# Patient Record
Sex: FEMALE | Race: WHITE | NOT HISPANIC OR LATINO | Employment: OTHER | URBAN - METROPOLITAN AREA
[De-identification: names, ages, dates, MRNs, and addresses within clinical notes are randomized per-mention and may not be internally consistent; named-entity substitution may affect disease eponyms.]

---

## 2017-01-16 ENCOUNTER — APPOINTMENT (OUTPATIENT)
Dept: BEHAVIORAL HEALTH | Facility: PHYSICIAN GROUP | Age: 57
End: 2017-01-16
Payer: COMMERCIAL

## 2017-02-15 ENCOUNTER — HOSPITAL ENCOUNTER (OUTPATIENT)
Dept: LAB | Facility: MEDICAL CENTER | Age: 57
End: 2017-02-15
Attending: ALLERGY & IMMUNOLOGY
Payer: COMMERCIAL

## 2017-02-15 PROCEDURE — 86317 IMMUNOASSAY INFECTIOUS AGENT: CPT

## 2017-02-15 PROCEDURE — 36415 COLL VENOUS BLD VENIPUNCTURE: CPT

## 2017-02-15 PROCEDURE — 83520 IMMUNOASSAY QUANT NOS NONAB: CPT

## 2017-02-15 PROCEDURE — 82785 ASSAY OF IGE: CPT

## 2017-02-15 PROCEDURE — 86003 ALLG SPEC IGE CRUDE XTRC EA: CPT | Mod: 91

## 2017-02-17 LAB — HAEM INFLU B IGG SER-MCNC: 1.3 UG/ML

## 2017-02-18 LAB
DEPRECATED S PNEUM 1 IGG SER-MCNC: 0.15 UG/ML
DEPRECATED S PNEUM12 IGG SER-MCNC: 0.34 UG/ML
DEPRECATED S PNEUM14 IGG SER-MCNC: 6.74 UG/ML
DEPRECATED S PNEUM19 IGG SER-MCNC: 0.39 UG/ML
DEPRECATED S PNEUM23 IGG SER-MCNC: 0.85 UG/ML
DEPRECATED S PNEUM3 IGG SER-MCNC: 1.46 UG/ML
DEPRECATED S PNEUM4 IGG SER-MCNC: 0.6 UG/ML
DEPRECATED S PNEUM5 IGG SER-MCNC: 0.95 UG/ML
DEPRECATED S PNEUM8 IGG SER-MCNC: 0.32 UG/ML
DEPRECATED S PNEUM9 IGG SER-MCNC: 0.14 UG/ML
S PNEUM DA 18C IGG SER-MCNC: 0.56 UG/ML
S PNEUM DA 6B IGG SER-MCNC: 0.07 UG/ML
S PNEUM DA 7F IGG SER-MCNC: 2.92 UG/ML
S PNEUM DA 9V IGG SER-MCNC: 0.28 UG/ML
S PNEUM SEROTYPE IGG SER-IMP: NORMAL

## 2017-02-19 LAB
A ALTERNATA IGE QN: <0.1 KU/L
A FUMIGATUS IGE QN: <0.1 KU/L
BERMUDA GRASS IGE QN: <0.1 KU/L
BOXELDER IGE QN: <0.1 KU/L
C SPHAEROSPERMUM IGE QN: <0.1 KU/L
CAT DANDER IGE QN: <0.1 KU/L
CLAM IGE QN: <0.1 KU/L
CMN PIGWEED IGE QN: <0.1 KU/L
COMMON RAGWEED IGE QN: <0.1 KU/L
COTTONWOOD IGE QN: <0.1 KU/L
COW MILK IGE QN: 0.16 KU/L
CRAB IGE QN: <0.1 KU/L
D FARINAE IGE QN: <0.1 KU/L
D PTERONYSS IGE QN: <0.1 KU/L
DEPRECATED MISC ALLERGEN IGE RAST QL: NORMAL
DOG DANDER IGE QN: <0.1 KU/L
IGE SERPL-ACNC: 30 KU/L
LOBSTER IGE QN: <0.1 KU/L
M RACEMOSUS IGE QN: <0.1 KU/L
MOUSE EPITH IGE QN: <0.1 KU/L
MT JUNIPER IGE QN: <0.1 KU/L
MUGWORT IGE QN: <0.1 KU/L
OLIVE POLN IGE QN: <0.1 KU/L
OYSTER IGE QN: <0.1 KU/L
P NOTATUM IGE QN: <0.1 KU/L
PEANUT IGE QN: <0.1 KU/L
ROACH IGE QN: 0.18 KU/L
SALTWORT IGE QN: <0.1 KU/L
SCALLOP IGE QN: <0.1 KU/L
TIMOTHY IGE QN: <0.1 KU/L
WHITE ELM IGE QN: <0.1 KU/L
WHITE MULBERRY IGE QN: <0.1 KU/L
WHITE OAK IGE QN: <0.1 KU/L

## 2017-02-24 LAB — TRYPTASE SERPL-MCNC: 3.5 UG/L

## 2017-02-27 ENCOUNTER — TELEPHONE (OUTPATIENT)
Dept: MEDICAL GROUP | Facility: PHYSICIAN GROUP | Age: 57
End: 2017-02-27

## 2017-02-27 ENCOUNTER — OFFICE VISIT (OUTPATIENT)
Dept: MEDICAL GROUP | Facility: PHYSICIAN GROUP | Age: 57
End: 2017-02-27
Payer: COMMERCIAL

## 2017-02-27 VITALS
OXYGEN SATURATION: 98 % | TEMPERATURE: 96.9 F | HEIGHT: 65 IN | WEIGHT: 266 LBS | SYSTOLIC BLOOD PRESSURE: 112 MMHG | BODY MASS INDEX: 44.32 KG/M2 | HEART RATE: 76 BPM | RESPIRATION RATE: 16 BRPM | DIASTOLIC BLOOD PRESSURE: 64 MMHG

## 2017-02-27 DIAGNOSIS — R55 SYNCOPE, UNSPECIFIED SYNCOPE TYPE: ICD-10-CM

## 2017-02-27 DIAGNOSIS — E78.1 HYPERTRIGLYCERIDEMIA: ICD-10-CM

## 2017-02-27 DIAGNOSIS — G44.229 CHRONIC TENSION-TYPE HEADACHE, NOT INTRACTABLE: ICD-10-CM

## 2017-02-27 DIAGNOSIS — R51.9 FACIAL PAIN: ICD-10-CM

## 2017-02-27 DIAGNOSIS — R51.9 LT FACIAL PAIN: ICD-10-CM

## 2017-02-27 DIAGNOSIS — E55.9 VITAMIN D DEFICIENCY DISEASE: ICD-10-CM

## 2017-02-27 PROCEDURE — G8432 DEP SCR NOT DOC, RNG: HCPCS | Performed by: INTERNAL MEDICINE

## 2017-02-27 PROCEDURE — 3014F SCREEN MAMMO DOC REV: CPT | Performed by: INTERNAL MEDICINE

## 2017-02-27 PROCEDURE — G8419 CALC BMI OUT NRM PARAM NOF/U: HCPCS | Performed by: INTERNAL MEDICINE

## 2017-02-27 PROCEDURE — 99214 OFFICE O/P EST MOD 30 MIN: CPT | Mod: 25 | Performed by: INTERNAL MEDICINE

## 2017-02-27 PROCEDURE — 4004F PT TOBACCO SCREEN RCVD TLK: CPT | Performed by: INTERNAL MEDICINE

## 2017-02-27 PROCEDURE — 3017F COLORECTAL CA SCREEN DOC REV: CPT | Performed by: INTERNAL MEDICINE

## 2017-02-27 PROCEDURE — G8484 FLU IMMUNIZE NO ADMIN: HCPCS | Performed by: INTERNAL MEDICINE

## 2017-02-27 PROCEDURE — 96372 THER/PROPH/DIAG INJ SC/IM: CPT | Performed by: INTERNAL MEDICINE

## 2017-02-27 RX ORDER — KETOROLAC TROMETHAMINE 30 MG/ML
60 INJECTION, SOLUTION INTRAMUSCULAR; INTRAVENOUS ONCE
Status: COMPLETED | OUTPATIENT
Start: 2017-02-27 | End: 2017-02-27

## 2017-02-27 RX ADMIN — KETOROLAC TROMETHAMINE 60 MG: 30 INJECTION, SOLUTION INTRAMUSCULAR; INTRAVENOUS at 16:25

## 2017-02-27 NOTE — PATIENT INSTRUCTIONS
1. Have xray of face.    2. Toradol today.    3. EKG today; ultrasound of neck, echocardiogram.    4. Have recheck of cholesterol in the next few months.    5. Follow up in 2 months.

## 2017-02-28 NOTE — ASSESSMENT & PLAN NOTE
Patient is a 56-year-old female who comes today for follow-up. She notes that one month ago she had an episode of syncope at home. It was bedtime and the patient had taken her Seroquel. She relates she left something in her kitchen and she got up from a sitting position and began walking towards the kitchen. She began to feel dizzy when she stood up and started walking. Patient then knows that she passed out, striking her face on the floor. She came to a few minutes later. She had the onset of left-sided facial pain. Patient was not seen at that time. She didn't have any loss of control of bowel or bladder. She notes that she's had these episodes of syncope always with presyncopal symptoms of dizziness episodically over the years. Patient did have some left-sided chest pain where she thought she heard her rib and she went into the emergency room a few days later. There she had an EKG and chest x-ray by her report.    Patient and I discussed this episode of syncope. She had an EKG today which overall show sinus rhythm. She's never had carotid ultrasound of her neck and I think that's a reasonable check in somebody who has had several episodes of passing out. I also do an echocardiogram. Overall her episodes do not seem to be consistent with cardiogenic syncope but will check the studies.

## 2017-02-28 NOTE — PROGRESS NOTES
Chief Complaint   Patient presents with   • Headache     fv headache depression   • Syncope     L side facial injury       HISTORY OF PRESENT ILLNESS: Patient is a 56 y.o. female established patient who presents today to be seen for acute and chronic issues.    Syncope  Patient is a 56-year-old female who comes today for follow-up. She notes that one month ago she had an episode of syncope at home. It was bedtime and the patient had taken her Seroquel. She relates she left something in her kitchen and she got up from a sitting position and began walking towards the kitchen. She began to feel dizzy when she stood up and started walking. Patient then knows that she passed out, striking her face on the floor. She came to a few minutes later. She had the onset of left-sided facial pain. Patient was not seen at that time. She didn't have any loss of control of bowel or bladder. She notes that she's had these episodes of syncope always with presyncopal symptoms of dizziness episodically over the years. Patient did have some left-sided chest pain where she thought she heard her rib and she went into the emergency room a few days later. There she had an EKG and chest x-ray by her report.    Patient and I discussed this episode of syncope. She had an EKG today which overall show sinus rhythm. She's never had carotid ultrasound of her neck and I think that's a reasonable check in somebody who has had several episodes of passing out. I also do an echocardiogram. Overall her episodes do not seem to be consistent with cardiogenic syncope but will check the studies.    Lt facial pain  Since her fall a month ago, the patient has had left-sided over her cheek bone. She notes it is  to touch. She's not had any problems with vision changes but she does note difficulty with chronic headaches since that time. Initially it was, order a x-ray of her face but was informed by x-ray tech that CT scan is the best option. Patient  also has an appointment on Monday with her ENT. In the meantime we have given her Toradol injection for pain.    Hypertriglyceridemia  Patient's most recent labs show very elevated triglycerides over 400. Patient notes this is very unusual for her. She's been watching her diet will recheck labs.    Vitamin D deficiency disease  Patient has vitamin D deficiency on recent labs. She has started on high-dose vitamin D.      Patient Active Problem List    Diagnosis Date Noted   • Syncope 02/27/2017   • Lt facial pain 02/27/2017   • Hypertriglyceridemia 12/20/2016   • Bilateral hand pain 05/24/2016   • Bilateral knee pain 01/27/2016   • Chronic maxillary sinusitis 06/10/2015   • Vitamin D deficiency disease 06/10/2015   • Fatigue 06/04/2015   • Left arm pain 01/06/2015   • Obesity 08/14/2014   • Shortness of breath 02/20/2014   • Tobacco abuse 02/20/2014   • Heart palpitations 02/12/2014   • Lumbar radiculopathy 12/05/2013   • Chronic headache 05/09/2013   • Breast cancer, left breast (CMS-HCC) 12/31/2012   • Pulmonary hypertension (CMS-HCC) 03/20/2012   • Iron deficiency anemia 03/20/2012   • Gastro-esophageal reflux 03/20/2012   • Fibromyalgia 11/18/2011   • Chronic pain 11/18/2011   • Neuropathy (CMS-HCC) 11/18/2011   • Depression 11/18/2011   • Anxiety 11/18/2011   • Insomnia 11/18/2011       Allergies:Shellfish allergy    Current Outpatient Prescriptions Ordered in [facility]   Medication Sig Dispense Refill   • rizatriptan (MAXALT) 10 MG tablet TAKE 1 TAB BY MOUTH ONCE AS NEEDED FOR MIGRAINE FOR UP TO 1 DOSE. MAY REPEAT IN 2 HOURS IF NEEDED 90 Tab 0   • ergocalciferol (DRISDOL) 97110 UNIT capsule Take 1 Cap by mouth every 7 days. 12 Cap 0   • venlafaxine XR (EFFEXOR XR) 75 MG CAPSULE SR 24 HR Take 1 Cap by mouth every day. 30 Cap 3   • venlafaxine (EFFEXOR-XR) 150 MG extended-release capsule Take 1 Cap by mouth every day. 30 Cap 3   • alprazolam (XANAX XR) 1 MG XR tablet TAKE 1 TO 2 TABS BY MOUTH 2 TIMES A DAY AS NEEDED;  "no more than 3mg per day 90 Tab 3   • meloxicam (MOBIC) 15 MG tablet Take 1 Tab by mouth every day. 90 Tab 1   • Diclofenac Sodium 1 % Gel Apply 2gm up to 4 times per day if needed for back pain 1 Tube 3   • Milnacipran HCl 50 MG Tab Take 50 mg by mouth 2 Times a Day. 60 Tab 3   • quetiapine (SEROQUEL) 50 MG tablet TAKE 3-4 TABS BY MOUTH AT BEDTIME AS NEEDED. 120 Tab 3   • divalproex (DEPAKOTE) 125 MG EC tablet Take 125mg in the am and 250mg in the pm by mouth 270 Tab 1   • diclofenac EC (VOLTAREN) 25 MG Tablet Delayed Response Take 25 mg by mouth 2 times a day.     • anastrozole (ARIMIDEX) 1 MG Tab Take 1 mg by mouth 2 Times a Day.     • Milnacipran HCl 50 MG Tab Take  by mouth.     • fluticasone (FLONASE) 50 MCG/ACT nasal spray Spray 2 Sprays in nose every day. 16 g 11   • aspirin (ASA) 325 MG TABS Take 325 mg by mouth as needed. weekly      • albuterol (VENTOLIN OR PROVENTIL) 108 (90 BASE) MCG/ACT Aero Soln inhalation aerosol Inhale 2 Puffs by mouth every 6 hours as needed for Shortness of Breath. 8.5 g 1   • ondansetron (ZOFRAN ODT) 8 MG TBDP Take 8 mg by mouth every 8 hours as needed.       No current Epic-ordered facility-administered medications on file.       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 pt unsure   • Other specified disorder of intestines    • Pneumonia 01/2012   • Breath shortness      occasionally   • Pain 6/14/12     3/10 stomach   • Chronic pain 11/18/2011   • Breast mass, left    • Depression 11/18/2011       Social History   Substance Use " "Topics   • Smoking status: Current Some Day Smoker -- 0.10 packs/day for 8 years     Types: Cigarettes   • Smokeless tobacco: Never Used      Comment: 1/2 pk a day on and off 10 yrs, 1 cigarette daily   • Alcohol Use: 0.0 oz/week     0 Standard drinks or equivalent per week      Comment: 5 a month, social       Family Status   Relation Status Death Age   • Father  53     MI in late 40's.   from MI at age 53.   • Mother Alive      Family History   Problem Relation Age of Onset   • Heart Attack Father    • Cancer Maternal Grandfather      leukemia   • Cancer Other      leukemia- cousin   • Diabetes Maternal Uncle    • Diabetes Maternal Uncle    • Hypertension Mother        ROS:  Review of Systems   Constitutional: Negative for fever and malaise/fatigue.   HENT: positive for facial pain  Respiratory: Negative for cough  Cardiovascular: Negative for chest pain  Gastrointestinal: Negative for nausea, vomiting and abdominal pain.  Musculoskeletal: positive for back and neck pain  All other systems reviewed and are negative except as in HPI.      Exam:  Blood pressure 112/64, pulse 76, temperature 36.1 °C (96.9 °F), resp. rate 16, height 1.651 m (5' 5\"), weight 120.657 kg (266 lb), last menstrual period 2010, SpO2 98 %.  General:  Morbidly obese female in NAD  HEENT: positive for tenderness with palpation over her left zygomatic arch.  Neck: Supple without JVD   Pulmonary: Clear to ausculation and percussion.  Normal effort. No rales, ronchi, or wheezing.  Cardiovascular: Regular rate and rhythm without murmur. Carotid and radial pulses are intact and equal bilaterally.  Extremities: no clubbing, cyanosis, or edema.  Neuro: CN 2-12 grossly intact, strength 5/5 in upper and lower extremities bilaterally, sensation intact to light touch, Rhomberg negative, gait intact        Assessment/Plan:  1. Syncope, unspecified syncope type  CAROTID DUPLEX    ECHOCARDIOGRAM COMP W/O CONT    Uncontrolled, will check " echocardiogram, ultrasound of neck.   2. Hypertriglyceridemia      Uncontrolled, will recheck labs   3. Vitamin D deficiency disease      Uncontrolled, has started vitamin D   4. Chronic tension-type headache, not intractable  ketorolac (TORADOL) injection 60 mg    Uncontrolled, will try Toradol   5. Lt facial pain      Uncontrolled, will check CT     Please note that this dictation was created using voice recognition software. I have made every reasonable attempt to correct obvious errors, but I expect that there are errors of grammar and possibly content that I did not discover before finalizing the note.

## 2017-02-28 NOTE — ASSESSMENT & PLAN NOTE
Since her fall a month ago, the patient has had left-sided over her cheek bone. She notes it is  to touch. She's not had any problems with vision changes but she does note difficulty with chronic headaches since that time. Initially it was, order a x-ray of her face but was informed by x-ray tech that CT scan is the best option. Patient also has an appointment on Monday with her ENT. In the meantime we have given her Toradol injection for pain.

## 2017-02-28 NOTE — TELEPHONE ENCOUNTER
Please let the patient now I spoke with our x-ray tech. She really needs to have a CT of her facial bones. I will order this noncontrast CT. She can also wait and discuss this with ENT on Monday.

## 2017-02-28 NOTE — ASSESSMENT & PLAN NOTE
Patient's most recent labs show very elevated triglycerides over 400. Patient notes this is very unusual for her. She's been watching her diet will recheck labs.

## 2017-03-02 ENCOUNTER — TELEPHONE (OUTPATIENT)
Dept: MEDICAL GROUP | Facility: PHYSICIAN GROUP | Age: 57
End: 2017-03-02

## 2017-03-02 ENCOUNTER — APPOINTMENT (OUTPATIENT)
Dept: BEHAVIORAL HEALTH | Facility: PHYSICIAN GROUP | Age: 57
End: 2017-03-02
Payer: COMMERCIAL

## 2017-03-03 NOTE — TELEPHONE ENCOUNTER
1. Caller Name: Mikayla                                     Call Back Number: [phone] (home)       Patient approves a detailed voicemail message: N\A    Mikayla requesting CT order be faxed to [facility]. Done

## 2017-03-26 DIAGNOSIS — F32.A DEPRESSION, UNSPECIFIED DEPRESSION TYPE: ICD-10-CM

## 2017-03-26 DIAGNOSIS — G43.109 MIGRAINE WITH AURA AND WITHOUT STATUS MIGRAINOSUS, NOT INTRACTABLE: ICD-10-CM

## 2017-03-27 RX ORDER — MILNACIPRAN HYDROCHLORIDE 50 MG/1
TABLET, FILM COATED ORAL
Qty: 60 TAB | Refills: 0 | Status: SHIPPED | OUTPATIENT
Start: 2017-03-27 | End: 2017-04-23 | Stop reason: SDUPTHER

## 2017-03-27 RX ORDER — DIVALPROEX SODIUM 125 MG/1
TABLET, DELAYED RELEASE ORAL
Refills: 0 | OUTPATIENT
Start: 2017-03-27

## 2017-03-27 RX ORDER — DIVALPROEX SODIUM 125 MG/1
TABLET, DELAYED RELEASE ORAL
Qty: 270 TAB | Refills: 0 | Status: SHIPPED | OUTPATIENT
Start: 2017-03-27 | End: 2017-05-16

## 2017-03-27 NOTE — TELEPHONE ENCOUNTER
Was the patient seen in the last year in this department? Yes     Does patient have an active prescription for medications requested? No     Received Request Via: Pharmacy      Pt met protocol?: Yes, depression and chronic pain discussed at appt last month. Aniket not on pt's med list.

## 2017-04-06 NOTE — TELEPHONE ENCOUNTER
This has been declined at this time as per the note she was not to exceed 2 additional refills be before May 1. Please verify this with patient.

## 2017-04-24 RX ORDER — MILNACIPRAN HYDROCHLORIDE 50 MG/1
TABLET, FILM COATED ORAL
Qty: 60 TAB | Refills: 2 | Status: SHIPPED | OUTPATIENT
Start: 2017-04-24 | End: 2017-05-16 | Stop reason: SDUPTHER

## 2017-04-25 RX ORDER — QUETIAPINE FUMARATE 50 MG/1
TABLET, FILM COATED ORAL
Qty: 120 TAB | Refills: 0 | Status: SHIPPED | OUTPATIENT
Start: 2017-04-25 | End: 2017-05-16

## 2017-04-25 RX ORDER — ALPRAZOLAM 1 MG/1
TABLET, EXTENDED RELEASE ORAL
Refills: 1
Start: 2017-04-25

## 2017-05-11 RX ORDER — VENLAFAXINE HYDROCHLORIDE 150 MG/1
CAPSULE, EXTENDED RELEASE ORAL
Qty: 90 CAP | Refills: 0 | Status: SHIPPED | OUTPATIENT
Start: 2017-05-11 | End: 2017-05-16

## 2017-05-11 NOTE — TELEPHONE ENCOUNTER
Was the patient seen in the last year in this department? Yes     Does patient have an active prescription for medications requested? No     Received Request Via: Pharmacy      Pt met protocol?: Yes, last ov 2/27/17

## 2017-05-16 ENCOUNTER — OFFICE VISIT (OUTPATIENT)
Dept: MEDICAL GROUP | Facility: PHYSICIAN GROUP | Age: 57
End: 2017-05-16
Payer: COMMERCIAL

## 2017-05-16 ENCOUNTER — TELEPHONE (OUTPATIENT)
Dept: MEDICAL GROUP | Facility: PHYSICIAN GROUP | Age: 57
End: 2017-05-16

## 2017-05-16 VITALS
OXYGEN SATURATION: 95 % | DIASTOLIC BLOOD PRESSURE: 84 MMHG | HEIGHT: 65 IN | HEART RATE: 102 BPM | WEIGHT: 259 LBS | RESPIRATION RATE: 16 BRPM | SYSTOLIC BLOOD PRESSURE: 122 MMHG | TEMPERATURE: 97.5 F | BODY MASS INDEX: 43.15 KG/M2

## 2017-05-16 DIAGNOSIS — M54.16 LUMBAR RADICULOPATHY: ICD-10-CM

## 2017-05-16 DIAGNOSIS — F33.2 SEVERE EPISODE OF RECURRENT MAJOR DEPRESSIVE DISORDER, WITHOUT PSYCHOTIC FEATURES (HCC): ICD-10-CM

## 2017-05-16 DIAGNOSIS — E78.1 HYPERTRIGLYCERIDEMIA: ICD-10-CM

## 2017-05-16 DIAGNOSIS — E66.01 MORBID OBESITY WITH BMI OF 40.0-44.9, ADULT (HCC): ICD-10-CM

## 2017-05-16 DIAGNOSIS — F41.9 ANXIETY: ICD-10-CM

## 2017-05-16 DIAGNOSIS — J32.0 CHRONIC MAXILLARY SINUSITIS: ICD-10-CM

## 2017-05-16 DIAGNOSIS — M79.7 FIBROMYALGIA: ICD-10-CM

## 2017-05-16 DIAGNOSIS — F51.01 PRIMARY INSOMNIA: ICD-10-CM

## 2017-05-16 DIAGNOSIS — G44.229 CHRONIC TENSION-TYPE HEADACHE, NOT INTRACTABLE: ICD-10-CM

## 2017-05-16 DIAGNOSIS — E55.9 VITAMIN D DEFICIENCY DISEASE: ICD-10-CM

## 2017-05-16 PROCEDURE — 3017F COLORECTAL CA SCREEN DOC REV: CPT | Performed by: NURSE PRACTITIONER

## 2017-05-16 PROCEDURE — 3014F SCREEN MAMMO DOC REV: CPT | Performed by: NURSE PRACTITIONER

## 2017-05-16 PROCEDURE — 4004F PT TOBACCO SCREEN RCVD TLK: CPT | Performed by: NURSE PRACTITIONER

## 2017-05-16 PROCEDURE — G8419 CALC BMI OUT NRM PARAM NOF/U: HCPCS | Performed by: NURSE PRACTITIONER

## 2017-05-16 PROCEDURE — 99214 OFFICE O/P EST MOD 30 MIN: CPT | Performed by: NURSE PRACTITIONER

## 2017-05-16 PROCEDURE — G8432 DEP SCR NOT DOC, RNG: HCPCS | Performed by: NURSE PRACTITIONER

## 2017-05-16 RX ORDER — MELOXICAM 15 MG/1
15 TABLET ORAL DAILY
Qty: 90 TAB | Refills: 1 | Status: SHIPPED | OUTPATIENT
Start: 2017-05-16 | End: 2017-08-16

## 2017-05-16 RX ORDER — ALPRAZOLAM 1 MG/1
TABLET, EXTENDED RELEASE ORAL
Qty: 90 TAB | Refills: 1 | Status: SHIPPED
Start: 2017-05-16 | End: 2017-08-16 | Stop reason: SDUPTHER

## 2017-05-16 RX ORDER — TRAZODONE HYDROCHLORIDE 50 MG/1
TABLET ORAL
Qty: 60 TAB | Refills: 1 | Status: SHIPPED | OUTPATIENT
Start: 2017-05-16 | End: 2017-07-14 | Stop reason: SDUPTHER

## 2017-05-16 RX ORDER — RIZATRIPTAN BENZOATE 10 MG/1
TABLET ORAL
Qty: 60 TAB | Refills: 0 | Status: SHIPPED | OUTPATIENT
Start: 2017-05-16 | End: 2017-08-16 | Stop reason: SDUPTHER

## 2017-05-16 ASSESSMENT — PATIENT HEALTH QUESTIONNAIRE - PHQ9: CLINICAL INTERPRETATION OF PHQ2 SCORE: 0

## 2017-05-16 NOTE — MR AVS SNAPSHOT
"        Mikayla Rioschoco   2017 11:20 AM   Office Visit   MRN: [id number]    Department:  [facility]   Dept Phone:  [phone]    Description:  Female : 1960   Provider:  YOSEPH Prince           Reason for Visit     Medication Refill fv meds    Sinus Problem x 3 weeks      Allergies as of 2017     Allergen Noted Reactions    Shellfish Allergy 2010   Anaphylaxis    SHRIMP ONLY, not anything else, not iodine.      You were diagnosed with     Vitamin D deficiency disease   [182432]       Hypertriglyceridemia   [373143]       Anxiety   [428745]       Severe episode of recurrent major depressive disorder, without psychotic features (CMS-HCC)   [3116904]       Primary insomnia   [062393]       Severe episode of recurrent major depressive disorder, without psychotic features (CMS-HCC)   [8874662]   Uncontrolled, will try Effexor    Primary insomnia   [635520]   Uncontrolled, referred to psychiatry    Severe episode of recurrent major depressive disorder, without psychotic features (CMS-HCC)   [3462380]   Uncontrolled, patient referred to psychiatry    Lumbar radiculopathy   [335142]   Uncontrolled, patient to see Dr. Orozco    Chronic tension-type headache, not intractable   [663979]         Vital Signs     Blood Pressure Pulse Temperature Respirations Height Weight    122/84 mmHg 102 36.4 °C (97.5 °F) 16 1.651 m (5' 5\") 117.482 kg (259 lb)    Body Mass Index Oxygen Saturation Last Menstrual Period Smoking Status          43.10 kg/m2 95% 2010 Current Some Day Smoker        Basic Information     Date Of Birth Sex Race Ethnicity Preferred Language    1960 Female White Non- English      Problem List              ICD-10-CM Priority Class Noted - Resolved    Fibromyalgia M79.7   2011 - Present    Chronic pain G89.29   2011 - Present    Neuropathy (CMS-HCC) G62.9   2011 - Present    Depression F32.9   2011 - Present    Anxiety F41.9   " 11/18/2011 - Present    Insomnia G47.00   11/18/2011 - Present    Pulmonary hypertension (CMS-HCC) I27.2   3/20/2012 - Present    Iron deficiency anemia D50.9   3/20/2012 - Present    Gastro-esophageal reflux K21.9   3/20/2012 - Present    Breast cancer, left breast (CMS-HCC) C50.912   12/31/2012 - Present    Chronic headache R51   5/9/2013 - Present    Lumbar radiculopathy M54.16   12/5/2013 - Present    Heart palpitations R00.2   2/12/2014 - Present    Shortness of breath R06.02   2/20/2014 - Present    Tobacco abuse Z72.0   2/20/2014 - Present    Obesity E66.9   8/14/2014 - Present    Left arm pain M79.602   1/6/2015 - Present    Fatigue R53.83   6/4/2015 - Present    Chronic maxillary sinusitis J32.0   6/10/2015 - Present    Vitamin D deficiency disease E55.9   6/10/2015 - Present    Bilateral knee pain M25.561, M25.562   1/27/2016 - Present    Bilateral hand pain M79.641, M79.642   5/24/2016 - Present    Hypertriglyceridemia E78.1   12/20/2016 - Present    Syncope R55   2/27/2017 - Present    Lt facial pain R51   2/27/2017 - Present      Health Maintenance        Date Due Completion Dates    IMM DTaP/Tdap/Td Vaccine (1 - Tdap) 4/20/1980 ---    IMM PNEUMOCOCCAL 19-64 (ADULT) MEDIUM RISK SERIES (1 of 1 - PPSV23) 4/20/1980 ---    PAP SMEAR 11/1/2015 11/1/2012    MAMMOGRAM 3/21/2017 3/21/2016, 1/2/2014, 11/14/2012    COLONOSCOPY 6/21/2022 6/21/2012, 6/6/2012 (Done)    Override on 6/6/2012: Done            Current Immunizations     Influenza TIV (IM) 10/1/2012, 10/9/2010  9:52 AM      Below and/or attached are the medications your provider expects you to take. Review all of your home medications and newly ordered medications with your provider and/or pharmacist. Follow medication instructions as directed by your provider and/or pharmacist. Please keep your medication list with you and share with your provider. Update the information when medications are discontinued, doses are changed, or new medications (including  over-the-counter products) are added; and carry medication information at all times in the event of emergency situations     Allergies:  SHELLFISH ALLERGY - Anaphylaxis               Medications  Valid as of: May 16, 2017 - 11:45 AM    Generic Name Brand Name Tablet Size Instructions for use    Albuterol Sulfate (Aero Soln) albuterol 108 (90 BASE) MCG/ACT Inhale 2 Puffs by mouth every 6 hours as needed for Shortness of Breath.        ALPRAZolam (TABLET SR 24 HR) XANAX XR 1 MG TAKE 1 TO 2 TABS BY MOUTH 2 TIMES A DAY AS NEEDED; no more than 3mg per day        Aspirin (Tab)  MG Take 325 mg by mouth as needed. weekly         Diclofenac Sodium (Gel) Diclofenac Sodium 1 % Apply 2gm up to 4 times per day if needed for back pain        Fluticasone Propionate (Suspension) FLONASE 50 MCG/ACT Spray 2 Sprays in nose every day.        Meloxicam (Tab) MOBIC 15 MG Take 1 Tab by mouth every day.        Milnacipran HCl (Tab) SAVELLA 50 MG TAKE 1 TAB (50 MG) BY MOUTH 2 TIMES A DAY.        Rizatriptan Benzoate (Tab) MAXALT 10 MG TAKE 1 TAB BY MOUTH ONCE AS NEEDED FOR MIGRAINE FOR UP TO 1 DOSE. MAY REPEAT IN 2 HOURS IF NEEDED        TraZODone HCl (Tab) DESYREL 50 MG Take 1-2 at bedtime        .                 Medicines prescribed today were sent to:     [facility] #[id number] - [place], [place] - [address] [address]    Phone: [phone] Fax: [phone]    Open 24 Hours?: No      Medication refill instructions:       If your prescription bottle indicates you have medication refills left, it is not necessary to call your provider’s office. Please contact your pharmacy and they will refill your medication.    If your prescription bottle indicates you do not have any refills left, you may request refills at any time through one of the following ways: The online Push[employer] system (except Urgent Care), by calling your provider’s office, or by asking your pharmacy to contact your provider’s office with a  refill request. Medication refills are processed only during regular business hours and may not be available until the next business day. Your provider may request additional information or to have a follow-up visit with you prior to refilling your medication.   *Please Note: Medication refills are assigned a new Rx number when refilled electronically. Your pharmacy may indicate that no refills were authorized even though a new prescription for the same medication is available at the pharmacy. Please request the medicine by name with the pharmacy before contacting your provider for a refill.        Your To Do List     Future Labs/Procedures Complete By Expires    COMP METABOLIC PANEL  As directed 5/16/2018    LIPID PROFILE  As directed 5/16/2018    VITAMIN D,25 HYDROXY  As directed 5/16/2018      Referral     A referral request has been sent to our patient care coordination department. Please allow 3-5 business days for us to process this request and contact you either by phone or mail. If you do not hear from us by the 5th business day, please call us at [phone].        Instructions    Zyrtec 10 mg daily--after 1-2 weeks can go up to 2 pills daily, take these at night. Need to be very consistent with the flonase, nasal saline 2 sprays to each nostril 3-4 times    Labs before you see me in 3 months    New referral to psychiatry    Let's have you try Trazodone for sleep, 1-2 pills at bedtime            MyChart Access Code: Activation code not generated  Current MyChart Status: Active          Quit Tobacco Information     Do you want to quit using tobacco?    Quitting tobacco decreases risks of cancer, heart and lung disease, increases life expectancy, improves sense of taste and smell, and increases spending money, among other benefits.    If you are thinking about quitting, we can help.  • [facility] Quit Tobacco Program: 396.203.1478  o Program occurs weekly for four weeks and includes pharmacist consultation on  products to support quitting smoking or chewing tobacco. A provider referral is needed for pharmacist consultation.  • Tobacco Users Help Hotline: 9-800QUIT-NOW (912-8588) or https://[place].quitlogix.org/  o Free, confidential telephone and online coaching for [place] residents. Sessions are designed on a schedule that is convenient for you. Eligible clients receive free nicotine replacement therapy.  • Nationally: www.smokefree.gov  o Information and professional assistance to support both immediate and long-term needs as you become, and remain, a non-smoker. Smokefree.gov allows you to choose the help that best fits your needs.

## 2017-05-16 NOTE — ASSESSMENT & PLAN NOTE
This is a chronic condition, currently fairly controlled on current regimen. She has been on Depakote 125 mg twice a day to help not only for depression but her chronic migraines. She has also been on Effexor ,000,000 g daily. She tells me her neurologist has told her to stop taking his medications, but she is not clear as to why. We will request those records. At this time the only thing she takes for her anxiety and depression as Xanax XR. She does need refills at this time. I did discuss with her that treating depression and anxiety alone a benzodiazepine as inappropriate we do need to have her start an SSRI at some point. She has been tried on several SSRIs in the past which were not effective. I will wait to get the records from her neurologist office to find out why he did not want her on Effexor. We will also refer her to psychiatry for ongoing treatment as well. She does continue to deny suicidal and homicidal ideations, hallucinations, racing thoughts and flights of ideas.

## 2017-05-16 NOTE — ASSESSMENT & PLAN NOTE
Chronic in nature, last level was 16. She is encouraged to continue on vitamin D supplementation and we will recheck labs in 3 months before she follows up with me at that time.

## 2017-05-16 NOTE — PATIENT INSTRUCTIONS
Zyrtec 10 mg daily--after 1-2 weeks can go up to 2 pills daily, take these at night. Need to be very consistent with the flonase, nasal saline 2 sprays to each nostril 3-4 times    Labs before you see me in 3 months    New referral to psychiatry    Let's have you try Trazodone for sleep, 1-2 pills at bedtime

## 2017-05-16 NOTE — ASSESSMENT & PLAN NOTE
Patient reports chronic headaches, migraines. She is followed by neurology. She does need refills on her Maxalt. This was called in for her.

## 2017-05-16 NOTE — ASSESSMENT & PLAN NOTE
Patient reports a history of chronic sinusitis in which she has seen ENT in the past. She also has a past history of sinus surgery which she states ultimately did not improve her condition. She states she gets a sinus infection at least once a month. Today she describes her symptoms as nasal congestion, sinus pressure and clear sinus drainage. She denies fever, malaise, myalgia, ear pain.  Upon examination it was noted her bilateral TMs are retracted and her nasal mucosa were erythematous and edematous. I did discuss with her that because her nasal drainage was clear and she did not have fever or any other systemic symptoms, this is likely not sinusitis but probably uncontrolled allergies. She is also complaining of significantly itchy ears and eyes.  I have encouraged her to start a second generation antihistamine such as Zyrtec. I encouraged her to take 1-2 pills at nighttime along with Flonase, one spray to each nostril twice a day, or she can take 2 sprays to each nostril once a day. She is also encouraged to use nasal saline, 2 sprays each nostril up to 3-4 times a day as needed for congestion and nasal rinsing.

## 2017-05-16 NOTE — ASSESSMENT & PLAN NOTE
This is a chronic condition, uncontrolled. Her last lipid profile showed a triglyceride level over 400. I asked her to continue watching her diet, low-fat, low-cholesterol was encouraged. We will recheck her labs in 3 months before she follows up with me at that time. Discussed with her she may potentially need statin therapy.

## 2017-05-16 NOTE — ASSESSMENT & PLAN NOTE
This is a chronic condition, stable. She has been taking Savella with fair results. She does need refills, this is has been called in for her. She is to take this as prescribed. She is to follow-up with me in 3 months.

## 2017-05-16 NOTE — ASSESSMENT & PLAN NOTE
This is a chronic condition, currently uncontrolled. She states her neurologist who treats her chronic migraines has taken her off of the Seroquel which was working for her. She is struggling with falling asleep and staying asleep. We did discuss at length sleep hygiene.  Discussed various topics of sleep hygiene: Encouraged to keep room dark and cool. Avoid caffeinated foods and/or beverages after noon. Avoid heavy meals and exercise 3-4 hours before bedtime. Avoid alcohol. Avoid electronic devices such as smart phones, tablets, TV, computers 3-4 hours prior to bedtime.   She has never been tried on trazodone. I did discuss with her the risks, benefits and side effects of this medication. We will start her on 50 mg at bedtime. She can go up to 2 pills if needed.

## 2017-05-16 NOTE — PROGRESS NOTES
Chief Complaint   Patient presents with   • Medication Refill     fv meds   • Sinus Problem     x 3 weeks         This is a 57 y.o.female patient that presents today with the following: Some scattered new PCP, discuss acute and chronic conditions, review labs    Vitamin D deficiency disease  Chronic in nature, last level was 16. She is encouraged to continue on vitamin D supplementation and we will recheck labs in 3 months before she follows up with me at that time.    Fibromyalgia  This is a chronic condition, stable. She has been taking Savella with fair results. She does need refills, this is has been called in for her. She is to take this as prescribed. She is to follow-up with me in 3 months.    Insomnia  This is a chronic condition, currently uncontrolled. She states her neurologist who treats her chronic migraines has taken her off of the Seroquel which was working for her. She is struggling with falling asleep and staying asleep. We did discuss at length sleep hygiene.  Discussed various topics of sleep hygiene: Encouraged to keep room dark and cool. Avoid caffeinated foods and/or beverages after noon. Avoid heavy meals and exercise 3-4 hours before bedtime. Avoid alcohol. Avoid electronic devices such as smart phones, tablets, TV, computers 3-4 hours prior to bedtime.   She has never been tried on trazodone. I did discuss with her the risks, benefits and side effects of this medication. We will start her on 50 mg at bedtime. She can go up to 2 pills if needed.    Hypertriglyceridemia  This is a chronic condition, uncontrolled. Her last lipid profile showed a triglyceride level over 400. I asked her to continue watching her diet, low-fat, low-cholesterol was encouraged. We will recheck her labs in 3 months before she follows up with me at that time. Discussed with her she may potentially need statin therapy.    Depression  This is a chronic condition, currently fairly controlled on current regimen. She has  been on Depakote 125 mg twice a day to help not only for depression but her chronic migraines. She has also been on Effexor  mg daily. She tells me her neurologist has told her to stop taking his medications, but she is not clear as to why. We will request those records. At this time the only thing she takes for her anxiety and depression as Xanax XR. She does need refills at this time. I did discuss with her that treating depression and anxiety alone a benzodiazepine as inappropriate we do need to have her start an SSRI at some point. She has been tried on several SSRIs in the past which were not effective. I will wait to get the records from her neurologist office to find out why he did not want her on Effexor. We will also refer her to psychiatry for ongoing treatment as well. She does continue to deny suicidal and homicidal ideations, hallucinations, racing thoughts and flights of ideas.    Chronic maxillary sinusitis  Patient reports a history of chronic sinusitis in which she has seen ENT in the past. She also has a past history of sinus surgery which she states ultimately did not improve her condition. She states she gets a sinus infection at least once a month. Today she describes her symptoms as nasal congestion, sinus pressure and clear sinus drainage. She denies fever, malaise, myalgia, ear pain.  Upon examination it was noted her bilateral TMs are retracted and her nasal mucosa were erythematous and edematous. I did discuss with her that because her nasal drainage was clear and she did not have fever or any other systemic symptoms, this is likely not sinusitis but probably uncontrolled allergies. She is also complaining of significantly itchy ears and eyes.  I have encouraged her to start a second generation antihistamine such as Zyrtec. I encouraged her to take 1-2 pills at nighttime along with Flonase, one spray to each nostril twice a day, or she can take 2 sprays to each nostril once a day. She  is also encouraged to use nasal saline, 2 sprays each nostril up to 3-4 times a day as needed for congestion and nasal rinsing.    Chronic headache  Patient reports chronic headaches, migraines. She is followed by neurology. She does need refills on her Maxalt. This was called in for her.    Anxiety  See additional notes on depression      No visits with results within 1 Month(s) from this visit.  Latest known visit with results is:    Hospital Outpatient Visit on 02/15/2017   Component Date Value   • Tryptase 02/15/2017 3.5    • Influ B Igg 02/15/2017 1.3    • Pneumococcal Ab Igg Sero* 02/15/2017 0.15    • Strep Pneumo Type 3 02/15/2017 1.46    • Strep Pneumo Serotype 4 02/15/2017 0.60    • Pneumo Serotype 5 02/15/2017 0.95    • Pneumo Seroty 6B 02/15/2017 0.07    • Pneumo Serotype 7F 02/15/2017 2.92    • Strep Pneumo Serotype 8 02/15/2017 0.32    • Pneumo Serotype 9N 02/15/2017 0.14    • Pneumo Serotype 9V 02/15/2017 0.28    • Pneumo Serotype 12F 02/15/2017 0.34    • Pneumococcal Ab Igg Sero* 02/15/2017 6.74    • Pneumo Serotype 18C 02/15/2017 0.56    • Pneumo Serotype 19F 02/15/2017 0.39    • Pneumo Serotype 23F 02/15/2017 0.85    • Pneumo Serotype Interp 02/15/2017 See Note    • D1 D Pteronyssinus 02/15/2017 <0.10    • D002 D Farinae Mite 02/15/2017 <0.10    • G2 Bermuda Grass 02/15/2017 <0.10    • M003 Aspergillus Fumigat* 02/15/2017 <0.10    • M006 Alternaria Tenius 02/15/2017 <0.10    • M001 Penicillium Notatum 02/15/2017 <0.10    • Mucor Racemosus 02/15/2017 <0.10    • W14 Pigweed 02/15/2017 <0.10    • Cockroach Allergen 02/15/2017 0.18    • Maple [place] 02/15/2017 <0.10    • [place] 02/15/2017 <0.10    • T6 [place] 02/15/2017 <0.10    • Mugwort 02/15/2017 <0.10    • W1 Ragweed Short 02/15/2017 <0.10    • Russian Thistle 02/15/2017 <0.10    • Sha Grass, Allergen 02/15/2017 <0.10    • T008 Elm American -White 02/15/2017 <0.10    • E001 Cat Hair-Dander Sta* 02/15/2017 <0.10    • T7 [place] White  "Tree 02/15/2017 <0.10    • E5 Dog Dander 02/15/2017 <0.10    • Mouse Epithelium Allergen 02/15/2017 <0.10    • Hormodendrum 02/15/2017 <0.10    • T70 [place] 02/15/2017 <0.10    • T9 [place] Tree 02/15/2017 <0.10    • F2 Milk 02/15/2017 0.16    • F013 Peanut 02/15/2017 <0.10    • Ige, Qn 02/15/2017 30    • Immunocap Score 02/15/2017 See Note    • Crab  F023 02/15/2017 <0.10    • Clam  F207 02/15/2017 <0.10    • Oyster IgE F290 02/15/2017 <0.10    • Scallop F338 IgE 02/15/2017 <0.10    • Lobster IgE F080 02/15/2017 <0.10          clinical course has been stable    Past Medical History   Diagnosis Date   • Heart murmur    • Arthritis      fibromyalgia   • Fibromyalgia 11/18/2011   • Neuropathy (CMS-HCC) 11/18/2011   • Anxiety 11/18/2011   • Syncope and collapse 3/20/2012   • Pulmonary hypertension (CMS-HCC) 3/20/2012   • Iron deficiency anemia 3/20/2012   • Gastro-esophageal reflux 3/20/2012   • Anesthesia      low bp coming out of anesthesia \"one time\"   • Bursitis of knee      left   • Cancer (CMS-HCC) 2005     squamous cell on nose   • Arrhythmia      ? pt unsure   • Other specified disorder of intestines    • Pneumonia 01/2012   • Breath shortness      occasionally   • Pain 6/14/12     3/10 stomach   • Chronic pain 11/18/2011   • Breast mass, left    • Depression 11/18/2011       Past Surgical History   Procedure Laterality Date   • Abdominal exploration  3/11/2010     Performed by MARIA G KHAN JR at SURGERY SAME DAY [facility]   • Hernia repair  3/15/2010     Performed by MARIA G KHAN JR at SURGERY [facility] ORS   • Flap graft  3/15/2010     Performed by MARIA G KHAN JR at SURGERY [facility] ORS   • Panniculectomy  3/15/2010     Performed by MARIA G KHAN JR at SURGERY [facility] ORS   • Gyn surgery  1984     tubal   • Other  04/2009     hernia repair,mesh removal after in aug.09   • Other  2005     bariatric surgery gastric bypass   • Other  06/09     bowel obstruction   • " Other  07/09     abscess removed abd.   • Other  08/09     pulled mesh out of hernia   • Irrigation & debridement general  4/12/2010     Performed by OZZIE WEINSTEIN at SURGERY [facility]S   • Abdominal exploration  10/7/2010     Performed by MARIA G KHAN JR at SURGERY [facility] ORS   • Gastric bypass laparoscopic  2005   • Colonoscopy  6/21/2012     Performed by PARAM VALDEZ at SURGERY [facility] ORS   • Gastroscopy  6/21/2012     Performed by PARAM VALDEZ at SURGERY [facility]S   • Breast biopsy  12/11/2012     Performed by Lizeth Ferreira M.D. at SURGERY SAME DAY [facility] ORS   • Breast biopsy  1/9/2013     Performed by Lizeth Ferreira M.D. at SURGERY SAME DAY [facility] ORS   • Axillary node dissection  1/9/2013     Performed by Lizeth Ferreira M.D. at SURGERY SAME DAY [facility] ORS   • Node biopsy  1/9/2013     Performed by Lizeth Ferreira M.D. at SURGERY SAME DAY [facility] ORS   • Cath placement  1/25/2013     Performed by Lizeth Ferreira M.D. at SURGERY SAME DAY [facility] ORS       Family History   Problem Relation Age of Onset   • Heart Attack Father    • Cancer Maternal Grandfather      leukemia   • Cancer Other      leukemia- cousin   • Diabetes Maternal Uncle    • Diabetes Maternal Uncle    • Hypertension Mother        Shellfish allergy    Current Outpatient Prescriptions Ordered in [facility]   Medication Sig Dispense Refill   • trazodone (DESYREL) 50 MG Tab Take 1-2 at bedtime 60 Tab 1   • rizatriptan (MAXALT) 10 MG tablet TAKE 1 TAB BY MOUTH ONCE AS NEEDED FOR MIGRAINE FOR UP TO 1 DOSE. MAY REPEAT IN 2 HOURS IF NEEDED 60 Tab 0   • alprazolam (XANAX XR) 1 MG XR tablet TAKE 1 TO 2 TABS BY MOUTH 2 TIMES A DAY AS NEEDED; no more than 3mg per day 90 Tab 1   • meloxicam (MOBIC) 15 MG tablet Take 1 Tab by mouth every day. 90 Tab 1   • Diclofenac Sodium 1 % Gel Apply 2gm up to 4 times per day if needed for back pain 1 Tube 3   • Milnacipran HCl (SAVELLA) 50 MG Tab TAKE 1 TAB (50 MG) BY MOUTH 2  "TIMES A DAY. 60 Tab 2   • albuterol (VENTOLIN OR PROVENTIL) 108 (90 BASE) MCG/ACT Aero Soln inhalation aerosol Inhale 2 Puffs by mouth every 6 hours as needed for Shortness of Breath. 8.5 g 1   • fluticasone (FLONASE) 50 MCG/ACT nasal spray Spray 2 Sprays in nose every day. 16 g 11   • aspirin (ASA) 325 MG TABS Take 325 mg by mouth as needed. weekly        No current Epic-ordered facility-administered medications on file.       Constitutional ROS: No unexpected change in weight, No weakness, No unexplained fevers, sweats, or chills  Pulmonary ROS: Positive per history of present illness  Cardiovascular ROS: No chest pain, No edema, No palpitations, Positive for hypertension, controlled  Gastrointestinal ROS: No abdominal pain, No nausea, vomiting, diarrhea, or constipation, no blood in stool  Musculoskeletal/Extremities ROS: Positive for chronic pain, fibromyalgia  Neurologic ROS: Normal development, No seizures, No weakness, Positive for headaches migraines, per history of present illness  Psychiatric ROS: Positive for depression and anxiety, per history of present illness    Physical exam:  /84 mmHg  Pulse 102  Temp(Src) 36.4 °C (97.5 °F)  Resp 16  Ht 1.651 m (5' 5\")  Wt 117.482 kg (259 lb)  BMI 43.10 kg/m2  SpO2 95%  LMP 03/18/2010  General Appearance: Middle-age female, alert, no distress, obese, well-groomed  Skin: Skin color, texture, turgor normal. No rashes or lesions.  Eyes: conjunctivae/corneas clear. PERRL, EOM's intact.   Ears: positive findings: R TM - retracted, L TM - retracted  Nose/Sinuses: positive findings: mucosa erythematous and swollen  Oropharynx: Lips, mucosa, and tongue normal. Teeth and gums normal. Oropharynx moist and without lesion  Lungs: negative findings: normal respiratory rate and rhythm, lungs clear to auscultation  Heart: negative. RRR without murmur, gallop, or rubs.  No ectopy.  Abdomen: Abdomen soft, non-tender. BS normal. No masses,  No " organomegaly  Musculoskeletal: negative findings: ROM of all joints is normal, strength normal, no deformities present  Neurologic: intact, oriented, mood appropriate, judgment intact. Cranial nerves II through XII grossly intact     Medical decision making/discussion: Patient will be due for routine fasting labs when she follows up with me in 3 months, these have been ordered. She is to continue on vitamin D supplementation and we will recheck with her labs in 3 months. I have referred her to psychiatry via telemedicine. Medications have been refilled, she is to take these as prescribed. She is to start new allergy regimen as discussed. We will try her on trazodone, 50 mg 1-2 tabs at bedtime as needed. She is to continue with sleep hygiene measures. We will request records from neurologist in [place]. She is to continue with healthy eating, regular physical activity and continued efforts towards weight loss.    Mikayla was seen today for medication refill and sinus problem.    Diagnoses and all orders for this visit:    Vitamin D deficiency disease  -     VITAMIN D,25 HYDROXY; Future    Hypertriglyceridemia  -     COMP METABOLIC PANEL; Future  -     LIPID PROFILE; Future    Anxiety  -     REFERRAL TO PSYCHIATRY    Severe episode of recurrent major depressive disorder, without psychotic features (CMS-HCC)  -     REFERRAL TO PSYCHIATRY  -     alprazolam (XANAX XR) 1 MG XR tablet; TAKE 1 TO 2 TABS BY MOUTH 2 TIMES A DAY AS NEEDED; no more than 3mg per day    Primary insomnia  -     trazodone (DESYREL) 50 MG Tab; Take 1-2 at bedtime  -     alprazolam (XANAX XR) 1 MG XR tablet; TAKE 1 TO 2 TABS BY MOUTH 2 TIMES A DAY AS NEEDED; no more than 3mg per day    Severe episode of recurrent major depressive disorder, without psychotic features (CMS-HCC)  Comments:  Uncontrolled, will try Effexor  Orders:  -     REFERRAL TO PSYCHIATRY  -     alprazolam (XANAX XR) 1 MG XR tablet; TAKE 1 TO 2 TABS BY MOUTH 2 TIMES A DAY AS NEEDED; no  more than 3mg per day    Primary insomnia  Comments:  Uncontrolled, referred to psychiatry  Orders:  -     trazodone (DESYREL) 50 MG Tab; Take 1-2 at bedtime  -     alprazolam (XANAX XR) 1 MG XR tablet; TAKE 1 TO 2 TABS BY MOUTH 2 TIMES A DAY AS NEEDED; no more than 3mg per day    Severe episode of recurrent major depressive disorder, without psychotic features (CMS-HCC)  Comments:  Uncontrolled, patient referred to psychiatry  Orders:  -     REFERRAL TO PSYCHIATRY  -     alprazolam (XANAX XR) 1 MG XR tablet; TAKE 1 TO 2 TABS BY MOUTH 2 TIMES A DAY AS NEEDED; no more than 3mg per day    Lumbar radiculopathy  Comments:  Uncontrolled, patient to see Dr. Orozco  Orders:  -     meloxicam (MOBIC) 15 MG tablet; Take 1 Tab by mouth every day.  -     Diclofenac Sodium 1 % Gel; Apply 2gm up to 4 times per day if needed for back pain  -     Milnacipran HCl (SAVELLA) 50 MG Tab; TAKE 1 TAB (50 MG) BY MOUTH 2 TIMES A DAY.    Chronic tension-type headache, not intractable  -     rizatriptan (MAXALT) 10 MG tablet; TAKE 1 TAB BY MOUTH ONCE AS NEEDED FOR MIGRAINE FOR UP TO 1 DOSE. MAY REPEAT IN 2 HOURS IF NEEDED    Fibromyalgia  -     Milnacipran HCl (SAVELLA) 50 MG Tab; TAKE 1 TAB (50 MG) BY MOUTH 2 TIMES A DAY.    Chronic maxillary sinusitis    Morbid obesity with BMI of 40.0-44.9, adult (CMS-formerly [facility])  -     Patient identified as having weight management issue.  Appropriate orders and counseling given.    Other orders  -     Obtain Results:: Other (see comment); Obtain Results From:: Other (see comment)          Please note that this dictation was created using voice recognition software. I have made every reasonable attempt to correct obvious errors, but I expect that there are errors of grammar and possibly content that I did not discover before finalizing the note.

## 2017-05-17 DIAGNOSIS — F51.01 PRIMARY INSOMNIA: ICD-10-CM

## 2017-05-17 DIAGNOSIS — F33.2 SEVERE EPISODE OF RECURRENT MAJOR DEPRESSIVE DISORDER, WITHOUT PSYCHOTIC FEATURES (HCC): ICD-10-CM

## 2017-05-17 RX ORDER — ALPRAZOLAM 1 MG/1
3 TABLET, EXTENDED RELEASE ORAL 2 TIMES DAILY PRN
Qty: 30 TAB | Refills: 0 | OUTPATIENT
Start: 2017-05-17

## 2017-06-22 RX ORDER — DIVALPROEX SODIUM 125 MG/1
TABLET, DELAYED RELEASE ORAL
Qty: 270 TAB | OUTPATIENT
Start: 2017-06-22

## 2017-06-22 NOTE — TELEPHONE ENCOUNTER
*MED D/C*  Was the patient seen in the last year in this department? Yes     Does patient have an active prescription for medications requested? No     Received Request Via: Pharmacy      Pt met protocol?: Yes    LAST OV 05/16/2017

## 2017-07-17 RX ORDER — TRAZODONE HYDROCHLORIDE 50 MG/1
TABLET ORAL
Qty: 180 TAB | Refills: 0 | Status: SHIPPED | OUTPATIENT
Start: 2017-07-17 | End: 2017-08-16 | Stop reason: SDUPTHER

## 2017-08-02 ENCOUNTER — HOSPITAL ENCOUNTER (OUTPATIENT)
Dept: LAB | Facility: MEDICAL CENTER | Age: 57
End: 2017-08-02
Attending: NURSE PRACTITIONER
Payer: COMMERCIAL

## 2017-08-02 DIAGNOSIS — E78.1 HYPERTRIGLYCERIDEMIA: ICD-10-CM

## 2017-08-02 DIAGNOSIS — E55.9 VITAMIN D DEFICIENCY DISEASE: ICD-10-CM

## 2017-08-02 LAB
25(OH)D3 SERPL-MCNC: 25 NG/ML (ref 30–100)
ALBUMIN SERPL BCP-MCNC: 3.9 G/DL (ref 3.2–4.9)
ALBUMIN/GLOB SERPL: 1.5 G/DL
ALP SERPL-CCNC: 96 U/L (ref 30–99)
ALT SERPL-CCNC: 20 U/L (ref 2–50)
ANION GAP SERPL CALC-SCNC: 12 MMOL/L (ref 0–11.9)
AST SERPL-CCNC: 17 U/L (ref 12–45)
BILIRUB SERPL-MCNC: 0.4 MG/DL (ref 0.1–1.5)
BUN SERPL-MCNC: 18 MG/DL (ref 8–22)
CALCIUM SERPL-MCNC: 9 MG/DL (ref 8.5–10.5)
CHLORIDE SERPL-SCNC: 109 MMOL/L (ref 96–112)
CHOLEST SERPL-MCNC: 209 MG/DL (ref 100–199)
CO2 SERPL-SCNC: 16 MMOL/L (ref 20–33)
CREAT SERPL-MCNC: 0.83 MG/DL (ref 0.5–1.4)
GFR SERPL CREATININE-BSD FRML MDRD: >60 ML/MIN/1.73 M 2
GLOBULIN SER CALC-MCNC: 2.6 G/DL (ref 1.9–3.5)
GLUCOSE SERPL-MCNC: 190 MG/DL (ref 65–99)
HDLC SERPL-MCNC: 47 MG/DL
LDLC SERPL CALC-MCNC: 107 MG/DL
POTASSIUM SERPL-SCNC: 3.3 MMOL/L (ref 3.6–5.5)
PROT SERPL-MCNC: 6.5 G/DL (ref 6–8.2)
SODIUM SERPL-SCNC: 137 MMOL/L (ref 135–145)
TRIGL SERPL-MCNC: 276 MG/DL (ref 0–149)

## 2017-08-02 PROCEDURE — 80061 LIPID PANEL: CPT

## 2017-08-02 PROCEDURE — 82306 VITAMIN D 25 HYDROXY: CPT

## 2017-08-02 PROCEDURE — 36415 COLL VENOUS BLD VENIPUNCTURE: CPT

## 2017-08-02 PROCEDURE — 80053 COMPREHEN METABOLIC PANEL: CPT

## 2017-08-16 ENCOUNTER — OFFICE VISIT (OUTPATIENT)
Dept: MEDICAL GROUP | Facility: PHYSICIAN GROUP | Age: 57
End: 2017-08-16
Payer: COMMERCIAL

## 2017-08-16 VITALS
DIASTOLIC BLOOD PRESSURE: 78 MMHG | TEMPERATURE: 99.9 F | BODY MASS INDEX: 43.15 KG/M2 | WEIGHT: 259 LBS | SYSTOLIC BLOOD PRESSURE: 114 MMHG | HEIGHT: 65 IN | OXYGEN SATURATION: 96 % | HEART RATE: 85 BPM | RESPIRATION RATE: 14 BRPM

## 2017-08-16 DIAGNOSIS — R73.01 ELEVATED FASTING GLUCOSE: ICD-10-CM

## 2017-08-16 DIAGNOSIS — F33.2 SEVERE EPISODE OF RECURRENT MAJOR DEPRESSIVE DISORDER, WITHOUT PSYCHOTIC FEATURES (HCC): ICD-10-CM

## 2017-08-16 DIAGNOSIS — F41.9 ANXIETY: ICD-10-CM

## 2017-08-16 DIAGNOSIS — M79.7 FIBROMYALGIA: ICD-10-CM

## 2017-08-16 DIAGNOSIS — G44.229 CHRONIC TENSION-TYPE HEADACHE, NOT INTRACTABLE: ICD-10-CM

## 2017-08-16 DIAGNOSIS — M54.16 LUMBAR RADICULOPATHY: ICD-10-CM

## 2017-08-16 DIAGNOSIS — F51.01 PRIMARY INSOMNIA: ICD-10-CM

## 2017-08-16 DIAGNOSIS — E87.1 HYPONATREMIA: ICD-10-CM

## 2017-08-16 DIAGNOSIS — J40 BRONCHITIS: ICD-10-CM

## 2017-08-16 PROCEDURE — 99214 OFFICE O/P EST MOD 30 MIN: CPT | Performed by: NURSE PRACTITIONER

## 2017-08-16 RX ORDER — TOPIRAMATE SPINKLE 25 MG/1
25 CAPSULE ORAL 2 TIMES DAILY
Qty: 180 CAP | Refills: 1 | Status: SHIPPED | OUTPATIENT
Start: 2017-08-16 | End: 2018-02-16 | Stop reason: SDUPTHER

## 2017-08-16 RX ORDER — ALPRAZOLAM 1 MG/1
TABLET, EXTENDED RELEASE ORAL
Qty: 60 TAB | Refills: 1 | Status: SHIPPED | OUTPATIENT
Start: 2017-08-16 | End: 2017-10-25 | Stop reason: SDUPTHER

## 2017-08-16 RX ORDER — ALBUTEROL SULFATE 90 UG/1
2 AEROSOL, METERED RESPIRATORY (INHALATION) EVERY 6 HOURS PRN
Qty: 1 INHALER | Refills: 3 | Status: SHIPPED | OUTPATIENT
Start: 2017-08-16 | End: 2018-10-18 | Stop reason: SDUPTHER

## 2017-08-16 RX ORDER — TRAZODONE HYDROCHLORIDE 50 MG/1
TABLET ORAL
Qty: 180 TAB | Refills: 3 | Status: SHIPPED | OUTPATIENT
Start: 2017-08-16 | End: 2017-12-18 | Stop reason: SDUPTHER

## 2017-08-16 RX ORDER — RIZATRIPTAN BENZOATE 10 MG/1
TABLET ORAL
Qty: 60 TAB | Refills: 0 | Status: SHIPPED | OUTPATIENT
Start: 2017-08-16 | End: 2018-06-20 | Stop reason: SDUPTHER

## 2017-08-16 RX ORDER — DICLOFENAC SODIUM 75 MG/1
75 TABLET, DELAYED RELEASE ORAL 2 TIMES DAILY
Qty: 180 TAB | Refills: 1 | Status: SHIPPED | OUTPATIENT
Start: 2017-08-16 | End: 2017-12-18

## 2017-08-16 NOTE — ASSESSMENT & PLAN NOTE
Upon reviewing labs with patient today, as noted her fasting glucose was 190. She does not carry a past diagnosis of prediabetes or type 2 diabetes. She also denies symptoms of type 2 diabetes, she denies polydipsia, oliguria and polyphagia. She denies episodes of hypoglycemia. We'll check labs, he will go by A1c. I will notify her of results and further actions if needed.

## 2017-08-16 NOTE — ASSESSMENT & PLAN NOTE
This is chronic, and has been followed by neurology. She does not feel it will be beneficial to see neurology anymore, thus she has counseled on her future appointments. She feels like her headache treatment has stalled, she currently takes Maxalt and over-the-counter NSAIDs. She is wanting now there is anything else she can take. She states she has headaches with on a daily basis. She has been tried on Topamax in the past, but states it did not work. Upon further investigation, she tells me she was taking this as needed. I discussed with her that this medication is meant to be taken on a daily basis as it is prophylactic. She states she is willing to try this again, we will start with 25 mg twice a day with room to increase in the dose. She is also to continue using Maxalt as needed.

## 2017-08-16 NOTE — PROGRESS NOTES
Chief Complaint   Patient presents with   • Follow-Up     labs         This is a 57 y.o.female patient that presents today with the following: Follow-up visit, review labs, discuss acute and chronic conditions, medication refills    Insomnia  This is chronic, improved on trazodone. She will at times take this with OTC melatonin. She continues to practice good sleep hygiene. Will have her continue on the trazodone, 50 mg, 1-2 pills at bedtime as needed. This has been refilled for her. She is to continue with good sleep hygiene measures:  Discussed various topics of sleep hygiene: Encouraged to keep room dark and cool. Avoid caffeinated foods and/or beverages after noon. Avoid heavy meals and exercise 3-4 hours before bedtime. Avoid alcohol. Avoid electronic devices such as smart phones, tablets, TV, computers 3-4 hours prior to bedtime.       Chronic headache  This is chronic, and has been followed by neurology. She does not feel it will be beneficial to see neurology anymore, thus she has counseled on her future appointments. She feels like her headache treatment has stalled, she currently takes Maxalt and over-the-counter NSAIDs. She is wanting now there is anything else she can take. She states she has headaches with on a daily basis. She has been tried on Topamax in the past, but states it did not work. Upon further investigation, she tells me she was taking this as needed. I discussed with her that this medication is meant to be taken on a daily basis as it is prophylactic. She states she is willing to try this again, we will start with 25 mg twice a day with room to increase in the dose. She is also to continue using Maxalt as needed.    Depression  This is a chronic condition, stable and well-controlled on current dose of venlafaxine, 150 mg daily. She is also taking as needed Xanax. She states that her symptoms seem to be improved, especially since she has been sleeping well lately. She was referred to  psychiatry at her last visit, but for some reason or another she has been unable to set up appointment. She has called the referral department multiple times as well as mental health in [place] and still an appointment has not been able to be made. At this time, she would like to just continue on medications and continue to monitor her symptoms for now and hold off on seeing psychiatry. She will let me know if symptoms worsen, we will re-refers that time. Her medications were refilled and she is to take these as prescribed. She continues to deny suicidal and homicidal ideations, hallucinations, racing thoughts and ideas.    Lumbar radiculopathy  Patient continues to have chronic low back pain as well as pain associated with fibromyalgia. She has been on meloxicam 7.5mg twice a Savella 50 mg twice a day and diclofenac gel. She will like to try Voltaren, oral version. I discussed with she will need to stop the meloxicam. Patient was agreeable to this. Voltaren has been called in, I discussed with her the risks, benefits and side effects of pain medication. She is to continue on the Savella.    Fibromyalgia  This is a chronic condition, stable and fairly well-controlled with Savella, 50 mg twice a day. She does need refills, this were called in for her. She is to take this as prescribed. I would like to see her back in 4 months.    Hyponatremia  Upon reviewing labs with patient today, as noted her potassium level was 3.3. She does not take diuretics. We will recheck this and will notify her results and further actions if needed.    Elevated fasting glucose  Upon reviewing labs with patient today, as noted her fasting glucose was 190. She does not carry a past diagnosis of prediabetes or type 2 diabetes. She also denies symptoms of type 2 diabetes, she denies polydipsia, oliguria and polyphagia. She denies episodes of hypoglycemia. We'll check labs, he will go by A1c. I will notify her of results and further actions if  "needed.      Hospital Outpatient Visit on 08/02/2017   Component Date Value   • Sodium 08/02/2017 137    • Potassium 08/02/2017 3.3*   • Chloride 08/02/2017 109    • Co2 08/02/2017 16*   • Anion Gap 08/02/2017 12.0*   • Glucose 08/02/2017 190*   • Bun 08/02/2017 18    • Creatinine 08/02/2017 0.83    • Calcium 08/02/2017 9.0    • AST(SGOT) 08/02/2017 17    • ALT(SGPT) 08/02/2017 20    • Alkaline Phosphatase 08/02/2017 96    • Total Bilirubin 08/02/2017 0.4    • Albumin 08/02/2017 3.9    • Total Protein 08/02/2017 6.5    • Globulin 08/02/2017 2.6    • A-G Ratio 08/02/2017 1.5    • Cholesterol,Tot 08/02/2017 209*   • Triglycerides 08/02/2017 276*   • HDL 08/02/2017 47    • LDL 08/02/2017 107*   • 25-Hydroxy   Vitamin D 25 08/02/2017 25*   • GFR If  08/02/2017 >60    • GFR If Non  Ameri* 08/02/2017 >60          clinical course has been stable    Past Medical History   Diagnosis Date   • Heart murmur    • Arthritis      fibromyalgia   • Fibromyalgia 11/18/2011   • Neuropathy (CMS-[facility]) 11/18/2011   • Anxiety 11/18/2011   • Syncope and collapse 3/20/2012   • Pulmonary hypertension (CMS-[facility]) 3/20/2012   • Iron deficiency anemia 3/20/2012   • Gastro-esophageal reflux 3/20/2012   • Anesthesia      low bp coming out of anesthesia \"one time\"   • Bursitis of knee      left   • Cancer (CMS-[facility]) 2005     squamous cell on nose   • Arrhythmia      ? pt unsure   • Other specified disorder of intestines    • Pneumonia 01/2012   • Breath shortness      occasionally   • Pain 6/14/12     3/10 stomach   • Chronic pain 11/18/2011   • Breast mass, left    • Depression 11/18/2011       Past Surgical History   Procedure Laterality Date   • Abdominal exploration  3/11/2010     Performed by MARIA G KHAN JR at SURGERY SAME DAY [facility] ORS   • Hernia repair  3/15/2010     Performed by MARIA G KHAN JR at SURGERY [facility] ORS   • Flap graft  3/15/2010     Performed by MARIA G KHAN JR at SURGERY [facility] [place] ORS   • Panniculectomy  3/15/2010     Performed by MARIA G KHAN JR at SURGERY [facility] ORS   • Gyn surgery  1984     tubal   • Other  04/2009     hernia repair,mesh removal after in aug.09   • Other  2005     bariatric surgery gastric bypass   • Other  06/09     bowel obstruction   • Other  07/09     abscess removed abd.   • Other  08/09     pulled mesh out of hernia   • Irrigation & debridement general  4/12/2010     Performed by OZZIE WEINSTEIN at SURGERY [facility] ORS   • Abdominal exploration  10/7/2010     Performed by MARIA G KHAN JR at SURGERY [facility] ORS   • Gastric bypass laparoscopic  2005   • Colonoscopy  6/21/2012     Performed by PARAM VALDEZ at SURGERY [facility]   • Gastroscopy  6/21/2012     Performed by PARAM VALDEZ at SURGERY [facility]   • Breast biopsy  12/11/2012     Performed by Lizeth Ferreira M.D. at SURGERY SAME DAY [facility] ORS   • Breast biopsy  1/9/2013     Performed by Lizeth Ferreira M.D. at SURGERY SAME DAY [facility] ORS   • Axillary node dissection  1/9/2013     Performed by Lizeth Ferreira M.D. at SURGERY SAME DAY [facility]   • Node biopsy  1/9/2013     Performed by Lizeth Ferreira M.D. at SURGERY SAME DAY [facility] ORS   • Cath placement  1/25/2013     Performed by Lizeth Ferreira M.D. at SURGERY SAME DAY [facility] ORS       Family History   Problem Relation Age of Onset   • Heart Attack Father    • Cancer Maternal Grandfather      leukemia   • Cancer Other      leukemia- cousin   • Diabetes Maternal Uncle    • Diabetes Maternal Uncle    • Hypertension Mother        Shellfish allergy    Current Outpatient Prescriptions Ordered in [facility]   Medication Sig Dispense Refill   • Venlafaxine HCl (EFFEXOR PO) Take  by mouth.     • diclofenac EC (VOLTAREN) 75 MG Tablet Delayed Response Take 1 Tab by mouth 2 times a day. 180 Tab 1   • trazodone (DESYREL) 50 MG Tab TAKE 1-2 AT BEDTIME 180 Tab 3   • topiramate (TOPAMAX) 25 MG capsule Take 1 Cap by  "mouth 2 times a day. 180 Cap 1   • rizatriptan (MAXALT) 10 MG tablet TAKE 1 TAB BY MOUTH ONCE AS NEEDED FOR MIGRAINE FOR UP TO 1 DOSE. MAY REPEAT IN 2 HOURS IF NEEDED 60 Tab 0   • alprazolam (XANAX XR) 1 MG XR tablet TAKE 1 TO 2 TABS BY MOUTH 2 TIMES A DAY AS NEEDED; no more than 3mg per day 60 Tab 1   • Diclofenac Sodium 1 % Gel Apply 2gm up to 4 times per day if needed for back pain 1 Tube 3   • Milnacipran HCl (SAVELLA) 50 MG Tab TAKE 1 TAB (50 MG) BY MOUTH 2 TIMES A DAY. 180 Tab 1   • albuterol 108 (90 BASE) MCG/ACT Aero Soln inhalation aerosol Inhale 2 Puffs by mouth every 6 hours as needed for Shortness of Breath. 1 Inhaler 3   • fluticasone (FLONASE) 50 MCG/ACT nasal spray Spray 2 Sprays in nose every day. 16 g 11   • aspirin (ASA) 325 MG TABS Take 325 mg by mouth as needed. weekly        No current Epic-ordered facility-administered medications on file.       Constitutional ROS: No unexpected change in weight, No weakness, No unexplained fevers, sweats, or chills  Pulmonary ROS: No chronic cough, sputum, or hemoptysis, No shortness of breath, No recent change in breathing, Positive for smoker, current everyday  Cardiovascular ROS: No chest pain, No edema, No palpitations  Gastrointestinal ROS: No abdominal pain, No nausea, vomiting, diarrhea, or constipation, no blood in stool  Musculoskeletal/Extremities ROS: Positive for chronic low back pain, positive for fibromyalgia  Neurologic ROS: Normal development, No seizures, No weakness. Positive for chronic headaches  Psych ROS: Positive for anxiety and depression, per history of present illness  Endocrine ROS: Positive for elevated fasting glucose, per history of present illness    Physical exam:  /78 mmHg  Pulse 85  Temp(Src) 37.7 °C (99.9 °F)  Resp 14  Ht 1.651 m (5' 5\")  Wt 117.482 kg (259 lb)  BMI 43.10 kg/m2  SpO2 96%  LMP 03/18/2010  General Appearance: Middle-aged female, alert, no distress, morbidly obese, well-groomed  Skin: Skin color, " texture, turgor normal. No rashes or lesions.  Lungs: negative findings: normal respiratory rate and rhythm, lungs clear to auscultation  Heart: negative. RRR without murmur, gallop, or rubs.  No ectopy.  Abdomen: Abdomen soft, non-tender. BS normal. No masses,  No organomegaly  Musculoskeletal: positive findings: Decreased range of motion to lumbar spine due to pain  Neurologic: intact, oriented, mood appropriate, judgment intact. Cranial nerves II through XII grossly intact    Medical decision making/discussion: Medications have been refilled, she is to take these as prescribed. Will have her stop meloxicam and start Voltaren 75 mg twice a day--discussed risks, benefits and side effects of this medication. We'll also have her restart Topamax, 25 mg twice a day, she is to increase by one pill every 3-5 days. She is to continue keeping a headache diary to determine headache triggers. We'll also check labs to assess hyponatremia and elevated fasting glucose. I would like to see her back in 4 months, sooner if needed.    Mikayla was seen today for follow-up.    Diagnoses and all orders for this visit:    Primary insomnia  -     trazodone (DESYREL) 50 MG Tab; TAKE 1-2 AT BEDTIME  -     alprazolam (XANAX XR) 1 MG XR tablet; TAKE 1 TO 2 TABS BY MOUTH 2 TIMES A DAY AS NEEDED; no more than 3mg per day    Anxiety    Severe episode of recurrent major depressive disorder, without psychotic features (CMS-HCC)  -     alprazolam (XANAX XR) 1 MG XR tablet; TAKE 1 TO 2 TABS BY MOUTH 2 TIMES A DAY AS NEEDED; no more than 3mg per day    Lumbar radiculopathy  -     diclofenac EC (VOLTAREN) 75 MG Tablet Delayed Response; Take 1 Tab by mouth 2 times a day.  -     Diclofenac Sodium 1 % Gel; Apply 2gm up to 4 times per day if needed for back pain  -     Milnacipran HCl (SAVELLA) 50 MG Tab; TAKE 1 TAB (50 MG) BY MOUTH 2 TIMES A DAY.    Chronic tension-type headache, not intractable  -     topiramate (TOPAMAX) 25 MG capsule; Take 1 Cap by  mouth 2 times a day.  -     rizatriptan (MAXALT) 10 MG tablet; TAKE 1 TAB BY MOUTH ONCE AS NEEDED FOR MIGRAINE FOR UP TO 1 DOSE. MAY REPEAT IN 2 HOURS IF NEEDED    Fibromyalgia  -     Milnacipran HCl (SAVELLA) 50 MG Tab; TAKE 1 TAB (50 MG) BY MOUTH 2 TIMES A DAY.    Bronchitis  Comments:  Ongoing for about a week. Some shortness of breath and wheezing. Lungs clear today. Given albuterol and Tussionex. Follow-up with PCP.  Orders:  -     albuterol 108 (90 BASE) MCG/ACT Aero Soln inhalation aerosol; Inhale 2 Puffs by mouth every 6 hours as needed for Shortness of Breath.    Hyponatremia  -     COMP METABOLIC PANEL; Future    Elevated fasting glucose  -     HEMOGLOBIN A1C; Future          Please note that this dictation was created using voice recognition software. I have made every reasonable attempt to correct obvious errors, but I expect that there are errors of grammar and possibly content that I did not discover before finalizing the note.

## 2017-08-16 NOTE — ASSESSMENT & PLAN NOTE
This is chronic, improved on trazodone. She will at times take this with OTC melatonin. She continues to practice good sleep hygiene. Will have her continue on the trazodone, 50 mg, 1-2 pills at bedtime as needed. This has been refilled for her. She is to continue with good sleep hygiene measures:  Discussed various topics of sleep hygiene: Encouraged to keep room dark and cool. Avoid caffeinated foods and/or beverages after noon. Avoid heavy meals and exercise 3-4 hours before bedtime. Avoid alcohol. Avoid electronic devices such as smart phones, tablets, TV, computers 3-4 hours prior to bedtime.

## 2017-08-16 NOTE — PATIENT INSTRUCTIONS
Stop the meloxicam    Start oral Voltaren    All other meds have been refilled    Repeat labs--they are fasting    Start topamax 25 mg twice a day, but start with one a day for 3-5 days, then can take twice a day    Follow up with me in 4 months, sooner if needed

## 2017-08-16 NOTE — ASSESSMENT & PLAN NOTE
Patient continues to have chronic low back pain as well as pain associated with fibromyalgia. She has been on meloxicam 7.5mg twice a Savella 50 mg twice a day and diclofenac gel. She will like to try Voltaren, oral version. I discussed with she will need to stop the meloxicam. Patient was agreeable to this. Voltaren has been called in, I discussed with her the risks, benefits and side effects of pain medication. She is to continue on the Savella.

## 2017-08-16 NOTE — ASSESSMENT & PLAN NOTE
Upon reviewing labs with patient today, as noted her potassium level was 3.3. She does not take diuretics. We will recheck this and will notify her results and further actions if needed.

## 2017-08-16 NOTE — ASSESSMENT & PLAN NOTE
This is a chronic condition, stable and fairly well-controlled with Savella, 50 mg twice a day. She does need refills, this were called in for her. She is to take this as prescribed. I would like to see her back in 4 months.

## 2017-08-16 NOTE — ASSESSMENT & PLAN NOTE
This is a chronic condition, stable and well-controlled on current dose of venlafaxine, 150 mg daily. She is also taking as needed Xanax. She states that her symptoms seem to be improved, especially since she has been sleeping well lately. She was referred to psychiatry at her last visit, but for some reason or another she has been unable to set up appointment. She has called the referral department multiple times as well as mental health in [place] and still an appointment has not been able to be made. At this time, she would like to just continue on medications and continue to monitor her symptoms for now and hold off on seeing psychiatry. She will let me know if symptoms worsen, we will re-refers that time. Her medications were refilled and she is to take these as prescribed. She continues to deny suicidal and homicidal ideations, hallucinations, racing thoughts and ideas.

## 2017-08-16 NOTE — MR AVS SNAPSHOT
"Mikayla Goldsmithl   2017 11:20 AM   Office Visit   MRN: [id number]    Department:  [facility]   Dept Phone:  [phone]    Description:  Female : 1960   Provider:  YOSEPH Prince           Reason for Visit     Follow-Up labs      Allergies as of 2017     Allergen Noted Reactions    Shellfish Allergy 2010   Anaphylaxis    SHRIMP ONLY, not anything else, not iodine.      You were diagnosed with     Primary insomnia   [608816]       Anxiety   [456215]       Severe episode of recurrent major depressive disorder, without psychotic features (CMS-HCC)   [3148473]       Lumbar radiculopathy   [1930]       Chronic tension-type headache, not intractable   [293095]       Lumbar radiculopathy   [1930]   Uncontrolled, patient to see Dr. Orozco    Fibromyalgia   [650533]       Bronchitis   [120959]   Ongoing for about a week. Some shortness of breath and wheezing. Lungs clear today. Given albuterol and Tussionex. Follow-up with PCP.    Hyponatremia   [295262]       Elevated fasting glucose   [498601]         Vital Signs     Blood Pressure Pulse Temperature Respirations Height Weight    114/78 mmHg 85 37.7 °C (99.9 °F) 14 1.651 m (5' 5\") 117.482 kg (259 lb)    Body Mass Index Oxygen Saturation Last Menstrual Period Smoking Status          43.10 kg/m2 96% 2010 Current Some Day Smoker        Basic Information     Date Of Birth Sex Race Ethnicity Preferred Language    1960 Female White Non- English      Your appointments     Dec 18, 2017  1:20 PM   Established Patient with YOSEPH Prince   [address] [address] [phone]           You will be receiving a confirmation call a few days before your appointment from our automated call confirmation system.              Problem List              ICD-10-CM Priority Class Noted - Resolved    Fibromyalgia M79.7   2011 - Present "    Chronic pain G89.29   11/18/2011 - Present    Neuropathy (CMS-HCC) G62.9   11/18/2011 - Present    Depression F32.9   11/18/2011 - Present    Anxiety F41.9   11/18/2011 - Present    Insomnia G47.00   11/18/2011 - Present    Pulmonary hypertension (CMS-HCC) I27.2   3/20/2012 - Present    Iron deficiency anemia D50.9   3/20/2012 - Present    Gastro-esophageal reflux K21.9   3/20/2012 - Present    Breast cancer, left breast (CMS-HCC) C50.912   12/31/2012 - Present    Chronic headache R51   5/9/2013 - Present    Lumbar radiculopathy M54.16   12/5/2013 - Present    Heart palpitations R00.2   2/12/2014 - Present    Shortness of breath R06.02   2/20/2014 - Present    Tobacco abuse Z72.0   2/20/2014 - Present    Obesity E66.9   8/14/2014 - Present    Left arm pain M79.602   1/6/2015 - Present    Fatigue R53.83   6/4/2015 - Present    Chronic maxillary sinusitis J32.0   6/10/2015 - Present    Vitamin D deficiency disease E55.9   6/10/2015 - Present    Bilateral knee pain M25.561, M25.562   1/27/2016 - Present    Bilateral hand pain M79.641, M79.642   5/24/2016 - Present    Hypertriglyceridemia E78.1   12/20/2016 - Present    Syncope R55   2/27/2017 - Present    Lt facial pain R51   2/27/2017 - Present    Morbid obesity with BMI of 40.0-44.9, adult ([facility]) E66.01, Z68.41   5/16/2017 - Present      Health Maintenance        Date Due Completion Dates    IMM DTaP/Tdap/Td Vaccine (1 - Tdap) 4/20/1980 ---    IMM PNEUMOCOCCAL 19-64 (ADULT) MEDIUM RISK SERIES (1 of 1 - PPSV23) 4/20/1980 ---    PAP SMEAR 11/1/2015 11/1/2012    MAMMOGRAM 3/21/2017 3/21/2016, 1/2/2014, 11/14/2012    IMM INFLUENZA (1) 9/1/2017 10/1/2012, 10/9/2010    COLONOSCOPY 6/21/2022 6/21/2012, 6/6/2012 (Done)    Override on 6/6/2012: Done            Current Immunizations     Influenza TIV (IM) 10/1/2012, 10/9/2010  9:52 AM      Below and/or attached are the medications your provider expects you to take. Review all of your home medications and newly ordered  medications with your provider and/or pharmacist. Follow medication instructions as directed by your provider and/or pharmacist. Please keep your medication list with you and share with your provider. Update the information when medications are discontinued, doses are changed, or new medications (including over-the-counter products) are added; and carry medication information at all times in the event of emergency situations     Allergies:  SHELLFISH ALLERGY - Anaphylaxis               Medications  Valid as of: August 16, 2017 - 11:48 AM    Generic Name Brand Name Tablet Size Instructions for use    Albuterol Sulfate (Aero Soln) albuterol 108 (90 BASE) MCG/ACT Inhale 2 Puffs by mouth every 6 hours as needed for Shortness of Breath.        ALPRAZolam (TABLET SR 24 HR) XANAX XR 1 MG TAKE 1 TO 2 TABS BY MOUTH 2 TIMES A DAY AS NEEDED; no more than 3mg per day        Aspirin (Tab)  MG Take 325 mg by mouth as needed. weekly         Diclofenac Sodium (Tablet Delayed Response) VOLTAREN 75 MG Take 1 Tab by mouth 2 times a day.        Diclofenac Sodium (Gel) Diclofenac Sodium 1 % Apply 2gm up to 4 times per day if needed for back pain        Fluticasone Propionate (Suspension) FLONASE 50 MCG/ACT Spray 2 Sprays in nose every day.        Milnacipran HCl (Tab) Milnacipran HCl 50 MG TAKE 1 TAB (50 MG) BY MOUTH 2 TIMES A DAY.        Rizatriptan Benzoate (Tab) MAXALT 10 MG TAKE 1 TAB BY MOUTH ONCE AS NEEDED FOR MIGRAINE FOR UP TO 1 DOSE. MAY REPEAT IN 2 HOURS IF NEEDED        Topiramate (CAPSULE SPRINKLE) TOPAMAX 25 MG Take 1 Cap by mouth 2 times a day.        TraZODone HCl (Tab) DESYREL 50 MG TAKE 1-2 AT BEDTIME        Venlafaxine HCl   Take  by mouth.        .                 Medicines prescribed today were sent to:     [facility] #[id number] - [place], [place] - [address] [place] [address]    Phone: [phone] Fax: [phone]    Open 24 Hours?: No      Medication refill instructions:       If your  prescription bottle indicates you have medication refills left, it is not necessary to call your provider’s office. Please contact your pharmacy and they will refill your medication.    If your prescription bottle indicates you do not have any refills left, you may request refills at any time through one of the following ways: The online IP Ghoster system (except Urgent Care), by calling your provider’s office, or by asking your pharmacy to contact your provider’s office with a refill request. Medication refills are processed only during regular business hours and may not be available until the next business day. Your provider may request additional information or to have a follow-up visit with you prior to refilling your medication.   *Please Note: Medication refills are assigned a new Rx number when refilled electronically. Your pharmacy may indicate that no refills were authorized even though a new prescription for the same medication is available at the pharmacy. Please request the medicine by name with the pharmacy before contacting your provider for a refill.        Your To Do List     Future Labs/Procedures Complete By Expires    COMP METABOLIC PANEL  As directed 8/16/2018    HEMOGLOBIN A1C  As directed 8/16/2018      Instructions    Stop the meloxicam    Start oral Voltaren    All other meds have been refilled    Repeat labs--they are fasting    Start topamax 25 mg twice a day, but start with one a day for 3-5 days, then can take twice a day    Follow up with me in 4 months, sooner if needed          IP Ghoster Access Code: Activation code not generated  Current IP Ghoster Status: Active          Quit Tobacco Information     Do you want to quit using tobacco?    Quitting tobacco decreases risks of cancer, heart and lung disease, increases life expectancy, improves sense of taste and smell, and increases spending money, among other benefits.    If you are thinking about quitting, we can help.  • [facility] Quit Tobacco  Program: 499.355.2057  o Program occurs weekly for four weeks and includes pharmacist consultation on products to support quitting smoking or chewing tobacco. A provider referral is needed for pharmacist consultation.  • Tobacco Users Help Hotline: 2-800-QUIT-NOW (999-6397) or https://[place].quitlogix.org/  o Free, confidential telephone and online coaching for [place] residents. Sessions are designed on a schedule that is convenient for you. Eligible clients receive free nicotine replacement therapy.  • Nationally: www.smokefree.gov  o Information and professional assistance to support both immediate and long-term needs as you become, and remain, a non-smoker. Smokefree.gov allows you to choose the help that best fits your needs.

## 2017-09-11 NOTE — TELEPHONE ENCOUNTER
Kandy, Venlafaxine  was DC'd at appt 5/17. At appt 8/17 she said she was still taking. Please refill if you see fit. Thank you.

## 2017-10-11 RX ORDER — VENLAFAXINE HYDROCHLORIDE 150 MG/1
CAPSULE, EXTENDED RELEASE ORAL
Qty: 90 CAP | Refills: 0 | Status: SHIPPED | OUTPATIENT
Start: 2017-10-11 | End: 2018-01-17 | Stop reason: SDUPTHER

## 2017-10-11 NOTE — TELEPHONE ENCOUNTER
Phone Number Called: [phone] (home)     Message: Patient stated she is taking medication and would like a refill.     Left Message for patient to call back: N\A

## 2017-10-11 NOTE — TELEPHONE ENCOUNTER
Was the patient seen in the last year in this department? Yes     Does patient have an active prescription for medications requested? No     Received Request Via: Pharmacy      Pt met protocol?: Yes pt last ov 8/17 med last d/c 5/16/17

## 2017-11-10 DIAGNOSIS — M54.16 LUMBAR RADICULOPATHY: ICD-10-CM

## 2017-11-10 NOTE — TELEPHONE ENCOUNTER
MA's- Please call pt and ask which antiinflammatory she is using. Meloxicam was replaced by Voltaren 8/16/17. If pt wants to go back on meloxicam. Kandy will need to send new script since it has been d/c'd. Thank you

## 2017-11-14 ENCOUNTER — HOSPITAL ENCOUNTER (OUTPATIENT)
Dept: LAB | Facility: MEDICAL CENTER | Age: 57
End: 2017-11-14
Attending: NURSE PRACTITIONER
Payer: COMMERCIAL

## 2017-11-14 DIAGNOSIS — E87.1 HYPONATREMIA: ICD-10-CM

## 2017-11-14 DIAGNOSIS — R73.01 ELEVATED FASTING GLUCOSE: ICD-10-CM

## 2017-11-14 LAB
ALBUMIN SERPL BCP-MCNC: 4.4 G/DL (ref 3.2–4.9)
ALBUMIN/GLOB SERPL: 1.6 G/DL
ALP SERPL-CCNC: 93 U/L (ref 30–99)
ALT SERPL-CCNC: 48 U/L (ref 2–50)
ANION GAP SERPL CALC-SCNC: 13 MMOL/L (ref 0–11.9)
AST SERPL-CCNC: 26 U/L (ref 12–45)
BILIRUB SERPL-MCNC: 0.6 MG/DL (ref 0.1–1.5)
BUN SERPL-MCNC: 21 MG/DL (ref 8–22)
CALCIUM SERPL-MCNC: 9.7 MG/DL (ref 8.5–10.5)
CHLORIDE SERPL-SCNC: 107 MMOL/L (ref 96–112)
CO2 SERPL-SCNC: 17 MMOL/L (ref 20–33)
CREAT SERPL-MCNC: 0.83 MG/DL (ref 0.5–1.4)
EST. AVERAGE GLUCOSE BLD GHB EST-MCNC: 140 MG/DL
GFR SERPL CREATININE-BSD FRML MDRD: >60 ML/MIN/1.73 M 2
GLOBULIN SER CALC-MCNC: 2.7 G/DL (ref 1.9–3.5)
GLUCOSE SERPL-MCNC: 108 MG/DL (ref 65–99)
HBA1C MFR BLD: 6.5 % (ref 0–5.6)
POTASSIUM SERPL-SCNC: 4 MMOL/L (ref 3.6–5.5)
PROT SERPL-MCNC: 7.1 G/DL (ref 6–8.2)
SODIUM SERPL-SCNC: 137 MMOL/L (ref 135–145)

## 2017-11-14 PROCEDURE — 80053 COMPREHEN METABOLIC PANEL: CPT

## 2017-11-14 PROCEDURE — 83036 HEMOGLOBIN GLYCOSYLATED A1C: CPT

## 2017-11-14 PROCEDURE — 36415 COLL VENOUS BLD VENIPUNCTURE: CPT

## 2017-12-18 ENCOUNTER — OFFICE VISIT (OUTPATIENT)
Dept: MEDICAL GROUP | Facility: PHYSICIAN GROUP | Age: 57
End: 2017-12-18
Payer: COMMERCIAL

## 2017-12-18 VITALS
SYSTOLIC BLOOD PRESSURE: 128 MMHG | OXYGEN SATURATION: 97 % | BODY MASS INDEX: 42.99 KG/M2 | HEART RATE: 72 BPM | RESPIRATION RATE: 14 BRPM | DIASTOLIC BLOOD PRESSURE: 72 MMHG | TEMPERATURE: 98 F | WEIGHT: 258 LBS | HEIGHT: 65 IN

## 2017-12-18 DIAGNOSIS — F51.01 PRIMARY INSOMNIA: ICD-10-CM

## 2017-12-18 DIAGNOSIS — M25.561 CHRONIC PAIN OF BOTH KNEES: ICD-10-CM

## 2017-12-18 DIAGNOSIS — G89.29 CHRONIC PAIN OF BOTH KNEES: ICD-10-CM

## 2017-12-18 DIAGNOSIS — E11.9 TYPE 2 DIABETES MELLITUS WITHOUT COMPLICATION, WITHOUT LONG-TERM CURRENT USE OF INSULIN (HCC): ICD-10-CM

## 2017-12-18 DIAGNOSIS — M25.562 CHRONIC PAIN OF BOTH KNEES: ICD-10-CM

## 2017-12-18 DIAGNOSIS — E66.01 MORBID OBESITY WITH BMI OF 40.0-44.9, ADULT (HCC): ICD-10-CM

## 2017-12-18 DIAGNOSIS — M54.16 LUMBAR RADICULOPATHY: ICD-10-CM

## 2017-12-18 PROCEDURE — 99214 OFFICE O/P EST MOD 30 MIN: CPT | Performed by: NURSE PRACTITIONER

## 2017-12-18 RX ORDER — TRAZODONE HYDROCHLORIDE 100 MG/1
TABLET ORAL
Qty: 180 TAB | Refills: 1 | Status: SHIPPED | OUTPATIENT
Start: 2017-12-18 | End: 2018-05-24

## 2017-12-18 NOTE — PROGRESS NOTES
Chief Complaint   Patient presents with   • Follow-Up     med refill   • Labs Only   • Pain     in joints         This is a 57 y.o.female patient that presents today with the following:Follow-up visit, review labs, discuss acute and chronic conditions, referrals, medication refills    Bilateral knee pain  This is a chronic condition, currently suboptimally controlled with medications. She suffers from pain in both knees related to very old injuries. She has been on Voltaren, 75 mg twice daily. Patient has seen orthopedic provider in the past, she would like referral to new orthopedic provider. We will increase her Voltaren to the extended release version, 100 mg, 1-2 pills daily.    Insomnia  This is a chronic condition, stable and poor to fair control on current dose of trazodone. She is on 50 mg, 1-2 pills at bedtime. She states she has been taking up to 3 trazodone at bedtime, 2 melatonin and Benadryl and getting very little sleep. We again discussed the importance of sleep hygiene which she does admit to not practicing. She is to stop the Benadryl, she can continue on the melatonin but we will increase the trazodone to 200 mg at bedtime. If this does not improve her symptoms, she may need to be referred sleep medicine for further evaluation.    Lumbar radiculopathy  Patient continues to have chronic low back pain as well as pain associated with fibromyalgia. She reports that she does have degenerative disc disease. She has been on meloxicam in the past, but now she takes Voltaren. She is also on Savella. She states that her back pain is not improving, in fact is getting worse. She has been referred to spine surgeon for further evaluation and treatment. We'll have her stop the Voltaren 75 mg twice daily and have her start the extended release, 100 mg 1-2 pills daily.    Type 2 diabetes mellitus without complication, without long-term current use of insulin (CMS-[facility])  This is a new diagnosis, patient's hemoglobin  A1c was 6.5%. She had a fasting blood sugar 108. I did discuss with her at length treatment, importance of treatment especially for protection and organs, regular physical activity and weight loss. She would like to try 3 months of lifestyle modifications including healthy diet, regular physical activity and continued efforts towards weight loss. We will have her do labs before she follows up in 3 months. I did discuss with her that there is no improvement or if her labs worsen, we will need to start medication. Patient verbalized understanding and was agreeable to this plan.    Morbid obesity with BMI of 40.0-44.9, adult (HCC)  This is a chronic condition, uncontrolled. Her weight is 258 pounds, BMI is 42.93. She has had bariatric surgery in the past. She continues to struggle with weight loss and reports that she does not eat that much, but she knows that what she eats is probably not the best for her. She does not know exactly how many calories she takes in. I did encourage her to start keeping a food diary so that we can determine exactly how many calories she takes in daily. She is interested in the [facility] health improvement program, only if it is available via telemedicine. I did discuss with her that I put referral and I'm requested on medicine, she should be hearing from the referral department regarding this.      No visits with results within 1 Month(s) from this visit.   Latest known visit with results is:   Hospital Outpatient Visit on 11/14/2017   Component Date Value   • Sodium 11/14/2017 137    • Potassium 11/14/2017 4.0    • Chloride 11/14/2017 107    • Co2 11/14/2017 17*   • Anion Gap 11/14/2017 13.0*   • Glucose 11/14/2017 108*   • Bun 11/14/2017 21    • Creatinine 11/14/2017 0.83    • Calcium 11/14/2017 9.7    • AST(SGOT) 11/14/2017 26    • ALT(SGPT) 11/14/2017 48    • Alkaline Phosphatase 11/14/2017 93    • Total Bilirubin 11/14/2017 0.6    • Total Protein 11/14/2017 7.1    • Albumin 11/14/2017  "4.4    • Globulin 11/14/2017 2.7    • A-G Ratio 11/14/2017 1.6    • Glycohemoglobin 11/14/2017 6.5*   • Est Avg Glucose 11/14/2017 140    • GFR If  11/14/2017 >60    • GFR If Non  Ameri* 11/14/2017 >60          clinical course has been stable    Past Medical History:   Diagnosis Date   • Anesthesia     low bp coming out of anesthesia \"one time\"   • Anxiety 11/18/2011   • Arrhythmia     ? pt unsure   • Arthritis     fibromyalgia   • Breast mass, left    • Breath shortness     occasionally   • Bursitis of knee     left   • Cancer (CMS-HCC) 2005    squamous cell on nose   • Chronic pain 11/18/2011   • Depression 11/18/2011   • Fibromyalgia 11/18/2011   • Gastro-esophageal reflux 3/20/2012   • Heart murmur    • Iron deficiency anemia 3/20/2012   • Neuropathy (CMS-[facility]) 11/18/2011   • Other specified disorder of intestines    • Pain 6/14/12    3/10 stomach   • Pneumonia 01/2012   • Pulmonary hypertension 3/20/2012   • Syncope and collapse 3/20/2012       Past Surgical History:   Procedure Laterality Date   • CATH PLACEMENT  1/25/2013    Performed by Lizeth Ferreira M.D. at SURGERY SAME DAY [facility]S   • BREAST BIOPSY  1/9/2013    Performed by Lizeth Ferreira M.D. at SURGERY SAME DAY [facility] ORS   • AXILLARY NODE DISSECTION  1/9/2013    Performed by Lizeth Ferreira M.D. at SURGERY SAME DAY [facility] ORS   • NODE BIOPSY  1/9/2013    Performed by Lizeth Ferreira M.D. at SURGERY SAME DAY [facility] ORS   • BREAST BIOPSY  12/11/2012    Performed by Lizeth Ferreira M.D. at SURGERY SAME DAY [facility] ORS   • COLONOSCOPY  6/21/2012    Performed by PARAM VALDEZ at SURGERY [facility] ORS   • GASTROSCOPY  6/21/2012    Performed by PARAM VALDEZ at SURGERY [facility] ORS   • ABDOMINAL EXPLORATION  10/7/2010    Performed by MARIA G KHAN JR at SURGERY [facility] ORS   • IRRIGATION & DEBRIDEMENT GENERAL  4/12/2010    Performed by OZZIE WEINSTEIN at SURGERY [facility] ORS   • HERNIA REPAIR  " 3/15/2010    Performed by MARIA G KHAN JR at SURGERY [facility] ORS   • FLAP GRAFT  3/15/2010    Performed by MARIA G KHAN JR at SURGERY [facility] ORS   • PANNICULECTOMY  3/15/2010    Performed by MARIA G KHAN JR at SURGERY [facility] ORS   • ABDOMINAL EXPLORATION  3/11/2010    Performed by MARIA G KHAN JR at SURGERY SAME DAY [facility] ORS   • OTHER  08/09    pulled mesh out of hernia   • OTHER  07/09    abscess removed abd.   • OTHER  06/09    bowel obstruction   • OTHER  04/2009    hernia repair,mesh removal after in aug.09   • OTHER  2005    bariatric surgery gastric bypass   • GASTRIC BYPASS LAPAROSCOPIC  2005   • GYN SURGERY  1984    tubal       Family History   Problem Relation Age of Onset   • Heart Attack Father    • Cancer Maternal Grandfather      leukemia   • Cancer Other      leukemia- cousin   • Diabetes Maternal Uncle    • Diabetes Maternal Uncle    • Hypertension Mother        Shellfish allergy    Current Outpatient Prescriptions Ordered in [facility]   Medication Sig Dispense Refill   • trazodone (DESYREL) 100 MG Tab TAKE 1-2 AT BEDTIME 180 Tab 1   • diclofenac CR (VOLTAREN-XR) 100 MG TABLET SR 24 HR tablet 1-2 pill daily as needed 180 Tab 1   • alprazolam (XANAX XR) 1 MG XR tablet TAKE 1 TO 2 TABS BY MOUTH 2 TIMES A DAY AS NEEDED 60 Tab 0   • venlafaxine (EFFEXOR-XR) 150 MG extended-release capsule TAKE 1 CAP BY MOUTH EVERY DAY 90 Cap 0   • topiramate (TOPAMAX) 25 MG capsule Take 1 Cap by mouth 2 times a day. 180 Cap 1   • rizatriptan (MAXALT) 10 MG tablet TAKE 1 TAB BY MOUTH ONCE AS NEEDED FOR MIGRAINE FOR UP TO 1 DOSE. MAY REPEAT IN 2 HOURS IF NEEDED 60 Tab 0   • Diclofenac Sodium 1 % Gel Apply 2gm up to 4 times per day if needed for back pain 1 Tube 3   • Milnacipran HCl (SAVELLA) 50 MG Tab TAKE 1 TAB (50 MG) BY MOUTH 2 TIMES A DAY. 180 Tab 1   • albuterol 108 (90 BASE) MCG/ACT Aero Soln inhalation aerosol Inhale 2 Puffs by mouth every 6 hours as needed for Shortness of  "Breath. 1 Inhaler 3   • fluticasone (FLONASE) 50 MCG/ACT nasal spray Spray 2 Sprays in nose every day. 16 g 11   • aspirin (ASA) 325 MG TABS Take 325 mg by mouth as needed. weekly        No current Epic-ordered facility-administered medications on file.        Constitutional ROS: No unexpected change in weight, No weakness, No unexplained fevers, sweats, or chills. Positive for insomnia  Pulmonary ROS: No chronic cough, sputum, or hemoptysis, No shortness of breath, No recent change in breathing, Positive for smoker, current every day  Cardiovascular ROS: No chest pain, No edema, No palpitations  Gastrointestinal ROS: No abdominal pain, No nausea, vomiting, diarrhea, or constipation, no blood in stool  Musculoskeletal/Extremities ROS: Positive per history of present illness  Neurologic ROS: Normal development, No seizures, No weakness  Endocrine ROS: Positive per history of present illness    Physical exam:  /72   Pulse 72   Temp 36.7 °C (98 °F)   Resp 14   Ht 1.651 m (5' 5\")   Wt 117 kg (258 lb)   LMP 03/18/2010   SpO2 97%   BMI 42.93 kg/m²   General Appearance: Middle-aged female, alert, no distress, morbidly obese, well-groomed  Skin: Skin color, texture, turgor normal. No rashes or lesions.  Lungs: negative findings: normal respiratory rate and rhythm, lungs clear to auscultation  Musculoskeletal: negative findings: no evidence of joint instability, no evidence of muscle atrophy, no deformities present. Positive for decreased range of motion to lumbar spine due to pain  Neurologic: intact, oriented, mood appropriate, judgment intact. Cranial nerves II-12 grossly intact    Medical decision making/discussion: Patient has been referred to spine surgery, orthopedics and weight management clinic. We'll have her stop immediate release will tear in and have her start Voltaren XR, 100 mg 1-2 pills daily. She is also going to increase trazodone to 200 mg at bedtime and continue practicing good sleep hygiene " measures. She is going to make healthy lifestyle modifications including healthy diet, regular physical activity and continued efforts towards weight loss. She is to follow-up with me in 3 months with labs before visit.    Mikayla was seen today for follow-up, labs only and pain.    Diagnoses and all orders for this visit:    Lumbar radiculopathy  -     REFERRAL TO SPINE SURGERY  -     diclofenac CR (VOLTAREN-XR) 100 MG TABLET SR 24 HR tablet; 1-2 pill daily as needed    Chronic pain of both knees  -     REFERRAL TO ORTHOPEDICS  -     diclofenac CR (VOLTAREN-XR) 100 MG TABLET SR 24 HR tablet; 1-2 pill daily as needed    Type 2 diabetes mellitus without complication, without long-term current use of insulin (CMS-[facility])  -     COMP METABOLIC PANEL; Future  -     HEMOGLOBIN A1C; Future  -     MICROALBUMIN CREAT RATIO URINE; Future  -     TSH WITH REFLEX TO FT4; Future    Morbid obesity with BMI of 40.0-44.9, adult ([facility])  -     Patient identified as having weight management issue.  Appropriate orders and counseling given.  -     REFERRAL TO [facility] IMPROVEMENT PROGRAMS (HIP) Services Requested: Weight Management Program; Reason for Visit: Overweight/Obesity; Future    Primary insomnia  -     trazodone (DESYREL) 100 MG Tab; TAKE 1-2 AT BEDTIME          Please note that this dictation was created using voice recognition software. I have made every reasonable attempt to correct obvious errors, but I expect that there are errors of grammar and possibly content that I did not discover before finalizing the note.

## 2017-12-18 NOTE — ASSESSMENT & PLAN NOTE
This is a chronic condition, uncontrolled. Her weight is 258 pounds, BMI is 42.93. She has had bariatric surgery in the past. She continues to struggle with weight loss and reports that she does not eat that much, but she knows that what she eats is probably not the best for her. She does not know exactly how many calories she takes in. I did encourage her to start keeping a food diary so that we can determine exactly how many calories she takes in daily. She is interested in the [facility] health improvement program, only if it is available via telemedicine. I did discuss with her that I put referral and I'm requested on medicine, she should be hearing from the referral department regarding this.

## 2017-12-18 NOTE — PATIENT INSTRUCTIONS
Increase the trazodone to 200 mg daily    Increase the voltaren to 100 mg, 1-2 daily    Referrals to spine surgery, orthopedics and weight management     Follow up with me in 3 months, with labs before visit

## 2017-12-18 NOTE — ASSESSMENT & PLAN NOTE
This is a chronic condition, currently suboptimally controlled with medications. She suffers from pain in both knees related to very old injuries. She has been on Voltaren, 75 mg twice daily. Patient has seen orthopedic provider in the past, she would like referral to new orthopedic provider. We will increase her Voltaren to the extended release version, 100 mg, 1-2 pills daily.

## 2017-12-18 NOTE — ASSESSMENT & PLAN NOTE
This is a new diagnosis, patient's hemoglobin A1c was 6.5%. She had a fasting blood sugar 108. I did discuss with her at length treatment, importance of treatment especially for protection and organs, regular physical activity and weight loss. She would like to try 3 months of lifestyle modifications including healthy diet, regular physical activity and continued efforts towards weight loss. We will have her do labs before she follows up in 3 months. I did discuss with her that there is no improvement or if her labs worsen, we will need to start medication. Patient verbalized understanding and was agreeable to this plan.

## 2017-12-18 NOTE — ASSESSMENT & PLAN NOTE
Patient continues to have chronic low back pain as well as pain associated with fibromyalgia. She reports that she does have degenerative disc disease. She has been on meloxicam in the past, but now she takes Voltaren. She is also on Savella. She states that her back pain is not improving, in fact is getting worse. She has been referred to spine surgeon for further evaluation and treatment. We'll have her stop the Voltaren 75 mg twice daily and have her start the extended release, 100 mg 1-2 pills daily.

## 2017-12-18 NOTE — ASSESSMENT & PLAN NOTE
This is a chronic condition, stable and poor to fair control on current dose of trazodone. She is on 50 mg, 1-2 pills at bedtime. She states she has been taking up to 3 trazodone at bedtime, 2 melatonin and Benadryl and getting very little sleep. We again discussed the importance of sleep hygiene which she does admit to not practicing. She is to stop the Benadryl, she can continue on the melatonin but we will increase the trazodone to 200 mg at bedtime. If this does not improve her symptoms, she may need to be referred sleep medicine for further evaluation.

## 2017-12-21 RX ORDER — MELOXICAM 15 MG/1
15 TABLET ORAL DAILY
Refills: 1 | OUTPATIENT
Start: 2017-12-21

## 2018-01-17 RX ORDER — VENLAFAXINE HYDROCHLORIDE 150 MG/1
CAPSULE, EXTENDED RELEASE ORAL
Qty: 90 CAP | Refills: 0 | Status: SHIPPED | OUTPATIENT
Start: 2018-01-17 | End: 2018-05-24

## 2018-01-17 NOTE — TELEPHONE ENCOUNTER
Was the patient seen in the last year in this department? Yes     Does patient have an active prescription for medications requested? No     Received Request Via: Patient      Pt met protocol?: Yes, ov 12/18/17 appt pcp 3/19/18

## 2018-02-16 DIAGNOSIS — G44.229 CHRONIC TENSION-TYPE HEADACHE, NOT INTRACTABLE: ICD-10-CM

## 2018-02-16 DIAGNOSIS — M79.7 FIBROMYALGIA: ICD-10-CM

## 2018-02-16 DIAGNOSIS — M54.16 LUMBAR RADICULOPATHY: ICD-10-CM

## 2018-02-16 RX ORDER — MILNACIPRAN HYDROCHLORIDE 50 MG/1
TABLET, FILM COATED ORAL
Qty: 180 TAB | Refills: 0 | Status: SHIPPED | OUTPATIENT
Start: 2018-02-16 | End: 2018-05-24

## 2018-02-16 RX ORDER — TOPIRAMATE SPINKLE 25 MG/1
25 CAPSULE ORAL 2 TIMES DAILY
Qty: 180 CAP | Refills: 0 | Status: SHIPPED | OUTPATIENT
Start: 2018-02-16 | End: 2019-02-28

## 2018-02-21 ENCOUNTER — HOSPITAL ENCOUNTER (OUTPATIENT)
Dept: RADIOLOGY | Facility: MEDICAL CENTER | Age: 58
End: 2018-02-21
Attending: NURSE PRACTITIONER
Payer: COMMERCIAL

## 2018-02-21 DIAGNOSIS — M54.5 LOW BACK PAIN, UNSPECIFIED BACK PAIN LATERALITY, UNSPECIFIED CHRONICITY, WITH SCIATICA PRESENCE UNSPECIFIED: ICD-10-CM

## 2018-02-21 PROCEDURE — 72110 X-RAY EXAM L-2 SPINE 4/>VWS: CPT

## 2018-02-21 PROCEDURE — 72148 MRI LUMBAR SPINE W/O DYE: CPT

## 2018-05-24 ENCOUNTER — OFFICE VISIT (OUTPATIENT)
Dept: MEDICAL GROUP | Facility: PHYSICIAN GROUP | Age: 58
End: 2018-05-24
Payer: COMMERCIAL

## 2018-05-24 ENCOUNTER — HOSPITAL ENCOUNTER (OUTPATIENT)
Dept: LAB | Facility: MEDICAL CENTER | Age: 58
End: 2018-05-24
Attending: NURSE PRACTITIONER
Payer: COMMERCIAL

## 2018-05-24 VITALS
DIASTOLIC BLOOD PRESSURE: 68 MMHG | BODY MASS INDEX: 44.83 KG/M2 | SYSTOLIC BLOOD PRESSURE: 120 MMHG | WEIGHT: 269.1 LBS | TEMPERATURE: 99.5 F | HEIGHT: 65 IN | OXYGEN SATURATION: 95 % | HEART RATE: 70 BPM | RESPIRATION RATE: 16 BRPM

## 2018-05-24 DIAGNOSIS — F33.2 SEVERE EPISODE OF RECURRENT MAJOR DEPRESSIVE DISORDER, WITHOUT PSYCHOTIC FEATURES (HCC): ICD-10-CM

## 2018-05-24 DIAGNOSIS — E11.9 TYPE 2 DIABETES MELLITUS WITHOUT COMPLICATION, WITHOUT LONG-TERM CURRENT USE OF INSULIN (HCC): ICD-10-CM

## 2018-05-24 DIAGNOSIS — E66.01 MORBID OBESITY WITH BMI OF 45.0-49.9, ADULT (HCC): ICD-10-CM

## 2018-05-24 DIAGNOSIS — M79.7 FIBROMYALGIA: ICD-10-CM

## 2018-05-24 DIAGNOSIS — R11.0 NAUSEA: ICD-10-CM

## 2018-05-24 DIAGNOSIS — F51.01 PRIMARY INSOMNIA: Primary | ICD-10-CM

## 2018-05-24 DIAGNOSIS — F41.9 ANXIETY: ICD-10-CM

## 2018-05-24 DIAGNOSIS — G44.229 CHRONIC TENSION-TYPE HEADACHE, NOT INTRACTABLE: ICD-10-CM

## 2018-05-24 DIAGNOSIS — G62.9 NEUROPATHY: ICD-10-CM

## 2018-05-24 PROBLEM — E87.1 HYPONATREMIA: Status: RESOLVED | Noted: 2017-08-16 | Resolved: 2018-05-24

## 2018-05-24 PROBLEM — R51.9 LT FACIAL PAIN: Status: RESOLVED | Noted: 2017-02-27 | Resolved: 2018-05-24

## 2018-05-24 PROBLEM — R73.01 ELEVATED FASTING GLUCOSE: Status: RESOLVED | Noted: 2017-08-16 | Resolved: 2018-05-24

## 2018-05-24 PROBLEM — R55 SYNCOPE: Status: RESOLVED | Noted: 2017-02-27 | Resolved: 2018-05-24

## 2018-05-24 LAB
ALBUMIN SERPL BCP-MCNC: 4 G/DL (ref 3.2–4.9)
ALBUMIN/GLOB SERPL: 1.5 G/DL
ALP SERPL-CCNC: 101 U/L (ref 30–99)
ALT SERPL-CCNC: 34 U/L (ref 2–50)
ANION GAP SERPL CALC-SCNC: 10 MMOL/L (ref 0–11.9)
AST SERPL-CCNC: 24 U/L (ref 12–45)
BILIRUB SERPL-MCNC: 0.4 MG/DL (ref 0.1–1.5)
BUN SERPL-MCNC: 15 MG/DL (ref 8–22)
CALCIUM SERPL-MCNC: 9.3 MG/DL (ref 8.5–10.5)
CHLORIDE SERPL-SCNC: 108 MMOL/L (ref 96–112)
CO2 SERPL-SCNC: 23 MMOL/L (ref 20–33)
CREAT SERPL-MCNC: 0.79 MG/DL (ref 0.5–1.4)
CREAT UR-MCNC: 171.5 MG/DL
EST. AVERAGE GLUCOSE BLD GHB EST-MCNC: 137 MG/DL
GLOBULIN SER CALC-MCNC: 2.6 G/DL (ref 1.9–3.5)
GLUCOSE SERPL-MCNC: 116 MG/DL (ref 65–99)
HBA1C MFR BLD: 6.4 % (ref 0–5.6)
MICROALBUMIN UR-MCNC: 1.2 MG/DL
MICROALBUMIN/CREAT UR: 7 MG/G (ref 0–30)
POTASSIUM SERPL-SCNC: 4.2 MMOL/L (ref 3.6–5.5)
PROT SERPL-MCNC: 6.6 G/DL (ref 6–8.2)
SODIUM SERPL-SCNC: 141 MMOL/L (ref 135–145)
TSH SERPL DL<=0.005 MIU/L-ACNC: 2.68 UIU/ML (ref 0.38–5.33)

## 2018-05-24 PROCEDURE — 83036 HEMOGLOBIN GLYCOSYLATED A1C: CPT | Mod: GA

## 2018-05-24 PROCEDURE — 80053 COMPREHEN METABOLIC PANEL: CPT

## 2018-05-24 PROCEDURE — 36415 COLL VENOUS BLD VENIPUNCTURE: CPT

## 2018-05-24 PROCEDURE — 82043 UR ALBUMIN QUANTITATIVE: CPT

## 2018-05-24 PROCEDURE — 82570 ASSAY OF URINE CREATININE: CPT

## 2018-05-24 PROCEDURE — 99214 OFFICE O/P EST MOD 30 MIN: CPT | Performed by: NURSE PRACTITIONER

## 2018-05-24 PROCEDURE — 84443 ASSAY THYROID STIM HORMONE: CPT

## 2018-05-24 RX ORDER — ONDANSETRON 8 MG/1
8 TABLET, ORALLY DISINTEGRATING ORAL EVERY 8 HOURS PRN
Qty: 15 TAB | Refills: 0 | Status: SHIPPED | OUTPATIENT
Start: 2018-05-24 | End: 2020-09-08 | Stop reason: SDUPTHER

## 2018-05-24 RX ORDER — AMITRIPTYLINE HYDROCHLORIDE 50 MG/1
50 TABLET, FILM COATED ORAL NIGHTLY PRN
Qty: 90 TAB | Refills: 1 | Status: SHIPPED | OUTPATIENT
Start: 2018-05-24 | End: 2018-07-18 | Stop reason: SDUPTHER

## 2018-05-24 RX ORDER — DULOXETIN HYDROCHLORIDE 30 MG/1
30 CAPSULE, DELAYED RELEASE ORAL DAILY
Qty: 90 CAP | Refills: 1 | Status: SHIPPED | OUTPATIENT
Start: 2018-05-24 | End: 2018-11-18 | Stop reason: SDUPTHER

## 2018-05-24 RX ORDER — ALPRAZOLAM 1 MG/1
1 TABLET ORAL NIGHTLY PRN
Qty: 30 TAB | Refills: 0 | Status: SHIPPED | OUTPATIENT
Start: 2018-05-24 | End: 2018-06-23

## 2018-05-24 ASSESSMENT — PATIENT HEALTH QUESTIONNAIRE - PHQ9: CLINICAL INTERPRETATION OF PHQ2 SCORE: 0

## 2018-05-24 NOTE — ASSESSMENT & PLAN NOTE
As noted, patient is a 58-year-old female who abruptly discontinued her Savella and trazodone, now with nausea and diarrhea.  No fever, no blood in her stool.  We did discuss that this is likely due to abrupt withdrawal of her medication, she does report that her symptoms started shortly after discontinuing her medications.  I will give her a small number of Zofran to be used in the interim, reassurance was provided, this will resolve on its own.

## 2018-05-24 NOTE — ASSESSMENT & PLAN NOTE
Patient is a 58-year-old female with insomnia, no longer controlled on trazodone 200 mg nightly.  In addition to trazodone she was taking melatonin and Benadryl.  In the past Ambien worked well though she does not like to take this on a daily basis.  Her poor sleep impacts her fibromyalgia pain.  We did discuss amitriptyline given her concomitant chronic pain, chronic headaches, neuropathy.  She is agreeable.  She is requesting a small refill of Xanax which she uses on nights where she absolutely cannot fall asleep.  She does not drink alcohol.  Her  is reviewed and in accordance with her prescriptions, no signs of abuse.  I will give her #30 tablets, I would like her to make this last at least 2 months, she is agreeable.  She will start amitriptyline 50 mg nightly and monitor for side effects to include weight gain.

## 2018-05-24 NOTE — PROGRESS NOTES
[facility]  Primary Care Office Visit - Problem-Oriented        History:     Mikayla Knight is a 58 y.o. female who is here today to discuss Medication Management; GI Problem (x2wk); and Establish Care      Insomnia  Patient is a 58-year-old female with insomnia, no longer controlled on trazodone 200 mg nightly.  In addition to trazodone she was taking melatonin and Benadryl.  In the past Ambien worked well though she does not like to take this on a daily basis.  Her poor sleep impacts her fibromyalgia pain.  We did discuss amitriptyline given her concomitant chronic pain, chronic headaches, neuropathy.  She is agreeable.  She is requesting a small refill of Xanax which she uses on nights where she absolutely cannot fall asleep.  She does not drink alcohol.  Her  is reviewed and in accordance with her prescriptions, no signs of abuse.  I will give her #30 tablets, I would like her to make this last at least 2 months, she is agreeable.  She will start amitriptyline 50 mg nightly and monitor for side effects to include weight gain.    Fibromyalgia  Patient is a 58-year-old female who is transferring care to me as she lives locally.  She was diagnosed with fibromyalgia many years ago and was previously being seen by pain management.  About a month ago she decided that she was going to stop all of her medications and has since discontinued Savella now having increased pain and diarrhea as well as fatigue.  She states that Savella did not work well for her.  She has not tried Cymbalta though this is in the same class we do have more room for increased titration of dose, she is agreeable to a trial of Cymbalta.    Nausea  As noted, patient is a 58-year-old female who abruptly discontinued her Savella and trazodone, now with nausea and diarrhea.  No fever, no blood in her stool.  We did discuss that this is likely due to abrupt withdrawal of her medication, she does report that her symptoms started  "shortly after discontinuing her medications.  I will give her a small number of Zofran to be used in the interim, reassurance was provided, this will resolve on its own.        Past Medical History:   Diagnosis Date   • Anesthesia     low bp coming out of anesthesia \"one time\"   • Anxiety 11/18/2011   • Arrhythmia     ? pt unsure   • Arthritis     fibromyalgia   • Breast mass, left    • Breath shortness     occasionally   • Bursitis of knee     left   • Cancer ([facility]) 2005    squamous cell on nose   • Chronic pain 11/18/2011   • Depression 11/18/2011   • Fibromyalgia 11/18/2011   • Gastro-esophageal reflux 3/20/2012   • Heart murmur    • Iron deficiency anemia 3/20/2012   • Neuropathy ([facility]) 11/18/2011   • Other specified disorder of intestines    • Pain 6/14/12    3/10 stomach   • Pneumonia 01/2012   • Pulmonary hypertension ([facility]) 3/20/2012   • Syncope and collapse 3/20/2012     Past Surgical History:   Procedure Laterality Date   • CATH PLACEMENT  1/25/2013    Performed by Lizeth Ferreira M.D. at SURGERY SAME DAY [facility]   • BREAST BIOPSY  1/9/2013    Performed by Lizeth Ferreira M.D. at SURGERY SAME DAY [facility]   • AXILLARY NODE DISSECTION  1/9/2013    Performed by Lizeth Ferreira M.D. at SURGERY SAME DAY [facility]   • NODE BIOPSY  1/9/2013    Performed by Lizeth Ferreira M.D. at SURGERY SAME DAY [facility]   • BREAST BIOPSY  12/11/2012    Performed by Lizeth Ferreira M.D. at SURGERY SAME DAY [facility]   • COLONOSCOPY  6/21/2012    Performed by PARAM VALDEZ at SURGERY [facility]S   • GASTROSCOPY  6/21/2012    Performed by PARAM VALDEZ at SURGERY [facility] ORS   • ABDOMINAL EXPLORATION  10/7/2010    Performed by MARIA G KHAN JR at SURGERY [facility] ORS   • IRRIGATION & DEBRIDEMENT GENERAL  4/12/2010    Performed by OZZIE WEINSTEIN at SURGERY [facility] ORS   • HERNIA REPAIR  3/15/2010    Performed by MARIA G KHAN JR at SURGERY [facility] ORS   • FLAP GRAFT  " 3/15/2010    Performed by MARIA G KHAN JR at SURGERY [facility] ORS   • PANNICULECTOMY  3/15/2010    Performed by MARIA G KHAN JR at SURGERY [facility] ORS   • ABDOMINAL EXPLORATION  3/11/2010    Performed by MARIA G KHAN JR at SURGERY SAME DAY [facility]   • OTHER  08/09    pulled mesh out of hernia   • OTHER  07/09    abscess removed abd.   • OTHER  06/09    bowel obstruction   • OTHER  04/2009    hernia repair,mesh removal after in aug.09   • OTHER  2005    bariatric surgery gastric bypass   • GASTRIC BYPASS LAPAROSCOPIC  2005   • GYN SURGERY  1984    tubal     Social History     Social History   • Marital status:      Spouse name: N/A   • Number of children: 2   • Years of education: N/A     Occupational History   •  Other     Social History Main Topics   • Smoking status: Current Some Day Smoker     Packs/day: 0.10     Years: 8.00     Types: Cigarettes   • Smokeless tobacco: Never Used      Comment: 1/2 pk a day on and off 10 yrs, 1 cigarette daily   • Alcohol use 0.0 oz/week      Comment: 5 a month, social   • Drug use: No   • Sexual activity: Not Currently     Partners: Male     Other Topics Concern   • Not on file     Social History Narrative    Patient lives with  in [place] and they have 2 grown children. She does not work. She is unable to drive due to health conditions and her son takes her to all appointments.      History   Smoking Status   • Current Some Day Smoker   • Packs/day: 0.10   • Years: 8.00   • Types: Cigarettes   Smokeless Tobacco   • Never Used     Comment: 1/2 pk a day on and off 10 yrs, 1 cigarette daily     Family History   Problem Relation Age of Onset   • Heart Attack Father    • Cancer Maternal Grandfather      leukemia   • Cancer Other      leukemia- cousin   • Diabetes Maternal Uncle    • Diabetes Maternal Uncle    • Hypertension Mother      Allergies   Allergen Reactions   • Shellfish Allergy Anaphylaxis     SHRIMP ONLY, not anything else, not  iodine.       Problem List:     Patient Active Problem List    Diagnosis Date Noted   • Nausea 05/24/2018   • Type 2 diabetes mellitus without complication, without long-term current use of insulin ([facility]) 12/18/2017   • Morbid obesity with BMI of 40.0-44.9, adult ([facility]) 05/16/2017   • Hypertriglyceridemia 12/20/2016   • Bilateral hand pain 05/24/2016   • Bilateral knee pain 01/27/2016   • Chronic maxillary sinusitis 06/10/2015   • Vitamin D deficiency disease 06/10/2015   • Tobacco abuse 02/20/2014   • Heart palpitations 02/12/2014   • Lumbar radiculopathy 12/05/2013   • Chronic headache 05/09/2013   • Breast cancer, left breast ([facility]) 12/31/2012   • Pulmonary hypertension ([facility]) 03/20/2012   • Gastro-esophageal reflux 03/20/2012   • Fibromyalgia 11/18/2011   • Chronic pain 11/18/2011   • Neuropathy ([facility]) 11/18/2011   • Depression 11/18/2011   • Anxiety 11/18/2011   • Insomnia 11/18/2011         Medications:     Current Outpatient Prescriptions:   •  amitriptyline (ELAVIL) 50 MG Tab, Take 1 Tab by mouth at bedtime as needed., Disp: 90 Tab, Rfl: 1  •  DULoxetine (CYMBALTA) 30 MG Cap DR Particles, Take 1 Cap by mouth every day., Disp: 90 Cap, Rfl: 1  •  ALPRAZolam (XANAX) 1 MG Tab, Take 1 Tab by mouth at bedtime as needed for Sleep for up to 30 days., Disp: 30 Tab, Rfl: 0  •  ondansetron (ZOFRAN ODT) 8 MG TABLET DISPERSIBLE, Take 1 Tab by mouth every 8 hours as needed for Nausea., Disp: 15 Tab, Rfl: 0  •  rizatriptan (MAXALT) 10 MG tablet, TAKE 1 TAB BY MOUTH ONCE AS NEEDED FOR MIGRAINE FOR UP TO 1 DOSE. MAY REPEAT IN 2 HOURS IF NEEDED, Disp: 60 Tab, Rfl: 0  •  Diclofenac Sodium 1 % Gel, Apply 2gm up to 4 times per day if needed for back pain, Disp: 1 Tube, Rfl: 3  •  albuterol 108 (90 BASE) MCG/ACT Aero Soln inhalation aerosol, Inhale 2 Puffs by mouth every 6 hours as needed for Shortness of Breath., Disp: 1 Inhaler, Rfl: 3  •  fluticasone (FLONASE) 50 MCG/ACT nasal spray, Spray 2 Sprays in nose every day., Disp: 16 g,  "Rfl: 11  •  aspirin (ASA) 325 MG TABS, Take 325 mg by mouth as needed. weekly , Disp: , Rfl:   •  topiramate (TOPAMAX) 25 MG capsule, TAKE 1 CAP BY MOUTH 2 TIMES A DAY., Disp: 180 Cap, Rfl: 0  •  diclofenac CR (VOLTAREN-XR) 100 MG TABLET SR 24 HR tablet, 1-2 pill daily as needed, Disp: 180 Tab, Rfl: 1      Review of Systems:     Pertinent positives as per HPI, all other systems reviewed and WNL       Physical Assessment:     VS: /68   Pulse 70   Temp 37.5 °C (99.5 °F)   Resp 16   Ht 1.651 m (5' 5\")   Wt 122.1 kg (269 lb 1.6 oz)   LMP 03/18/2010   SpO2 95%   Breastfeeding? No   BMI 44.78 kg/m²     General: Well-developed, well-nourished, female, obese     Head: PERRL, EOMI. Normocephalic. No facial asymmetry noted.  Cardiovasc: RRR, no MRG. No thrills or bruits. Pulses 2+ and symmetric at all distal extremities.  Pulmonary: Lungs clear bilaterally.  Normal respiratory effort. No wheeze or crackles.   Neuro: Alert and oriented, CNs II-XII intact, no focal deficits.  Skin:No rashes noted. Skin warm, dry, intact    Psych: Dressed appropriately for the weather, pleasant and conversant.  Affect, mood & judgment appropriate.    Assessment/Plan:   Mikayla was seen today for medication management, gi problem and establish care.    Diagnoses and all orders for this visit:    Primary insomnia, uncontrolled  -Start amitriptyline 50 mg nightly for insomnia as well as chronic pain, fibromyalgia, chronic headache and neuropathy.  We did discuss rare use of alprazolam 1 mg nightly, #30 for 2 months, not for daily use.  Not to combine with alcohol.  She is aware of the side effects of these medications.  We did discuss good sleep hygiene/habits.  She will follow-up in one month, sooner if needed.  Should she need refill of alprazolam she will need to provide urine drug screen and update controlled substance agreement.   is reviewed and in accordance with her prescriptions, no signs of abuse  -     amitriptyline " (ELAVIL) 50 MG Tab; Take 1 Tab by mouth at bedtime as needed.  -     ALPRAZolam (XANAX) 1 MG Tab; Take 1 Tab by mouth at bedtime as needed for Sleep for up to 30 days.    Fibromyalgia, uncontrolled  -Start medications below, titration instructions discussed with patient as well as side effects.  Follow-up in 4-6 weeks, sooner if needed  -     amitriptyline (ELAVIL) 50 MG Tab; Take 1 Tab by mouth at bedtime as needed.  -     DULoxetine (CYMBALTA) 30 MG Cap DR Particles; Take 1 Cap by mouth every day.    Neuropathy ([facility]), stable, continue to monitor  -     amitriptyline (ELAVIL) 50 MG Tab; Take 1 Tab by mouth at bedtime as needed.  -     DULoxetine (CYMBALTA) 30 MG Cap DR Particles; Take 1 Cap by mouth every day.    Chronic tension-type headache, not intractable, stable, continue to monitor  -     amitriptyline (ELAVIL) 50 MG Tab; Take 1 Tab by mouth at bedtime as needed.    Anxiety, uncontrolled  -Start medications below, titration instructions discussed with patient as well as side effects.  Follow-up in 4-6 weeks, sooner if needed  -     DULoxetine (CYMBALTA) 30 MG Cap DR Particles; Take 1 Cap by mouth every day.  -     ALPRAZolam (XANAX) 1 MG Tab; Take 1 Tab by mouth at bedtime as needed for Sleep for up to 30 days.    Nausea  -Likely related to abrupt withdrawal of her Savella and trazodone, Zofran to be used as needed for symptomatic management, reassurance provided, symptoms will resolve with the initiation of Cymbalta.  -     ondansetron (ZOFRAN ODT) 8 MG TABLET DISPERSIBLE; Take 1 Tab by mouth every 8 hours as needed for Nausea.    Morbid obesity with BMI of 45.0-49.9, adult ([facility])  -Recommend weight loss specifically for pain management purposes        Patient is agreeable to the above plan and voiced understanding. All questions answered.     Please note that this dictation was created using voice recognition software. I have made every reasonable attempt to correct obvious errors, but I expect that there  are errors of grammar and possibly content that I did not discover before finalizing the note.      GRACIELA Pedraza  5/24/2018, 12:53 PM

## 2018-05-24 NOTE — ASSESSMENT & PLAN NOTE
Patient is a 58-year-old female who is transferring care to me as she lives locally.  She was diagnosed with fibromyalgia many years ago and was previously being seen by pain management.  About a month ago she decided that she was going to stop all of her medications and has since discontinued Savella now having increased pain and diarrhea as well as fatigue.  She states that Savella did not work well for her.  She has not tried Cymbalta though this is in the same class we do have more room for increased titration of dose, she is agreeable to a trial of Cymbalta.

## 2018-06-06 DIAGNOSIS — F51.01 PRIMARY INSOMNIA: ICD-10-CM

## 2018-06-06 RX ORDER — TRAZODONE HYDROCHLORIDE 100 MG/1
TABLET ORAL
OUTPATIENT
Start: 2018-06-06

## 2018-06-06 NOTE — TELEPHONE ENCOUNTER
*Medication is currently D/C'd*  Was the patient seen in the last year in this department? Yes     Does patient have an active prescription for medications requested? No     Received Request Via: Pharmacy    Pt met protocol?: Yes     Last OV 05/2018

## 2018-06-20 ENCOUNTER — OFFICE VISIT (OUTPATIENT)
Dept: MEDICAL GROUP | Facility: PHYSICIAN GROUP | Age: 58
End: 2018-06-20
Payer: COMMERCIAL

## 2018-06-20 VITALS
RESPIRATION RATE: 16 BRPM | OXYGEN SATURATION: 92 % | HEIGHT: 65 IN | SYSTOLIC BLOOD PRESSURE: 122 MMHG | DIASTOLIC BLOOD PRESSURE: 68 MMHG | WEIGHT: 263 LBS | BODY MASS INDEX: 43.82 KG/M2 | HEART RATE: 77 BPM | TEMPERATURE: 98.3 F

## 2018-06-20 DIAGNOSIS — G89.4 CHRONIC PAIN SYNDROME: ICD-10-CM

## 2018-06-20 DIAGNOSIS — G44.229 CHRONIC TENSION-TYPE HEADACHE, NOT INTRACTABLE: ICD-10-CM

## 2018-06-20 DIAGNOSIS — M79.7 FIBROMYALGIA: Primary | ICD-10-CM

## 2018-06-20 DIAGNOSIS — F51.01 PRIMARY INSOMNIA: ICD-10-CM

## 2018-06-20 DIAGNOSIS — F33.2 SEVERE EPISODE OF RECURRENT MAJOR DEPRESSIVE DISORDER, WITHOUT PSYCHOTIC FEATURES (HCC): ICD-10-CM

## 2018-06-20 DIAGNOSIS — T39.395A NSAID INDUCED GASTRITIS: ICD-10-CM

## 2018-06-20 DIAGNOSIS — G89.29 CHRONIC MIDLINE LOW BACK PAIN WITHOUT SCIATICA: ICD-10-CM

## 2018-06-20 DIAGNOSIS — K29.60 NSAID INDUCED GASTRITIS: ICD-10-CM

## 2018-06-20 DIAGNOSIS — R73.01 ELEVATED FASTING GLUCOSE: ICD-10-CM

## 2018-06-20 DIAGNOSIS — M54.50 CHRONIC MIDLINE LOW BACK PAIN WITHOUT SCIATICA: ICD-10-CM

## 2018-06-20 PROBLEM — E11.9 TYPE 2 DIABETES MELLITUS WITHOUT COMPLICATION, WITHOUT LONG-TERM CURRENT USE OF INSULIN (HCC): Status: RESOLVED | Noted: 2017-12-18 | Resolved: 2018-06-20

## 2018-06-20 PROCEDURE — 99214 OFFICE O/P EST MOD 30 MIN: CPT | Performed by: NURSE PRACTITIONER

## 2018-06-20 RX ORDER — RIZATRIPTAN BENZOATE 10 MG/1
TABLET ORAL
Qty: 10 TAB | Refills: 2 | Status: SHIPPED | OUTPATIENT
Start: 2018-06-20 | End: 2019-01-09 | Stop reason: SDUPTHER

## 2018-06-20 RX ORDER — GABAPENTIN 100 MG/1
100 CAPSULE ORAL 3 TIMES DAILY
Qty: 90 CAP | Refills: 2 | Status: SHIPPED | OUTPATIENT
Start: 2018-06-20 | End: 2018-07-18

## 2018-06-20 RX ORDER — OMEPRAZOLE 20 MG/1
20 CAPSULE, DELAYED RELEASE ORAL DAILY
Qty: 90 CAP | Refills: 0 | Status: SHIPPED | OUTPATIENT
Start: 2018-06-20 | End: 2018-09-15 | Stop reason: SDUPTHER

## 2018-06-20 RX ORDER — CELECOXIB 100 MG/1
100 CAPSULE ORAL 2 TIMES DAILY
Qty: 60 CAP | Refills: 2 | Status: SHIPPED | OUTPATIENT
Start: 2018-06-20 | End: 2019-04-25

## 2018-06-20 NOTE — PROGRESS NOTES
[place] [facility]  Primary Care Office Visit - Problem-Oriented        History:     Mikayla Knight is a 58 y.o. female who is here today to discuss Sleep Problem (FV) and Medication Refill ([place])      Fibromyalgia  Patient is a 58-year-old female with fibromyalgia and chronic low back pain.  She is here for follow-up.  She reports that her chronic widespread pain has improved with Cymbalta 30 mg daily.  Most bothersome is her low back pain, see additional notes under chronic low back pain.    Depression  Patient is a 58-year-old female with depression, fibromyalgia, chronic pain, insomnia.  She continues on Cymbalta 30 mg daily, with good control of her mood.  She denies suicidal or homicidal ideations.  She has had mild improvement in her chronic widespread pain on this medication.    Chronic low back pain without sciatica  Patient is a 58-year-old female with fibromyalgia and chronic midline low back pain.  Her pain is a constant ache with physical activity, needing to take breaks often to stretch.  She has had some improvement with aqua therapy.  She does not have any radicular symptoms.  She is not using anti-inflammatories.  Muscle relaxers were only slightly beneficial and she discontinued use of these.  Last MRI was done in February 2018 which did not show any significant central or foraminal stenosis, neurosurgery did not feel that she was a candidate for surgery or conservative interventional pain management strategies.  Recommendation was for weight loss and repeat physical therapy which the patient completed and did not find beneficial.  She continues on Cymbalta 30 mg daily, amitriptyline 50 mg nightly.  She has had some improvement in her chronic widespread pain with these medications.  We did discuss adding Celebrex 100 mg twice daily and starting gabapentin.  She is status post Fredo-en-Y gastric bypass, we also discussed the risk of NSAID associated gastritis and I have given her  "prescription for daily PPI should she find that she develops any symptoms suggestive of gastritis.    Insomnia  Patient is a 58-year-old female with insomnia, depression, chronic pain, fibromyalgia, migraines.  At the last office visit we did start her on amitriptyline 50 mg nightly for her insomnia as well as her frequent migraines and chronic widespread pain.  She tells me that she is sleeping better than she ever has while on this medication.  She denies any side effects of this medication.  She is using Xanax rarely for evenings when she cannot fall asleep, I gave her #30 tablets over a month ago.        Past Medical History:   Diagnosis Date   • Anesthesia     low bp coming out of anesthesia \"one time\"   • Anxiety 11/18/2011   • Arrhythmia     ? pt unsure   • Arthritis     fibromyalgia   • Breast mass, left    • Breath shortness     occasionally   • Bursitis of knee     left   • Cancer ([facility]) 2005    squamous cell on nose   • Chronic pain 11/18/2011   • Depression 11/18/2011   • Fibromyalgia 11/18/2011   • Gastro-esophageal reflux 3/20/2012   • Heart murmur    • Iron deficiency anemia 3/20/2012   • Neuropathy ([facility]) 11/18/2011   • Other specified disorder of intestines    • Pain 6/14/12    3/10 stomach   • Pneumonia 01/2012   • Pulmonary hypertension ([facility]) 3/20/2012   • Syncope and collapse 3/20/2012     Past Surgical History:   Procedure Laterality Date   • CATH PLACEMENT  1/25/2013    Performed by Lizeth Ferreira M.D. at SURGERY SAME DAY [facility] ORS   • BREAST BIOPSY  1/9/2013    Performed by Lizeth Ferreira M.D. at SURGERY SAME DAY [facility] ORS   • AXILLARY NODE DISSECTION  1/9/2013    Performed by Lizeth Ferreira M.D. at SURGERY SAME DAY [facility] ORS   • NODE BIOPSY  1/9/2013    Performed by Lizeth Ferreira M.D. at SURGERY SAME DAY [facility] ORS   • BREAST BIOPSY  12/11/2012    Performed by Lizeth Ferreira M.D. at SURGERY SAME DAY [facility] ORS   • COLONOSCOPY  6/21/2012    Performed by PARAM VALDEZ at " SURGERY [facility] ORS   • GASTROSCOPY  6/21/2012    Performed by PARAM VALDEZ at SURGERY [facility] ORS   • ABDOMINAL EXPLORATION  10/7/2010    Performed by MARIA G KHAN JR at SURGERY [facility] ORS   • IRRIGATION & DEBRIDEMENT GENERAL  4/12/2010    Performed by OZZIE WEINSTEIN at SURGERY [facility] ORS   • HERNIA REPAIR  3/15/2010    Performed by MARIA G KHAN JR at SURGERY [facility] ORS   • FLAP GRAFT  3/15/2010    Performed by MARIA G KHAN JR at SURGERY [facility] ORS   • PANNICULECTOMY  3/15/2010    Performed by MARIA G KHAN JR at SURGERY [facility] ORS   • ABDOMINAL EXPLORATION  3/11/2010    Performed by MARIA G KHAN JR at SURGERY SAME DAY [facility] ORS   • OTHER  08/09    pulled mesh out of hernia   • OTHER  07/09    abscess removed abd.   • OTHER  06/09    bowel obstruction   • OTHER  04/2009    hernia repair,mesh removal after in aug.09   • OTHER  2005    bariatric surgery gastric bypass   • GASTRIC BYPASS LAPAROSCOPIC  2005   • GYN SURGERY  1984    tubal     Social History     Social History   • Marital status:      Spouse name: N/A   • Number of children: 2   • Years of education: N/A     Occupational History   •  Other     Social History Main Topics   • Smoking status: Current Some Day Smoker     Packs/day: 0.10     Years: 8.00     Types: Cigarettes   • Smokeless tobacco: Never Used      Comment: 1/2 pk a day on and off 10 yrs, 1 cigarette daily   • Alcohol use 0.0 oz/week      Comment: 5 a month, social   • Drug use: No   • Sexual activity: Not Currently     Partners: Male     Other Topics Concern   • Not on file     Social History Narrative    Patient lives with  in [place] and they have 2 grown children. She does not work. She is unable to drive due to health conditions and her son takes her to all appointments.      History   Smoking Status   • Current Some Day Smoker   • Packs/day: 0.10   • Years: 8.00   • Types: Cigarettes   Smokeless  Tobacco   • Never Used     Comment: 1/2 pk a day on and off 10 yrs, 1 cigarette daily     Family History   Problem Relation Age of Onset   • Heart Attack Father    • Cancer Maternal Grandfather      leukemia   • Cancer Other      leukemia- cousin   • Diabetes Maternal Uncle    • Diabetes Maternal Uncle    • Hypertension Mother      Allergies   Allergen Reactions   • Shellfish Allergy Anaphylaxis     SHRIMP ONLY, not anything else, not iodine.       Problem List:     Patient Active Problem List    Diagnosis Date Noted   • Nausea 05/24/2018   • Elevated fasting glucose 08/16/2017   • Morbid obesity with BMI of 40.0-44.9, adult ([facility]) 05/16/2017   • Hypertriglyceridemia 12/20/2016   • Bilateral hand pain 05/24/2016   • Bilateral knee pain 01/27/2016   • Chronic maxillary sinusitis 06/10/2015   • Vitamin D deficiency disease 06/10/2015   • Tobacco abuse 02/20/2014   • Heart palpitations 02/12/2014   • Chronic low back pain without sciatica 12/05/2013   • Chronic headache 05/09/2013   • Breast cancer, left breast ([facility]) 12/31/2012   • Pulmonary hypertension ([facility]) 03/20/2012   • Gastro-esophageal reflux 03/20/2012   • Fibromyalgia 11/18/2011   • Neuropathy ([facility]) 11/18/2011   • Depression 11/18/2011   • Anxiety 11/18/2011   • Insomnia 11/18/2011         Medications:     Current Outpatient Prescriptions:   •  omeprazole (PRILOSEC) 20 MG delayed-release capsule, Take 1 Cap by mouth every day. 30 minute before food or drink, on an empty stomach, Disp: 90 Cap, Rfl: 0  •  celecoxib (CELEBREX) 100 MG Cap, Take 1 Cap by mouth 2 times a day., Disp: 60 Cap, Rfl: 2  •  gabapentin (NEURONTIN) 100 MG Cap, Take 1 Cap by mouth 3 times a day., Disp: 90 Cap, Rfl: 2  •  rizatriptan (MAXALT) 10 MG tablet, TAKE 1 TAB BY MOUTH ONCE AS NEEDED FOR MIGRAINE FOR UP TO 1 DOSE. MAY REPEAT IN 2 HOURS IF NEEDED, Disp: 10 Tab, Rfl: 2  •  amitriptyline (ELAVIL) 50 MG Tab, Take 1 Tab by mouth at bedtime as needed., Disp: 90 Tab, Rfl: 1  •  DULoxetine  "(CYMBALTA) 30 MG Cap DR Particles, Take 1 Cap by mouth every day., Disp: 90 Cap, Rfl: 1  •  ALPRAZolam (XANAX) 1 MG Tab, Take 1 Tab by mouth at bedtime as needed for Sleep for up to 30 days., Disp: 30 Tab, Rfl: 0  •  Diclofenac Sodium 1 % Gel, Apply 2gm up to 4 times per day if needed for back pain, Disp: 1 Tube, Rfl: 3  •  aspirin (ASA) 325 MG TABS, Take 325 mg by mouth as needed. weekly , Disp: , Rfl:   •  ondansetron (ZOFRAN ODT) 8 MG TABLET DISPERSIBLE, Take 1 Tab by mouth every 8 hours as needed for Nausea., Disp: 15 Tab, Rfl: 0  •  topiramate (TOPAMAX) 25 MG capsule, TAKE 1 CAP BY MOUTH 2 TIMES A DAY., Disp: 180 Cap, Rfl: 0  •  albuterol 108 (90 BASE) MCG/ACT Aero Soln inhalation aerosol, Inhale 2 Puffs by mouth every 6 hours as needed for Shortness of Breath., Disp: 1 Inhaler, Rfl: 3  •  fluticasone (FLONASE) 50 MCG/ACT nasal spray, Spray 2 Sprays in nose every day., Disp: 16 g, Rfl: 11      Review of Systems:     Pertinent positives as per HPI, all other systems reviewed and WNL     Physical Assessment:     VS: /68   Pulse 77   Temp 36.8 °C (98.3 °F)   Resp 16   Ht 1.651 m (5' 5\")   Wt 119.3 kg (263 lb)   LMP 03/18/2010   SpO2 92%   Breastfeeding? No   BMI 43.77 kg/m²     General: Well-developed, well-nourished, female, obese     Head: PERRL, EOMI. Normocephalic. No facial asymmetry noted.  Cardiovasc: RRR, no MRG. No thrills or bruits. Pulses 2+ and symmetric at all distal extremities.  Pulmonary: Lungs clear bilaterally.  Normal respiratory effort. No wheeze or crackles.   MSK: SLR negative bilat.   Neuro: Alert and oriented  Skin:No rashes noted. Skin warm, dry, intact    Psych: Dressed appropriately for the weather, pleasant and conversant.  Affect, mood & judgment appropriate.      Assessment/Plan:   Mikayla was seen today for sleep problem and medication refill.    Diagnoses and all orders for this visit:    Fibromyalgia, improving   -Patient continues on Cymbalta and amitriptyline without " side effect and with improvement in her chronic widespread pain.  We did discuss starting Celebrex cautiously given her history of Fredo-en-Y gastric bypass, she was given a prescription for daily PPI should she find that she develops any symptoms of gastritis, she will then hold celebrex.  I would also like her to start gabapentin, titration instructions were discussed with her as well as side effects.  She will follow-up in 1 month, sooner if needed.  We will maximize Cymbalta and gabapentin if needed.  -     celecoxib (CELEBREX) 100 MG Cap; Take 1 Cap by mouth 2 times a day.  -     gabapentin (NEURONTIN) 100 MG Cap; Take 1 Cap by mouth 3 times a day.    Chronic pain syndrome, improving as above   -     celecoxib (CELEBREX) 100 MG Cap; Take 1 Cap by mouth 2 times a day.  -     gabapentin (NEURONTIN) 100 MG Cap; Take 1 Cap by mouth 3 times a day.    NSAID induced gastritis, see number 1, started as a precaution   -     omeprazole (PRILOSEC) 20 MG delayed-release capsule; Take 1 Cap by mouth every day. 30 minute before food or drink, on an empty stomach    Chronic midline low back pain without sciatica, improving, see treatment plan #1  -     celecoxib (CELEBREX) 100 MG Cap; Take 1 Cap by mouth 2 times a day.  -     gabapentin (NEURONTIN) 100 MG Cap; Take 1 Cap by mouth 3 times a day.    Elevated fasting glucose, uncontrolled  -Discussed diet, exercise, weight loss, monitor in 3 months.  -     HEMOGLOBIN A1C; Future    Chronic tension-type headache, not intractable, chronic, stable on present treatment, continue to monitor  -     rizatriptan (MAXALT) 10 MG tablet; TAKE 1 TAB BY MOUTH ONCE AS NEEDED FOR MIGRAINE FOR UP TO 1 DOSE. MAY REPEAT IN 2 HOURS IF NEEDED    Severe episode of recurrent major depressive disorder, without psychotic features (HCC), chronic, stable on present treatment, continue to monitor    Primary insomnia, chronic, improved with amitriptyline, continue to monitor        Patient is agreeable to  the above plan and voiced understanding. All questions answered.     Please note that this dictation was created using voice recognition software. I have made every reasonable attempt to correct obvious errors, but I expect that there are errors of grammar and possibly content that I did not discover before finalizing the note.      GRACIELA Pedraza  6/20/2018, 11:50 AM

## 2018-06-20 NOTE — ASSESSMENT & PLAN NOTE
Patient is a 58-year-old female with depression, fibromyalgia, chronic pain, insomnia.  She continues on Cymbalta 30 mg daily, with good control of her mood.  She denies suicidal or homicidal ideations.  She has had mild improvement in her chronic widespread pain on this medication.

## 2018-06-20 NOTE — ASSESSMENT & PLAN NOTE
Patient is a 58-year-old female with fibromyalgia and chronic low back pain.  She is here for follow-up.  She reports that her chronic widespread pain has improved with Cymbalta 30 mg daily.  Most bothersome is her low back pain, see additional notes under chronic low back pain.

## 2018-06-20 NOTE — ASSESSMENT & PLAN NOTE
Patient does have history of elevated fasting glucose, most recent A1c was evaluated with her today and elevated at 6.4%.  She is working on weight loss, she is on notable medications which to carry the side effect of weight gain.  Most cumbersome is controlling her pain to the point where she feels that she can exercise.  We did discuss aqua therapy.  We also discussed diet in length.  We will monitor labs again in 3 months.

## 2018-06-20 NOTE — ASSESSMENT & PLAN NOTE
Patient is a 58-year-old female with fibromyalgia and chronic midline low back pain.  Her pain is a constant ache with physical activity, needing to take breaks often to stretch.  She has had some improvement with aqua therapy.  She does not have any radicular symptoms.  She is not using anti-inflammatories.  Muscle relaxers were only slightly beneficial and she discontinued use of these.  Last MRI was done in February 2018 which did not show any significant central or foraminal stenosis, neurosurgery did not feel that she was a candidate for surgery or conservative interventional pain management strategies.  Recommendation was for weight loss and repeat physical therapy which the patient completed and did not find beneficial.  She continues on Cymbalta 30 mg daily, amitriptyline 50 mg nightly.  She has had some improvement in her chronic widespread pain with these medications.  We did discuss adding Celebrex 100 mg twice daily and starting gabapentin.  She is status post Fredo-en-Y gastric bypass, we also discussed the risk of NSAID associated gastritis and I have given her prescription for daily PPI should she find that she develops any symptoms suggestive of gastritis.

## 2018-06-20 NOTE — ASSESSMENT & PLAN NOTE
Patient is a 58-year-old female with insomnia, depression, chronic pain, fibromyalgia, migraines.  At the last office visit we did start her on amitriptyline 50 mg nightly for her insomnia as well as her frequent migraines and chronic widespread pain.  She tells me that she is sleeping better than she ever has while on this medication.  She denies any side effects of this medication.  She is using Xanax rarely for evenings when she cannot fall asleep, I gave her #30 tablets over a month ago.

## 2018-07-18 ENCOUNTER — OFFICE VISIT (OUTPATIENT)
Dept: MEDICAL GROUP | Facility: PHYSICIAN GROUP | Age: 58
End: 2018-07-18
Payer: COMMERCIAL

## 2018-07-18 VITALS
OXYGEN SATURATION: 94 % | HEART RATE: 83 BPM | HEIGHT: 65 IN | DIASTOLIC BLOOD PRESSURE: 78 MMHG | SYSTOLIC BLOOD PRESSURE: 120 MMHG | TEMPERATURE: 98.2 F | WEIGHT: 268 LBS | RESPIRATION RATE: 16 BRPM | BODY MASS INDEX: 44.65 KG/M2

## 2018-07-18 DIAGNOSIS — F51.01 PRIMARY INSOMNIA: ICD-10-CM

## 2018-07-18 DIAGNOSIS — Z00.00 HEALTH CARE MAINTENANCE: ICD-10-CM

## 2018-07-18 DIAGNOSIS — M79.7 FIBROMYALGIA: Primary | ICD-10-CM

## 2018-07-18 DIAGNOSIS — E66.01 MORBID OBESITY WITH BMI OF 40.0-44.9, ADULT (HCC): ICD-10-CM

## 2018-07-18 DIAGNOSIS — Z12.31 ENCOUNTER FOR SCREENING MAMMOGRAM FOR BREAST CANCER: ICD-10-CM

## 2018-07-18 DIAGNOSIS — F41.9 ANXIETY: ICD-10-CM

## 2018-07-18 DIAGNOSIS — Z23 NEED FOR VACCINATION: ICD-10-CM

## 2018-07-18 DIAGNOSIS — G62.9 NEUROPATHY: ICD-10-CM

## 2018-07-18 DIAGNOSIS — G44.229 CHRONIC TENSION-TYPE HEADACHE, NOT INTRACTABLE: ICD-10-CM

## 2018-07-18 PROCEDURE — 99214 OFFICE O/P EST MOD 30 MIN: CPT | Mod: 25 | Performed by: NURSE PRACTITIONER

## 2018-07-18 PROCEDURE — 90471 IMMUNIZATION ADMIN: CPT | Performed by: NURSE PRACTITIONER

## 2018-07-18 PROCEDURE — 90715 TDAP VACCINE 7 YRS/> IM: CPT | Performed by: NURSE PRACTITIONER

## 2018-07-18 RX ORDER — ALPRAZOLAM 1 MG/1
.5-1 TABLET ORAL NIGHTLY PRN
Qty: 30 TAB | Refills: 0 | Status: SHIPPED | OUTPATIENT
Start: 2018-07-18 | End: 2018-09-27 | Stop reason: SDUPTHER

## 2018-07-18 RX ORDER — CYCLOBENZAPRINE HCL 10 MG
10 TABLET ORAL 3 TIMES DAILY PRN
Qty: 90 TAB | Refills: 5 | Status: SHIPPED | OUTPATIENT
Start: 2018-07-18 | End: 2018-12-31 | Stop reason: SDUPTHER

## 2018-07-18 RX ORDER — GABAPENTIN 300 MG/1
300 CAPSULE ORAL 3 TIMES DAILY
Qty: 270 CAP | Refills: 1 | Status: SHIPPED | OUTPATIENT
Start: 2018-07-18 | End: 2018-10-29

## 2018-07-18 RX ORDER — AMITRIPTYLINE HYDROCHLORIDE 50 MG/1
100 TABLET, FILM COATED ORAL NIGHTLY PRN
Qty: 180 TAB | Refills: 1 | Status: SHIPPED | OUTPATIENT
Start: 2018-07-18 | End: 2018-10-29

## 2018-07-18 NOTE — ASSESSMENT & PLAN NOTE
Still struggling with weight loss, patient is doing aqua therapy occasionally, she does not exercise routinely due to widespread pain, she is also on multiple medications that cause weight gain.  She has changed her diet, eating solids for dinner with protein.

## 2018-07-18 NOTE — ASSESSMENT & PLAN NOTE
Patient struggles with anxiety, having some heightened anxiety due to her father's recent stroke.  She occasionally uses alprazolam 0.5 mg to 1 mg as needed for episodes of panic and insomnia.  She was given alprazolam No. 30 tablets in May and is requesting a refill today.  She does not to combine with alcohol.      This patient is continuing to use a controlled substance (CS) on a long term basis.  The patient is thoroughly aware of the goals of treatment with the CS  The patient is aware that yearly and random urine drug screens are required.  The patient has been instructed to take the CS only as prescribed.  The patient is prohibited from sharing the CS with any other person.  The patient is instructed to inform the provider if any other CS is taken, of any alcohol or cannabis or other recreational drug use, any treatment for side effects of the CS or complications, if they have CS active rx in other states  The patient has evidence for a reason for the CS  The treatment plan has been discussed with the patient  The  report has been reviewed

## 2018-07-18 NOTE — ASSESSMENT & PLAN NOTE
Patient is a 58-year-old female with insomnia and generalized anxiety disorder.  Her father recently had another stroke, this has caused worsening anxiety, rumination and inability to fall asleep.  She gets generally 2-3 hours with amitriptyline 50 mg, Benadryl and melatonin.  She does have chronic widespread pain, diagnosed by outside provider with fibromyalgia.  She is requesting an increased dose of amitriptyline, we did discuss the increased risk of adverse effects with increased dose specifically weight gain which will worsen her pain, we will add Flexeril to see if this improves her pain, this can also cause sedation here also working on maximizing gabapentin which can also cause sedation, if she feels that this does not improve her sleep she may then increase amitriptyline.

## 2018-07-18 NOTE — ASSESSMENT & PLAN NOTE
Patient is a 58-year-old female with multi-joint aches and pains, chronic low back pain and fibromyalgia. She continues on Cymbalta 30 mg daily, amitriptyline 50 mg nightly.  Initially she had some improved pain with duloxetine and amitriptyline, she feels that these effects have started to taper off and pain is returning.  She has tried Celebrex without improvement, she is status post Fredo-en-Y gastric bypass and due to the risk of gastritis vs gastric ulcer I have asked her to discontinue this.  She also continues on gabapentin 300 mg daily, she isn't sure if this is helping.

## 2018-07-18 NOTE — PROGRESS NOTES
[facility]  Primary Care Office Visit - Problem-Oriented        History:     Mikayla Knight is a 58 y.o. female who is here today to discuss Sleep Problem (FV)      Fibromyalgia  Patient is a 58-year-old female with multi-joint aches and pains, chronic low back pain and fibromyalgia. She continues on Cymbalta 30 mg daily, amitriptyline 50 mg nightly.  Initially she had some improved pain with duloxetine and amitriptyline, she feels that these effects have started to taper off and pain is returning.  She has tried Celebrex without improvement, she is status post Fredo-en-Y gastric bypass and due to the risk of gastritis vs gastric ulcer I have asked her to discontinue this.  She also continues on gabapentin 300 mg daily, she isn't sure if this is helping.     Insomnia  Patient is a 58-year-old female with insomnia and generalized anxiety disorder.  Her father recently had another stroke, this has caused worsening anxiety, rumination and inability to fall asleep.  She gets generally 2-3 hours with amitriptyline 50 mg, Benadryl and melatonin.  She does have chronic widespread pain, diagnosed by outside provider with fibromyalgia.  She is requesting an increased dose of amitriptyline, we did discuss the increased risk of adverse effects with increased dose specifically weight gain which will worsen her pain, we will add Flexeril to see if this improves her pain, this can also cause sedation here also working on maximizing gabapentin which can also cause sedation, if she feels that this does not improve her sleep she may then increase amitriptyline.       Health care maintenance  Due for mammo & PAP     Morbid obesity with BMI of 40.0-44.9, adult (HCC)  Still struggling with weight loss, patient is doing aqua therapy occasionally, she does not exercise routinely due to widespread pain, she is also on multiple medications that cause weight gain.  She has changed her diet, eating solids for dinner with  "protein.    Anxiety  Patient struggles with anxiety, having some heightened anxiety due to her father's recent stroke.  She occasionally uses alprazolam 0.5 mg to 1 mg as needed for episodes of panic and insomnia.  She was given alprazolam No. 30 tablets in May and is requesting a refill today.  She does not to combine with alcohol.      This patient is continuing to use a controlled substance (CS) on a long term basis.  The patient is thoroughly aware of the goals of treatment with the CS  The patient is aware that yearly and random urine drug screens are required.  The patient has been instructed to take the CS only as prescribed.  The patient is prohibited from sharing the CS with any other person.  The patient is instructed to inform the provider if any other CS is taken, of any alcohol or cannabis or other recreational drug use, any treatment for side effects of the CS or complications, if they have CS active rx in other states  The patient has evidence for a reason for the CS  The treatment plan has been discussed with the patient  The  report has been reviewed            Past Medical History:   Diagnosis Date   • Anesthesia     low bp coming out of anesthesia \"one time\"   • Anxiety 11/18/2011   • Arrhythmia     ? pt unsure   • Arthritis     fibromyalgia   • Breast mass, left    • Breath shortness     occasionally   • Bursitis of knee     left   • Cancer ([facility]) 2005    squamous cell on nose   • Chronic pain 11/18/2011   • Depression 11/18/2011   • Fibromyalgia 11/18/2011   • Gastro-esophageal reflux 3/20/2012   • Heart murmur    • Iron deficiency anemia 3/20/2012   • Neuropathy (HCC) 11/18/2011   • Other specified disorder of intestines    • Pain 6/14/12    3/10 stomach   • Pneumonia 01/2012   • Pulmonary hypertension (HCC) 3/20/2012   • Syncope and collapse 3/20/2012     Past Surgical History:   Procedure Laterality Date   • CATH PLACEMENT  1/25/2013    Performed by Lizeth Ferreira M.D. at SURGERY SAME DAY " [facility]   • BREAST BIOPSY  1/9/2013    Performed by Lizeth Ferreira M.D. at SURGERY SAME DAY [facility]   • AXILLARY NODE DISSECTION  1/9/2013    Performed by Lizeth Ferreira M.D. at SURGERY SAME DAY [facility]   • NODE BIOPSY  1/9/2013    Performed by Lizeth Ferreira M.D. at SURGERY SAME DAY [facility]   • BREAST BIOPSY  12/11/2012    Performed by Lizeth Ferreira M.D. at SURGERY SAME DAY [facility]   • COLONOSCOPY  6/21/2012    Performed by PARAM VALDEZ at SURGERY [facility]   • GASTROSCOPY  6/21/2012    Performed by PARAM VALDEZ at SURGERY [facility]   • ABDOMINAL EXPLORATION  10/7/2010    Performed by MARIA G KHAN JR at SURGERY [facility]   • IRRIGATION & DEBRIDEMENT GENERAL  4/12/2010    Performed by OZZIE WEINSTEIN at SURGERY [facility]   • HERNIA REPAIR  3/15/2010    Performed by MARIA G KHAN JR at SURGERY [facility]   • FLAP GRAFT  3/15/2010    Performed by AMRIA G KHAN JR at SURGERY [facility]   • PANNICULECTOMY  3/15/2010    Performed by MARI AG HKAN JR at SURGERY [facility]   • ABDOMINAL EXPLORATION  3/11/2010    Performed by MARIA G KHAN JR at SURGERY SAME DAY [facility]   • OTHER  08/09    pulled mesh out of hernia   • OTHER  07/09    abscess removed abd.   • OTHER  06/09    bowel obstruction   • OTHER  04/2009    hernia repair,mesh removal after in aug.09   • OTHER  2005    bariatric surgery gastric bypass   • GASTRIC BYPASS LAPAROSCOPIC  2005   • GYN SURGERY  1984    tubal     Social History     Social History   • Marital status:      Spouse name: N/A   • Number of children: 2   • Years of education: N/A     Occupational History   •  Other     Social History Main Topics   • Smoking status: Current Some Day Smoker     Packs/day: 0.10     Years: 8.00     Types: Cigarettes   • Smokeless tobacco: Never Used      Comment: 1/2 pk a day on and off 10 yrs, 1 cigarette daily   • Alcohol use 0.0 oz/week       Comment: 5 a month, social   • Drug use: No   • Sexual activity: Not Currently     Partners: Male     Other Topics Concern   • Not on file     Social History Narrative    Patient lives with  in [place] and they have 2 grown children. She does not work. She is unable to drive due to health conditions and her son takes her to all appointments.      History   Smoking Status   • Current Some Day Smoker   • Packs/day: 0.10   • Years: 8.00   • Types: Cigarettes   Smokeless Tobacco   • Never Used     Comment: 1/2 pk a day on and off 10 yrs, 1 cigarette daily     Family History   Problem Relation Age of Onset   • Heart Attack Father    • Cancer Maternal Grandfather      leukemia   • Cancer Other      leukemia- cousin   • Diabetes Maternal Uncle    • Diabetes Maternal Uncle    • Hypertension Mother      Allergies   Allergen Reactions   • Shellfish Allergy Anaphylaxis     SHRIMP ONLY, not anything else, not iodine.       Problem List:     Patient Active Problem List    Diagnosis Date Noted   • Health care maintenance 07/18/2018   • Nausea 05/24/2018   • Elevated fasting glucose 08/16/2017   • Morbid obesity with BMI of 40.0-44.9, adult ([facility]) 05/16/2017   • Hypertriglyceridemia 12/20/2016   • Bilateral hand pain 05/24/2016   • Bilateral knee pain 01/27/2016   • Chronic maxillary sinusitis 06/10/2015   • Vitamin D deficiency disease 06/10/2015   • Tobacco abuse 02/20/2014   • Heart palpitations 02/12/2014   • Chronic low back pain without sciatica 12/05/2013   • Chronic headache 05/09/2013   • Breast cancer, left breast ([facility]) 12/31/2012   • Pulmonary hypertension ([facility]) 03/20/2012   • Gastro-esophageal reflux 03/20/2012   • Fibromyalgia 11/18/2011   • Neuropathy ([facility]) 11/18/2011   • Depression 11/18/2011   • Anxiety 11/18/2011   • Insomnia 11/18/2011         Medications:     Current Outpatient Prescriptions:   •  gabapentin (NEURONTIN) 300 MG Cap, Take 1 Cap by mouth 3 times a day., Disp: 270 Cap, Rfl: 1  •  amitriptyline  "(ELAVIL) 50 MG Tab, Take 2 Tabs by mouth at bedtime as needed., Disp: 180 Tab, Rfl: 1  •  cyclobenzaprine (FLEXERIL) 10 MG Tab, Take 1 Tab by mouth 3 times a day as needed., Disp: 90 Tab, Rfl: 5  •  ALPRAZolam (XANAX) 1 MG Tab, Take 0.5-1 Tabs by mouth at bedtime as needed for Sleep for up to 30 days., Disp: 30 Tab, Rfl: 0  •  omeprazole (PRILOSEC) 20 MG delayed-release capsule, Take 1 Cap by mouth every day. 30 minute before food or drink, on an empty stomach, Disp: 90 Cap, Rfl: 0  •  rizatriptan (MAXALT) 10 MG tablet, TAKE 1 TAB BY MOUTH ONCE AS NEEDED FOR MIGRAINE FOR UP TO 1 DOSE. MAY REPEAT IN 2 HOURS IF NEEDED, Disp: 10 Tab, Rfl: 2  •  DULoxetine (CYMBALTA) 30 MG Cap DR Particles, Take 1 Cap by mouth every day., Disp: 90 Cap, Rfl: 1  •  topiramate (TOPAMAX) 25 MG capsule, TAKE 1 CAP BY MOUTH 2 TIMES A DAY., Disp: 180 Cap, Rfl: 0  •  Diclofenac Sodium 1 % Gel, Apply 2gm up to 4 times per day if needed for back pain, Disp: 1 Tube, Rfl: 3  •  fluticasone (FLONASE) 50 MCG/ACT nasal spray, Spray 2 Sprays in nose every day., Disp: 16 g, Rfl: 11  •  aspirin (ASA) 325 MG TABS, Take 325 mg by mouth as needed. weekly , Disp: , Rfl:   •  celecoxib (CELEBREX) 100 MG Cap, Take 1 Cap by mouth 2 times a day., Disp: 60 Cap, Rfl: 2  •  ondansetron (ZOFRAN ODT) 8 MG TABLET DISPERSIBLE, Take 1 Tab by mouth every 8 hours as needed for Nausea., Disp: 15 Tab, Rfl: 0  •  albuterol 108 (90 BASE) MCG/ACT Aero Soln inhalation aerosol, Inhale 2 Puffs by mouth every 6 hours as needed for Shortness of Breath., Disp: 1 Inhaler, Rfl: 3      Review of Systems:     Pertinent positives as per HPI, all other systems reviewed and WNL     Physical Assessment:     VS: /78   Pulse 83   Temp 36.8 °C (98.2 °F)   Resp 16   Ht 1.651 m (5' 5\")   Wt 121.6 kg (268 lb)   LMP 03/18/2010   SpO2 94%   Breastfeeding? No   BMI 44.60 kg/m²     General: Well-developed, well-nourished, female, obese     Head: PERRL, EOMI. Normocephalic. No facial " asymmetry noted.  Cardiovasc:  RRR, no MRG. No thrills or bruits. Pulses 2+ and symmetric at all distal extremities.  Pulmonary: Lungs clear bilaterally.  Normal respiratory effort. No wheeze or crackles.   Neuro: Alert and oriented, CNs II-XII intact, no focal deficits.  Skin:No rashes noted. Skin warm, dry, intact    Psych: Dressed appropriately for the weather, pleasant and conversant.  Affect, mood & judgment appropriate.      Assessment/Plan:   Mikayla was seen today for sleep problem.    Diagnoses and all orders for this visit:    Fibromyalgia, ongoing, uncontrolled   -Continue duloxetine 30 mg daily -she was previously on 60 mg daily with no added benefit, d/c celebrex given no improvement in pain and risk for gastritis/ulcer given h/p gastric bypass, increase gabapentin to 300 mg 3 times daily slowly and monitor for adverse effects.  Trial cyclobenzaprine 10 mg up to 3 times daily, aware of side effects of this medication. Follow up in 1 month.  -     gabapentin (NEURONTIN) 300 MG Cap; Take 1 Cap by mouth 3 times a day.  -     amitriptyline (ELAVIL) 50 MG Tab; Take 2 Tabs by mouth at bedtime as needed.  -     cyclobenzaprine (FLEXERIL) 10 MG Tab; Take 1 Tab by mouth 3 times a day as needed.    Primary insomnia, chronic, uncontrolled  -Likely due to increased stress 2/2 father's poor health, discussed stress/anxiety management techniques, patient to increase amitriptyline to 100 mg nightly only if meditation, OTCs, flexeril and gabapentin do not improve sleep, we discussed AE of amitriptyline which do include weight gain which will not be beneficial to her low back pain or fibromyalgia.  Conversely, appropriate sleep is important for management of fibromyalgia pain. Refill of xanax #30, no refill again until September.   -     amitriptyline (ELAVIL) 50 MG Tab; Take 2 Tabs by mouth at bedtime as needed.  -     ALPRAZolam (XANAX) 1 MG Tab; Take 0.5-1 Tabs by mouth at bedtime as needed for Sleep for up to 30  days.    Chronic tension-type headache, not intractable, well controlled on present treatment   -     amitriptyline (ELAVIL) 50 MG Tab; Take 2 Tabs by mouth at bedtime as needed.    Health care maintenance    Encounter for screening mammogram for breast cancer  -     MA-SCREEN MAMMO W/CAD-BILAT; Future    Need for vaccination  -     TDAP VACCINE =>6YO IM    Anxiety, ongoing, uncontrolled, see number 2   -     ALPRAZolam (XANAX) 1 MG Tab; Take 0.5-1 Tabs by mouth at bedtime as needed for Sleep for up to 30 days.    Morbid obesity with BMI of 40.0-44.9, adult (HCC)  - discussed weight loss as it pertains to back pain and fibro    Neuropathy (HCC), chronic, stable on present treatment, continue to monitor  -     gabapentin (NEURONTIN) 300 MG Cap; Take 1 Cap by mouth 3 times a day.  -     amitriptyline (ELAVIL) 50 MG Tab; Take 2 Tabs by mouth at bedtime as needed.      Patient is agreeable to the above plan and voiced understanding. All questions answered.     Please note that this dictation was created using voice recognition software. I have made every reasonable attempt to correct obvious errors, but I expect that there are errors of grammar and possibly content that I did not discover before finalizing the note.      GRACIELA Pedraza  7/18/2018, 11:34 AM

## 2018-08-02 ENCOUNTER — OFFICE VISIT (OUTPATIENT)
Dept: URGENT CARE | Facility: PHYSICIAN GROUP | Age: 58
End: 2018-08-02
Payer: COMMERCIAL

## 2018-08-02 VITALS
DIASTOLIC BLOOD PRESSURE: 86 MMHG | BODY MASS INDEX: 44.65 KG/M2 | WEIGHT: 268 LBS | OXYGEN SATURATION: 93 % | HEIGHT: 65 IN | TEMPERATURE: 97.3 F | SYSTOLIC BLOOD PRESSURE: 124 MMHG | HEART RATE: 104 BPM | RESPIRATION RATE: 16 BRPM

## 2018-08-02 DIAGNOSIS — L03.113 CELLULITIS OF RIGHT FOREARM: ICD-10-CM

## 2018-08-02 DIAGNOSIS — T63.481A INSECT STINGS, ACCIDENTAL OR UNINTENTIONAL, INITIAL ENCOUNTER: ICD-10-CM

## 2018-08-02 PROCEDURE — 99213 OFFICE O/P EST LOW 20 MIN: CPT | Performed by: EMERGENCY MEDICINE

## 2018-08-02 RX ORDER — SULFAMETHOXAZOLE AND TRIMETHOPRIM 800; 160 MG/1; MG/1
1 TABLET ORAL 2 TIMES DAILY
Qty: 14 TAB | Refills: 0 | Status: SHIPPED | OUTPATIENT
Start: 2018-08-02 | End: 2018-08-09

## 2018-08-02 RX ORDER — TRIAMCINOLONE ACETONIDE 1 MG/G
1 CREAM TOPICAL 2 TIMES DAILY
Qty: 15 G | Refills: 0 | Status: SHIPPED | OUTPATIENT
Start: 2018-08-02 | End: 2019-04-25

## 2018-08-02 ASSESSMENT — ENCOUNTER SYMPTOMS
SENSORY CHANGE: 0
FEVER: 0
FOCAL WEAKNESS: 0
TINGLING: 0

## 2018-08-02 NOTE — PATIENT INSTRUCTIONS
Use an oral probiotic daily, such as Culturelle, Align, or yogurt to reduce gastrointestinal symptoms.  Use over-the-counter cetirizine (Zyrtec), fexofenadine (Allegra), or loratadine (Claritin) as needed for relief of itch symptoms.  Recheck in 2-3 days unless resolving.  Cellulitis, Adult  Introduction  Cellulitis is a skin infection. The infected area is usually red and sore. This condition occurs most often in the arms and lower legs. It is very important to get treated for this condition.  Follow these instructions at home:  · Take over-the-counter and prescription medicines only as told by your doctor.  · If you were prescribed an antibiotic medicine, take it as told by your doctor. Do not stop taking the antibiotic even if you start to feel better.  · Drink enough fluid to keep your pee (urine) clear or pale yellow.  · Do not touch or rub the infected area.  · Raise (elevate) the infected area above the level of your heart while you are sitting or lying down.  · Place warm or cold wet cloths (warm or cold compresses) on the infected area. Do this as told by your doctor.  · Keep all follow-up visits as told by your doctor. This is important. These visits let your doctor make sure your infection is not getting worse.  Contact a doctor if:  · You have a fever.  · Your symptoms do not get better after 1-2 days of treatment.  · Your bone or joint under the infected area starts to hurt after the skin has healed.  · Your infection comes back. This can happen in the same area or another area.  · You have a swollen bump in the infected area.  · You have new symptoms.  · You feel ill and also have muscle aches and pains.  Get help right away if:  · Your symptoms get worse.  · You feel very sleepy.  · You throw up (vomit) or have watery poop (diarrhea) for a long time.  · There are red streaks coming from the infected area.  · Your red area gets larger.  · Your red area turns darker.  This information is not intended to  replace advice given to you by your health care provider. Make sure you discuss any questions you have with your health care provider.  Document Released: 06/05/2009 Document Revised: 05/25/2017 Document Reviewed: 10/26/2016  © 2017 [employer]    Insect Bite, Adult  An insect bite can make your skin red, itchy, and swollen. Some insects can spread disease to people with a bite. However, most insect bites do not lead to disease, and most are not serious.  Follow these instructions at home:  Bite area care  · Do not scratch the bite area.  · Keep the bite area clean and dry.  · Wash the bite area every day with soap and water as told by your doctor.  · Check the bite area every day for signs of infection. Check for:  ¨ More redness, swelling, or pain.  ¨ Fluid or blood.  ¨ Warmth.  ¨ Pus.  Managing pain, itching, and swelling  · You may put any of these on the bite area as told by your doctor:  ¨ A baking soda paste.  ¨ Cortisone cream.  ¨ Calamine lotion.  · If directed, put ice on the bite area.  ¨ Put ice in a plastic bag.  ¨ Place a towel between your skin and the bag.  ¨ Leave the ice on for 20 minutes, 2-3 times a day.  Medicines  · Take medicines or put medicines on your skin only as told by your doctor.  · If you were prescribed an antibiotic medicine, use it as told by your doctor. Do not stop using the antibiotic even if your condition improves.  General instructions  · Keep all follow-up visits as told by your doctor. This is important.  How is this prevented?  To help you have a lower risk of insect bites:  · When you are outside, wear clothing that covers your arms and legs.  · Use insect repellent. The best insect repellents have:  ¨ An active ingredient of DEET, picaridin, oil of lemon eucalyptus (OLE), or ED8182.  ¨ Higher amounts of DEET or another active ingredient than other repellents have.  · If your home windows do not have screens, think about putting some in.  Contact a doctor if:  · You have  more redness, swelling, or pain in the bite area.  · You have fluid, blood, or pus coming from the bite area.  · The bite area feels warm.  · You have a fever.  Get help right away if:  · You have joint pain.  · You have a rash.  · You have shortness of breath.  · You feel more tired or sleepy than you normally do.  · You have neck pain.  · You have a headache.  · You feel weaker than you normally do.  · You have chest pain.  · You have pain in your belly.  · You feel sick to your stomach (nauseous) or you throw up (vomit).  Summary  · An insect bite can make your skin red, itchy, and swollen.  · Do not scratch the bite area, and keep it clean and dry.  · Ice can help with pain and itching from the bite.  This information is not intended to replace advice given to you by your health care provider. Make sure you discuss any questions you have with your health care provider.  Document Released: 12/15/2001 Document Revised: 07/20/2017 Document Reviewed: 05/04/2016  [employer] Patient Education © 2017 [employer]

## 2018-08-02 NOTE — PROGRESS NOTES
Subjective:      Mikayla Knight is a 58 y.o. female who presents with Wasp Sting            Rash   This is a new problem. Episode onset: 2 days. The problem has been rapidly worsening since onset. The affected locations include the right arm. The rash is characterized by redness, swelling, itchiness and burning. She was exposed to an insect bite/sting. Pertinent negatives include no fever. Past treatments include anti-itch cream and antihistamine. The treatment provided no relief.   Tetanus up-to-date.    Review of Systems   Constitutional: Negative for fever and malaise/fatigue.   Musculoskeletal:        No pain proximal or distal to the site.   Skin: Positive for itching and rash.   Neurological: Negative for tingling, sensory change and focal weakness.     PMH:  has a past medical history of Anesthesia; Anxiety (11/18/2011); Arrhythmia; Arthritis; Breast mass, left; Breath shortness; Bursitis of knee; Cancer ([facility]) (2005); Chronic pain (11/18/2011); Depression (11/18/2011); Fibromyalgia (11/18/2011); Gastro-esophageal reflux (3/20/2012); Heart murmur; Iron deficiency anemia (3/20/2012); Neuropathy ([facility]) (11/18/2011); Other specified disorder of intestines; Pain (6/14/12); Pneumonia (01/2012); Pulmonary hypertension ([facility]) (3/20/2012); and Syncope and collapse (3/20/2012).  MEDS:   Current Outpatient Prescriptions:   •  sulfamethoxazole-trimethoprim (BACTRIM DS) 800-160 MG tablet, Take 1 Tab by mouth 2 times a day for 7 days., Disp: 14 Tab, Rfl: 0  •  triamcinolone acetonide (KENALOG) 0.1 % Cream, Apply 1 Application to affected area(s) 2 times a day., Disp: 15 g, Rfl: 0  •  gabapentin (NEURONTIN) 300 MG Cap, Take 1 Cap by mouth 3 times a day., Disp: 270 Cap, Rfl: 1  •  amitriptyline (ELAVIL) 50 MG Tab, Take 2 Tabs by mouth at bedtime as needed., Disp: 180 Tab, Rfl: 1  •  celecoxib (CELEBREX) 100 MG Cap, Take 1 Cap by mouth 2 times a day., Disp: 60 Cap, Rfl: 2  •  rizatriptan (MAXALT) 10 MG tablet, TAKE 1 TAB  BY MOUTH ONCE AS NEEDED FOR MIGRAINE FOR UP TO 1 DOSE. MAY REPEAT IN 2 HOURS IF NEEDED, Disp: 10 Tab, Rfl: 2  •  DULoxetine (CYMBALTA) 30 MG Cap DR Particles, Take 1 Cap by mouth every day., Disp: 90 Cap, Rfl: 1  •  aspirin (ASA) 325 MG TABS, Take 325 mg by mouth as needed. weekly , Disp: , Rfl:   •  cyclobenzaprine (FLEXERIL) 10 MG Tab, Take 1 Tab by mouth 3 times a day as needed., Disp: 90 Tab, Rfl: 5  •  ALPRAZolam (XANAX) 1 MG Tab, Take 0.5-1 Tabs by mouth at bedtime as needed for Sleep for up to 30 days., Disp: 30 Tab, Rfl: 0  •  omeprazole (PRILOSEC) 20 MG delayed-release capsule, Take 1 Cap by mouth every day. 30 minute before food or drink, on an empty stomach, Disp: 90 Cap, Rfl: 0  •  ondansetron (ZOFRAN ODT) 8 MG TABLET DISPERSIBLE, Take 1 Tab by mouth every 8 hours as needed for Nausea., Disp: 15 Tab, Rfl: 0  •  topiramate (TOPAMAX) 25 MG capsule, TAKE 1 CAP BY MOUTH 2 TIMES A DAY., Disp: 180 Cap, Rfl: 0  •  Diclofenac Sodium 1 % Gel, Apply 2gm up to 4 times per day if needed for back pain, Disp: 1 Tube, Rfl: 3  •  albuterol 108 (90 BASE) MCG/ACT Aero Soln inhalation aerosol, Inhale 2 Puffs by mouth every 6 hours as needed for Shortness of Breath., Disp: 1 Inhaler, Rfl: 3  •  fluticasone (FLONASE) 50 MCG/ACT nasal spray, Spray 2 Sprays in nose every day., Disp: 16 g, Rfl: 11  ALLERGIES:   Allergies   Allergen Reactions   • Shellfish Allergy Anaphylaxis     SHRIMP ONLY, not anything else, not iodine.     SURGHX:   Past Surgical History:   Procedure Laterality Date   • CATH PLACEMENT  1/25/2013    Performed by Lizeth Ferreira M.D. at SURGERY SAME DAY [facility] ORS   • BREAST BIOPSY  1/9/2013    Performed by Lizeth Ferreira M.D. at SURGERY SAME DAY [facility]S   • AXILLARY NODE DISSECTION  1/9/2013    Performed by Lizeth Ferreira M.D. at SURGERY SAME DAY [facility]   • NODE BIOPSY  1/9/2013    Performed by Lizeth Ferreira M.D. at SURGERY SAME DAY [facility]   • BREAST BIOPSY  12/11/2012    Performed by  "Lizeth Ferreira M.D. at SURGERY SAME DAY [facility] ORS   • COLONOSCOPY  6/21/2012    Performed by PARAM VALDEZ at SURGERY [facility] ORS   • GASTROSCOPY  6/21/2012    Performed by PARAM VALDEZ at SURGERY [facility] ORS   • ABDOMINAL EXPLORATION  10/7/2010    Performed by MARIA G KHAN JR at SURGERY [facility] ORS   • IRRIGATION & DEBRIDEMENT GENERAL  4/12/2010    Performed by OZZIE WEINSTEIN at SURGERY [facility] ORS   • HERNIA REPAIR  3/15/2010    Performed by MARIA G KHAN JR at SURGERY [facility] ORS   • FLAP GRAFT  3/15/2010    Performed by MARIA G KHAN JR at SURGERY [facility] ORS   • PANNICULECTOMY  3/15/2010    Performed by MARIA G KHAN JR at SURGERY [facility] ORS   • ABDOMINAL EXPLORATION  3/11/2010    Performed by MARIA G KHAN JR at SURGERY SAME DAY [facility]S   • OTHER  08/09    pulled mesh out of hernia   • OTHER  07/09    abscess removed abd.   • OTHER  06/09    bowel obstruction   • OTHER  04/2009    hernia repair,mesh removal after in aug.09   • OTHER  2005    bariatric surgery gastric bypass   • GASTRIC BYPASS LAPAROSCOPIC  2005   • GYN SURGERY  1984    tubal     SOCHX:  reports that she has been smoking Cigarettes.  She has a 0.80 pack-year smoking history. She has never used smokeless tobacco. She reports that she drinks alcohol. She reports that she does not use drugs.  FH: family history includes Cancer in her maternal grandfather and other; Diabetes in her maternal uncle and maternal uncle; Heart Attack in her father; Hypertension in her mother.       Objective:     /86   Pulse (!) 104   Temp 36.3 °C (97.3 °F)   Resp 16   Ht 1.651 m (5' 5\")   Wt 121.6 kg (268 lb)   LMP 03/18/2010   SpO2 93%   BMI 44.60 kg/m²      Physical Exam   Constitutional: Vital signs are normal. She appears well-developed and well-nourished. She is cooperative.   Cardiovascular: Normal rate, regular rhythm, S1 normal, S2 normal and normal heart sounds.    No " murmur heard.  Pulmonary/Chest: Effort normal.   Lymphadenopathy:   No lymphangitis   Neurological: She is alert.   Distal sensation to light touch and pressure intact.   Skin: Skin is warm and dry. Lesion and rash noted.        Psychiatric: She has a normal mood and affect.               Assessment/Plan:     1. Cellulitis of right forearm  Rest and elevation.  Recheck 2-3 days.  - sulfamethoxazole-trimethoprim (BACTRIM DS) 800-160 MG tablet; Take 1 Tab by mouth 2 times a day for 7 days.  Dispense: 14 Tab; Refill: 0    2. Insect stings, accidental or unintentional, initial encounter  OTC antihistamine prn  - triamcinolone acetonide (KENALOG) 0.1 % Cream; Apply 1 Application to affected area(s) 2 times a day.  Dispense: 15 g; Refill: 0

## 2018-08-27 DIAGNOSIS — F51.01 PRIMARY INSOMNIA: ICD-10-CM

## 2018-08-29 RX ORDER — TRAZODONE HYDROCHLORIDE 50 MG/1
TABLET ORAL
Refills: 3 | OUTPATIENT
Start: 2018-08-29

## 2018-09-09 DIAGNOSIS — M54.50 CHRONIC MIDLINE LOW BACK PAIN WITHOUT SCIATICA: ICD-10-CM

## 2018-09-09 DIAGNOSIS — M79.7 FIBROMYALGIA: ICD-10-CM

## 2018-09-09 DIAGNOSIS — G89.4 CHRONIC PAIN SYNDROME: ICD-10-CM

## 2018-09-09 DIAGNOSIS — G89.29 CHRONIC MIDLINE LOW BACK PAIN WITHOUT SCIATICA: ICD-10-CM

## 2018-09-10 NOTE — TELEPHONE ENCOUNTER
Was the patient seen in the last year in this department? Yes    Does patient have an active prescription for medications requested? No     Received Request Via: Pharmacy      Pt met protocol?: Yes    OV 7/18    CHANGE IN GABAPENTIN DOSE

## 2018-09-11 RX ORDER — GABAPENTIN 100 MG/1
CAPSULE ORAL
Refills: 0 | OUTPATIENT
Start: 2018-09-11

## 2018-09-11 RX ORDER — CELECOXIB 100 MG/1
CAPSULE ORAL
Refills: 0 | OUTPATIENT
Start: 2018-09-11

## 2018-09-11 NOTE — TELEPHONE ENCOUNTER
Pt was d/c'd on Celebrex 7/18 as she stated it wasn't working and places on Flexeril. Gabapentin was also increase to 300 which we already sent to pharmacy.

## 2018-09-15 DIAGNOSIS — K29.60 NSAID INDUCED GASTRITIS: ICD-10-CM

## 2018-09-15 DIAGNOSIS — T39.395A NSAID INDUCED GASTRITIS: ICD-10-CM

## 2018-09-17 RX ORDER — OMEPRAZOLE 20 MG/1
20 CAPSULE, DELAYED RELEASE ORAL DAILY
Qty: 90 CAP | Refills: 0 | Status: SHIPPED | OUTPATIENT
Start: 2018-09-17 | End: 2018-12-12 | Stop reason: SDUPTHER

## 2018-09-24 ENCOUNTER — HOSPITAL ENCOUNTER (OUTPATIENT)
Dept: LAB | Facility: MEDICAL CENTER | Age: 58
End: 2018-09-24
Attending: NURSE PRACTITIONER
Payer: COMMERCIAL

## 2018-09-24 DIAGNOSIS — R73.01 ELEVATED FASTING GLUCOSE: ICD-10-CM

## 2018-09-24 PROCEDURE — 83036 HEMOGLOBIN GLYCOSYLATED A1C: CPT | Mod: GA

## 2018-09-24 PROCEDURE — 36415 COLL VENOUS BLD VENIPUNCTURE: CPT | Mod: GA

## 2018-09-25 LAB
EST. AVERAGE GLUCOSE BLD GHB EST-MCNC: 148 MG/DL
HBA1C MFR BLD: 6.8 % (ref 0–5.6)

## 2018-09-27 ENCOUNTER — OFFICE VISIT (OUTPATIENT)
Dept: MEDICAL GROUP | Facility: PHYSICIAN GROUP | Age: 58
End: 2018-09-27
Payer: COMMERCIAL

## 2018-09-27 VITALS
WEIGHT: 276.8 LBS | RESPIRATION RATE: 14 BRPM | HEIGHT: 65 IN | BODY MASS INDEX: 46.12 KG/M2 | OXYGEN SATURATION: 94 % | TEMPERATURE: 98.5 F | DIASTOLIC BLOOD PRESSURE: 78 MMHG | SYSTOLIC BLOOD PRESSURE: 122 MMHG | HEART RATE: 95 BPM

## 2018-09-27 DIAGNOSIS — M79.7 FIBROMYALGIA: ICD-10-CM

## 2018-09-27 DIAGNOSIS — F51.01 PRIMARY INSOMNIA: ICD-10-CM

## 2018-09-27 DIAGNOSIS — G44.229 CHRONIC TENSION-TYPE HEADACHE, NOT INTRACTABLE: ICD-10-CM

## 2018-09-27 DIAGNOSIS — E11.9 TYPE 2 DIABETES MELLITUS WITHOUT COMPLICATION, WITHOUT LONG-TERM CURRENT USE OF INSULIN (HCC): ICD-10-CM

## 2018-09-27 DIAGNOSIS — F41.9 ANXIETY: ICD-10-CM

## 2018-09-27 DIAGNOSIS — Z23 NEED FOR VACCINATION: ICD-10-CM

## 2018-09-27 PROCEDURE — 90471 IMMUNIZATION ADMIN: CPT | Performed by: NURSE PRACTITIONER

## 2018-09-27 PROCEDURE — 90686 IIV4 VACC NO PRSV 0.5 ML IM: CPT | Performed by: NURSE PRACTITIONER

## 2018-09-27 PROCEDURE — 99214 OFFICE O/P EST MOD 30 MIN: CPT | Mod: 25 | Performed by: NURSE PRACTITIONER

## 2018-09-27 RX ORDER — ALPRAZOLAM 1 MG/1
.5-1 TABLET ORAL NIGHTLY PRN
Qty: 30 TAB | Refills: 0 | Status: SHIPPED | OUTPATIENT
Start: 2018-09-27 | End: 2018-10-29 | Stop reason: SDUPTHER

## 2018-09-27 RX ORDER — BUSPIRONE HYDROCHLORIDE 5 MG/1
5 TABLET ORAL 3 TIMES DAILY
Qty: 90 TAB | Refills: 1 | Status: SHIPPED | OUTPATIENT
Start: 2018-09-27 | End: 2018-09-27 | Stop reason: SDUPTHER

## 2018-09-27 RX ORDER — BUSPIRONE HYDROCHLORIDE 5 MG/1
5 TABLET ORAL 3 TIMES DAILY
Qty: 90 TAB | Refills: 1 | Status: SHIPPED | OUTPATIENT
Start: 2018-09-27 | End: 2019-02-01 | Stop reason: SDUPTHER

## 2018-09-27 NOTE — ASSESSMENT & PLAN NOTE
Patient continues to struggle with anxiety, having some heightened anxiety due to her father's recent stroke. Now having a hard time getting herself to leave the house. Relying on alprazolam to run errands. Using cymbalta 30mg daily, 60mg wasn't beneficial.  She occasionally uses alprazolam 0.5 mg to 1 mg as needed for episodes of panic and insomnia. Last fill was July, using #30 tablets in roughly 60 days.         This patient is continuing to use a controlled substance (CS) on a long term basis.  The patient is thoroughly aware of the goals of treatment with the CS  The patient is aware that yearly and random urine drug screens are required.  The patient has been instructed to take the CS only as prescribed.  The patient is prohibited from sharing the CS with any other person.  The patient is instructed to inform the provider if any other CS is taken, of any alcohol or cannabis or other recreational drug use, any treatment for side effects of the CS or complications, if they have CS active rx in other states  The patient has evidence for a reason for the CS  The treatment plan has been discussed with the patient  The  report has been reviewed

## 2018-09-27 NOTE — ASSESSMENT & PLAN NOTE
Patient continues to struggle with mild daily HA. Using maxalt when severe with improvement in her symptoms. On amitriptyline  mg nightly for both prophylaxis as well as insomnia. Using topiramate?

## 2018-09-27 NOTE — ASSESSMENT & PLAN NOTE
Patient continues to struggle with insomnia, utilizing Benadryl, melatonin, amitriptyline  mg nightly.  Tells me today that she has been sleeping like a baby.

## 2018-09-27 NOTE — ASSESSMENT & PLAN NOTE
Patient is a 58-year-old female with multi-joint aches and pains, chronic low back pain and fibromyalgia.  She continues on Cymbalta 30 mg daily, amitriptyline 5100 mg nightly.  No longer using Celebrex as this was not beneficial and she does have history of Fredo-en-Y gastric bypass.  She is now on gabapentin 300 mg 3 times daily with mild improvement in her pain.  We did discuss increasing this at subsequent visit.

## 2018-09-27 NOTE — ASSESSMENT & PLAN NOTE
This is a new diagnosis, patient's A1c was previously 6.4%, today her weight is up and A1c is 6.8%.  Fasting blood glucose is 108.  She declines oral antidiabetics at this point and would like to work on dietary modifications, weight loss and exercise alone.

## 2018-09-27 NOTE — PROGRESS NOTES
[facility]  Primary Care Office Visit - Problem-Oriented        History:     Mikayla Knight is a 58 y.o. female who is here today to discuss Sleep Problem ([facility] labs) and Medication Refill (xanax, amitriptyline)      Anxiety  Patient continues to struggle with anxiety, having some heightened anxiety due to her father's recent stroke. Now having a hard time getting herself to leave the house. Relying on alprazolam to run errands. Using cymbalta 30mg daily, 60mg wasn't beneficial.  She occasionally uses alprazolam 0.5 mg to 1 mg as needed for episodes of panic and insomnia. Last fill was July, using #30 tablets in roughly 60 days.         This patient is continuing to use a controlled substance (CS) on a long term basis.  The patient is thoroughly aware of the goals of treatment with the CS  The patient is aware that yearly and random urine drug screens are required.  The patient has been instructed to take the CS only as prescribed.  The patient is prohibited from sharing the CS with any other person.  The patient is instructed to inform the provider if any other CS is taken, of any alcohol or cannabis or other recreational drug use, any treatment for side effects of the CS or complications, if they have CS active rx in other states  The patient has evidence for a reason for the CS  The treatment plan has been discussed with the patient  The  report has been reviewed       Chronic headache  Patient continues to struggle with mild daily HA. Using maxalt when severe with improvement in her symptoms. On amitriptyline  mg nightly for both prophylaxis as well as insomnia. Using topiramate?     Fibromyalgia  Patient is a 58-year-old female with multi-joint aches and pains, chronic low back pain and fibromyalgia.  She continues on Cymbalta 30 mg daily, amitriptyline 5100 mg nightly.  No longer using Celebrex as this was not beneficial and she does have history of Fredo-en-Y gastric bypass.  She is now  "on gabapentin 300 mg 3 times daily with mild improvement in her pain.  We did discuss increasing this at subsequent visit.    Insomnia  Patient continues to struggle with insomnia, utilizing Benadryl, melatonin, amitriptyline  mg nightly.  Tells me today that she has been sleeping like a baby.    Type 2 diabetes mellitus without complication, without long-term current use of insulin ([facility])  This is a new diagnosis, patient's A1c was previously 6.4%, today her weight is up and A1c is 6.8%.  Fasting blood glucose is 108.  She declines oral antidiabetics at this point and would like to work on dietary modifications, weight loss and exercise alone.        Past Medical History:   Diagnosis Date   • Anesthesia     low bp coming out of anesthesia \"one time\"   • Anxiety 11/18/2011   • Arrhythmia     ? pt unsure   • Arthritis     fibromyalgia   • Breast mass, left    • Breath shortness     occasionally   • Bursitis of knee     left   • Cancer ([facility]) 2005    squamous cell on nose   • Chronic pain 11/18/2011   • Depression 11/18/2011   • Fibromyalgia 11/18/2011   • Gastro-esophageal reflux 3/20/2012   • Heart murmur    • Iron deficiency anemia 3/20/2012   • Neuropathy ([facility]) 11/18/2011   • Other specified disorder of intestines    • Pain 6/14/12    3/10 stomach   • Pneumonia 01/2012   • Pulmonary hypertension ([facility]) 3/20/2012   • Syncope and collapse 3/20/2012     Past Surgical History:   Procedure Laterality Date   • CATH PLACEMENT  1/25/2013    Performed by Lizeth Ferreira M.D. at SURGERY SAME DAY [facility] ORS   • BREAST BIOPSY  1/9/2013    Performed by Lizeth Ferreira M.D. at SURGERY SAME DAY [facility]S   • AXILLARY NODE DISSECTION  1/9/2013    Performed by Lizeth Ferreira M.D. at SURGERY SAME DAY [facility]   • NODE BIOPSY  1/9/2013    Performed by Lizeth Ferreira M.D. at SURGERY SAME DAY [facility]   • BREAST BIOPSY  12/11/2012    [facility]   • COLONOSCOPY  " 6/21/2012    Performed by PARAM VALDEZ at SURGERY [facility] ORS   • GASTROSCOPY  6/21/2012    Performed by PARAM VALDEZ at SURGERY [facility] ORS   • ABDOMINAL EXPLORATION  10/7/2010    Performed by MARIA G KHAN JR at SURGERY [facility] ORS   • IRRIGATION & DEBRIDEMENT GENERAL  4/12/2010    Performed by OZZIE WEINSTEIN at SURGERY [facility] ORS   • HERNIA REPAIR  3/15/2010    Performed by MARIA G KHAN JR at SURGERY [facility] ORS   • FLAP GRAFT  3/15/2010    Performed by MARIA G KHAN JR at SURGERY [facility] ORS   • PANNICULECTOMY  3/15/2010    Performed by MARIA G KHAN JR at SURGERY [facility] ORS   • ABDOMINAL EXPLORATION  3/11/2010    Performed by MARIA G KHAN JR at SURGERY SAME DAY [facility] ORS   • OTHER  08/09    pulled mesh out of hernia   • OTHER  07/09    abscess removed abd.   • OTHER  06/09    bowel obstruction   • OTHER  04/2009    hernia repair,mesh removal after in aug.09   • OTHER  2005    bariatric surgery gastric bypass   • GASTRIC BYPASS LAPAROSCOPIC  2005   • GYN SURGERY  1984    tubal     Social History     Social History   • Marital status:      Spouse name: N/A   • Number of children: 2   • Years of education: N/A     Occupational History   •  Other     Social History Main Topics   • Smoking status: Current Some Day Smoker     Packs/day: 0.10     Years: 8.00     Types: Cigarettes   • Smokeless tobacco: Never Used      Comment: 1/2 pk a day on and off 10 yrs, 1 cigarette daily   • Alcohol use 0.0 oz/week      Comment: 5 a month, social   • Drug use: No   • Sexual activity: Not Currently     Partners: Male     Other Topics Concern   • Not on file     Social History Narrative    Patient lives with  in [place] and they have 2 grown children. She does not work. She is unable to drive due to health conditions and her son takes her to all appointments.      History   Smoking Status   • Current Some Day Smoker   • Packs/day: 0.10   •  Years: 8.00   • Types: Cigarettes   Smokeless Tobacco   • Never Used     Comment: 1/2 pk a day on and off 10 yrs, 1 cigarette daily     Family History   Problem Relation Age of Onset   • Heart Attack Father    • Cancer Maternal Grandfather         leukemia   • Cancer Other         leukemia- cousin   • Diabetes Maternal Uncle    • Diabetes Maternal Uncle    • Hypertension Mother      Allergies   Allergen Reactions   • Shellfish Allergy Anaphylaxis     SHRIMP ONLY, not anything else, not iodine.       Problem List:     Patient Active Problem List    Diagnosis Date Noted   • Health care maintenance 07/18/2018   • Nausea 05/24/2018   • Type 2 diabetes mellitus without complication, without long-term current use of insulin ([facility]) 12/18/2017   • Elevated fasting glucose 08/16/2017   • Morbid obesity with BMI of 40.0-44.9, adult ([facility]) 05/16/2017   • Hypertriglyceridemia 12/20/2016   • Bilateral hand pain 05/24/2016   • Bilateral knee pain 01/27/2016   • Chronic maxillary sinusitis 06/10/2015   • Vitamin D deficiency disease 06/10/2015   • Tobacco abuse 02/20/2014   • Heart palpitations 02/12/2014   • Chronic low back pain without sciatica 12/05/2013   • Chronic headache 05/09/2013   • Breast cancer, left breast ([facility]) 12/31/2012   • Pulmonary hypertension ([facility]) 03/20/2012   • Gastro-esophageal reflux 03/20/2012   • Fibromyalgia 11/18/2011   • Neuropathy ([facility]) 11/18/2011   • Depression 11/18/2011   • Anxiety 11/18/2011   • Insomnia 11/18/2011         Medications:     Current Outpatient Prescriptions:   •  ALPRAZolam (XANAX) 1 MG Tab, Take 0.5-1 Tabs by mouth at bedtime as needed for Sleep for up to 30 days., Disp: 30 Tab, Rfl: 0  •  busPIRone (BUSPAR) 5 MG Tab, Take 1 Tab by mouth 3 times a day., Disp: 90 Tab, Rfl: 1  •  omeprazole (PRILOSEC) 20 MG delayed-release capsule, TAKE 1 CAP BY MOUTH EVERY DAY. 30 MINUTE BEFORE FOOD OR DRINK, ON AN EMPTY STOMACH, Disp: 90 Cap, Rfl: 0  •  gabapentin (NEURONTIN) 300 MG Cap, Take 1 Cap  "by mouth 3 times a day., Disp: 270 Cap, Rfl: 1  •  amitriptyline (ELAVIL) 50 MG Tab, Take 2 Tabs by mouth at bedtime as needed., Disp: 180 Tab, Rfl: 1  •  cyclobenzaprine (FLEXERIL) 10 MG Tab, Take 1 Tab by mouth 3 times a day as needed., Disp: 90 Tab, Rfl: 5  •  celecoxib (CELEBREX) 100 MG Cap, Take 1 Cap by mouth 2 times a day., Disp: 60 Cap, Rfl: 2  •  rizatriptan (MAXALT) 10 MG tablet, TAKE 1 TAB BY MOUTH ONCE AS NEEDED FOR MIGRAINE FOR UP TO 1 DOSE. MAY REPEAT IN 2 HOURS IF NEEDED, Disp: 10 Tab, Rfl: 2  •  DULoxetine (CYMBALTA) 30 MG Cap DR Particles, Take 1 Cap by mouth every day., Disp: 90 Cap, Rfl: 1  •  ondansetron (ZOFRAN ODT) 8 MG TABLET DISPERSIBLE, Take 1 Tab by mouth every 8 hours as needed for Nausea., Disp: 15 Tab, Rfl: 0  •  Diclofenac Sodium 1 % Gel, Apply 2gm up to 4 times per day if needed for back pain, Disp: 1 Tube, Rfl: 3  •  aspirin (ASA) 325 MG TABS, Take 325 mg by mouth as needed. weekly , Disp: , Rfl:   •  triamcinolone acetonide (KENALOG) 0.1 % Cream, Apply 1 Application to affected area(s) 2 times a day., Disp: 15 g, Rfl: 0  •  topiramate (TOPAMAX) 25 MG capsule, TAKE 1 CAP BY MOUTH 2 TIMES A DAY., Disp: 180 Cap, Rfl: 0  •  albuterol 108 (90 BASE) MCG/ACT Aero Soln inhalation aerosol, Inhale 2 Puffs by mouth every 6 hours as needed for Shortness of Breath., Disp: 1 Inhaler, Rfl: 3  •  fluticasone (FLONASE) 50 MCG/ACT nasal spray, Spray 2 Sprays in nose every day., Disp: 16 g, Rfl: 11      Review of Systems:     Pertinent positives as per HPI, all other systems reviewed and WNL   Physical Assessment:     VS: /78 (BP Location: Right arm, Patient Position: Sitting, BP Cuff Size: Large adult)   Pulse 95   Temp 36.9 °C (98.5 °F) (Temporal)   Resp 14   Ht 1.651 m (5' 5\")   Wt (!) 125.6 kg (276 lb 12.8 oz)   LMP 03/18/2010   SpO2 94%   BMI 46.06 kg/m²     General: Well-developed, well-nourished, female, obese     Head: PERRL, EOMI. Normocephalic. No facial asymmetry " noted.  Cardiovasc:RRR, no MRG. No thrills or bruits. Pulses 2+ and symmetric at all distal extremities.  Pulmonary: Lungs clear bilaterally.  Normal respiratory effort. No wheeze or crackles.   Neuro: Alert and oriented, CNs II-XII intact, no focal deficits.  Skin:No rashes noted. Skin warm, dry, intact    Psych: Dressed appropriately for the weather, pleasant and conversant.  Affect, mood & judgment appropriate.    Assessment/Plan:   Mikayla was seen today for sleep problem and medication refill.    Diagnoses and all orders for this visit:    Type 2 diabetes mellitus without complication, without long-term current use of insulin ([facility]), new   -Reviewed labs with the patient, A1c 6.8%, weight is up.  At this time patient would like to work on dietary modifications, exercise, weight loss.  I think this is reasonable we will repeat labs in 3-6 months time.  Discussed dietary modifications at great length today.    Anxiety, uncontrolled  -Trial buspirone 5 mg up to 3 times daily, aware of adverse effects of medication.  May continue to use alprazolam 0.5 mg 1-2 times daily, number 30 tablets will last 60 days, no refill until December. Aware of risk for addiction or dependence.  Not to combine with alcohol.  Urine drug screen has been appropriate.   is reviewed and in accordance with her prescriptions.  CSA is on file. Follow up in 1 month.   -     ALPRAZolam (XANAX) 1 MG Tab; Take 0.5-1 Tabs by mouth at bedtime as needed for Sleep for up to 30 days.  -     busPIRone (BUSPAR) 5 MG Tab; Take 1 Tab by mouth 3 times a day.    Primary insomnia, controlled   -May continue alprazolam, Benadryl, melatonin, Elavil.   -     ALPRAZolam (XANAX) 1 MG Tab; Take 0.5-1 Tabs by mouth at bedtime as needed for Sleep for up to 30 days.    Fibromyalgia, stable   -Continue current treatment, patient does note that she has had improvement in her pain with gabapentin, will increase dose at follow-up if needed.     Need for vaccination  -      Flu Quad Inj >3 Year Pre-Filled PF    Chronic tension-type headache, not intractable, uncontrolled   - again discussed referral back to neuro, declines       Patient is agreeable to the above plan and voiced understanding. All questions answered.     Please note that this dictation was created using voice recognition software. I have made every reasonable attempt to correct obvious errors, but I expect that there are errors of grammar and possibly content that I did not discover before finalizing the note.      GRACIELA Pedraza  9/27/2018, 1:35 PM

## 2018-10-18 DIAGNOSIS — J40 BRONCHITIS: ICD-10-CM

## 2018-10-18 RX ORDER — ALBUTEROL SULFATE 90 UG/1
2 AEROSOL, METERED RESPIRATORY (INHALATION) EVERY 6 HOURS PRN
Qty: 1 INHALER | Refills: 2 | Status: SHIPPED | OUTPATIENT
Start: 2018-10-18 | End: 2018-11-09

## 2018-10-24 DIAGNOSIS — J40 BRONCHITIS: ICD-10-CM

## 2018-10-24 RX ORDER — ALBUTEROL SULFATE 90 UG/1
2 AEROSOL, METERED RESPIRATORY (INHALATION) EVERY 6 HOURS PRN
Qty: 6.7 INHALER | Refills: 0 | OUTPATIENT
Start: 2018-10-24

## 2018-10-29 ENCOUNTER — OFFICE VISIT (OUTPATIENT)
Dept: MEDICAL GROUP | Facility: PHYSICIAN GROUP | Age: 58
End: 2018-10-29
Payer: COMMERCIAL

## 2018-10-29 VITALS
WEIGHT: 271.6 LBS | HEIGHT: 65 IN | OXYGEN SATURATION: 96 % | HEART RATE: 86 BPM | BODY MASS INDEX: 45.25 KG/M2 | RESPIRATION RATE: 18 BRPM | TEMPERATURE: 99.3 F | SYSTOLIC BLOOD PRESSURE: 130 MMHG | DIASTOLIC BLOOD PRESSURE: 78 MMHG

## 2018-10-29 DIAGNOSIS — E66.01 MORBID OBESITY WITH BMI OF 40.0-44.9, ADULT (HCC): ICD-10-CM

## 2018-10-29 DIAGNOSIS — M25.551 CHRONIC HIP PAIN, BILATERAL: ICD-10-CM

## 2018-10-29 DIAGNOSIS — F41.9 ANXIETY: ICD-10-CM

## 2018-10-29 DIAGNOSIS — M54.50 CHRONIC MIDLINE LOW BACK PAIN WITHOUT SCIATICA: ICD-10-CM

## 2018-10-29 DIAGNOSIS — G89.29 CHRONIC MIDLINE LOW BACK PAIN WITHOUT SCIATICA: ICD-10-CM

## 2018-10-29 DIAGNOSIS — G89.29 CHRONIC HIP PAIN, BILATERAL: ICD-10-CM

## 2018-10-29 DIAGNOSIS — G62.9 NEUROPATHY: ICD-10-CM

## 2018-10-29 DIAGNOSIS — F51.01 PRIMARY INSOMNIA: ICD-10-CM

## 2018-10-29 DIAGNOSIS — E11.9 TYPE 2 DIABETES MELLITUS WITHOUT COMPLICATION, WITHOUT LONG-TERM CURRENT USE OF INSULIN (HCC): ICD-10-CM

## 2018-10-29 DIAGNOSIS — M25.552 CHRONIC HIP PAIN, BILATERAL: ICD-10-CM

## 2018-10-29 DIAGNOSIS — M79.7 FIBROMYALGIA: ICD-10-CM

## 2018-10-29 PROCEDURE — 99214 OFFICE O/P EST MOD 30 MIN: CPT | Performed by: NURSE PRACTITIONER

## 2018-10-29 RX ORDER — ALPRAZOLAM 1 MG/1
1 TABLET ORAL NIGHTLY PRN
Qty: 40 TAB | Refills: 0 | Status: SHIPPED | OUTPATIENT
Start: 2018-11-08 | End: 2018-11-29 | Stop reason: SDUPTHER

## 2018-10-29 RX ORDER — GABAPENTIN 600 MG/1
600 TABLET ORAL 3 TIMES DAILY
Qty: 270 TAB | Refills: 0 | Status: SHIPPED | OUTPATIENT
Start: 2018-10-29 | End: 2019-03-06

## 2018-10-29 NOTE — ASSESSMENT & PLAN NOTE
Patient is a 58-year-old female with fibromyalgia and chronic midline low back pain with intermittent radiculopathy to bilateral lower extremities.  Her pain is a constant ache with physical activity, needing to take breaks often to stretch, leaning forward on a counter provides improvement in her back pain.  She has had some improvement with aqua therapy, swims a few days a week.  She continues on Celebrex 100 mg twice daily which she takes along with daily PPI in the setting of Fredo-en-Y gastric bypass, she is also using cyclobenzaprine, gabapentin 300 mg 3 times daily.  Her pain persists, she is feeling discouraged that she is not able to routinely exercise and her weight loss is stagnant.

## 2018-10-29 NOTE — ASSESSMENT & PLAN NOTE
Patient is a 58-year-old female who is here for follow-up regarding her anxiety.  She has had some improvement with the addition of buspirone 5 mg 3 times daily.  Still has panic and worsened anxiety provoked by car travel, coming to the clinic, grocery shopping etc.  She continues on Cymbalta 30 mg daily, 60 mg was not beneficial.  She relies on alprazolam 0.5 mg a few days a week for episodes of panic, last filled in September.  Number 30 tablets lasts her 60 days.

## 2018-10-29 NOTE — PATIENT INSTRUCTIONS
To do:     Start gabapentin slowly to 600mg three times a day.     Drop amitryptilline to 50mg at night with 1mg alprazolam and melatonin.     May use an additional 0.5mg as needed for up to 20 days in the month for anxiety.     Also please consider increasing buspar to 10-15mg three times a day for better control of the anxiety. We can adjust the alprazolam rx in the future if this controls your anxiety better.     Referral placed for back doctor.     1 month follow up with labs

## 2018-10-29 NOTE — PROGRESS NOTES
[facility]  Primary Care Office Visit - Problem-Oriented        History:     Mikayla Knight is a 58 y.o. female who is here today to discuss Diabetes (1 month fv)      Insomnia  Patient continues to struggle with insomnia.  In the past she has tried and failed various over-the-counter treatments, trazodone.  She is using alprazolam for anxiety and thus is not a great candidate for hypnotics.  She is currently using melatonin, Benadryl, amitriptyline 150 mg nightly.  She has some dry mouth when waking up in the morning.  Her migraines have been well controlled.  Unfortunately, there is no longer provides good sleep.      Anxiety  Patient is a 58-year-old female who is here for follow-up regarding her anxiety.  She has had some improvement with the addition of buspirone 5 mg 3 times daily.  Still has panic and worsened anxiety provoked by car travel, coming to the clinic, grocery shopping etc.  She continues on Cymbalta 30 mg daily, 60 mg was not beneficial.  She relies on alprazolam 0.5 mg a few days a week for episodes of panic, last filled in September.  Number 30 tablets lasts her 60 days.     Chronic low back pain without sciatica  Patient is a 58-year-old female with fibromyalgia and chronic midline low back pain with intermittent radiculopathy to bilateral lower extremities.  Her pain is a constant ache with physical activity, needing to take breaks often to stretch, leaning forward on a counter provides improvement in her back pain.  She has had some improvement with aqua therapy, swims a few days a week.  She continues on Celebrex 100 mg twice daily which she takes along with daily PPI in the setting of Fredo-en-Y gastric bypass, she is also using cyclobenzaprine, gabapentin 300 mg 3 times daily.  Her pain persists, she is feeling discouraged that she is not able to routinely exercise and her weight loss is stagnant.    Type 2 diabetes mellitus without complication, without long-term current use  "of insulin (HCC)  Patient is a 58-year-old female who was diagnosed in September with type 2 diabetes, A1c is elevated at 6.8%.  She is working on dietary modifications specifically eating small frequent meals throughout the day, snacking on almonds or beef jerky,, aqua therapy for exercise, weight loss.  She has lost about 6 pounds in the last month.  She feels discouraged.    Neuropathy  This is a chronic condition which is well controlled on medications. Patient is tolerating medications without side effects.      Morbid obesity with BMI of 40.0-44.9, adult ([facility])  Patient continues to struggle with weight loss, she has managed to lose about 6 pounds since our last office visit.        Past Medical History:   Diagnosis Date   • Anesthesia     low bp coming out of anesthesia \"one time\"   • Anxiety 11/18/2011   • Arrhythmia     ? pt unsure   • Arthritis     fibromyalgia   • Breast mass, left    • Breath shortness     occasionally   • Bursitis of knee     left   • Cancer ([facility]) 2005    squamous cell on nose   • Chronic pain 11/18/2011   • Depression 11/18/2011   • Fibromyalgia 11/18/2011   • Gastro-esophageal reflux 3/20/2012   • Heart murmur    • Iron deficiency anemia 3/20/2012   • Neuropathy ([facility]) 11/18/2011   • Other specified disorder of intestines    • Pain 6/14/12    3/10 stomach   • Pneumonia 01/2012   • Pulmonary hypertension ([facility]) 3/20/2012   • Syncope and collapse 3/20/2012     Past Surgical History:   Procedure Laterality Date   • CATH PLACEMENT  1/25/2013    Performed [facility] Liz[facility] Ferreira M.D. [facility]   • BREAST BIOPSY  1/9/2013    Performed by Lizeth Ferreira M.D. at SURGERY SAME DAY [facility] ORS   • AXILLARY NODE DISSECTION  1/9/2013    Performed [facility]. [facility]   • NODE BIOPSY  1/9/2013    Performed by Liz[facility] Fer[facility] M.D. [facility]   • BREAST BIOPSY  12/11/2012    Performed by Lizeth Ferreira M.D. at SURGERY SAME " DAY [facility] ORS   • COLONOSCOPY  6/21/2012    Performed by PARAM VALDEZ at SURGERY [facility] ORS   • GASTROSCOPY  6/21/2012    Performed by PARAM VALDEZ at SURGERY [facility] ORS   • ABDOMINAL EXPLORATION  10/7/2010    Performed by MARIA G KHAN JR at SURGERY [facility] ORS   • IRRIGATION & DEBRIDEMENT GENERAL  4/12/2010    Performed by OZZIE WEINSTEIN at SURGERY [facility] ORS   • HERNIA REPAIR  3/15/2010    Performed by MARIA G KHAN JR at SURGERY [facility] ORS   • FLAP GRAFT  3/15/2010    Performed by MARIA G KHAN JR at SURGERY [facility] ORS   • PANNICULECTOMY  3/15/2010    Performed by MARIA G KHAN JR at SURGERY [facility] ORS   • ABDOMINAL EXPLORATION  3/11/2010    Performed by MARIA G KHAN JR at SURGERY SAME DAY [facility] ORS   • OTHER  08/09    pulled mesh out of hernia   • OTHER  07/09    abscess removed abd.   • OTHER  06/09    bowel obstruction   • OTHER  04/2009    hernia repair,mesh removal after in aug.09   • OTHER  2005    bariatric surgery gastric bypass   • GASTRIC BYPASS LAPAROSCOPIC  2005   • GYN SURGERY  1984    tubal     Social History     Social History   • Marital status:      Spouse name: N/A   • Number of children: 2   • Years of education: N/A     Occupational History   •  Other     Social History Main Topics   • Smoking status: Current Some Day Smoker     Packs/day: 0.10     Years: 8.00     Types: Cigarettes   • Smokeless tobacco: Never Used      Comment: 1/2 pk a day on and off 10 yrs, 1 cigarette daily   • Alcohol use 0.0 oz/week      Comment: 5 a month, social   • Drug use: No   • Sexual activity: Not Currently     Partners: Male     Other Topics Concern   • Not on file     Social History Narrative    Patient lives with  in [place] and they have 2 grown children. She does not work. She is unable to drive due to health conditions and her son takes her to all appointments.      History   Smoking Status   • Current Some Day  Smoker   • Packs/day: 0.10   • Years: 8.00   • Types: Cigarettes   Smokeless Tobacco   • Never Used     Comment: 1/2 pk a day on and off 10 yrs, 1 cigarette daily     Family History   Problem Relation Age of Onset   • Heart Attack Father    • Cancer Maternal Grandfather         leukemia   • Cancer Other         leukemia- cousin   • Diabetes Maternal Uncle    • Diabetes Maternal Uncle    • Hypertension Mother      Allergies   Allergen Reactions   • Shellfish Allergy Anaphylaxis     SHRIMP ONLY, not anything else, not iodine.       Problem List:     Patient Active Problem List    Diagnosis Date Noted   • Health care maintenance 07/18/2018   • Nausea 05/24/2018   • Type 2 diabetes mellitus without complication, without long-term current use of insulin ([facility]) 12/18/2017   • Elevated fasting glucose 08/16/2017   • Morbid obesity with BMI of 40.0-44.9, adult ([facility]) 05/16/2017   • Hypertriglyceridemia 12/20/2016   • Bilateral hand pain 05/24/2016   • Bilateral knee pain 01/27/2016   • Chronic maxillary sinusitis 06/10/2015   • Vitamin D deficiency disease 06/10/2015   • Tobacco abuse 02/20/2014   • Heart palpitations 02/12/2014   • Chronic low back pain without sciatica 12/05/2013   • Chronic headache 05/09/2013   • Breast cancer, left breast ([facility]) 12/31/2012   • Pulmonary hypertension ([facility]) 03/20/2012   • Gastro-esophageal reflux 03/20/2012   • Fibromyalgia 11/18/2011   • Neuropathy ([facility]) 11/18/2011   • Depression 11/18/2011   • Anxiety 11/18/2011   • Insomnia 11/18/2011         Medications:     Current Outpatient Prescriptions:   •  gabapentin (NEURONTIN) 600 MG tablet, Take 1 Tab by mouth 3 times a day., Disp: 270 Tab, Rfl: 0  •  [START ON 11/8/2018] ALPRAZolam (XANAX) 1 MG Tab, Take 1 Tab by mouth at bedtime as needed for Sleep for up to 30 days. May take an additional 1 mg up to 10 days in the month for anxiety, Disp: 40 Tab, Rfl: 0  •  albuterol 108 (90 Base) MCG/ACT Aero Soln inhalation aerosol, Inhale 2 Puffs by  "mouth every 6 hours as needed for Shortness of Breath., Disp: 1 Inhaler, Rfl: 2  •  busPIRone (BUSPAR) 5 MG Tab, Take 1 Tab by mouth 3 times a day., Disp: 90 Tab, Rfl: 1  •  omeprazole (PRILOSEC) 20 MG delayed-release capsule, TAKE 1 CAP BY MOUTH EVERY DAY. 30 MINUTE BEFORE FOOD OR DRINK, ON AN EMPTY STOMACH, Disp: 90 Cap, Rfl: 0  •  triamcinolone acetonide (KENALOG) 0.1 % Cream, Apply 1 Application to affected area(s) 2 times a day., Disp: 15 g, Rfl: 0  •  cyclobenzaprine (FLEXERIL) 10 MG Tab, Take 1 Tab by mouth 3 times a day as needed., Disp: 90 Tab, Rfl: 5  •  celecoxib (CELEBREX) 100 MG Cap, Take 1 Cap by mouth 2 times a day., Disp: 60 Cap, Rfl: 2  •  rizatriptan (MAXALT) 10 MG tablet, TAKE 1 TAB BY MOUTH ONCE AS NEEDED FOR MIGRAINE FOR UP TO 1 DOSE. MAY REPEAT IN 2 HOURS IF NEEDED, Disp: 10 Tab, Rfl: 2  •  DULoxetine (CYMBALTA) 30 MG Cap DR Particles, Take 1 Cap by mouth every day., Disp: 90 Cap, Rfl: 1  •  ondansetron (ZOFRAN ODT) 8 MG TABLET DISPERSIBLE, Take 1 Tab by mouth every 8 hours as needed for Nausea., Disp: 15 Tab, Rfl: 0  •  topiramate (TOPAMAX) 25 MG capsule, TAKE 1 CAP BY MOUTH 2 TIMES A DAY., Disp: 180 Cap, Rfl: 0  •  Diclofenac Sodium 1 % Gel, Apply 2gm up to 4 times per day if needed for back pain, Disp: 1 Tube, Rfl: 3  •  fluticasone (FLONASE) 50 MCG/ACT nasal spray, Spray 2 Sprays in nose every day., Disp: 16 g, Rfl: 11  •  aspirin (ASA) 325 MG TABS, Take 325 mg by mouth as needed. weekly , Disp: , Rfl:       Review of Systems:     Pertinent positives as per HPI, all other systems reviewed and WNL     Physical Assessment:     VS: /78 (BP Location: Right arm, Patient Position: Sitting, BP Cuff Size: Large adult)   Pulse 86   Temp 37.4 °C (99.3 °F) (Temporal)   Resp 18   Ht 1.651 m (5' 5\")   Wt 123.2 kg (271 lb 9.6 oz)   LMP 03/18/2010   SpO2 96%   BMI 45.20 kg/m²     General: Well-developed, well-nourished, female, obese     Head: PERRL, EOMI. Normocephalic. No facial asymmetry " noted.  Cardiovasc:RRR, no MRG. No thrills or bruits. Pulses 2+ and symmetric at all distal extremities.  Pulmonary: Lungs clear bilaterally.  Normal respiratory effort. No wheeze or crackles.   Neuro: Alert and oriented, CNs II-XII intact, no focal deficits.  Skin:No rashes noted. Skin warm, dry, intact    Psych: Dressed appropriately for the weather, pleasant and conversant.  Affect, mood & judgment appropriate.    Assessment/Plan:   Mikayla was seen today for diabetes.    Diagnoses and all orders for this visit:    Chronic midline low back pain without sciatica, uncontrolled   - Continue Celebrex, gabapentin, duloxetine, cyclobenzaprine, increase gabapentin cautiously to 600mg TID, monitor renal function at follow up in 1 month. Continue swimming for exercise and weight loss. We did discuss referral to physiatry as she is very interested in interventional pain management, she does not want narcotics.  -     REFERRAL TO PHYSIATRY (PMR)  -     gabapentin (NEURONTIN) 600 MG tablet; Take 1 Tab by mouth 3 times a day.    Fibromyalgia, chronic, somewhat uncontrolled, see above treatment plan  -     REFERRAL TO PHYSIATRY (PMR)  -     gabapentin (NEURONTIN) 600 MG tablet; Take 1 Tab by mouth 3 times a day.    Chronic hip pain, bilateral, chronic, uncontrolled, see #1 treatment plan  -     REFERRAL TO PHYSIATRY (PMR)  -     gabapentin (NEURONTIN) 600 MG tablet; Take 1 Tab by mouth 3 times a day.    Type 2 diabetes mellitus without complication, without long-term current use of insulin (HCC), chronic, unclear control  -Continue to work on dietary modifications and weight loss, obtain labs at follow-up in December.  -     HEMOGLOBIN A1C; Future  -     BASIC METABOLIC PANEL; Future    Neuropathy (HCC), chronic, well controlled on present treatment   -     gabapentin (NEURONTIN) 600 MG tablet; Take 1 Tab by mouth 3 times a day.    Primary insomnia, uncontrolled   -Decrease amitriptyline to 50 mg nightly given side effects at  higher doses along with increased weight gain (will not discontinue given improvement in severity and frequency of migraines), will adjust alprazolam prescription so that she may utilize 1 mg nightly for insomnia.  May continue to use melatonin along with the above treatments.  Follow-up in 1 month, sooner if needed.    This patient is continuing to use a controlled substance (CS) on a long term basis.  The patient is thoroughly aware of the goals of treatment with the CS  The patient is aware that yearly and random urine drug screens are required.  The patient has been instructed to take the CS only as prescribed.  The patient is prohibited from sharing the CS with any other person.  The patient is instructed to inform the provider if any other CS is taken, of any alcohol or cannabis or other recreational drug use, any treatment for side effects of the CS or complications, if they have CS active rx in other states  The patient has evidence for a reason for the CS  The treatment plan has been discussed with the patient  The  report has been reviewed      -     ALPRAZolam (XANAX) 1 MG Tab; Take 1 Tab by mouth at bedtime as needed for Sleep for up to 30 days. May take an additional 1 mg up to 10 days in the month for anxiety    Anxiety, chronic, improving  -Recommend the patient increase buspirone to 10 mg 3 times daily and reserve alprazolam for episodes of panic.  I have adjusted her alprazolam prescription to reflect that the patient may use up to 10 tablets in a month for anxiety in addition to the 1 tablet nightly for insomnia as above.  Patient is agreeable, she will follow-up in 1 month, sooner if needed.  -     ALPRAZolam (XANAX) 1 MG Tab; Take 1 Tab by mouth at bedtime as needed for Sleep for up to 30 days. May take an additional 1 mg up to 10 days in the month for anxiety    Morbid obesity with BMI of 40.0-44.9, adult (HCC)  - discussed weight loss         Patient is agreeable to the above plan and voiced  understanding. All questions answered.     Please note that this dictation was created using voice recognition software. I have made every reasonable attempt to correct obvious errors, but I expect that there are errors of grammar and possibly content that I did not discover before finalizing the note.      GRACIELA Pedraza  10/29/2018, 9:29 AM

## 2018-10-29 NOTE — ASSESSMENT & PLAN NOTE
Patient is a 58-year-old female who was diagnosed in September with type 2 diabetes, A1c is elevated at 6.8%.  She is working on dietary modifications specifically eating small frequent meals throughout the day, snacking on almonds or beef jerky,, aqua therapy for exercise, weight loss.  She has lost about 6 pounds in the last month.  She feels discouraged.

## 2018-10-29 NOTE — ASSESSMENT & PLAN NOTE
Patient continues to struggle with insomnia.  In the past she has tried and failed various over-the-counter treatments, trazodone.  She is using alprazolam for anxiety and thus is not a great candidate for hypnotics.  She is currently using melatonin, Benadryl, amitriptyline 150 mg nightly.  She has some dry mouth when waking up in the morning.  Her migraines have been well controlled.  Unfortunately, there is no longer provides good sleep.

## 2018-10-29 NOTE — ASSESSMENT & PLAN NOTE
Patient continues to struggle with weight loss, she has managed to lose about 6 pounds since our last office visit.

## 2018-11-04 DIAGNOSIS — G62.9 NEUROPATHY: ICD-10-CM

## 2018-11-04 DIAGNOSIS — G44.229 CHRONIC TENSION-TYPE HEADACHE, NOT INTRACTABLE: ICD-10-CM

## 2018-11-04 DIAGNOSIS — F51.01 PRIMARY INSOMNIA: ICD-10-CM

## 2018-11-04 DIAGNOSIS — M79.7 FIBROMYALGIA: ICD-10-CM

## 2018-11-05 RX ORDER — AMITRIPTYLINE HYDROCHLORIDE 50 MG/1
100 TABLET, FILM COATED ORAL NIGHTLY PRN
Qty: 60 TAB | Refills: 0 | Status: SHIPPED | OUTPATIENT
Start: 2018-11-05 | End: 2018-12-06 | Stop reason: SDUPTHER

## 2018-11-05 NOTE — TELEPHONE ENCOUNTER
Was the patient seen in the last year in this department? Yes    Does patient have an active prescription for medications requested? No     Received Request Via: Pharmacy      Pt met protocol?: Yes  pt last ov 10/18 was last d/c by pcp 10/29/18 was no longer working for pt

## 2018-11-06 NOTE — TELEPHONE ENCOUNTER
Per last OV pt to decrease Elavil to 50mg nightly for sleep and an increase in Xanax. Will send 30 days as pt has upcoming appt to discuss.

## 2018-11-08 ENCOUNTER — PATIENT MESSAGE (OUTPATIENT)
Dept: MEDICAL GROUP | Facility: PHYSICIAN GROUP | Age: 58
End: 2018-11-08

## 2018-11-08 DIAGNOSIS — J40 BRONCHITIS: ICD-10-CM

## 2018-11-09 RX ORDER — ALBUTEROL SULFATE 90 UG/1
2 AEROSOL, METERED RESPIRATORY (INHALATION) EVERY 6 HOURS PRN
Qty: 6.7 INHALER | Refills: 2 | Status: SHIPPED | OUTPATIENT
Start: 2018-11-09 | End: 2019-10-05 | Stop reason: SDUPTHER

## 2018-11-29 ENCOUNTER — HOSPITAL ENCOUNTER (OUTPATIENT)
Dept: LAB | Facility: MEDICAL CENTER | Age: 58
End: 2018-11-29
Attending: NURSE PRACTITIONER
Payer: OTHER GOVERNMENT

## 2018-11-29 ENCOUNTER — OFFICE VISIT (OUTPATIENT)
Dept: MEDICAL GROUP | Facility: PHYSICIAN GROUP | Age: 58
End: 2018-11-29
Payer: COMMERCIAL

## 2018-11-29 VITALS
RESPIRATION RATE: 16 BRPM | SYSTOLIC BLOOD PRESSURE: 114 MMHG | BODY MASS INDEX: 46.15 KG/M2 | HEART RATE: 94 BPM | HEIGHT: 65 IN | OXYGEN SATURATION: 94 % | WEIGHT: 277 LBS | TEMPERATURE: 99.1 F | DIASTOLIC BLOOD PRESSURE: 80 MMHG

## 2018-11-29 DIAGNOSIS — M79.7 FIBROMYALGIA: ICD-10-CM

## 2018-11-29 DIAGNOSIS — E11.9 TYPE 2 DIABETES MELLITUS WITHOUT COMPLICATION, WITHOUT LONG-TERM CURRENT USE OF INSULIN (HCC): ICD-10-CM

## 2018-11-29 DIAGNOSIS — M54.16 LUMBAR RADICULOPATHY: ICD-10-CM

## 2018-11-29 DIAGNOSIS — G62.9 NEUROPATHY: ICD-10-CM

## 2018-11-29 DIAGNOSIS — F51.01 PRIMARY INSOMNIA: ICD-10-CM

## 2018-11-29 DIAGNOSIS — F41.9 ANXIETY: ICD-10-CM

## 2018-11-29 LAB
ANION GAP SERPL CALC-SCNC: 10 MMOL/L (ref 0–11.9)
BUN SERPL-MCNC: 16 MG/DL (ref 8–22)
CALCIUM SERPL-MCNC: 8.8 MG/DL (ref 8.5–10.5)
CHLORIDE SERPL-SCNC: 104 MMOL/L (ref 96–112)
CO2 SERPL-SCNC: 23 MMOL/L (ref 20–33)
CREAT SERPL-MCNC: 0.88 MG/DL (ref 0.5–1.4)
EST. AVERAGE GLUCOSE BLD GHB EST-MCNC: 128 MG/DL
GLUCOSE SERPL-MCNC: 144 MG/DL (ref 65–99)
HBA1C MFR BLD: 6.1 % (ref 0–5.6)
POTASSIUM SERPL-SCNC: 4.1 MMOL/L (ref 3.6–5.5)
SODIUM SERPL-SCNC: 137 MMOL/L (ref 135–145)

## 2018-11-29 PROCEDURE — 83036 HEMOGLOBIN GLYCOSYLATED A1C: CPT

## 2018-11-29 PROCEDURE — 99214 OFFICE O/P EST MOD 30 MIN: CPT | Performed by: NURSE PRACTITIONER

## 2018-11-29 PROCEDURE — 36415 COLL VENOUS BLD VENIPUNCTURE: CPT

## 2018-11-29 PROCEDURE — 80048 BASIC METABOLIC PNL TOTAL CA: CPT

## 2018-11-29 RX ORDER — ALPRAZOLAM 1 MG/1
1 TABLET ORAL NIGHTLY PRN
Qty: 40 TAB | Refills: 1 | Status: SHIPPED | OUTPATIENT
Start: 2018-12-20 | End: 2019-02-28 | Stop reason: SDUPTHER

## 2018-11-29 ASSESSMENT — PATIENT HEALTH QUESTIONNAIRE - PHQ9
4. FEELING TIRED OR HAVING LITTLE ENERGY: NEARLY EVERY DAY
6. FEELING BAD ABOUT YOURSELF - OR THAT YOU ARE A FAILURE OR HAVE LET YOURSELF OR YOUR FAMILY DOWN: NOT AL ALL
SUM OF ALL RESPONSES TO PHQ9 QUESTIONS 1 AND 2: 2
1. LITTLE INTEREST OR PLEASURE IN DOING THINGS: SEVERAL DAYS
8. MOVING OR SPEAKING SO SLOWLY THAT OTHER PEOPLE COULD HAVE NOTICED. OR THE OPPOSITE, BEING SO FIGETY OR RESTLESS THAT YOU HAVE BEEN MOVING AROUND A LOT MORE THAN USUAL: SEVERAL DAYS
2. FEELING DOWN, DEPRESSED, IRRITABLE, OR HOPELESS: SEVERAL DAYS
9. THOUGHTS THAT YOU WOULD BE BETTER OFF DEAD, OR OF HURTING YOURSELF: NOT AT ALL
3. TROUBLE FALLING OR STAYING ASLEEP OR SLEEPING TOO MUCH: NEARLY EVERY DAY
7. TROUBLE CONCENTRATING ON THINGS, SUCH AS READING THE NEWSPAPER OR WATCHING TELEVISION: NOT AT ALL
SUM OF ALL RESPONSES TO PHQ QUESTIONS 1-9: 12
5. POOR APPETITE OR OVEREATING: NEARLY EVERY DAY

## 2018-11-29 NOTE — ASSESSMENT & PLAN NOTE
Patient continues to struggle with insomnia, now able to fall asleep with alprazolam and amitriptyline nightly, unfortunately waking up in the early morning hours.  She continues to use melatonin, Benadryl in addition to the above prescribed medications.

## 2018-11-29 NOTE — PROGRESS NOTES
"[facility]  Primary Care Office Visit - Problem-Oriented        History:     Mikayla Knight is a 58 y.o. female who is here today to discuss Edema (swelling on feet/ hands) and Medication Refill (Rx refills )      Fibromyalgia  Patient continues to struggle with widespread pain, particularly low back pain.  She has found no significant improvement with increased dose of Neurontin, she will decrease her dose back to lowest effective dose which was 300 mg 3 times daily.  She does continue on Celebrex, Flexeril, duloxetine, amitriptyline.  She does have an improved referral to physiatry for interventional pain management consult.     Insomnia  Patient continues to struggle with insomnia, now able to fall asleep with alprazolam and amitriptyline nightly, unfortunately waking up in the early morning hours.  She continues to use melatonin, Benadryl in addition to the above prescribed medications.    Type 2 diabetes mellitus without complication, without long-term current use of insulin (HCC)  Patient is a 58-year-old female with type 2 diabetes which she has been previously managing with dietary modifications alone.  A1c was elevated in September, she is due for repeat A1c.  She is not on any oral antidiabetic's.  She continues to struggle with weight loss, now back to baseline weight.  Feeling discouraged.    Anxiety  Baseline generalized anxiety is well controlled with 3 times daily dosing of buspirone 5 mg.  Still having a few rare episodes of panic which she uses half tablet of alprazolam about 10 times in a month. She is using alprazolam 1mg nightly for sleep. 40 tablets lasts her 1.5 months. She needs a refill today.         Past Medical History:   Diagnosis Date   • Anesthesia     low bp coming out of anesthesia \"one time\"   • Anxiety 11/18/2011   • Arrhythmia     ? pt unsure   • Arthritis     fibromyalgia   • Breast mass, left    • Breath shortness     occasionally   • Bursitis of knee     left   • " Cancer (HCC) 2005    squamous cell on nose   • Chronic pain 11/18/2011   • Depression 11/18/2011   • Fibromyalgia 11/18/2011   • Gastro-esophageal reflux 3/20/2012   • Heart murmur    • Iron deficiency anemia 3/20/2012   • Neuropathy (HCC) 11/18/2011   • Other specified disorder of intestines    • Pain 6/14/12    3/10 stomach   • Pneumonia 01/2012   • Pulmonary hypertension ([facility]) 3/20/2012   • Syncope and collapse 3/20/2012     Past Surgical History:   Procedure Laterality Date   • CATH PLACEMENT  1/25/2013    Performed by Lizeth Ferreira M.D. at SURGERY SAME DAY [facility] ORS   • BREAST BIOPSY  1/9/2013    Performed by Lizeth Ferreira M.D. at SURGERY SAME DAY [facility] ORS   • AXILLARY NODE DISSECTION  1/9/2013    Performed by Lizeth Ferreira M.D. at SURGERY SAME DAY [facility] ORS   • NODE BIOPSY  1/9/2013    Performed by Lizeth Ferreira M.D. at SURGERY SAME DAY [facility] ORS   • BREAST BIOPSY  12/11/2012    Performed by Lizeth Ferreira M.D. at SURGERY SAME DAY [facility] ORS   • COLONOSCOPY  6/21/2012    Performed by PARAM VALDEZ at SURGERY [facility] ORS   • GASTROSCOPY  6/21/2012    Performed by PARAM VALDEZ at SURGERY [facility] ORS   • ABDOMINAL EXPLORATION  10/7/2010    Performed by MARIA G KHAN JR at SURGERY [facility] ORS   • IRRIGATION & DEBRIDEMENT GENERAL  4/12/2010    Performed by OZZIE WEINSTEIN at SURGERY [facility] ORS   • HERNIA REPAIR  3/15/2010    Performed by MARIA G KHAN JR at SURGERY [facility] ORS   • FLAP GRAFT  3/15/2010    Performed by MARIA G KHAN JR at SURGERY [facility] ORS   • PANNICULECTOMY  3/15/2010    Performed by MARIA G KHAN JR at SURGERY [facility] ORS   • ABDOMINAL EXPLORATION  3/11/2010    Performed by MARIA G KHAN JR at SURGERY SAME DAY [facility] ORS   • OTHER  08/09    pulled mesh out of hernia   • OTHER  07/09    abscess removed abd.   • OTHER  06/09    bowel obstruction   • OTHER  04/2009    hernia repair,mesh removal after in  aug.09   • OTHER  2005    bariatric surgery gastric bypass   • GASTRIC BYPASS LAPAROSCOPIC  2005   • GYN SURGERY  1984    tubal     Social History     Social History   • Marital status:      Spouse name: N/A   • Number of children: 2   • Years of education: N/A     Occupational History   •  Other     Social History Main Topics   • Smoking status: Current Some Day Smoker     Packs/day: 0.10     Years: 8.00     Types: Cigarettes   • Smokeless tobacco: Never Used      Comment: 1/2 pk a day on and off 10 yrs, 1 cigarette daily   • Alcohol use 0.0 oz/week      Comment: 5 a month, social   • Drug use: No   • Sexual activity: Not Currently     Partners: Male     Other Topics Concern   • Not on file     Social History Narrative    Patient lives with  in [place] and they have 2 grown children. She does not work. She is unable to drive due to health conditions and her son takes her to all appointments.      History   Smoking Status   • Current Some Day Smoker   • Packs/day: 0.10   • Years: 8.00   • Types: Cigarettes   Smokeless Tobacco   • Never Used     Comment: 1/2 pk a day on and off 10 yrs, 1 cigarette daily     Family History   Problem Relation Age of Onset   • Heart Attack Father    • Cancer Maternal Grandfather         leukemia   • Cancer Other         leukemia- cousin   • Diabetes Maternal Uncle    • Diabetes Maternal Uncle    • Hypertension Mother      Allergies   Allergen Reactions   • Shellfish Allergy Anaphylaxis     SHRIMP ONLY, not anything else, not iodine.       Problem List:     Patient Active Problem List    Diagnosis Date Noted   • Health care maintenance 07/18/2018   • Nausea 05/24/2018   • Type 2 diabetes mellitus without complication, without long-term current use of insulin ([facility]) 12/18/2017   • Elevated fasting glucose 08/16/2017   • Morbid obesity with BMI of 40.0-44.9, adult ([facility]) 05/16/2017   • Hypertriglyceridemia 12/20/2016   • Bilateral hand pain 05/24/2016   • Bilateral knee pain  01/27/2016   • Chronic maxillary sinusitis 06/10/2015   • Vitamin D deficiency disease 06/10/2015   • Tobacco abuse 02/20/2014   • Heart palpitations 02/12/2014   • Chronic low back pain without sciatica 12/05/2013   • Chronic headache 05/09/2013   • Breast cancer, left breast (HCC) 12/31/2012   • Pulmonary hypertension ([facility]) 03/20/2012   • Gastro-esophageal reflux 03/20/2012   • Fibromyalgia 11/18/2011   • Neuropathy ([facility]) 11/18/2011   • Depression 11/18/2011   • Anxiety 11/18/2011   • Insomnia 11/18/2011         Medications:     Current Outpatient Prescriptions:   •  Diclofenac Sodium 1 % Gel, Apply 2gm up to 4 times per day if needed for back pain, Disp: 1 Tube, Rfl: 3  •  [START ON 12/20/2018] ALPRAZolam (XANAX) 1 MG Tab, Take 1 Tab by mouth at bedtime as needed for Sleep for up to 90 days. May take an additional 1 mg up to 10 days in the month for anxiety, Disp: 40 Tab, Rfl: 1  •  DULoxetine (CYMBALTA) 30 MG Cap DR Particles, TAKE 1 CAPSULE BY MOUTH EVERY DAY, Disp: 90 Cap, Rfl: 0  •  amitriptyline (ELAVIL) 50 MG Tab, TAKE 2 TABS BY MOUTH AT BEDTIME AS NEEDED., Disp: 60 Tab, Rfl: 0  •  gabapentin (NEURONTIN) 600 MG tablet, Take 1 Tab by mouth 3 times a day., Disp: 270 Tab, Rfl: 0  •  busPIRone (BUSPAR) 5 MG Tab, Take 1 Tab by mouth 3 times a day., Disp: 90 Tab, Rfl: 1  •  omeprazole (PRILOSEC) 20 MG delayed-release capsule, TAKE 1 CAP BY MOUTH EVERY DAY. 30 MINUTE BEFORE FOOD OR DRINK, ON AN EMPTY STOMACH, Disp: 90 Cap, Rfl: 0  •  cyclobenzaprine (FLEXERIL) 10 MG Tab, Take 1 Tab by mouth 3 times a day as needed., Disp: 90 Tab, Rfl: 5  •  celecoxib (CELEBREX) 100 MG Cap, Take 1 Cap by mouth 2 times a day., Disp: 60 Cap, Rfl: 2  •  rizatriptan (MAXALT) 10 MG tablet, TAKE 1 TAB BY MOUTH ONCE AS NEEDED FOR MIGRAINE FOR UP TO 1 DOSE. MAY REPEAT IN 2 HOURS IF NEEDED, Disp: 10 Tab, Rfl: 2  •  ondansetron (ZOFRAN ODT) 8 MG TABLET DISPERSIBLE, Take 1 Tab by mouth every 8 hours as needed for Nausea., Disp: 15 Tab, Rfl:  "0  •  aspirin (ASA) 325 MG TABS, Take 325 mg by mouth as needed. weekly , Disp: , Rfl:   •  albuterol 108 (90 Base) MCG/ACT Aero Soln inhalation aerosol, Inhale 2 Puffs by mouth every 6 hours as needed for Shortness of Breath., Disp: 6.7 Inhaler, Rfl: 2  •  triamcinolone acetonide (KENALOG) 0.1 % Cream, Apply 1 Application to affected area(s) 2 times a day., Disp: 15 g, Rfl: 0  •  topiramate (TOPAMAX) 25 MG capsule, TAKE 1 CAP BY MOUTH 2 TIMES A DAY., Disp: 180 Cap, Rfl: 0  •  fluticasone (FLONASE) 50 MCG/ACT nasal spray, Spray 2 Sprays in nose every day., Disp: 16 g, Rfl: 11      Review of Systems:     Pertinent positives as per HPI, all other systems reviewed and WNL     Physical Assessment:     VS: /80 (BP Location: Right arm, Patient Position: Sitting, BP Cuff Size: Adult long)   Pulse 94   Temp 37.3 °C (99.1 °F) (Temporal)   Resp 16   Ht 1.651 m (5' 5\")   Wt (!) 125.6 kg (277 lb)   LMP 03/18/2010   SpO2 94%   BMI 46.10 kg/m²     General: Well-developed, well-nourished, female, obese     Head: PERRL, EOMI. Normocephalic. No facial asymmetry noted.  Extremities: ble with non pitting edema  Neuro: Alert and oriented, CNs II-XII intact, no focal deficits.  Skin:No rashes noted. Skin warm, dry, intact    Psych: Dressed appropriately for the weather, pleasant and conversant.  Affect, mood & judgment appropriate.    Assessment/Plan:   Mikayla was seen today for edema and medication refill.    Diagnoses and all orders for this visit:    Type 2 diabetes mellitus without complication, without long-term current use of insulin (HCC), unclear control  - continue to work on diet, exercise, weight loss. Referral placed to HIP. Labs and follow up in 3months.     Neuropathy (HCC), stable  -No significant improvement in neuropathy and back pain with increased dose of gabapentin, decreased to lowest effective dose, gabapentin 300 mg 3 times daily, monitor renal function per      Primary insomnia, stable  -Again " reinforced good sleep habits, continue alprazolam 1 mg nightly along with amitriptyline  mg nightly. May also continue melatonin. Guided meditation recommended for nighttime awakening.     This patient is continuing to use a controlled substance (CS) on a long term basis.  The patient is thoroughly aware of the goals of treatment with the CS  The patient is aware that yearly and random urine drug screens are required.  The patient has been instructed to take the CS only as prescribed.  The patient is prohibited from sharing the CS with any other person.  The patient is instructed to inform the provider if any other CS is taken, of any alcohol or cannabis or other recreational drug use, any treatment for side effects of the CS or complications, if they have CS active rx in other states  The patient has evidence for a reason for the CS  The treatment plan has been discussed with the patient  The  report has been reviewed      -     ALPRAZolam (XANAX) 1 MG Tab; Take 1 Tab by mouth at bedtime as needed for Sleep for up to 90 days. May take an additional 1 mg up to 10 days in the month for anxiety    Anxiety, stable   - continue TID buspar, may use alprazolam sparingly for panic, 10 times in a month, refill provided today.   -     ALPRAZolam (XANAX) 1 MG Tab; Take 1 Tab by mouth at bedtime as needed for Sleep for up to 90 days. May take an additional 1 mg up to 10 days in the month for anxiety    Fibromyalgia, uncontrolled  - continue current treatments, referral to PM placed       -     Diclofenac Sodium 1 % Gel; Apply 2gm up to 4 times per day if needed for back pain      Patient is agreeable to the above plan and voiced understanding. All questions answered.     Please note that this dictation was created using voice recognition software. I have made every reasonable attempt to correct obvious errors, but I expect that there are errors of grammar and possibly content that I did not discover before finalizing  the note.      GRACIELA Pedraza  11/29/2018, 10:05 AM

## 2018-11-29 NOTE — ASSESSMENT & PLAN NOTE
Patient is a 58-year-old female with type 2 diabetes which she has been previously managing with dietary modifications alone.  A1c was elevated in September, she is due for repeat A1c.  She is not on any oral antidiabetic's.  She continues to struggle with weight loss, now back to baseline weight.  Feeling discouraged.

## 2018-11-29 NOTE — ASSESSMENT & PLAN NOTE
Patient continues to struggle with widespread pain, particularly low back pain.  She has found no significant improvement with increased dose of Neurontin, she will decrease her dose back to lowest effective dose which was 300 mg 3 times daily.  She does continue on Celebrex, Flexeril, duloxetine, amitriptyline.  She does have an improved referral to physiatry for interventional pain management consult.

## 2018-11-29 NOTE — ASSESSMENT & PLAN NOTE
Baseline generalized anxiety is well controlled with 3 times daily dosing of buspirone 5 mg.  Still having a few rare episodes of panic which she uses half tablet of alprazolam about 10 times in a month. She is using alprazolam 1mg nightly for sleep. 40 tablets lasts her 1.5 months. She needs a refill today.

## 2018-12-06 DIAGNOSIS — G62.9 NEUROPATHY: ICD-10-CM

## 2018-12-06 DIAGNOSIS — F51.01 PRIMARY INSOMNIA: ICD-10-CM

## 2018-12-06 DIAGNOSIS — G44.229 CHRONIC TENSION-TYPE HEADACHE, NOT INTRACTABLE: ICD-10-CM

## 2018-12-06 DIAGNOSIS — M79.7 FIBROMYALGIA: ICD-10-CM

## 2018-12-07 RX ORDER — AMITRIPTYLINE HYDROCHLORIDE 50 MG/1
100 TABLET, FILM COATED ORAL NIGHTLY PRN
Qty: 60 TAB | Refills: 0 | Status: SHIPPED | OUTPATIENT
Start: 2018-12-07 | End: 2019-01-06 | Stop reason: SDUPTHER

## 2018-12-12 DIAGNOSIS — T39.395A NSAID INDUCED GASTRITIS: ICD-10-CM

## 2018-12-12 DIAGNOSIS — K29.60 NSAID INDUCED GASTRITIS: ICD-10-CM

## 2018-12-13 RX ORDER — OMEPRAZOLE 20 MG/1
20 CAPSULE, DELAYED RELEASE ORAL DAILY
Qty: 90 CAP | Refills: 0 | Status: SHIPPED | OUTPATIENT
Start: 2018-12-13 | End: 2019-03-24 | Stop reason: SDUPTHER

## 2018-12-31 DIAGNOSIS — M79.7 FIBROMYALGIA: ICD-10-CM

## 2019-01-02 ENCOUNTER — TELEPHONE (OUTPATIENT)
Dept: MEDICAL GROUP | Facility: PHYSICIAN GROUP | Age: 59
End: 2019-01-02

## 2019-01-02 DIAGNOSIS — M25.561 ACUTE PAIN OF RIGHT KNEE: ICD-10-CM

## 2019-01-02 NOTE — TELEPHONE ENCOUNTER
1. Caller Name: Pt                      Call Back Number: [phone] (home)       2. Message: Pt sates that her knee has become tender to the touch and started selling again. She is requesting a referral to ortho to have it checked out. Please advise.    3. Patient approves office to leave a detailed voicemail/MyChart message: yes

## 2019-01-02 NOTE — TELEPHONE ENCOUNTER
Was the patient seen in the last year in this department? Yes    Does patient have an active prescription for medications requested? No     Received Request Via: Pharmacy      Pt met protocol?: Yes, ov 11/18 appt pcp 2/19

## 2019-01-03 RX ORDER — CYCLOBENZAPRINE HCL 10 MG
TABLET ORAL
Qty: 90 TAB | Refills: 0 | Status: SHIPPED | OUTPATIENT
Start: 2019-01-03 | End: 2019-02-01 | Stop reason: SDUPTHER

## 2019-01-06 DIAGNOSIS — F51.01 PRIMARY INSOMNIA: ICD-10-CM

## 2019-01-06 DIAGNOSIS — G44.229 CHRONIC TENSION-TYPE HEADACHE, NOT INTRACTABLE: ICD-10-CM

## 2019-01-06 DIAGNOSIS — M79.7 FIBROMYALGIA: ICD-10-CM

## 2019-01-06 DIAGNOSIS — G62.9 NEUROPATHY: ICD-10-CM

## 2019-01-08 RX ORDER — AMITRIPTYLINE HYDROCHLORIDE 50 MG/1
100 TABLET, FILM COATED ORAL NIGHTLY PRN
Qty: 180 TAB | Refills: 0 | Status: SHIPPED | OUTPATIENT
Start: 2019-01-08 | End: 2019-03-28 | Stop reason: SDUPTHER

## 2019-01-09 DIAGNOSIS — G44.229 CHRONIC TENSION-TYPE HEADACHE, NOT INTRACTABLE: ICD-10-CM

## 2019-01-09 NOTE — TELEPHONE ENCOUNTER
Was the patient seen in the last year in this department? Yes    Does patient have an active prescription for medications requested? No     Received Request Via: Pharmacy      Pt met protocol?: Yes    OV 11/18

## 2019-01-10 RX ORDER — RIZATRIPTAN BENZOATE 10 MG/1
TABLET ORAL
Qty: 10 TAB | Refills: 2 | Status: SHIPPED | OUTPATIENT
Start: 2019-01-10 | End: 2019-01-17 | Stop reason: SDUPTHER

## 2019-01-16 ENCOUNTER — APPOINTMENT (OUTPATIENT)
Dept: PHYSICAL MEDICINE AND REHAB | Facility: MEDICAL CENTER | Age: 59
End: 2019-01-16
Payer: COMMERCIAL

## 2019-01-17 ENCOUNTER — PATIENT MESSAGE (OUTPATIENT)
Dept: MEDICAL GROUP | Facility: PHYSICIAN GROUP | Age: 59
End: 2019-01-17

## 2019-01-17 DIAGNOSIS — G44.229 CHRONIC TENSION-TYPE HEADACHE, NOT INTRACTABLE: ICD-10-CM

## 2019-01-17 RX ORDER — RIZATRIPTAN BENZOATE 10 MG/1
TABLET ORAL
Qty: 10 TAB | Refills: 2 | Status: SHIPPED | OUTPATIENT
Start: 2019-01-17 | End: 2019-07-05 | Stop reason: SDUPTHER

## 2019-01-17 NOTE — TELEPHONE ENCOUNTER
From: Mikayla Knight  To: YOSEPH Ly  Sent: 1/17/2019 11:19 AM PST  Subject: Prescription Question    Hi! My question is ,can I please get a refill for my Maxalt? My headaches & migraines are constant for the last few weeks. Also, my anxiety is getting really bad. It feels like I'm going to have a panic attack. I pretty much stay home daily because of the anxiety. Is there anything else better or stronger than Buspirone?   Thank You,mikayla

## 2019-01-19 DIAGNOSIS — M54.16 LUMBAR RADICULOPATHY: ICD-10-CM

## 2019-01-21 NOTE — TELEPHONE ENCOUNTER
Was the patient seen in the last year in this department? Yes    Does patient have an active prescription for medications requested? No     Received Request Via: Pharmacy      Pt met protocol?: Yes, OV 11/18

## 2019-01-22 ENCOUNTER — PATIENT MESSAGE (OUTPATIENT)
Dept: MEDICAL GROUP | Facility: PHYSICIAN GROUP | Age: 59
End: 2019-01-22

## 2019-01-22 DIAGNOSIS — F41.9 ANXIETY: ICD-10-CM

## 2019-01-28 ENCOUNTER — PATIENT MESSAGE (OUTPATIENT)
Dept: MEDICAL GROUP | Facility: PHYSICIAN GROUP | Age: 59
End: 2019-01-28

## 2019-01-28 DIAGNOSIS — M79.89 BILATERAL SWELLING OF FEET: ICD-10-CM

## 2019-01-28 RX ORDER — HYDROCHLOROTHIAZIDE 12.5 MG/1
12.5-25 CAPSULE, GELATIN COATED ORAL DAILY
Qty: 60 CAP | Refills: 0 | Status: SHIPPED | OUTPATIENT
Start: 2019-01-28 | End: 2019-02-25 | Stop reason: SDUPTHER

## 2019-02-01 DIAGNOSIS — M79.7 FIBROMYALGIA: ICD-10-CM

## 2019-02-01 DIAGNOSIS — F41.9 ANXIETY: ICD-10-CM

## 2019-02-01 RX ORDER — CYCLOBENZAPRINE HCL 10 MG
TABLET ORAL
Qty: 90 TAB | Refills: 0 | Status: SHIPPED | OUTPATIENT
Start: 2019-02-01 | End: 2019-03-04 | Stop reason: SDUPTHER

## 2019-02-01 RX ORDER — BUSPIRONE HYDROCHLORIDE 5 MG/1
TABLET ORAL
Qty: 270 TAB | Refills: 0 | Status: SHIPPED | OUTPATIENT
Start: 2019-02-01 | End: 2019-02-28 | Stop reason: SDUPTHER

## 2019-02-11 RX ORDER — ACETAMINOPHEN 325 MG/1
TABLET ORAL
COMMUNITY
End: 2020-09-29

## 2019-02-11 RX ORDER — GABAPENTIN 300 MG/1
300 CAPSULE ORAL
Refills: 1 | COMMUNITY
Start: 2018-12-08 | End: 2019-04-25

## 2019-02-11 RX ORDER — TRAZODONE HYDROCHLORIDE 100 MG/1
TABLET ORAL
COMMUNITY
End: 2019-04-25

## 2019-02-11 RX ORDER — RIZATRIPTAN BENZOATE 10 MG/1
TABLET ORAL
COMMUNITY
End: 2019-04-25

## 2019-02-11 RX ORDER — TOPIRAMATE 25 MG/1
TABLET ORAL
COMMUNITY
End: 2019-02-28

## 2019-02-11 RX ORDER — OMEGA-3 FATTY ACIDS/FISH OIL 300-1000MG
CAPSULE ORAL
COMMUNITY
End: 2019-09-19

## 2019-02-19 DIAGNOSIS — M79.7 FIBROMYALGIA: ICD-10-CM

## 2019-02-19 DIAGNOSIS — F41.9 ANXIETY: ICD-10-CM

## 2019-02-19 DIAGNOSIS — G62.9 NEUROPATHY: ICD-10-CM

## 2019-02-21 RX ORDER — DULOXETIN HYDROCHLORIDE 30 MG/1
CAPSULE, DELAYED RELEASE ORAL
Qty: 90 CAP | Refills: 0 | Status: SHIPPED | OUTPATIENT
Start: 2019-02-21 | End: 2019-03-28

## 2019-02-25 DIAGNOSIS — M79.89 BILATERAL SWELLING OF FEET: ICD-10-CM

## 2019-02-26 RX ORDER — HYDROCHLOROTHIAZIDE 12.5 MG/1
12.5-25 CAPSULE, GELATIN COATED ORAL DAILY
Qty: 90 CAP | Refills: 1 | Status: SHIPPED | OUTPATIENT
Start: 2019-02-26 | End: 2019-02-28 | Stop reason: SDUPTHER

## 2019-02-26 NOTE — TELEPHONE ENCOUNTER
Refill X 6 months, sent to pharmacy.Pt. Seen in the last 6 months per protocol.   Lab Results   Component Value Date/Time    SODIUM 137 11/29/2018 10:20 AM    POTASSIUM 4.1 11/29/2018 10:20 AM    CHLORIDE 104 11/29/2018 10:20 AM    CO2 23 11/29/2018 10:20 AM    GLUCOSE 144 (H) 11/29/2018 10:20 AM    BUN 16 11/29/2018 10:20 AM    CREATININE 0.88 11/29/2018 10:20 AM

## 2019-02-28 ENCOUNTER — OFFICE VISIT (OUTPATIENT)
Dept: MEDICAL GROUP | Facility: PHYSICIAN GROUP | Age: 59
End: 2019-02-28
Payer: COMMERCIAL

## 2019-02-28 VITALS
HEIGHT: 65 IN | TEMPERATURE: 97.8 F | SYSTOLIC BLOOD PRESSURE: 126 MMHG | WEIGHT: 282 LBS | OXYGEN SATURATION: 96 % | BODY MASS INDEX: 46.98 KG/M2 | DIASTOLIC BLOOD PRESSURE: 66 MMHG | HEART RATE: 85 BPM | RESPIRATION RATE: 14 BRPM

## 2019-02-28 DIAGNOSIS — G89.29 CHRONIC MIDLINE LOW BACK PAIN WITHOUT SCIATICA: ICD-10-CM

## 2019-02-28 DIAGNOSIS — G89.29 CHRONIC PAIN OF BOTH KNEES: ICD-10-CM

## 2019-02-28 DIAGNOSIS — F50.89 PICA: ICD-10-CM

## 2019-02-28 DIAGNOSIS — G62.9 NEUROPATHY: ICD-10-CM

## 2019-02-28 DIAGNOSIS — F51.01 PRIMARY INSOMNIA: ICD-10-CM

## 2019-02-28 DIAGNOSIS — E11.9 TYPE 2 DIABETES MELLITUS WITHOUT COMPLICATION, WITHOUT LONG-TERM CURRENT USE OF INSULIN (HCC): ICD-10-CM

## 2019-02-28 DIAGNOSIS — E55.9 VITAMIN D DEFICIENCY DISEASE: ICD-10-CM

## 2019-02-28 DIAGNOSIS — F41.9 ANXIETY: ICD-10-CM

## 2019-02-28 DIAGNOSIS — G44.229 CHRONIC TENSION-TYPE HEADACHE, NOT INTRACTABLE: ICD-10-CM

## 2019-02-28 DIAGNOSIS — R73.01 ELEVATED FASTING GLUCOSE: ICD-10-CM

## 2019-02-28 DIAGNOSIS — M54.50 CHRONIC MIDLINE LOW BACK PAIN WITHOUT SCIATICA: ICD-10-CM

## 2019-02-28 DIAGNOSIS — M25.562 CHRONIC PAIN OF BOTH KNEES: ICD-10-CM

## 2019-02-28 DIAGNOSIS — M25.561 CHRONIC PAIN OF BOTH KNEES: ICD-10-CM

## 2019-02-28 DIAGNOSIS — M79.7 FIBROMYALGIA: ICD-10-CM

## 2019-02-28 DIAGNOSIS — M79.89 BILATERAL SWELLING OF FEET: ICD-10-CM

## 2019-02-28 PROCEDURE — 99215 OFFICE O/P EST HI 40 MIN: CPT | Performed by: NURSE PRACTITIONER

## 2019-02-28 RX ORDER — BUSPIRONE HYDROCHLORIDE 15 MG/1
TABLET ORAL
Qty: 270 TAB | Refills: 1 | Status: SHIPPED | OUTPATIENT
Start: 2019-02-28 | End: 2019-08-20 | Stop reason: SDUPTHER

## 2019-02-28 RX ORDER — ALPRAZOLAM 1 MG/1
1 TABLET ORAL NIGHTLY PRN
Qty: 40 TAB | Refills: 0 | Status: SHIPPED | OUTPATIENT
Start: 2019-03-02 | End: 2019-02-28 | Stop reason: SDUPTHER

## 2019-02-28 RX ORDER — ALPRAZOLAM 1 MG/1
1 TABLET ORAL NIGHTLY PRN
Qty: 40 TAB | Refills: 0 | Status: SHIPPED | OUTPATIENT
Start: 2019-04-14 | End: 2019-04-25 | Stop reason: SDUPTHER

## 2019-02-28 RX ORDER — HYDROCHLOROTHIAZIDE 12.5 MG/1
12.5-25 CAPSULE, GELATIN COATED ORAL DAILY
Qty: 90 CAP | Refills: 1 | Status: SHIPPED | OUTPATIENT
Start: 2019-02-28 | End: 2019-07-08

## 2019-02-28 RX ORDER — TOPIRAMATE 50 MG/1
50 TABLET, FILM COATED ORAL 2 TIMES DAILY
Qty: 60 TAB | Refills: 1 | Status: SHIPPED | OUTPATIENT
Start: 2019-02-28 | End: 2019-03-29 | Stop reason: SDUPTHER

## 2019-02-28 NOTE — ASSESSMENT & PLAN NOTE
Patient continues to have good days and bad days with widespread pain due to her fibromyalgia.  She continues on gabapentin, amitriptyline, Celebrex, duloxetine.  She is not a candidate for narcotics given that she is on alprazolam for her anxiety insomnia.  She does have a referral for pain management.

## 2019-02-28 NOTE — ASSESSMENT & PLAN NOTE
Patient continues to struggle with near daily headache.  I do suspect that there is a component of rebound headache as she takes Excedrin Migraine or ibuprofen daily.  She is using Maxalt when she has severe headaches with improvement in her headache but not resolution of that A.  She initially had significant improvement in the frequency and severity of her headaches with amitriptyline.  In the past she was using topiramate 25 mg prescribed by her previous PCP but this failed to improve frequency and severity of her headaches.  She has no new or unusual neurologic symptoms.

## 2019-02-28 NOTE — ASSESSMENT & PLAN NOTE
Patient has baseline generalized anxiety disorder as well as panic attacks.  Her anxiety is far better controlled with buspirone 15 mg 3 times daily.  She is still having rare episodes of panic which she uses half tablet of alprazolam about 10 times a month.  She does not drink ETOH. She is requesting a refill today.

## 2019-02-28 NOTE — ASSESSMENT & PLAN NOTE
Patient is a 58-year-old female with insomnia, now sleeping very well with amitriptyline 100 mg nightly along with alprazolam 0.5 mg once nightly.

## 2019-02-28 NOTE — LETTER
February 28, 2019        Mikayla Knight    Current Outpatient Prescriptions   Medication Sig Dispense Refill   • AMOXICILLIN PO Take  by mouth.     • busPIRone (BUSPAR) 15 MG tablet TAKE 1 TABLET BY MOUTH THREE TIMES A  Tab 1   • topiramate (TOPAMAX) 50 MG tablet Take 1 Tab by mouth 2 times a day. 60 Tab 1   • [START ON 4/14/2019] ALPRAZolam (XANAX) 1 MG Tab Take 1 Tab by mouth at bedtime as needed for Sleep for up to 45 days. May take an additional 1 mg up to 10 days in the month for anxiety 40 Tab 0   • hydrochlorothiazide (MICROZIDE) 12.5 MG capsule Take 1-2 Caps by mouth every day. 90 Cap 1   • DULoxetine (CYMBALTA) 30 MG Cap DR Particles TAKE 1 CAPSULE BY MOUTH EVERY DAY 90 Cap 0   • acetaminophen (TYLENOL) 325 MG Tab Tylenol 325 mg tablet   Take 2 tablets every 6 hours by oral route.     • Ibuprofen 200 MG Cap ibuprofen 200 mg capsule   Take 1 capsule every 6 hours by oral route.     • cyclobenzaprine (FLEXERIL) 10 MG Tab TAKE 1 TABLET BY MOUTH THREE TIMES A DAY AS NEEDED 90 Tab 0   • Diclofenac Sodium 1 % Gel APPLY 2 GM UP TO 4 TIMES PER DAY IF NEEDED FOR BACK PAIN 100 g 0   • rizatriptan (MAXALT) 10 MG tablet TAKE 1 TAB BY MOUTH ONCE AS NEEDED FOR MIGRAINE FOR UP TO 1 DOSE. MAY REPEAT IN 2 HOURS IF NEEDED 10 Tab 2   • amitriptyline (ELAVIL) 50 MG Tab TAKE 2 TABS BY MOUTH AT BEDTIME AS NEEDED. 180 Tab 0   • omeprazole (PRILOSEC) 20 MG delayed-release capsule TAKE 1 CAP BY MOUTH EVERY DAY. 30 MINUTE BEFORE FOOD OR DRINK, ON AN EMPTY STOMACH 90 Cap 0   • gabapentin (NEURONTIN) 600 MG tablet Take 1 Tab by mouth 3 times a day. 270 Tab 0   • celecoxib (CELEBREX) 100 MG Cap Take 1 Cap by mouth 2 times a day. 60 Cap 2   • ondansetron (ZOFRAN ODT) 8 MG TABLET DISPERSIBLE Take 1 Tab by mouth every 8 hours as needed for Nausea. 15 Tab 0   • aspirin (ASA) 325 MG TABS Take 325 mg by mouth as needed. weekly      • rizatriptan (MAXALT) 10 MG tablet rizatriptan 10 mg tablet   Take by oral route.     • traZODone  (DESYREL) 100 MG Tab trazodone 100 mg tablet   Take 1 tablet twice a day by oral route.     • Milnacipran HCl (SAVELLA) 50 MG Tab Savella 50 mg tablet   Take 2 tablets twice a day by oral route.     • gabapentin (NEURONTIN) 300 MG Cap Take 300 mg by mouth.  1   • diclofenac CR (VOLTAREN-XR) 100 MG TABLET SR 24 HR tablet diclofenac  mg tablet,extended release 24 hr   Take 1 tablet every day by oral route.     • Diclofenac Sodium 1 % Gel APPLY 2 GM UP TO 4 TIMES PER DAY IF NEEDED FOR BACK PAIN  0   • albuterol 108 (90 Base) MCG/ACT Aero Soln inhalation aerosol Inhale 2 Puffs by mouth every 6 hours as needed for Shortness of Breath. 6.7 Inhaler 2   • triamcinolone acetonide (KENALOG) 0.1 % Cream Apply 1 Application to affected area(s) 2 times a day. 15 g 0   • fluticasone (FLONASE) 50 MCG/ACT nasal spray Spray 2 Sprays in nose every day. 16 g 11     No current facility-administered medications for this visit.                              Sully Davis

## 2019-02-28 NOTE — PROGRESS NOTES
[place] [facility]  Primary Care Office Visit - Problem-Oriented        History:     Mikayla Knight is a 58 y.o. female who is here today to discuss Diabetes (FV) and Medication Refill (xanax/HTCZ/Pt states taking buspar 5mg 3 tabs TID would like scrip to reflect this)      Insomnia  Patient is a 58-year-old female with insomnia, now sleeping very well with amitriptyline 100 mg nightly along with alprazolam 0.5 mg once nightly.    Fibromyalgia  Patient continues to have good days and bad days with widespread pain due to her fibromyalgia.  She continues on gabapentin, amitriptyline, Celebrex, duloxetine.  She is not a candidate for narcotics given that she is on alprazolam for her anxiety insomnia.  She does have a referral for pain management.     Elevated fasting glucose  Patient does have a history of elevated fasting glucose, at one point her A1c was elevated at 6.4%.  She has largely been managing this with diet and exercise.  She is due for repeat labs.  No polyuria, polydipsia, polyphagia.    Chronic headache  Patient continues to struggle with near daily headache.  I do suspect that there is a component of rebound headache as she takes Excedrin Migraine or ibuprofen daily.  She is using Maxalt when she has severe headaches with improvement in her headache but not resolution of that A.  She initially had significant improvement in the frequency and severity of her headaches with amitriptyline.  In the past she was using topiramate 25 mg prescribed by her previous PCP but this failed to improve frequency and severity of her headaches.  She has no new or unusual neurologic symptoms.    Anxiety  Patient has baseline generalized anxiety disorder as well as panic attacks.  Her anxiety is far better controlled with buspirone 15 mg 3 times daily.  She is still having rare episodes of panic which she uses half tablet of alprazolam about 10 times a month.  She does not drink ETOH. She is requesting a refill  "today.         Past Medical History:   Diagnosis Date   • Anesthesia     low bp coming out of anesthesia \"one time\"   • Anxiety 11/18/2011   • Arrhythmia     ? pt unsure   • Arthritis     fibromyalgia   • Breast mass, left    • Breath shortness     occasionally   • Bursitis of knee     left   • Cancer ([facility]) 2005    squamous cell on nose   • Chronic pain 11/18/2011   • Depression 11/18/2011   • Fibromyalgia 11/18/2011   • Gastro-esophageal reflux 3/20/2012   • Heart murmur    • Iron deficiency anemia 3/20/2012   • Neuropathy (HCC) 11/18/2011   • Other specified disorder of intestines    • Pain 6/14/12    3/10 stomach   • Pneumonia 01/2012   • Pulmonary hypertension ([facility]) 3/20/2012   • Syncope and collapse 3/20/2012     Past Surgical History:   Procedure Laterality Date   • CATH PLACEMENT  1/25/2013    Performed by Lizeth Ferreira M.D. at SURGERY SAME DAY [facility] ORS   • BREAST BIOPSY  1/9/2013    Performed by Lizeth Ferreira M.D. at SURGERY SAME DAY [facility] ORS   • AXILLARY NODE DISSECTION  1/9/2013    Performed by Lizeth Ferreira M.D. at SURGERY SAME DAY [facility] ORS   • NODE BIOPSY  1/9/2013    Performed by Lizeth Ferreira M.D. at SURGERY SAME DAY [facility] ORS   • BREAST BIOPSY  12/11/2012    Performed by Lizeth Ferreira M.D. at SURGERY SAME DAY [facility] ORS   • COLONOSCOPY  6/21/2012    Performed by PARAM VALDEZ at SURGERY [facility] ORS   • GASTROSCOPY  6/21/2012    Performed by PARAM VALDEZ at SURGERY [facility] ORS   • ABDOMINAL EXPLORATION  10/7/2010    Performed by MARIA G KHAN JR at SURGERY [facility] ORS   • IRRIGATION & DEBRIDEMENT GENERAL  4/12/2010    Performed by OZZIE WEINSTEIN at SURGERY [facility] ORS   • HERNIA REPAIR  3/15/2010    Performed by MARIA G KHAN JR at SURGERY [facility] ORS   • FLAP GRAFT  3/15/2010    Performed by MARIA G KHAN JR at SURGERY [facility] ORS   • PANNICULECTOMY  3/15/2010    Performed by MARIA G KHAN JR at SURGERY [facility] " ORS   • ABDOMINAL EXPLORATION  3/11/2010    Performed by MARIA G KHAN JR at SURGERY SAME DAY [facility] ORS   • OTHER  08/09    pulled mesh out of hernia   • OTHER  07/09    abscess removed abd.   • OTHER  06/09    bowel obstruction   • OTHER  04/2009    hernia repair,mesh removal after in aug.09   • OTHER  2005    bariatric surgery gastric bypass   • GASTRIC BYPASS LAPAROSCOPIC  2005   • GYN SURGERY  1984    tubal     Social History     Social History   • Marital status:      Spouse name: N/A   • Number of children: 2   • Years of education: N/A     Occupational History   •  Other     Social History Main Topics   • Smoking status: Current Some Day Smoker     Packs/day: 0.10     Years: 8.00     Types: Cigarettes   • Smokeless tobacco: Never Used      Comment: 1/2 pk a day on and off 10 yrs, 1 cigarette daily   • Alcohol use 0.0 oz/week      Comment: 5 a month, social   • Drug use: No   • Sexual activity: Not Currently     Partners: Male     Other Topics Concern   • Not on file     Social History Narrative    Patient lives with  in [place] and they have 2 grown children. She does not work. She is unable to drive due to health conditions and her son takes her to all appointments.      History   Smoking Status   • Current Some Day Smoker   • Packs/day: 0.10   • Years: 8.00   • Types: Cigarettes   Smokeless Tobacco   • Never Used     Comment: 1/2 pk a day on and off 10 yrs, 1 cigarette daily     Family History   Problem Relation Age of Onset   • Heart Attack Father    • Cancer Maternal Grandfather         leukemia   • Cancer Other         leukemia- cousin   • Diabetes Maternal Uncle    • Diabetes Maternal Uncle    • Hypertension Mother      Allergies   Allergen Reactions   • Shellfish Allergy Anaphylaxis     SHRIMP ONLY, not anything else, not iodine.       Problem List:     Patient Active Problem List    Diagnosis Date Noted   • Health care maintenance 07/18/2018   • Nausea 05/24/2018   • Type 2  diabetes mellitus without complication, without long-term current use of insulin ([facility]) 12/18/2017   • Elevated fasting glucose 08/16/2017   • Morbid obesity with BMI of 40.0-44.9, adult ([facility]) 05/16/2017   • Hypertriglyceridemia 12/20/2016   • Bilateral hand pain 05/24/2016   • Bilateral knee pain 01/27/2016   • Vitamin D deficiency disease 06/10/2015   • Tobacco abuse 02/20/2014   • Heart palpitations 02/12/2014   • Chronic low back pain without sciatica 12/05/2013   • Chronic headache 05/09/2013   • Breast cancer, left breast ([facility]) 12/31/2012   • Pulmonary hypertension ([facility]) 03/20/2012   • Gastro-esophageal reflux 03/20/2012   • Fibromyalgia 11/18/2011   • Neuropathy ([facility]) 11/18/2011   • Depression 11/18/2011   • Anxiety 11/18/2011   • Insomnia 11/18/2011         Medications:     Current Outpatient Prescriptions:   •  AMOXICILLIN PO, Take  by mouth., Disp: , Rfl:   •  busPIRone (BUSPAR) 15 MG tablet, TAKE 1 TABLET BY MOUTH THREE TIMES A DAY, Disp: 270 Tab, Rfl: 1  •  topiramate (TOPAMAX) 50 MG tablet, Take 1 Tab by mouth 2 times a day., Disp: 60 Tab, Rfl: 1  •  [START ON 4/14/2019] ALPRAZolam (XANAX) 1 MG Tab, Take 1 Tab by mouth at bedtime as needed for Sleep for up to 45 days. May take an additional 1 mg up to 10 days in the month for anxiety, Disp: 40 Tab, Rfl: 0  •  hydrochlorothiazide (MICROZIDE) 12.5 MG capsule, Take 1-2 Caps by mouth every day., Disp: 90 Cap, Rfl: 1  •  DULoxetine (CYMBALTA) 30 MG Cap DR Particles, TAKE 1 CAPSULE BY MOUTH EVERY DAY, Disp: 90 Cap, Rfl: 0  •  acetaminophen (TYLENOL) 325 MG Tab, Tylenol 325 mg tablet  Take 2 tablets every 6 hours by oral route., Disp: , Rfl:   •  Ibuprofen 200 MG Cap, ibuprofen 200 mg capsule  Take 1 capsule every 6 hours by oral route., Disp: , Rfl:   •  cyclobenzaprine (FLEXERIL) 10 MG Tab, TAKE 1 TABLET BY MOUTH THREE TIMES A DAY AS NEEDED, Disp: 90 Tab, Rfl: 0  •  Diclofenac Sodium 1 % Gel, APPLY 2 GM UP TO 4 TIMES PER DAY IF NEEDED FOR BACK PAIN, Disp: 100  g, Rfl: 0  •  rizatriptan (MAXALT) 10 MG tablet, TAKE 1 TAB BY MOUTH ONCE AS NEEDED FOR MIGRAINE FOR UP TO 1 DOSE. MAY REPEAT IN 2 HOURS IF NEEDED, Disp: 10 Tab, Rfl: 2  •  amitriptyline (ELAVIL) 50 MG Tab, TAKE 2 TABS BY MOUTH AT BEDTIME AS NEEDED., Disp: 180 Tab, Rfl: 0  •  omeprazole (PRILOSEC) 20 MG delayed-release capsule, TAKE 1 CAP BY MOUTH EVERY DAY. 30 MINUTE BEFORE FOOD OR DRINK, ON AN EMPTY STOMACH, Disp: 90 Cap, Rfl: 0  •  gabapentin (NEURONTIN) 600 MG tablet, Take 1 Tab by mouth 3 times a day., Disp: 270 Tab, Rfl: 0  •  celecoxib (CELEBREX) 100 MG Cap, Take 1 Cap by mouth 2 times a day., Disp: 60 Cap, Rfl: 2  •  ondansetron (ZOFRAN ODT) 8 MG TABLET DISPERSIBLE, Take 1 Tab by mouth every 8 hours as needed for Nausea., Disp: 15 Tab, Rfl: 0  •  aspirin (ASA) 325 MG TABS, Take 325 mg by mouth as needed. weekly , Disp: , Rfl:   •  rizatriptan (MAXALT) 10 MG tablet, rizatriptan 10 mg tablet  Take by oral route., Disp: , Rfl:   •  traZODone (DESYREL) 100 MG Tab, trazodone 100 mg tablet  Take 1 tablet twice a day by oral route., Disp: , Rfl:   •  Milnacipran HCl (SAVELLA) 50 MG Tab, Savella 50 mg tablet  Take 2 tablets twice a day by oral route., Disp: , Rfl:   •  gabapentin (NEURONTIN) 300 MG Cap, Take 300 mg by mouth., Disp: , Rfl: 1  •  diclofenac CR (VOLTAREN-XR) 100 MG TABLET SR 24 HR tablet, diclofenac  mg tablet,extended release 24 hr  Take 1 tablet every day by oral route., Disp: , Rfl:   •  Diclofenac Sodium 1 % Gel, APPLY 2 GM UP TO 4 TIMES PER DAY IF NEEDED FOR BACK PAIN, Disp: , Rfl: 0  •  albuterol 108 (90 Base) MCG/ACT Aero Soln inhalation aerosol, Inhale 2 Puffs by mouth every 6 hours as needed for Shortness of Breath., Disp: 6.7 Inhaler, Rfl: 2  •  triamcinolone acetonide (KENALOG) 0.1 % Cream, Apply 1 Application to affected area(s) 2 times a day., Disp: 15 g, Rfl: 0  •  fluticasone (FLONASE) 50 MCG/ACT nasal spray, Spray 2 Sprays in nose every day., Disp: 16 g, Rfl: 11      Review of  "Systems:     Pertinent positives as per HPI, all other systems reviewed and WNL     Physical Assessment:     VS: /66 (BP Location: Left arm, Patient Position: Sitting, BP Cuff Size: Adult long)   Pulse 85   Temp 36.6 °C (97.8 °F)   Resp 14   Ht 1.651 m (5' 5\")   Wt (!) 127.9 kg (282 lb)   LMP 03/18/2010   SpO2 96%   BMI 46.93 kg/m²     General: Well-developed, well-nourished, female, obese     Head: PERRL, EOMI. Normocephalic. No facial asymmetry noted.  Cardiovasc: RRR, no MRG. No thrills or bruits. Pulses 2+ and symmetric at all distal extremities.  Pulmonary: Lungs clear bilaterally.  Normal respiratory effort. No wheeze or crackles.   Neuro: Alert and oriented, CNs II-XII intact, no focal deficits.  Skin:No rashes noted. Skin warm, dry, intact    Psych: Dressed appropriately for the weather, pleasant and conversant.  Affect, mood & judgment appropriate.    Assessment/Plan:   Mikayla was seen today for diabetes and medication refill.    Diagnoses and all orders for this visit:    Fibromyalgia, uncontrolled   -Continue gabapentin, Celebrex, Cymbalta, amitriptyline, discussed weight loss, exercise, refer to pain management.   -     REFERRAL TO PAIN MANAGEMENT    Neuropathy (HCC), stable on present treatment, continue to monitor  -     REFERRAL TO PAIN MANAGEMENT    Chronic midline low back pain without sciatica, uncontrolled, see #1  -     REFERRAL TO PAIN MANAGEMENT    Chronic pain of both knees, uncontrolled, see #1  -     REFERRAL TO PAIN MANAGEMENT    Anxiety, well controlled with present treatments  -Continue Cymbalta, buspirone.  Will re-issue alprazolam prescription today.  40 tablets will last her 45 days.  She has been appropriate with refills.   is in accordance with her prescriptions.  UDS and CSA are updated today.    This patient is continuing to use a controlled substance (CS) on a long term basis.  The patient is thoroughly aware of the goals of treatment with the CS  The patient is " aware that yearly and random urine drug screens are required.  The patient has been instructed to take the CS only as prescribed.  The patient is prohibited from sharing the CS with any other person.  The patient is instructed to inform the provider if any other CS is taken, of any alcohol or cannabis or other recreational drug use, any treatment for side effects of the CS or complications, if they have CS active rx in other states  The patient has evidence for a reason for the CS  The treatment plan has been discussed with the patient  The  report has been reviewed    -     busPIRone (BUSPAR) 15 MG tablet; TAKE 1 TABLET BY MOUTH THREE TIMES A DAY  -     Discontinue: ALPRAZolam (XANAX) 1 MG Tab; Take 1 Tab by mouth at bedtime as needed for Sleep for up to 45 days. May take an additional 1 mg up to 10 days in the month for anxiety  -     ALPRAZolam (XANAX) 1 MG Tab; Take 1 Tab by mouth at bedtime as needed for Sleep for up to 45 days. May take an additional 1 mg up to 10 days in the month for anxiety  -     Controlled Substance Treatment Agreement    Chronic tension-type headache, not intractable, uncontrolled   -Unfortunately, I suspect that her headaches are largely due to rebound from chronic NSAID use.  We will have her try topiramate 50 mg at night times 2 weeks then increase to twice daily dosing if no significant improvement in the frequency and severity of her headaches.  Aware of the side effects of the medication.  May continue to use Maxalt.  Decrease Excedrin Migraine and ibuprofen use to no more than 2-3 times a week.  May also discuss her headaches with pain management as per #1.  -     topiramate (TOPAMAX) 50 MG tablet; Take 1 Tab by mouth 2 times a day.    Primary insomnia, well controlled with present treatment, see #5.  -     Discontinue: ALPRAZolam (XANAX) 1 MG Tab; Take 1 Tab by mouth at bedtime as needed for Sleep for up to 45 days. May take an additional 1 mg up to 10 days in the month for  anxiety  -     ALPRAZolam (XANAX) 1 MG Tab; Take 1 Tab by mouth at bedtime as needed for Sleep for up to 45 days. May take an additional 1 mg up to 10 days in the month for anxiety    Bilateral swelling of feet well controlled with as needed hydrochlorothiazide, electrolytes and renal function remained stable.  -     hydrochlorothiazide (MICROZIDE) 12.5 MG capsule; Take 1-2 Caps by mouth every day.    Vitamin D deficiency disease, unclear control, check labs  -     VITAMIN D,25 HYDROXY; Future    Pica, new  -Patient reports a propensity to chewing ice on her way out of the appointment today, will check iron levels, evaluate for anemia.  -     CBC WITH DIFFERENTIAL; Future  -     IRON/TOTAL IRON BIND; Future  -     FERRITIN; Future    Type 2 diabetes mellitus without complication, without long-term current use of insulin (HCC), unclear control, check labs, discussed the importance of dietary modification, weight loss, exercise.  -     Comp Metabolic Panel; Future  -     Lipid Profile; Future      Follow up in 1 month to discuss labs and med changes     Spent 40 minutes face-to-face time spent with patient, >50% of the total time discussing patient's issues and symptoms as listed above in assessment and plan, as well as managing coordination of care for future evaluation and treatment.      Patient is agreeable to the above plan and voiced understanding. All questions answered.     Please note that this dictation was created using voice recognition software. I have made every reasonable attempt to correct obvious errors, but I expect that there are errors of grammar and possibly content that I did not discover before finalizing the note.      GRACIELA Pedraza  2/28/2019, 11:59 AM

## 2019-02-28 NOTE — ASSESSMENT & PLAN NOTE
Patient does have a history of elevated fasting glucose, at one point her A1c was elevated at 6.4%.  She has largely been managing this with diet and exercise.  She is due for repeat labs.  No polyuria, polydipsia, polyphagia.

## 2019-03-04 DIAGNOSIS — M79.7 FIBROMYALGIA: ICD-10-CM

## 2019-03-05 RX ORDER — CYCLOBENZAPRINE HCL 10 MG
TABLET ORAL
Qty: 90 TAB | Refills: 0 | Status: SHIPPED | OUTPATIENT
Start: 2019-03-05 | End: 2019-03-28

## 2019-03-06 DIAGNOSIS — G62.9 NEUROPATHY: ICD-10-CM

## 2019-03-06 DIAGNOSIS — M79.7 FIBROMYALGIA: ICD-10-CM

## 2019-03-06 RX ORDER — GABAPENTIN 300 MG/1
CAPSULE ORAL
Qty: 270 CAP | Refills: 0 | Status: SHIPPED | OUTPATIENT
Start: 2019-03-06 | End: 2019-06-02 | Stop reason: SDUPTHER

## 2019-03-06 NOTE — TELEPHONE ENCOUNTER
Was the patient seen in the last year in this department? Yes    Does patient have an active prescription for medications requested? No     Received Request Via: Pharmacy      Pt met protocol?: Yes   Pt last ov 2/19 ,pt has two strength of gabapentin on file she increased her dose to 600mg in 11/29/18 but was going to lower it back down to 300mg tid

## 2019-03-15 ENCOUNTER — PATIENT MESSAGE (OUTPATIENT)
Dept: MEDICAL GROUP | Facility: PHYSICIAN GROUP | Age: 59
End: 2019-03-15

## 2019-03-21 ENCOUNTER — HOSPITAL ENCOUNTER (OUTPATIENT)
Dept: LAB | Facility: MEDICAL CENTER | Age: 59
End: 2019-03-21
Attending: NURSE PRACTITIONER
Payer: COMMERCIAL

## 2019-03-21 DIAGNOSIS — E11.9 TYPE 2 DIABETES MELLITUS WITHOUT COMPLICATION, WITHOUT LONG-TERM CURRENT USE OF INSULIN (HCC): ICD-10-CM

## 2019-03-21 DIAGNOSIS — E55.9 VITAMIN D DEFICIENCY DISEASE: ICD-10-CM

## 2019-03-21 DIAGNOSIS — F50.89 PICA: ICD-10-CM

## 2019-03-21 LAB
25(OH)D3 SERPL-MCNC: 25 NG/ML (ref 30–100)
ALBUMIN SERPL BCP-MCNC: 4.2 G/DL (ref 3.2–4.9)
ALBUMIN/GLOB SERPL: 1.9 G/DL
ALP SERPL-CCNC: 107 U/L (ref 30–99)
ALT SERPL-CCNC: 51 U/L (ref 2–50)
ANION GAP SERPL CALC-SCNC: 13 MMOL/L (ref 0–11.9)
AST SERPL-CCNC: 46 U/L (ref 12–45)
BASOPHILS # BLD AUTO: 0.9 % (ref 0–1.8)
BASOPHILS # BLD: 0.07 K/UL (ref 0–0.12)
BILIRUB SERPL-MCNC: 0.4 MG/DL (ref 0.1–1.5)
BUN SERPL-MCNC: 16 MG/DL (ref 8–22)
CALCIUM SERPL-MCNC: 9 MG/DL (ref 8.5–10.5)
CHLORIDE SERPL-SCNC: 102 MMOL/L (ref 96–112)
CHOLEST SERPL-MCNC: 171 MG/DL (ref 100–199)
CO2 SERPL-SCNC: 25 MMOL/L (ref 20–33)
CREAT SERPL-MCNC: 0.92 MG/DL (ref 0.5–1.4)
EOSINOPHIL # BLD AUTO: 0.11 K/UL (ref 0–0.51)
EOSINOPHIL NFR BLD: 1.5 % (ref 0–6.9)
ERYTHROCYTE [DISTWIDTH] IN BLOOD BY AUTOMATED COUNT: 46.9 FL (ref 35.9–50)
FASTING STATUS PATIENT QL REPORTED: NORMAL
FERRITIN SERPL-MCNC: 15.7 NG/ML (ref 10–291)
GLOBULIN SER CALC-MCNC: 2.2 G/DL (ref 1.9–3.5)
GLUCOSE SERPL-MCNC: 225 MG/DL (ref 65–99)
HCT VFR BLD AUTO: 42 % (ref 37–47)
HDLC SERPL-MCNC: 46 MG/DL
HGB BLD-MCNC: 12.7 G/DL (ref 12–16)
IMM GRANULOCYTES # BLD AUTO: 0.04 K/UL (ref 0–0.11)
IMM GRANULOCYTES NFR BLD AUTO: 0.5 % (ref 0–0.9)
IRON SATN MFR SERPL: 11 % (ref 15–55)
IRON SERPL-MCNC: 57 UG/DL (ref 40–170)
LDLC SERPL CALC-MCNC: 71 MG/DL
LYMPHOCYTES # BLD AUTO: 1.71 K/UL (ref 1–4.8)
LYMPHOCYTES NFR BLD: 22.9 % (ref 22–41)
MCH RBC QN AUTO: 27 PG (ref 27–33)
MCHC RBC AUTO-ENTMCNC: 30.2 G/DL (ref 33.6–35)
MCV RBC AUTO: 89.2 FL (ref 81.4–97.8)
MONOCYTES # BLD AUTO: 0.48 K/UL (ref 0–0.85)
MONOCYTES NFR BLD AUTO: 6.4 % (ref 0–13.4)
NEUTROPHILS # BLD AUTO: 5.07 K/UL (ref 2–7.15)
NEUTROPHILS NFR BLD: 67.8 % (ref 44–72)
NRBC # BLD AUTO: 0 K/UL
NRBC BLD-RTO: 0 /100 WBC
PLATELET # BLD AUTO: 333 K/UL (ref 164–446)
PMV BLD AUTO: 9.9 FL (ref 9–12.9)
POTASSIUM SERPL-SCNC: 3.7 MMOL/L (ref 3.6–5.5)
PROT SERPL-MCNC: 6.4 G/DL (ref 6–8.2)
RBC # BLD AUTO: 4.71 M/UL (ref 4.2–5.4)
SODIUM SERPL-SCNC: 140 MMOL/L (ref 135–145)
TIBC SERPL-MCNC: 532 UG/DL (ref 250–450)
TRIGL SERPL-MCNC: 269 MG/DL (ref 0–149)
WBC # BLD AUTO: 7.5 K/UL (ref 4.8–10.8)

## 2019-03-21 PROCEDURE — 83540 ASSAY OF IRON: CPT

## 2019-03-21 PROCEDURE — 83550 IRON BINDING TEST: CPT

## 2019-03-21 PROCEDURE — 80053 COMPREHEN METABOLIC PANEL: CPT

## 2019-03-21 PROCEDURE — 85025 COMPLETE CBC W/AUTO DIFF WBC: CPT

## 2019-03-21 PROCEDURE — 80061 LIPID PANEL: CPT

## 2019-03-21 PROCEDURE — 36415 COLL VENOUS BLD VENIPUNCTURE: CPT

## 2019-03-21 PROCEDURE — 82306 VITAMIN D 25 HYDROXY: CPT

## 2019-03-21 PROCEDURE — 82728 ASSAY OF FERRITIN: CPT

## 2019-03-23 ENCOUNTER — PATIENT MESSAGE (OUTPATIENT)
Dept: MEDICAL GROUP | Facility: PHYSICIAN GROUP | Age: 59
End: 2019-03-23

## 2019-03-24 DIAGNOSIS — T39.395A NSAID INDUCED GASTRITIS: ICD-10-CM

## 2019-03-24 DIAGNOSIS — K29.60 NSAID INDUCED GASTRITIS: ICD-10-CM

## 2019-03-25 RX ORDER — OMEPRAZOLE 20 MG/1
20 CAPSULE, DELAYED RELEASE ORAL DAILY
Qty: 90 CAP | Refills: 0 | Status: SHIPPED | OUTPATIENT
Start: 2019-03-25 | End: 2019-04-25

## 2019-03-28 ENCOUNTER — OFFICE VISIT (OUTPATIENT)
Dept: MEDICAL GROUP | Facility: PHYSICIAN GROUP | Age: 59
End: 2019-03-28
Payer: COMMERCIAL

## 2019-03-28 VITALS
BODY MASS INDEX: 45.15 KG/M2 | RESPIRATION RATE: 14 BRPM | OXYGEN SATURATION: 94 % | DIASTOLIC BLOOD PRESSURE: 62 MMHG | TEMPERATURE: 97.6 F | WEIGHT: 271 LBS | HEIGHT: 65 IN | SYSTOLIC BLOOD PRESSURE: 130 MMHG | HEART RATE: 102 BPM

## 2019-03-28 DIAGNOSIS — F51.01 PRIMARY INSOMNIA: ICD-10-CM

## 2019-03-28 DIAGNOSIS — R73.01 ELEVATED FASTING GLUCOSE: ICD-10-CM

## 2019-03-28 DIAGNOSIS — E78.1 HYPERTRIGLYCERIDEMIA: ICD-10-CM

## 2019-03-28 DIAGNOSIS — G44.229 CHRONIC TENSION-TYPE HEADACHE, NOT INTRACTABLE: ICD-10-CM

## 2019-03-28 DIAGNOSIS — M79.7 FIBROMYALGIA: ICD-10-CM

## 2019-03-28 DIAGNOSIS — G89.29 CHRONIC MIDLINE LOW BACK PAIN WITHOUT SCIATICA: ICD-10-CM

## 2019-03-28 DIAGNOSIS — G62.9 NEUROPATHY: ICD-10-CM

## 2019-03-28 DIAGNOSIS — M54.50 CHRONIC MIDLINE LOW BACK PAIN WITHOUT SCIATICA: ICD-10-CM

## 2019-03-28 PROCEDURE — 99214 OFFICE O/P EST MOD 30 MIN: CPT | Performed by: NURSE PRACTITIONER

## 2019-03-28 RX ORDER — AMITRIPTYLINE HYDROCHLORIDE 50 MG/1
100 TABLET, FILM COATED ORAL NIGHTLY PRN
Qty: 180 TAB | Refills: 0 | Status: SHIPPED | OUTPATIENT
Start: 2019-03-28 | End: 2019-06-02 | Stop reason: SDUPTHER

## 2019-03-28 RX ORDER — METHOCARBAMOL 750 MG/1
750 TABLET, FILM COATED ORAL
Qty: 30 TAB | Refills: 0
Start: 2019-03-28 | End: 2019-07-05 | Stop reason: SDUPTHER

## 2019-03-28 NOTE — ASSESSMENT & PLAN NOTE
Patient has been on topiramate for one month and has not had a single migraine, has had fleeting mild HA which are easily resolved with NSAID, occassionally they don't require treatment at all and resolve on their own with time.

## 2019-03-28 NOTE — ASSESSMENT & PLAN NOTE
Patient's triglycerides are elevated, does have elevated fasting glucose and history of type 2 diabetes managed with diet.  We did discuss the implications of this as well as dietary modifications. Will improve with better glucose control, dietary modifications. Check labs in 6 months.

## 2019-03-28 NOTE — PROGRESS NOTES
"[facility]  Primary Care Office Visit - Problem-Oriented        History:     Mikayla Knight is a 58 y.o. female who is here today to discuss Anxiety (FV); Oral Swelling; Medication Management (DULoxetine); and Medication Refill (amitriptyline )      Chronic headache  Patient has been on topiramate for one month and has not had a single migraine, has had fleeting mild HA which are easily resolved with NSAID, occassionally they don't require treatment at all and resolve on their own with time.       Chronic low back pain without sciatica  Patient now following with PM who has recommended d/c of Cymbalta and flexeril, will start robaxin and follow up for facet joint injections. She is happy with this plan.     Elevated fasting glucose  Patient has history of elevated fasting glucose and T2DM which she was able to manage with diet and exercise, unfortunately, her A1c is quite high and we will need to repeat A1c again at follow up. Does have polydipsia but is on several medications that can cause dry mouth. No polyuria/phagia.     Hypertriglyceridemia  Patient's triglycerides are elevated, does have elevated fasting glucose and history of type 2 diabetes managed with diet.  We did discuss the implications of this as well as dietary modifications. Will improve with better glucose control, dietary modifications. Check labs in 6 months.         Past Medical History:   Diagnosis Date   • Anesthesia     low bp coming out of anesthesia \"one time\"   • Anxiety 11/18/2011   • Arrhythmia     ? pt unsure   • Arthritis     fibromyalgia   • Breast mass, left    • Breath shortness     occasionally   • Bursitis of knee     left   • Cancer (HCC) 2005    squamous cell on nose   • Chronic pain 11/18/2011   • Depression 11/18/2011   • Fibromyalgia 11/18/2011   • Gastro-esophageal reflux 3/20/2012   • Heart murmur    • Iron deficiency anemia 3/20/2012   • Neuropathy (HCC) 11/18/2011   • Other specified disorder of intestines  "   • Pain 6/14/12    3/10 stomach   • Pneumonia 01/2012   • Pulmonary hypertension (HCC) 3/20/2012   • Syncope and collapse 3/20/2012     Past Surgical History:   Procedure Laterality Date   • CATH PLACEMENT  1/25/2013    Performed by Lizeth Ferreira M.D. at SURGERY SAME DAY [facility] ORS   • BREAST BIOPSY  1/9/2013    Performed by Lizeth Ferreira M.D. at SURGERY SAME DAY [facility] ORS   • AXILLARY NODE DISSECTION  1/9/2013    Performed by Lizeth Ferreira M.D. at SURGERY SAME DAY [facility] ORS   • NODE BIOPSY  1/9/2013    Performed by Lizeth Ferreira M.D. at SURGERY SAME DAY [facility] ORS   • BREAST BIOPSY  12/11/2012    Performed by Lizeth Ferreira M.D. at SURGERY SAME DAY [facility] ORS   • COLONOSCOPY  6/21/2012    Performed by PARAM VALDEZ at SURGERY [facility] ORS   • GASTROSCOPY  6/21/2012    Performed by PARAM VALDEZ at SURGERY [facility] ORS   • ABDOMINAL EXPLORATION  10/7/2010    Performed by MARIA G KHAN JR at SURGERY [facility] ORS   • IRRIGATION & DEBRIDEMENT GENERAL  4/12/2010    Performed by OZZIE WEINSTEIN at SURGERY [facility] ORS   • HERNIA REPAIR  3/15/2010    Performed by MARIA G KHAN JR at SURGERY [facility] ORS   • FLAP GRAFT  3/15/2010    Performed by MARIA G KHAN JR at SURGERY [facility] ORS   • PANNICULECTOMY  3/15/2010    Performed by MARIA G KHAN JR at SURGERY [facility] ORS   • ABDOMINAL EXPLORATION  3/11/2010    Performed by MARIA G KHAN JR at SURGERY SAME DAY [facility] ORS   • OTHER  08/09    pulled mesh out of hernia   • OTHER  07/09    abscess removed abd.   • OTHER  06/09    bowel obstruction   • OTHER  04/2009    hernia repair,mesh removal after in aug.09   • OTHER  2005    bariatric surgery gastric bypass   • GASTRIC BYPASS LAPAROSCOPIC  2005   • GYN SURGERY  1984    tubal     Social History     Social History   • Marital status:      Spouse name: N/A   • Number of children: 2   • Years of education: N/A     Occupational History   •   Other     Social History Main Topics   • Smoking status: Current Some Day Smoker     Packs/day: 0.10     Years: 8.00     Types: Cigarettes   • Smokeless tobacco: Never Used      Comment: 1/2 pk a day on and off 10 yrs, 1 cigarette daily   • Alcohol use 0.0 oz/week      Comment: 5 a month, social   • Drug use: No   • Sexual activity: Not Currently     Partners: Male     Other Topics Concern   • Not on file     Social History Narrative    Patient lives with  in [place] and they have 2 grown children. She does not work. She is unable to drive due to health conditions and her son takes her to all appointments.      History   Smoking Status   • Current Some Day Smoker   • Packs/day: 0.10   • Years: 8.00   • Types: Cigarettes   Smokeless Tobacco   • Never Used     Comment: 1/2 pk a day on and off 10 yrs, 1 cigarette daily     Family History   Problem Relation Age of Onset   • Heart Attack Father    • Cancer Maternal Grandfather         leukemia   • Cancer Other         leukemia- cousin   • Diabetes Maternal Uncle    • Diabetes Maternal Uncle    • Hypertension Mother      Allergies   Allergen Reactions   • Shellfish Allergy Anaphylaxis     SHRIMP ONLY, not anything else, not iodine.       Problem List:     Patient Active Problem List    Diagnosis Date Noted   • Health care maintenance 07/18/2018   • Nausea 05/24/2018   • Type 2 diabetes mellitus without complication, without long-term current use of insulin ([facility]) 12/18/2017   • Elevated fasting glucose 08/16/2017   • Morbid obesity with BMI of 40.0-44.9, adult ([facility]) 05/16/2017   • Hypertriglyceridemia 12/20/2016   • Bilateral hand pain 05/24/2016   • Bilateral knee pain 01/27/2016   • Vitamin D deficiency disease 06/10/2015   • Tobacco abuse 02/20/2014   • Heart palpitations 02/12/2014   • Chronic low back pain without sciatica 12/05/2013   • Chronic headache 05/09/2013   • Breast cancer, left breast ([facility]) 12/31/2012   • Pulmonary hypertension ([facility]) 03/20/2012   •  Gastro-esophageal reflux 03/20/2012   • Fibromyalgia 11/18/2011   • Neuropathy (HCC) 11/18/2011   • Depression 11/18/2011   • Anxiety 11/18/2011   • Insomnia 11/18/2011         Medications:     Current Outpatient Prescriptions:   •  methocarbamol (ROBAXIN-750) 750 MG Tab, Take 1 Tab by mouth 1 time daily as needed., Disp: 30 Tab, Rfl: 0  •  amitriptyline (ELAVIL) 50 MG Tab, Take 2 Tabs by mouth at bedtime as needed., Disp: 180 Tab, Rfl: 0  •  Diclofenac Sodium 1 % Gel, APPLY 2 GM UP TO 4 TIMES PER DAY IF NEEDED FOR BACK PAIN, Disp: 1 Tube, Rfl: 5  •  omeprazole (PRILOSEC) 20 MG delayed-release capsule, TAKE 1 CAP BY MOUTH EVERY DAY. 30 MINUTE BEFORE FOOD OR DRINK, ON AN EMPTY STOMACH, Disp: 90 Cap, Rfl: 0  •  gabapentin (NEURONTIN) 300 MG Cap, TAKE 1 CAPSULE BY MOUTH THREE TIMES A DAY, Disp: 270 Cap, Rfl: 0  •  busPIRone (BUSPAR) 15 MG tablet, TAKE 1 TABLET BY MOUTH THREE TIMES A DAY, Disp: 270 Tab, Rfl: 1  •  topiramate (TOPAMAX) 50 MG tablet, Take 1 Tab by mouth 2 times a day., Disp: 60 Tab, Rfl: 1  •  [START ON 4/14/2019] ALPRAZolam (XANAX) 1 MG Tab, Take 1 Tab by mouth at bedtime as needed for Sleep for up to 45 days. May take an additional 1 mg up to 10 days in the month for anxiety, Disp: 40 Tab, Rfl: 0  •  hydrochlorothiazide (MICROZIDE) 12.5 MG capsule, Take 1-2 Caps by mouth every day., Disp: 90 Cap, Rfl: 1  •  acetaminophen (TYLENOL) 325 MG Tab, Tylenol 325 mg tablet  Take 2 tablets every 6 hours by oral route., Disp: , Rfl:   •  traZODone (DESYREL) 100 MG Tab, trazodone 100 mg tablet  Take 1 tablet twice a day by oral route., Disp: , Rfl:   •  diclofenac CR (VOLTAREN-XR) 100 MG TABLET SR 24 HR tablet, diclofenac  mg tablet,extended release 24 hr  Take 1 tablet every day by oral route., Disp: , Rfl:   •  rizatriptan (MAXALT) 10 MG tablet, TAKE 1 TAB BY MOUTH ONCE AS NEEDED FOR MIGRAINE FOR UP TO 1 DOSE. MAY REPEAT IN 2 HOURS IF NEEDED, Disp: 10 Tab, Rfl: 2  •  albuterol 108 (90 Base) MCG/ACT Aero  "Soln inhalation aerosol, Inhale 2 Puffs by mouth every 6 hours as needed for Shortness of Breath., Disp: 6.7 Inhaler, Rfl: 2  •  triamcinolone acetonide (KENALOG) 0.1 % Cream, Apply 1 Application to affected area(s) 2 times a day., Disp: 15 g, Rfl: 0  •  celecoxib (CELEBREX) 100 MG Cap, Take 1 Cap by mouth 2 times a day., Disp: 60 Cap, Rfl: 2  •  fluticasone (FLONASE) 50 MCG/ACT nasal spray, Spray 2 Sprays in nose every day., Disp: 16 g, Rfl: 11  •  aspirin (ASA) 325 MG TABS, Take 325 mg by mouth as needed. weekly , Disp: , Rfl:   •  AMOXICILLIN PO, Take  by mouth., Disp: , Rfl:   •  rizatriptan (MAXALT) 10 MG tablet, rizatriptan 10 mg tablet  Take by oral route., Disp: , Rfl:   •  Ibuprofen 200 MG Cap, ibuprofen 200 mg capsule  Take 1 capsule every 6 hours by oral route., Disp: , Rfl:   •  gabapentin (NEURONTIN) 300 MG Cap, Take 300 mg by mouth., Disp: , Rfl: 1  •  Diclofenac Sodium 1 % Gel, APPLY 2 GM UP TO 4 TIMES PER DAY IF NEEDED FOR BACK PAIN, Disp: 100 g, Rfl: 0  •  ondansetron (ZOFRAN ODT) 8 MG TABLET DISPERSIBLE, Take 1 Tab by mouth every 8 hours as needed for Nausea., Disp: 15 Tab, Rfl: 0      Review of Systems:     Pertinent positives as per HPI, all other systems reviewed and WNL     Physical Assessment:     VS: /62 (BP Location: Left arm, Patient Position: Sitting, BP Cuff Size: Adult)   Pulse (!) 102   Temp 36.4 °C (97.6 °F)   Resp 14   Ht 1.651 m (5' 5\")   Wt 122.9 kg (271 lb)   LMP 03/18/2010   SpO2 94%   BMI 45.10 kg/m²     General: Well-developed, well-nourished, female, obese     Head: PERRL, EOMI. Normocephalic. No facial asymmetry noted.  Cardiovasc:RRR, no MRG. No thrills or bruits. Pulses 2+ and symmetric at all distal extremities.  Pulmonary: Lungs clear bilaterally.  Normal respiratory effort. No wheeze or crackles.   Extremities: No edema.  Neuro: Alert and oriented, CNs II-XII intact, no focal deficits.  Skin:No rashes noted. Skin warm, dry, intact    Psych: Dressed " appropriately for the weather, pleasant and conversant.  Affect, mood & judgment appropriate.      Assessment/Plan:   Mikayla was seen today for anxiety, oral swelling, medication management and medication refill.    Diagnoses and all orders for this visit:    Fibromyalgia, stable  - following with PM   -     amitriptyline (ELAVIL) 50 MG Tab; Take 2 Tabs by mouth at bedtime as needed.    Primary insomnia, stable on treatment, monitor   -     amitriptyline (ELAVIL) 50 MG Tab; Take 2 Tabs by mouth at bedtime as needed.    Chronic tension-type headache, not intractable, improved on topiramate, monitor     Elevated fasting glucose, uncontrolled   -Discussed dietary modifications, weight loss, check A1c at follow-up appointment in 1 month.  -     HEMOGLOBIN A1C; Future    Chronic midline low back pain without sciatica, stable, following with pain management, defer  -     Diclofenac Sodium 1 % Gel; APPLY 2 GM UP TO 4 TIMES PER DAY IF NEEDED FOR BACK PAIN  -     methocarbamol (ROBAXIN-750) 750 MG Tab; Take 1 Tab by mouth 1 time daily as needed.    Hypertriglyceridemia, uncontrolled, TLM, monitor     Follow up in 1 month for xanax refill and review A1C     Patient is agreeable to the above plan and voiced understanding. All questions answered.     Please note that this dictation was created using voice recognition software. I have made every reasonable attempt to correct obvious errors, but I expect that there are errors of grammar and possibly content that I did not discover before finalizing the note.      GRACIELA Pedraza  3/28/2019, 11:17 AM

## 2019-03-28 NOTE — ASSESSMENT & PLAN NOTE
Patient now following with PM who has recommended d/c of Cymbalta and flexeril, will start robaxin and follow up for facet joint injections. She is happy with this plan.

## 2019-03-28 NOTE — ASSESSMENT & PLAN NOTE
Patient has history of elevated fasting glucose and T2DM which she was able to manage with diet and exercise, unfortunately, her A1c is quite high and we will need to repeat A1c again at follow up. Does have polydipsia but is on several medications that can cause dry mouth. No polyuria/phagia.

## 2019-03-29 DIAGNOSIS — G44.229 CHRONIC TENSION-TYPE HEADACHE, NOT INTRACTABLE: ICD-10-CM

## 2019-03-29 NOTE — TELEPHONE ENCOUNTER
Was the patient seen in the last year in this department? Yes    Does patient have an active prescription for medications requested? No     Received Request Via: Patient    Last OV 3/28/19, last labs 3/21/19

## 2019-04-01 RX ORDER — TOPIRAMATE 50 MG/1
50 TABLET, FILM COATED ORAL 2 TIMES DAILY
Qty: 60 TAB | Refills: 1 | Status: SHIPPED | OUTPATIENT
Start: 2019-04-01 | End: 2019-04-25 | Stop reason: SDUPTHER

## 2019-04-03 DIAGNOSIS — M79.7 FIBROMYALGIA: ICD-10-CM

## 2019-04-04 NOTE — TELEPHONE ENCOUNTER
Was the patient seen in the last year in this department? Yes    Does patient have an active prescription for medications requested? No     Received Request Via: Pharmacy      Pt met protocol?: Yes    LAST OV 03/28/2019    *CYCLO WAS D/C 03/28/2019, PT STARTED AMITRIPTYLINE 03/28/2019. WAS IT REPLACED?*

## 2019-04-08 RX ORDER — CYCLOBENZAPRINE HCL 10 MG
TABLET ORAL
Qty: 90 TAB | Refills: 0 | OUTPATIENT
Start: 2019-04-08

## 2019-04-25 ENCOUNTER — OFFICE VISIT (OUTPATIENT)
Dept: MEDICAL GROUP | Facility: PHYSICIAN GROUP | Age: 59
End: 2019-04-25
Payer: COMMERCIAL

## 2019-04-25 VITALS
TEMPERATURE: 98.7 F | HEART RATE: 102 BPM | WEIGHT: 275 LBS | OXYGEN SATURATION: 93 % | BODY MASS INDEX: 45.82 KG/M2 | RESPIRATION RATE: 14 BRPM | HEIGHT: 65 IN | SYSTOLIC BLOOD PRESSURE: 124 MMHG | DIASTOLIC BLOOD PRESSURE: 60 MMHG

## 2019-04-25 DIAGNOSIS — J32.9 BACTERIAL SINUSITIS: ICD-10-CM

## 2019-04-25 DIAGNOSIS — E11.9 TYPE 2 DIABETES MELLITUS WITHOUT COMPLICATION, WITHOUT LONG-TERM CURRENT USE OF INSULIN (HCC): ICD-10-CM

## 2019-04-25 DIAGNOSIS — M79.7 FIBROMYALGIA: ICD-10-CM

## 2019-04-25 DIAGNOSIS — F51.01 PRIMARY INSOMNIA: ICD-10-CM

## 2019-04-25 DIAGNOSIS — B96.89 BACTERIAL SINUSITIS: ICD-10-CM

## 2019-04-25 DIAGNOSIS — G44.229 CHRONIC TENSION-TYPE HEADACHE, NOT INTRACTABLE: ICD-10-CM

## 2019-04-25 DIAGNOSIS — J30.1 SEASONAL ALLERGIC RHINITIS DUE TO POLLEN: ICD-10-CM

## 2019-04-25 DIAGNOSIS — F41.9 ANXIETY: ICD-10-CM

## 2019-04-25 LAB
HBA1C MFR BLD: 6.9 % (ref 0–5.6)
INT CON NEG: NEGATIVE
INT CON POS: POSITIVE

## 2019-04-25 PROCEDURE — 83036 HEMOGLOBIN GLYCOSYLATED A1C: CPT | Performed by: NURSE PRACTITIONER

## 2019-04-25 PROCEDURE — 99214 OFFICE O/P EST MOD 30 MIN: CPT | Performed by: NURSE PRACTITIONER

## 2019-04-25 RX ORDER — AMOXICILLIN AND CLAVULANATE POTASSIUM 875; 125 MG/1; MG/1
1 TABLET, FILM COATED ORAL 2 TIMES DAILY
Qty: 14 TAB | Refills: 0 | Status: SHIPPED | OUTPATIENT
Start: 2019-04-25 | End: 2019-05-02

## 2019-04-25 RX ORDER — METFORMIN HYDROCHLORIDE 750 MG/1
750 TABLET, EXTENDED RELEASE ORAL DAILY
Qty: 90 TAB | Refills: 1 | Status: SHIPPED | OUTPATIENT
Start: 2019-04-25 | End: 2019-10-16 | Stop reason: SDUPTHER

## 2019-04-25 RX ORDER — TOPIRAMATE 50 MG/1
50 TABLET, FILM COATED ORAL 2 TIMES DAILY
Qty: 60 TAB | Refills: 5 | Status: SHIPPED | OUTPATIENT
Start: 2019-04-25 | End: 2019-12-07 | Stop reason: SDUPTHER

## 2019-04-25 RX ORDER — ALPRAZOLAM 1 MG/1
1 TABLET ORAL NIGHTLY PRN
Qty: 40 TAB | Refills: 0 | Status: SHIPPED | OUTPATIENT
Start: 2019-05-29 | End: 2019-07-05 | Stop reason: SDUPTHER

## 2019-04-25 RX ORDER — FLUTICASONE PROPIONATE 50 MCG
2 SPRAY, SUSPENSION (ML) NASAL DAILY
Qty: 16 G | Refills: 11 | Status: SHIPPED | OUTPATIENT
Start: 2019-04-25 | End: 2020-04-29 | Stop reason: SDUPTHER

## 2019-04-25 NOTE — ASSESSMENT & PLAN NOTE
Complains of sinus headache, sinus pressure, tooth pain, nasal congestion x2 weeks.  Has had frequent and recurrent sinus infections in the past.  Has had sinus surgery in the past.  Also has underlying allergies which seem to be uncontrolled with itchy scratchy throat, sneezing, scratchy eyes

## 2019-04-25 NOTE — PROGRESS NOTES
[facility]  Primary Care Office Visit - Problem-Oriented        History:     Mikayla Knight is a 59 y.o. female who is here today to discuss Diabetes (FV) and Medication Refill (xanax,topamax,flonase)      Type 2 diabetes mellitus without complication, without long-term current use of insulin (HCC)  Patient is a 51-year-old female with type 2 diabetes here for follow-up.  She has been able to manage this over the last year with diet and exercise.  She has lost about 10 pounds since I saw her in February.  Unfortunately her A1c is elevated at 6.9%.  She is not on any oral antidiabetics, she is agreeable to starting metformin today.  I do think that she would be a great candidate for Invokana though this will not be approved without trial of alternate medications first.    Chronic headache  Patient's headaches are very well controlled on topiramate, unfortunately she is having metallic taste side effect, will try to mitigate this by having her take both tablets at night.    Bacterial sinusitis  Complains of sinus headache, sinus pressure, tooth pain, nasal congestion x2 weeks.  Has had frequent and recurrent sinus infections in the past.  Has had sinus surgery in the past.  Also has underlying allergies which seem to be uncontrolled with itchy scratchy throat, sneezing, scratchy eyes    Anxiety  Patient has baseline generalized anxiety disorder as well as panic attacks.  Her anxiety is far better controlled with buspirone 15 mg up to 3 times daily.  She still has episodes of panic for which she uses alprazolam, using 10 tablets in a month for panic, 30 tablets in a month for insomnia, 40 tablets lasts her 45 days. UDS UTD, CSA on file.     Fibromyalgia  Patient is a 59-year-old female with fibromyalgia and chronic low back pain.  She continues on gabapentin, Robaxin and Tylenol.  She is seeing pain management.        Past Medical History:   Diagnosis Date   • Anesthesia     low bp coming out of  "anesthesia \"one time\"   • Anxiety 11/18/2011   • Arrhythmia     ? pt unsure   • Arthritis     fibromyalgia   • Breast mass, left    • Breath shortness     occasionally   • Bursitis of knee     left   • Cancer ([facility]) 2005    squamous cell on nose   • Chronic pain 11/18/2011   • Depression 11/18/2011   • Fibromyalgia 11/18/2011   • Gastro-esophageal reflux 3/20/2012   • Heart murmur    • Iron deficiency anemia 3/20/2012   • Neuropathy ([facility]) 11/18/2011   • Other specified disorder of intestines    • Pain 6/14/12    3/10 stomach   • Pneumonia 01/2012   • Pulmonary hypertension ([facility]) 3/20/2012   • Syncope and collapse 3/20/2012     Past Surgical History:   Procedure Laterality Date   • CATH PLACEMENT  1/25/2013    Performed by Lizeth Ferreira M.D. at SURGERY SAME DAY [facility] ORS   • BREAST BIOPSY  1/9/2013    Performed by Lizeth Ferreira M.D. at SURGERY SAME DAY [facility] ORS   • AXILLARY NODE DISSECTION  1/9/2013    Performed by Lizeth Ferreira M.D. at SURGERY SAME DAY [facility] ORS   • NODE BIOPSY  1/9/2013    Performed by Lizeth Ferreira M.D. at SURGERY SAME DAY [facility] ORS   • BREAST BIOPSY  12/11/2012    Performed by Lizeth Ferreira M.D. at SURGERY SAME DAY [facility] ORS   • COLONOSCOPY  6/21/2012    Performed by PARAM VALDEZ at SURGERY [facility] ORS   • GASTROSCOPY  6/21/2012    Performed by PARAM VALDEZ at SURGERY [facility] ORS   • ABDOMINAL EXPLORATION  10/7/2010    Performed by MARIA G KHAN JR at SURGERY [facility] ORS   • IRRIGATION & DEBRIDEMENT GENERAL  4/12/2010    Performed by OZZIE WEINSTEIN at SURGERY [facility] ORS   • HERNIA REPAIR  3/15/2010    Performed by MARIA G KHAN JR at SURGERY [facility] ORS   • FLAP GRAFT  3/15/2010    Performed by MARIA G KHAN JR at SURGERY [facility] ORS   • PANNICULECTOMY  3/15/2010    Performed by MARIA G KHAN JR at SURGERY [facility] ORS   • ABDOMINAL EXPLORATION  3/11/2010    Performed by MARIA G KHAN JR at SURGERY SAME " DAY [facility] ORS   • OTHER  08/09    pulled mesh out of hernia   • OTHER  07/09    abscess removed abd.   • OTHER  06/09    bowel obstruction   • OTHER  04/2009    hernia repair,mesh removal after in aug.09   • OTHER  2005    bariatric surgery gastric bypass   • GASTRIC BYPASS LAPAROSCOPIC  2005   • GYN SURGERY  1984    tubal     Social History     Social History   • Marital status:      Spouse name: N/A   • Number of children: 2   • Years of education: N/A     Occupational History   •  Other     Social History Main Topics   • Smoking status: Former Smoker     Packs/day: 0.10     Years: 8.00     Types: Cigarettes     Quit date: 4/25/2016   • Smokeless tobacco: Never Used      Comment: 1/2 pk a day on and off 10 yrs, 1 cigarette daily   • Alcohol use 0.0 oz/week      Comment: 5 a month, social   • Drug use: No   • Sexual activity: Not Currently     Partners: Male     Other Topics Concern   • Not on file     Social History Narrative    Patient lives with  in [place] and they have 2 grown children. She does not work. She is unable to drive due to health conditions and her son takes her to all appointments.      History   Smoking Status   • Former Smoker   • Packs/day: 0.10   • Years: 8.00   • Types: Cigarettes   • Quit date: 4/25/2016   Smokeless Tobacco   • Never Used     Comment: 1/2 pk a day on and off 10 yrs, 1 cigarette daily     Family History   Problem Relation Age of Onset   • Heart Attack Father    • Hypertension Mother    • Cancer Maternal Grandfather         leukemia   • Cancer Other         leukemia- cousin   • Diabetes Maternal Uncle    • Diabetes Maternal Uncle      Allergies   Allergen Reactions   • Shellfish Allergy Anaphylaxis     SHRIMP ONLY, not anything else, not iodine.       Problem List:     Patient Active Problem List    Diagnosis Date Noted   • Bacterial sinusitis 04/25/2019   • Health care maintenance 07/18/2018   • Nausea 05/24/2018   • Type 2 diabetes mellitus without  complication, without long-term current use of insulin ([facility]) 12/18/2017   • Elevated fasting glucose 08/16/2017   • Morbid obesity with BMI of 40.0-44.9, adult ([facility]) 05/16/2017   • Hypertriglyceridemia 12/20/2016   • Bilateral hand pain 05/24/2016   • Bilateral knee pain 01/27/2016   • Vitamin D deficiency disease 06/10/2015   • Tobacco abuse 02/20/2014   • Heart palpitations 02/12/2014   • Chronic low back pain without sciatica 12/05/2013   • Chronic headache 05/09/2013   • Breast cancer, left breast ([facility]) 12/31/2012   • Pulmonary hypertension ([facility]) 03/20/2012   • Gastro-esophageal reflux 03/20/2012   • Fibromyalgia 11/18/2011   • Neuropathy ([facility]) 11/18/2011   • Depression 11/18/2011   • Anxiety 11/18/2011   • Insomnia 11/18/2011         Medications:     Current Outpatient Prescriptions:   •  amoxicillin-clavulanate (AUGMENTIN) 875-125 MG Tab, Take 1 Tab by mouth 2 times a day for 7 days., Disp: 14 Tab, Rfl: 0  •  fluticasone (FLONASE) 50 MCG/ACT nasal spray, Spray 2 Sprays in nose every day., Disp: 16 g, Rfl: 11  •  [START ON 5/29/2019] ALPRAZolam (XANAX) 1 MG Tab, Take 1 Tab by mouth at bedtime as needed for Sleep for up to 45 days. May take an additional 1 mg up to 10 days in the month for anxiety, Disp: 40 Tab, Rfl: 0  •  topiramate (TOPAMAX) 50 MG tablet, Take 1 Tab by mouth 2 times a day., Disp: 60 Tab, Rfl: 5  •  metFORMIN ER (GLUCOPHAGE XR) 750 MG TABLET SR 24 HR, Take 1 Tab by mouth every day., Disp: 90 Tab, Rfl: 1  •  methocarbamol (ROBAXIN-750) 750 MG Tab, Take 1 Tab by mouth 1 time daily as needed., Disp: 30 Tab, Rfl: 0  •  amitriptyline (ELAVIL) 50 MG Tab, Take 2 Tabs by mouth at bedtime as needed., Disp: 180 Tab, Rfl: 0  •  Diclofenac Sodium 1 % Gel, APPLY 2 GM UP TO 4 TIMES PER DAY IF NEEDED FOR BACK PAIN, Disp: 1 Tube, Rfl: 5  •  gabapentin (NEURONTIN) 300 MG Cap, TAKE 1 CAPSULE BY MOUTH THREE TIMES A DAY, Disp: 270 Cap, Rfl: 0  •  busPIRone (BUSPAR) 15 MG tablet, TAKE 1 TABLET BY MOUTH THREE TIMES  "A DAY, Disp: 270 Tab, Rfl: 1  •  hydrochlorothiazide (MICROZIDE) 12.5 MG capsule, Take 1-2 Caps by mouth every day., Disp: 90 Cap, Rfl: 1  •  acetaminophen (TYLENOL) 325 MG Tab, Tylenol 325 mg tablet  Take 2 tablets every 6 hours by oral route., Disp: , Rfl:   •  rizatriptan (MAXALT) 10 MG tablet, TAKE 1 TAB BY MOUTH ONCE AS NEEDED FOR MIGRAINE FOR UP TO 1 DOSE. MAY REPEAT IN 2 HOURS IF NEEDED, Disp: 10 Tab, Rfl: 2  •  albuterol 108 (90 Base) MCG/ACT Aero Soln inhalation aerosol, Inhale 2 Puffs by mouth every 6 hours as needed for Shortness of Breath., Disp: 6.7 Inhaler, Rfl: 2  •  ondansetron (ZOFRAN ODT) 8 MG TABLET DISPERSIBLE, Take 1 Tab by mouth every 8 hours as needed for Nausea., Disp: 15 Tab, Rfl: 0  •  aspirin (ASA) 325 MG TABS, Take 325 mg by mouth as needed. weekly , Disp: , Rfl:   •  Ibuprofen 200 MG Cap, ibuprofen 200 mg capsule  Take 1 capsule every 6 hours by oral route., Disp: , Rfl:       Review of Systems:     Pertinent positives as per HPI, all other systems reviewed and WNL     Physical Assessment:     VS: /60 (BP Location: Right arm, Patient Position: Sitting, BP Cuff Size: Adult long)   Pulse (!) 102   Temp 37.1 °C (98.7 °F)   Resp 14   Ht 1.651 m (5' 5\")   Wt 124.7 kg (275 lb)   LMP 03/18/2010   SpO2 93%   BMI 45.76 kg/m²     General: Well-developed, well-nourished, female, morbidly obese    Head: PERRL, EOMI. Normocephalic. No facial asymmetry noted. Nasal mucosa erythematous and edematous. Pain with tap overlying maxillary and frontal sinuses.   Cardiovasc: RRR, no MRG. No thrills or bruits. Pulses 2+ and symmetric at all distal extremities.  Pulmonary: Lungs clear bilaterally.  Normal respiratory effort. No wheeze or crackles.   Extremities: No edema  Neuro: Alert and oriented, CNs II-XII intact, no focal deficits.  Skin:No rashes noted. Skin warm, dry, intact    Psych: Dressed appropriately for the weather, pleasant and conversant.  Affect, mood & judgment " appropriate.      Assessment/Plan:   Mikayla was seen today for diabetes and medication refill.    Diagnoses and all orders for this visit:    Type 2 diabetes mellitus without complication, without long-term current use of insulin (HCC), uncontrolled   -Discussed dietary modifications, exercise, weight loss, trial once daily metformin with breakfast.  No other changes to treatment at this time.  Follow-up in 3 months with point-of-care A1c.  -     metFORMIN ER (GLUCOPHAGE XR) 750 MG TABLET SR 24 HR; Take 1 Tab by mouth every day.    Bacterial sinusitis, new  -Nasal wash twice daily, Flonase twice daily, Mucinex, Augmentin x7 days, recommend treating underlying allergies with antihistamine and Flonase.  -     amoxicillin-clavulanate (AUGMENTIN) 875-125 MG Tab; Take 1 Tab by mouth 2 times a day for 7 days.    Seasonal allergic rhinitis due to pollen, can  -     fluticasone (FLONASE) 50 MCG/ACT nasal spray; Spray 2 Sprays in nose every day.    Primary insomnia, well controlled with present treatment  -Good sleep hygiene discussed, may continue to utilize the alprazolam, not to combine with alcohol, where the side effects medication.  Follow-up for refill.  -     ALPRAZolam (XANAX) 1 MG Tab; Take 1 Tab by mouth at bedtime as needed for Sleep for up to 45 days. May take an additional 1 mg up to 10 days in the month for anxiety    Anxiety, well controlled   This patient is continuing to use a controlled substance (CS) on a long term basis.  The patient is thoroughly aware of the goals of treatment with the CS  The patient is aware that yearly and random urine drug screens are required.  The patient has been instructed to take the CS only as prescribed.  The patient is prohibited from sharing the CS with any other person.  The patient is instructed to inform the provider if any other CS is taken, of any alcohol or cannabis or other recreational drug use, any treatment for side effects of the CS or complications, if they have  CS active rx in other states  The patient has evidence for a reason for the CS  The treatment plan has been discussed with the patient  The  report has been reviewed  -     ALPRAZolam (XANAX) 1 MG Tab; Take 1 Tab by mouth at bedtime as needed for Sleep for up to 45 days. May take an additional 1 mg up to 10 days in the month for anxiety    Chronic tension-type headache, not intractable, well controlled with topiramate, monitor  -     topiramate (TOPAMAX) 50 MG tablet; Take 1 Tab by mouth 2 times a day.    Fibromyalgia, follows with pain management    Patient is agreeable to the above plan and voiced understanding. All questions answered.     Please note that this dictation was created using voice recognition software. I have made every reasonable attempt to correct obvious errors, but I expect that there are errors of grammar and possibly content that I did not discover before finalizing the note.      GRACIELA Pedraza  4/25/2019, 1:56 PM

## 2019-04-25 NOTE — ASSESSMENT & PLAN NOTE
Patient has baseline generalized anxiety disorder as well as panic attacks.  Her anxiety is far better controlled with buspirone 15 mg up to 3 times daily.  She still has episodes of panic for which she uses alprazolam, using 10 tablets in a month for panic, 30 tablets in a month for insomnia, 40 tablets lasts her 45 days. SHARIS UTD, CSA on file.

## 2019-04-25 NOTE — ASSESSMENT & PLAN NOTE
Patient is a 51-year-old female with type 2 diabetes here for follow-up.  She has been able to manage this over the last year with diet and exercise.  She has lost about 10 pounds since I saw her in February.  Unfortunately her A1c is elevated at 6.9%.  She is not on any oral antidiabetics, she is agreeable to starting metformin today.  I do think that she would be a great candidate for Invokana though this will not be approved without trial of alternate medications first.

## 2019-04-25 NOTE — PATIENT INSTRUCTIONS
[email or web address]       Diabetes Mellitus and Food  It is important for you to manage your blood sugar (glucose) level. Your blood glucose level can be greatly affected by what you eat. Eating healthier foods in the appropriate amounts throughout the day at about the same time each day will help you control your blood glucose level. It can also help slow or prevent worsening of your diabetes mellitus. Healthy eating may even help you improve the level of your blood pressure and reach or maintain a healthy weight.  General recommendations for healthful eating and cooking habits include:  · Eating meals and snacks regularly. Avoid going long periods of time without eating to lose weight.  · Eating a diet that consists mainly of plant-based foods, such as fruits, vegetables, nuts, legumes, and whole grains.  · Using low-heat cooking methods, such as baking, instead of high-heat cooking methods, such as deep frying.  Work with your dietitian to make sure you understand how to use the Nutrition Facts information on food labels.  How can food affect me?  Carbohydrates   Carbohydrates affect your blood glucose level more than any other type of food. Your dietitian will help you determine how many carbohydrates to eat at each meal and teach you how to count carbohydrates. Counting carbohydrates is important to keep your blood glucose at a healthy level, especially if you are using insulin or taking certain medicines for diabetes mellitus.  Alcohol   Alcohol can cause sudden decreases in blood glucose (hypoglycemia), especially if you use insulin or take certain medicines for diabetes mellitus. Hypoglycemia can be a life-threatening condition. Symptoms of hypoglycemia (sleepiness, dizziness, and disorientation) are similar to symptoms of having too much alcohol.  If your health care provider has given you approval to drink alcohol, do so in moderation and use the following guidelines:  · Women should not have more than one drink  per day, and men should not have more than two drinks per day. One drink is equal to:  ¨ 12 oz of beer.  ¨ 5 oz of wine.  ¨ 1½ oz of hard liquor.  · Do not drink on an empty stomach.  · Keep yourself hydrated. Have water, diet soda, or unsweetened iced tea.  · Regular soda, juice, and other mixers might contain a lot of carbohydrates and should be counted.  What foods are not recommended?  As you make food choices, it is important to remember that all foods are not the same. Some foods have fewer nutrients per serving than other foods, even though they might have the same number of calories or carbohydrates. It is difficult to get your body what it needs when you eat foods with fewer nutrients. Examples of foods that you should avoid that are high in calories and carbohydrates but low in nutrients include:  · Trans fats (most processed foods list trans fats on the Nutrition Facts label).  · Regular soda.  · Juice.  · Candy.  · Sweets, such as cake, pie, doughnuts, and cookies.  · Fried foods.  What foods can I eat?  Eat nutrient-rich foods, which will nourish your body and keep you healthy. The food you should eat also will depend on several factors, including:  · The calories you need.  · The medicines you take.  · Your weight.  · Your blood glucose level.  · Your blood pressure level.  · Your cholesterol level.  You should eat a variety of foods, including:  · Protein.  ¨ Lean cuts of meat.  ¨ Proteins low in saturated fats, such as fish, egg whites, and beans. Avoid processed meats.  · Fruits and vegetables.  ¨ Fruits and vegetables that may help control blood glucose levels, such as apples, mangoes, and yams.  · Dairy products.  ¨ Choose fat-free or low-fat dairy products, such as milk, yogurt, and cheese.  · Grains, bread, pasta, and rice.  ¨ Choose whole grain products, such as multigrain bread, whole oats, and brown rice. These foods may help control blood pressure.  · Fats.  ¨ Foods containing healthful  fats, such as nuts, avocado, olive oil, canola oil, and fish.  Does everyone with diabetes mellitus have the same meal plan?  Because every person with diabetes mellitus is different, there is not one meal plan that works for everyone. It is very important that you meet with a dietitian who will help you create a meal plan that is just right for you.  This information is not intended to replace advice given to you by your health care provider. Make sure you discuss any questions you have with your health care provider.  Document Released: 09/14/2006 Document Revised: 05/25/2017 Document Reviewed: 11/14/2014  [employer] Interactive Patient Education © 2017 [employer]

## 2019-04-25 NOTE — ASSESSMENT & PLAN NOTE
Patient's headaches are very well controlled on topiramate, unfortunately she is having metallic taste side effect, will try to mitigate this by having her take both tablets at night.

## 2019-05-03 NOTE — ASSESSMENT & PLAN NOTE
Detail Level: Simple Patient is a 59-year-old female with fibromyalgia and chronic low back pain.  She continues on gabapentin, Robaxin and Tylenol.  She is seeing pain management.

## 2019-05-23 DIAGNOSIS — F41.9 ANXIETY: ICD-10-CM

## 2019-05-23 DIAGNOSIS — G62.9 NEUROPATHY: ICD-10-CM

## 2019-05-23 DIAGNOSIS — M79.7 FIBROMYALGIA: ICD-10-CM

## 2019-05-23 RX ORDER — DULOXETIN HYDROCHLORIDE 30 MG/1
CAPSULE, DELAYED RELEASE ORAL
Refills: 0 | OUTPATIENT
Start: 2019-05-23

## 2019-05-27 DIAGNOSIS — G62.9 NEUROPATHY: ICD-10-CM

## 2019-05-27 DIAGNOSIS — F41.9 ANXIETY: ICD-10-CM

## 2019-05-27 DIAGNOSIS — M79.7 FIBROMYALGIA: ICD-10-CM

## 2019-05-28 RX ORDER — DULOXETIN HYDROCHLORIDE 30 MG/1
CAPSULE, DELAYED RELEASE ORAL
Refills: 0 | OUTPATIENT
Start: 2019-05-28

## 2019-05-28 NOTE — TELEPHONE ENCOUNTER
*Medication is currently D/C'd on patients med list*  Was the patient seen in the last year in this department? Yes    Does patient have an active prescription for medications requested? No     Received Request Via: Pharmacy    Pt met protocol?: Yes     Last OV 04/25/2019

## 2019-06-02 DIAGNOSIS — M79.7 FIBROMYALGIA: ICD-10-CM

## 2019-06-02 DIAGNOSIS — F51.01 PRIMARY INSOMNIA: ICD-10-CM

## 2019-06-02 DIAGNOSIS — G44.229 CHRONIC TENSION-TYPE HEADACHE, NOT INTRACTABLE: ICD-10-CM

## 2019-06-02 DIAGNOSIS — G62.9 NEUROPATHY: ICD-10-CM

## 2019-06-03 RX ORDER — GABAPENTIN 300 MG/1
CAPSULE ORAL
Qty: 270 CAP | Refills: 0 | Status: SHIPPED | OUTPATIENT
Start: 2019-06-03 | End: 2019-09-25 | Stop reason: SDUPTHER

## 2019-06-03 RX ORDER — AMITRIPTYLINE HYDROCHLORIDE 50 MG/1
100 TABLET, FILM COATED ORAL NIGHTLY PRN
Qty: 180 TAB | Refills: 0 | Status: SHIPPED | OUTPATIENT
Start: 2019-06-03 | End: 2019-08-07 | Stop reason: SDUPTHER

## 2019-06-03 NOTE — TELEPHONE ENCOUNTER
Was the patient seen in the last year in this department? Yes    Does patient have an active prescription for medications requested? No     Received Request Via: Pharmacy      Pt met protocol?: Yes, OV 4/19

## 2019-06-03 NOTE — TELEPHONE ENCOUNTER
Was the patient seen in the last year in this department? Yes    Does patient have an active prescription for medications requested? No     Received Request Via: Patient       Last OV 4/25/19, last labs 3/21/19

## 2019-06-03 NOTE — TELEPHONE ENCOUNTER
From: Mikayla Knight  Sent: 6/2/2019 8:27 PM PDT  Subject: Medication Renewal Request    Mikayla Knight would like a refill of the following medications:     amitriptyline (ELAVIL) 50 MG Tab [Tamara Méndez, A.P.R.N.]    Preferred pharmacy: [facility]/PHARMACY #[id number] - [place], [place] - [address]

## 2019-06-13 DIAGNOSIS — M79.7 FIBROMYALGIA: ICD-10-CM

## 2019-06-13 DIAGNOSIS — G62.9 NEUROPATHY: ICD-10-CM

## 2019-06-13 DIAGNOSIS — F41.9 ANXIETY: ICD-10-CM

## 2019-06-17 DIAGNOSIS — F51.01 PRIMARY INSOMNIA: ICD-10-CM

## 2019-06-17 DIAGNOSIS — F41.9 ANXIETY: ICD-10-CM

## 2019-06-17 RX ORDER — DULOXETIN HYDROCHLORIDE 30 MG/1
CAPSULE, DELAYED RELEASE ORAL
Refills: 0 | OUTPATIENT
Start: 2019-06-17

## 2019-06-18 RX ORDER — ALPRAZOLAM 1 MG/1
1 TABLET ORAL NIGHTLY PRN
Qty: 40 TAB | Refills: 0 | OUTPATIENT
Start: 2019-06-18 | End: 2019-08-02

## 2019-06-18 NOTE — TELEPHONE ENCOUNTER
From: Mikaylasathish Deleon Antonia  Sent: 6/17/2019 12:27 PM PDT  Subject: Medication Renewal Request    Mikayla Knight would like a refill of the following medications:     ALPRAZolam (XANAX) 1 MG Tab [Tamara Méndez, SATHISH.P.R.N.]   Patient Comment: Last filled in the beginning of May shortly after my last appointment. The pharmacist says the the bext refill date is not until July 22,2019. That's way past the 45 day script. I'm not sure how this got confused or why the date is extended out so long. I have 2 left and do not see my new P.A until July 5th. Any assistance would be greatly appreciated. Thank you,t.    Preferred pharmacy: [facility]/PHARMACY #[id number] - [place], [place] - [address]

## 2019-06-18 NOTE — TELEPHONE ENCOUNTER
I called the pharmacy, she has 1 refill on file that can be picked up today or tomorrow. Her insurance won't pay for it until July 22. She can pay out of pocket if she wants. She needs to call her insurance company to discuss. Thanks!

## 2019-06-23 DIAGNOSIS — K29.60 NSAID INDUCED GASTRITIS: ICD-10-CM

## 2019-06-23 DIAGNOSIS — T39.395A NSAID INDUCED GASTRITIS: ICD-10-CM

## 2019-06-24 RX ORDER — OMEPRAZOLE 20 MG/1
CAPSULE, DELAYED RELEASE ORAL
Qty: 90 CAP | Refills: 0 | Status: SHIPPED | OUTPATIENT
Start: 2019-06-24 | End: 2019-09-27 | Stop reason: SDUPTHER

## 2019-06-24 NOTE — TELEPHONE ENCOUNTER
Was the patient seen in the last year in this department? Yes - has appt to [facility] 7/5    Does patient have an active prescription for medications requested? No     Received Request Via: Pharmacy

## 2019-07-05 ENCOUNTER — OFFICE VISIT (OUTPATIENT)
Dept: MEDICAL GROUP | Facility: PHYSICIAN GROUP | Age: 59
End: 2019-07-05
Payer: COMMERCIAL

## 2019-07-05 VITALS
RESPIRATION RATE: 14 BRPM | DIASTOLIC BLOOD PRESSURE: 78 MMHG | OXYGEN SATURATION: 97 % | HEIGHT: 65 IN | WEIGHT: 270 LBS | SYSTOLIC BLOOD PRESSURE: 124 MMHG | TEMPERATURE: 98.8 F | HEART RATE: 96 BPM | BODY MASS INDEX: 44.98 KG/M2

## 2019-07-05 DIAGNOSIS — F51.01 PRIMARY INSOMNIA: ICD-10-CM

## 2019-07-05 DIAGNOSIS — F41.9 ANXIETY: ICD-10-CM

## 2019-07-05 DIAGNOSIS — M25.562 CHRONIC PAIN OF BOTH KNEES: ICD-10-CM

## 2019-07-05 DIAGNOSIS — M79.89 BILATERAL SWELLING OF FEET: ICD-10-CM

## 2019-07-05 DIAGNOSIS — M79.7 FIBROMYALGIA: ICD-10-CM

## 2019-07-05 DIAGNOSIS — G44.229 CHRONIC TENSION-TYPE HEADACHE, NOT INTRACTABLE: ICD-10-CM

## 2019-07-05 DIAGNOSIS — M25.561 CHRONIC PAIN OF BOTH KNEES: ICD-10-CM

## 2019-07-05 DIAGNOSIS — Z79.899 CHRONIC PRESCRIPTION BENZODIAZEPINE USE: ICD-10-CM

## 2019-07-05 DIAGNOSIS — G89.29 CHRONIC PAIN OF BOTH KNEES: ICD-10-CM

## 2019-07-05 DIAGNOSIS — F33.2 SEVERE EPISODE OF RECURRENT MAJOR DEPRESSIVE DISORDER, WITHOUT PSYCHOTIC FEATURES (HCC): ICD-10-CM

## 2019-07-05 PROBLEM — R11.0 NAUSEA: Status: RESOLVED | Noted: 2018-05-24 | Resolved: 2019-07-05

## 2019-07-05 PROBLEM — B96.89 BACTERIAL SINUSITIS: Status: RESOLVED | Noted: 2019-04-25 | Resolved: 2019-07-05

## 2019-07-05 PROBLEM — R73.01 ELEVATED FASTING GLUCOSE: Status: RESOLVED | Noted: 2017-08-16 | Resolved: 2019-07-05

## 2019-07-05 PROBLEM — Z00.00 HEALTH CARE MAINTENANCE: Status: RESOLVED | Noted: 2018-07-18 | Resolved: 2019-07-05

## 2019-07-05 PROBLEM — J32.9 BACTERIAL SINUSITIS: Status: RESOLVED | Noted: 2019-04-25 | Resolved: 2019-07-05

## 2019-07-05 PROCEDURE — 99214 OFFICE O/P EST MOD 30 MIN: CPT | Performed by: NURSE PRACTITIONER

## 2019-07-05 RX ORDER — RIZATRIPTAN BENZOATE 10 MG/1
TABLET ORAL
Qty: 10 TAB | Refills: 2 | Status: SHIPPED | OUTPATIENT
Start: 2019-07-05 | End: 2020-04-29 | Stop reason: SDUPTHER

## 2019-07-05 RX ORDER — ALPRAZOLAM 1 MG/1
1 TABLET ORAL NIGHTLY PRN
Qty: 40 TAB | Refills: 0 | Status: SHIPPED | OUTPATIENT
Start: 2019-08-20 | End: 2019-10-16 | Stop reason: SDUPTHER

## 2019-07-05 RX ORDER — ALPRAZOLAM 1 MG/1
1 TABLET ORAL NIGHTLY PRN
Qty: 40 TAB | Refills: 0 | Status: SHIPPED | OUTPATIENT
Start: 2019-07-05 | End: 2019-07-05 | Stop reason: SDUPTHER

## 2019-07-05 RX ORDER — METHOCARBAMOL 750 MG/1
750 TABLET, FILM COATED ORAL
Qty: 30 TAB | Refills: 0 | Status: SHIPPED | OUTPATIENT
Start: 2019-07-05 | End: 2019-08-01 | Stop reason: SDUPTHER

## 2019-07-05 NOTE — ASSESSMENT & PLAN NOTE
Chronic health problem that is normally well controlled with amitriptyline 100 mg nightly and alprazolam 1 mg at bedtime.  Patient ran out of alprazolam about 2 weeks ago and has not been sleeping well at all.  There was a miscommunication with the pharmacy, and they said her prescription until mid July.   reviewed today, no concerns.  UDS in 3/2019 and consistent.  Will refill alprazolam.

## 2019-07-05 NOTE — ASSESSMENT & PLAN NOTE
This is a chronic health problem that is well controlled with current medications and lifestyle measures.  Taking topiramate 100 mg at bedtime.  Was getting metallic taste in her mouth if she took it during the day.  Has about 2 migraines a month, relieved with Maxalt.  A lot of the times relieved with Tylenol or Excedrin.  Trial of Botox injections without relief.  Reports migraines are usually bilateral with phonophobia and photophobia.  Headaches have been chronic since child jerez.

## 2019-07-05 NOTE — ASSESSMENT & PLAN NOTE
This is a chronic health problem that is well controlled with current medications and lifestyle measures.  Taking BuSpar 15 mg 3 times a day.  Also taking alprazolam 1 mg as needed for episodes of panic.  Taking approximately 10 tablets in 1 month period for panic, 30 tablets in a month for insomnia, 40 tablets lasts her 45 days.

## 2019-07-05 NOTE — PROGRESS NOTES
CC: To establish care, medication refill    HISTORY OF THE PRESENT ILLNESS: Patient is a 59 y.o. female. This pleasant patient is here today to establish care and for evaluation management the following health problems.      Insomnia  Chronic health problem that is normally well controlled with amitriptyline 100 mg nightly and alprazolam 1 mg at bedtime.  Patient ran out of alprazolam about 2 weeks ago and has not been sleeping well at all.  There was a miscommunication with the pharmacy, and they said her prescription until mid July.   reviewed today, no concerns.  UDS in 3/2019 and consistent.  Will refill alprazolam.      Chronic headache  This is a chronic health problem that is well controlled with current medications and lifestyle measures.  Taking topiramate 100 mg at bedtime.  Was getting metallic taste in her mouth if she took it during the day.  Has about 2 migraines a month, relieved with Maxalt.  A lot of the times relieved with Tylenol or Excedrin.  Trial of Botox injections without relief.  Reports migraines are usually bilateral with phonophobia and photophobia.  Headaches have been chronic since child jerez.      Fibromyalgia  This is a chronic health problem that is well controlled with current medications and lifestyle measures. Widespread pain and back pain.  Taking methocarbamol and acetaminophen.  Restarted gabapentin about 4 days ago because ran out of methocarbamol.  Discontinued Cymbalta as recommended by pain management because of increased risk for serotonin syndrome.  Patient does not like the way gabapentin making her feel.  I did advise patient to slowly titrate by taking only at bedtime for a week when she is restarting gabapentin.  Patient has stopped going to [place] pain management because did not find interventions beneficial.  While there, patient received nerve ablation, trigger point injections, and Botox for headache.  She reports none of them provided much  relief.      Depression  This is a chronic health problem that is well controlled with current  lifestyle measures.  Discontinued Cymbalta as recommended by Pain Management.  Reports doing well, though still has some anxiety.  Denies HI/SI.  Discussed non medication options for management, including routine exercise and counseling.       Anxiety  This is a chronic health problem that is well controlled with current medications and lifestyle measures.  Taking BuSpar 15 mg 3 times a day.  Also taking alprazolam 1 mg as needed for episodes of panic.  Taking approximately 10 tablets in 1 month period for panic, 30 tablets in a month for insomnia, 40 tablets lasts her 45 days.      Bilateral knee pain  Chronic health problem, bilateral knee pain.  Related to very old injuries.  Moderately controlled with ibuprofen and Voltaren gel.      Allergies: Shellfish allergy    Current Outpatient Prescriptions Ordered in [facility]   Medication Sig Dispense Refill   • rizatriptan (MAXALT) 10 MG tablet TAKE 1 TAB BY MOUTH ONCE AS NEEDED FOR MIGRAINE FOR UP TO 1 DOSE. MAY REPEAT IN 2 HOURS IF NEEDED 10 Tab 2   • Diclofenac Sodium 1 % Gel APPLY 2 GM UP TO 4 TIMES PER DAY IF NEEDED FOR BACK PAIN 1 Tube 5   • methocarbamol (ROBAXIN-750) 750 MG Tab Take 1 Tab by mouth 1 time daily as needed. 30 Tab 0   • [START ON 8/20/2019] ALPRAZolam (XANAX) 1 MG Tab Take 1 Tab by mouth at bedtime as needed for Sleep for up to 45 days. May take an additional 1 mg up to 10 days in the month for anxiety 40 Tab 0   • omeprazole (PRILOSEC) 20 MG delayed-release capsule TAKE 1 CAP BY MOUTH EVERY DAY 30 MINUTES BEFORE FOOD OR DRINK, ON AN EMPTY STOMACH. 90 Cap 0   • gabapentin (NEURONTIN) 300 MG Cap TAKE 1 CAPSULE BY MOUTH THREE TIMES A  Cap 0   • amitriptyline (ELAVIL) 50 MG Tab Take 2 Tabs by mouth at bedtime as needed. 180 Tab 0   • fluticasone (FLONASE) 50 MCG/ACT nasal spray Spray 2 Sprays in nose every day. 16 g 11   • topiramate (TOPAMAX) 50 MG  "tablet Take 1 Tab by mouth 2 times a day. 60 Tab 5   • metFORMIN ER (GLUCOPHAGE XR) 750 MG TABLET SR 24 HR Take 1 Tab by mouth every day. 90 Tab 1   • busPIRone (BUSPAR) 15 MG tablet TAKE 1 TABLET BY MOUTH THREE TIMES A  Tab 1   • acetaminophen (TYLENOL) 325 MG Tab Tylenol 325 mg tablet   Take 2 tablets every 6 hours by oral route.     • Ibuprofen 200 MG Cap ibuprofen 200 mg capsule   Take 1 capsule every 6 hours by oral route.     • ondansetron (ZOFRAN ODT) 8 MG TABLET DISPERSIBLE Take 1 Tab by mouth every 8 hours as needed for Nausea. 15 Tab 0   • hydrochlorothiazide (MICROZIDE) 12.5 MG capsule Take 1-2 Caps by mouth every day. 90 Cap 1   • albuterol 108 (90 Base) MCG/ACT Aero Soln inhalation aerosol Inhale 2 Puffs by mouth every 6 hours as needed for Shortness of Breath. 6.7 Inhaler 2   • aspirin (ASA) 325 MG TABS Take 325 mg by mouth as needed. weekly        No current Epic-ordered facility-administered medications on file.        Past Medical History:   Diagnosis Date   • Anesthesia     low bp coming out of anesthesia \"one time\"   • Anxiety 11/18/2011   • Arrhythmia     ? pt unsure   • Arthritis     fibromyalgia   • Breast mass, left    • Breath shortness     occasionally   • Bursitis of knee     left   • Cancer ([facility]) 2005    squamous cell on nose   • Chronic pain 11/18/2011   • Depression 11/18/2011   • Fibromyalgia 11/18/2011   • Gastro-esophageal reflux 3/20/2012   • Heart murmur    • Iron deficiency anemia 3/20/2012   • Neuropathy (HCC) 11/18/2011   • Other specified disorder of intestines    • Pain 6/14/12    3/10 stomach   • Pneumonia 01/2012   • Pulmonary hypertension ([facility]) 3/20/2012   • Syncope and collapse 3/20/2012       Past Surgical History:   Procedure Laterality Date   • CATH PLACEMENT  1/25/2013    Performed by Lizeth Ferreira M.D. at SURGERY SAME DAY [facility] ORS   • BREAST BIOPSY  1/9/2013    Performed by Lizeth Ferreira M.D. at SURGERY SAME DAY [facility] ORS   • AXILLARY NODE DISSECTION  " 1/9/2013    Performed by Lizeth Ferreira M.D. at SURGERY SAME DAY [facility] ORS   • NODE BIOPSY  1/9/2013    Performed by Lizeth Ferreira M.D. at SURGERY SAME DAY [facility] ORS   • BREAST BIOPSY  12/11/2012    Performed by Lizeth Ferreira M.D. at SURGERY SAME DAY [facility] ORS   • COLONOSCOPY  6/21/2012    Performed by PARAM VALDEZ at SURGERY [facility] ORS   • GASTROSCOPY  6/21/2012    Performed by PARAM VALDEZ at SURGERY [facility] ORS   • ABDOMINAL EXPLORATION  10/7/2010    Performed by MARIA G KHAN JR at SURGERY [facility] ORS   • IRRIGATION & DEBRIDEMENT GENERAL  4/12/2010    Performed by OZZIE WEINSTEIN at SURGERY [facility] ORS   • HERNIA REPAIR  3/15/2010    Performed by MARIA G KHAN JR at SURGERY [facility] ORS   • FLAP GRAFT  3/15/2010    Performed by MARIA G KHAN JR at SURGERY [facility] ORS   • PANNICULECTOMY  3/15/2010    Performed by MARIA G KHAN JR at SURGERY [facility] ORS   • ABDOMINAL EXPLORATION  3/11/2010    Performed by MARIA G KHAN JR at SURGERY SAME DAY [facility] ORS   • OTHER  08/09    pulled mesh out of hernia   • OTHER  07/09    abscess removed abd.   • OTHER  06/09    bowel obstruction   • OTHER  04/2009    hernia repair,mesh removal after in aug.09   • OTHER  2005    bariatric surgery gastric bypass   • GASTRIC BYPASS LAPAROSCOPIC  2005   • GYN SURGERY  1984    tubal       Social History   Substance Use Topics   • Smoking status: Former Smoker     Packs/day: 0.10     Years: 8.00     Types: Cigarettes     Quit date: 4/25/2016   • Smokeless tobacco: Never Used      Comment: 1/2 pk a day on and off 10 yrs, 1 cigarette daily   • Alcohol use 0.0 oz/week      Comment: 5 a month, social       Family History   Problem Relation Age of Onset   • Heart Attack Father    • Hypertension Mother    • Cancer Maternal Grandfather         leukemia   • Cancer Other         leukemia- cousin   • Diabetes Maternal Uncle    • Diabetes Maternal Uncle        ROS:  "  As in HPI, otherwise negative for chest pain, dyspnea, abdominal pain, dysuria, blood in stool, fever           Exam: /78 (BP Location: Left arm, Patient Position: Sitting, BP Cuff Size: Adult long)   Pulse 96   Temp 37.1 °C (98.8 °F)   Resp 14   Ht 1.651 m (5' 5\")   Wt 122.5 kg (270 lb)   SpO2 97%  Body mass index is 44.93 kg/m².    General: Alert, pleasant, obese habitus, well nourished, well developed female in NAD  HEENT: Normocephalic. Eyes conjunctiva clear lids without ptosis, pupils equal and reactive to light, ears normal shape and contour, canals are clear bilaterally, tympanic membranes are pearly gray with good light reflex, nasal mucosa without erythema and drainage, oropharynx is without erythema, edema or exudates.   Neck: Supple without bruit. Thyroid is not enlarged.  Pulmonary: Clear to ausculation.  Normal effort. No rales, ronchi, or wheezing.  Cardiovascular: Normal rate and rhythm without murmur. Carotid and radial pulses are intact and equal bilaterally.  No lower extremity edema.  Abdomen: Soft, nontender, nondistended. Normal bowel sounds. Liver and spleen are not palpable  Neurologic: Grossly nonfocal  Lymph: No cervical or supraclavicular lymph nodes are palpable  Skin: Warm and dry.    Musculoskeletal: Normal gait.   Psych: Normal mood and affect. Alert and oriented. Judgment and insight is normal.    Please note that this dictation was created using voice recognition software. I have made every reasonable attempt to correct obvious errors, but I expect that there are errors of grammar and possibly content that I did not discover before finalizing the note.      Assessment/Plan  1. Primary insomnia  Continue with amitriptyline and alprazolam.  Patient had holiday from alprazolam and did not well.  Reviewed good sleep hygiene.  - ALPRAZolam (XANAX) 1 MG Tab; Take 1 Tab by mouth at bedtime as needed for Sleep for up to 45 days. May take an additional 1 mg up to 10 days in the " month for anxiety  Dispense: 40 Tab; Refill: 0    2. Anxiety  Continue with BuSpar and alprazolam as needed.  Reviewed nonmedication management of anxiety routine aerobic exercise and counseling.  - ALPRAZolam (XANAX) 1 MG Tab; Take 1 Tab by mouth at bedtime as needed for Sleep for up to 45 days. May take an additional 1 mg up to 10 days in the month for anxiety  Dispense: 40 Tab; Refill: 0    3. Chronic tension-type headache, not intractable  Continue with lifestyle measures and topiramate.  Continue with Maxalt as needed.  - rizatriptan (MAXALT) 10 MG tablet; TAKE 1 TAB BY MOUTH ONCE AS NEEDED FOR MIGRAINE FOR UP TO 1 DOSE. MAY REPEAT IN 2 HOURS IF NEEDED  Dispense: 10 Tab; Refill: 2    4. Fibromyalgia  Methocarbamol initially prescribed by pain management.  Patient requesting a refill as she is not going in management.  She has noticed some improvement with fibromyalgia. Continue with amitriptyline and gabapentin.  Discontinued Cymbalta as directed by pain management.   - methocarbamol (ROBAXIN-750) 750 MG Tab; Take 1 Tab by mouth 1 time daily as needed.  Dispense: 30 Tab; Refill: 0    5. Severe episode of recurrent major depressive disorder, without psychotic features (HCC)  Continue with lifestyle measures.     6. Chronic prescription benzodiazepine use  Reviewed risks of benzodiazepines including respiratory depression and that risks are increased if taken with alcohol or narcotics.  Patient verbalized understanding and says she does not drink alcohol or take opioids.   reviewed and appropriate patient takes alprazolam nightly for sleep and has lost 10 tabs for anxiety, 40 tablets last for 45 days.  - Consent for Opiate Prescription    7. Chronic pain of both knees  Continue diclofenac gel.  Consider physical therapy.    Patient will return to clinic in 1 month for diabetes or sooner if needed.

## 2019-07-05 NOTE — ASSESSMENT & PLAN NOTE
This is a chronic health problem that is well controlled with current  lifestyle measures.  Discontinued Cymbalta as recommended by Pain Management.  Reports doing well, though still has some anxiety.  Denies HI/SI.  Discussed non medication options for management, including routine exercise and counseling.

## 2019-07-05 NOTE — ASSESSMENT & PLAN NOTE
This is a chronic health problem that is well controlled with current medications and lifestyle measures. Widespread pain and back pain.  Taking methocarbamol and acetaminophen.  Restarted gabapentin about 4 days ago because ran out of methocarbamol.  Discontinued Cymbalta as recommended by pain management because of increased risk for serotonin syndrome.  Patient does not like the way gabapentin making her feel.  I did advise patient to slowly titrate by taking only at bedtime for a week when she is restarting gabapentin.  Patient has stopped going to [place] pain management because did not find interventions beneficial.  While there, patient received nerve ablation, trigger point injections, and Botox for headache.  She reports none of them provided much relief.

## 2019-07-06 NOTE — ASSESSMENT & PLAN NOTE
Chronic health problem, bilateral knee pain.  Related to very old injuries.  Moderately controlled with ibuprofen and Voltaren gel.

## 2019-07-08 RX ORDER — HYDROCHLOROTHIAZIDE 12.5 MG/1
12.5-25 CAPSULE, GELATIN COATED ORAL DAILY
Qty: 180 CAP | Refills: 0 | Status: SHIPPED | OUTPATIENT
Start: 2019-07-08 | End: 2020-09-29

## 2019-08-01 DIAGNOSIS — M79.7 FIBROMYALGIA: ICD-10-CM

## 2019-08-01 NOTE — TELEPHONE ENCOUNTER
Was the patient seen in the last year in this department? Yes    Does patient have an active prescription for medications requested? No     Received Request Via: Pharmacy      Pt met protocol?: Yes, OV last month

## 2019-08-02 RX ORDER — METHOCARBAMOL 750 MG/1
TABLET, FILM COATED ORAL
Qty: 30 TAB | Refills: 0 | Status: SHIPPED | OUTPATIENT
Start: 2019-08-02 | End: 2019-08-29 | Stop reason: SDUPTHER

## 2019-08-07 ENCOUNTER — OFFICE VISIT (OUTPATIENT)
Dept: MEDICAL GROUP | Facility: PHYSICIAN GROUP | Age: 59
End: 2019-08-07
Payer: COMMERCIAL

## 2019-08-07 VITALS
HEIGHT: 65 IN | HEART RATE: 83 BPM | BODY MASS INDEX: 45.48 KG/M2 | RESPIRATION RATE: 12 BRPM | OXYGEN SATURATION: 94 % | DIASTOLIC BLOOD PRESSURE: 74 MMHG | SYSTOLIC BLOOD PRESSURE: 138 MMHG | TEMPERATURE: 97.8 F | WEIGHT: 273 LBS

## 2019-08-07 DIAGNOSIS — G62.9 NEUROPATHY: ICD-10-CM

## 2019-08-07 DIAGNOSIS — E11.9 TYPE 2 DIABETES MELLITUS WITHOUT COMPLICATION, WITHOUT LONG-TERM CURRENT USE OF INSULIN (HCC): ICD-10-CM

## 2019-08-07 DIAGNOSIS — G44.229 CHRONIC TENSION-TYPE HEADACHE, NOT INTRACTABLE: ICD-10-CM

## 2019-08-07 DIAGNOSIS — M54.50 CHRONIC MIDLINE LOW BACK PAIN WITHOUT SCIATICA: ICD-10-CM

## 2019-08-07 DIAGNOSIS — M54.16 LUMBAR BACK PAIN WITH RADICULOPATHY AFFECTING LEFT LOWER EXTREMITY: ICD-10-CM

## 2019-08-07 DIAGNOSIS — F51.01 PRIMARY INSOMNIA: ICD-10-CM

## 2019-08-07 DIAGNOSIS — F41.9 ANXIETY: ICD-10-CM

## 2019-08-07 DIAGNOSIS — M79.7 FIBROMYALGIA: ICD-10-CM

## 2019-08-07 DIAGNOSIS — F33.2 SEVERE EPISODE OF RECURRENT MAJOR DEPRESSIVE DISORDER, WITHOUT PSYCHOTIC FEATURES (HCC): ICD-10-CM

## 2019-08-07 DIAGNOSIS — G89.29 CHRONIC MIDLINE LOW BACK PAIN WITHOUT SCIATICA: ICD-10-CM

## 2019-08-07 LAB
HBA1C MFR BLD: 5.8 % (ref 0–5.6)
INT CON NEG: NEGATIVE
INT CON POS: POSITIVE

## 2019-08-07 PROCEDURE — 99214 OFFICE O/P EST MOD 30 MIN: CPT | Performed by: NURSE PRACTITIONER

## 2019-08-07 PROCEDURE — 83036 HEMOGLOBIN GLYCOSYLATED A1C: CPT | Performed by: NURSE PRACTITIONER

## 2019-08-07 PROCEDURE — 92250 FUNDUS PHOTOGRAPHY W/I&R: CPT | Mod: TC | Performed by: NURSE PRACTITIONER

## 2019-08-07 RX ORDER — AMITRIPTYLINE HYDROCHLORIDE 75 MG/1
150 TABLET ORAL NIGHTLY PRN
Qty: 180 TAB | Refills: 1 | Status: SHIPPED | OUTPATIENT
Start: 2019-08-07 | End: 2019-10-30 | Stop reason: SDUPTHER

## 2019-08-07 RX ORDER — ALPRAZOLAM 1 MG/1
1 TABLET ORAL NIGHTLY PRN
Qty: 40 TAB | Refills: 0 | Status: CANCELLED | OUTPATIENT
Start: 2019-08-20 | End: 2019-10-04

## 2019-08-07 ASSESSMENT — PATIENT HEALTH QUESTIONNAIRE - PHQ9: CLINICAL INTERPRETATION OF PHQ2 SCORE: 0

## 2019-08-07 NOTE — PROGRESS NOTES
CC: Insomnia, leg pain, diabetes    HISTORY OF THE PRESENT ILLNESS: Patient is a 59 y.o. female. This pleasant patient is here today for evaluation management following health problems.      Type 2 diabetes mellitus without complication, without long-term current use of insulin (HCC)  This is a chronic health problem that is well controlled with current medications and lifestyle measures.  Patient was started on metformin  mg daily about 3 months ago.  Tolerating medication, denies stomach upset.  Point-of-care A1c today is 5.8%.  Has been working on lifestyle measures.  Overdue for eye exam.    Component      Latest Ref Rng & Units 4/25/2019 8/7/2019           1:24 PM 11:00 AM   Glycohemoglobin      0.0 - 5.6 % 6.9 (A) 5.8 (A)       Insomnia  Chronic health problem, uncontrolled with current medications and lifestyle measures.  Taking amitriptyline 100 mg at bedtime.  Also taking alprazolam 1 mg.  Patient reports she is able to fall asleep, but weeks out after 3 to 4 hours and not able to go back to sleep.  She did increase her amitriptyline to 150 mg nightly, but discontinued her alprazolam because she was not sure if that would be too much.  She reports some improvement with increased dose of amitriptyline.  She reports she is also taking Tylenol PM, melatonin, gabapentin.  Reports she was taking Ambien in the past for about 10 years, but it became not effective.  Patient reports sleep study about 8 or 9 years ago, reports being no sleep apnea.        Chronic low back pain without sciatica  Patient struggles with chronic low back pain, usually without sciatica.  She now has new onset of radicular symptoms in left lower extremity.  Pain radiates from the left buttock down posterior thigh to anterior shin.  Reports symptoms and numbness.  Onset 6 weeks ago.  Denies foot drop, saddle paresthesia, fecal or urinary incontinence, fever.  Has had epidural steroid injections, ablations.  Has seen pain management in  the past and frustrated with not resolving the issue.  Last time had physical therapy was 4 and 1/2 years ago.  Lumbar MRI from 2/2018 showed multilevel degenerative disc disease and facet degeneration.  No significant central canal or neuroforaminal narrowing.  This patient is having new radicular symptoms, will get updated MRI.      Allergies: Shellfish allergy    Current Outpatient Medications Ordered in Epic   Medication Sig Dispense Refill   • amitriptyline (ELAVIL) 75 MG Tab Take 2 Tabs by mouth at bedtime as needed. 180 Tab 1   • methocarbamol (ROBAXIN) 750 MG Tab TAKE 1 TABLET BY MOUTH EVERY DAY AS NEEDED 30 Tab 0   • hydrochlorothiazide (MICROZIDE) 12.5 MG capsule TAKE 1-2 CAPS BY MOUTH EVERY DAY. 180 Cap 0   • rizatriptan (MAXALT) 10 MG tablet TAKE 1 TAB BY MOUTH ONCE AS NEEDED FOR MIGRAINE FOR UP TO 1 DOSE. MAY REPEAT IN 2 HOURS IF NEEDED 10 Tab 2   • Diclofenac Sodium 1 % Gel APPLY 2 GM UP TO 4 TIMES PER DAY IF NEEDED FOR BACK PAIN 1 Tube 5   • [START ON 8/20/2019] ALPRAZolam (XANAX) 1 MG Tab Take 1 Tab by mouth at bedtime as needed for Sleep for up to 45 days. May take an additional 1 mg up to 10 days in the month for anxiety 40 Tab 0   • omeprazole (PRILOSEC) 20 MG delayed-release capsule TAKE 1 CAP BY MOUTH EVERY DAY 30 MINUTES BEFORE FOOD OR DRINK, ON AN EMPTY STOMACH. 90 Cap 0   • gabapentin (NEURONTIN) 300 MG Cap TAKE 1 CAPSULE BY MOUTH THREE TIMES A  Cap 0   • fluticasone (FLONASE) 50 MCG/ACT nasal spray Spray 2 Sprays in nose every day. 16 g 11   • topiramate (TOPAMAX) 50 MG tablet Take 1 Tab by mouth 2 times a day. 60 Tab 5   • metFORMIN ER (GLUCOPHAGE XR) 750 MG TABLET SR 24 HR Take 1 Tab by mouth every day. 90 Tab 1   • busPIRone (BUSPAR) 15 MG tablet TAKE 1 TABLET BY MOUTH THREE TIMES A  Tab 1   • acetaminophen (TYLENOL) 325 MG Tab Tylenol 325 mg tablet   Take 2 tablets every 6 hours by oral route.     • Ibuprofen 200 MG Cap ibuprofen 200 mg capsule   Take 1 capsule every 6  "hours by oral route.     • albuterol 108 (90 Base) MCG/ACT Aero Soln inhalation aerosol Inhale 2 Puffs by mouth every 6 hours as needed for Shortness of Breath. 6.7 Inhaler 2   • ondansetron (ZOFRAN ODT) 8 MG TABLET DISPERSIBLE Take 1 Tab by mouth every 8 hours as needed for Nausea. 15 Tab 0   • aspirin (ASA) 325 MG TABS Take 325 mg by mouth as needed. weekly        No current Epic-ordered facility-administered medications on file.        Past Medical History:   Diagnosis Date   • Anesthesia     low bp coming out of anesthesia \"one time\"   • Anxiety 11/18/2011   • Arrhythmia     ? pt unsure   • Arthritis     fibromyalgia   • Breast mass, left    • Breath shortness     occasionally   • Bursitis of knee     left   • Cancer ([facility]) 2005    squamous cell on nose   • Chronic pain 11/18/2011   • Depression 11/18/2011   • Fibromyalgia 11/18/2011   • Gastro-esophageal reflux 3/20/2012   • Heart murmur    • Iron deficiency anemia 3/20/2012   • Neuropathy ([facility]) 11/18/2011   • Other specified disorder of intestines    • Pain 6/14/12    3/10 stomach   • Pneumonia 01/2012   • Pulmonary hypertension ([facility]) 3/20/2012   • Syncope and collapse 3/20/2012       Past Surgical History:   Procedure Laterality Date   • CATH PLACEMENT  1/25/2013    Performed by Lizeth Ferreira M.D. at SURGERY SAME DAY [facility] ORS   • BREAST BIOPSY  1/9/2013    Performed by Lizeth Ferreira M.D. at SURGERY SAME DAY [facility] ORS   • AXILLARY NODE DISSECTION  1/9/2013    Performed by Lizeth Ferreira M.D. at SURGERY SAME DAY [facility] ORS   • NODE BIOPSY  1/9/2013    Performed by Lizeth Ferreira M.D. at SURGERY SAME DAY [facility] ORS   • BREAST BIOPSY  12/11/2012    Performed by Lizeth Ferreira M.D. at SURGERY SAME DAY [facility] ORS   • COLONOSCOPY  6/21/2012    Performed by PARAM VALDEZ at SURGERY [facility] ORS   • GASTROSCOPY  6/21/2012    Performed by PARAM VALDEZ at SURGERY [facility] ORS   • ABDOMINAL EXPLORATION  10/7/2010    Performed by BILL " "MARIA G WALKER JR at SURGERY [facility] ORS   • IRRIGATION & DEBRIDEMENT GENERAL  4/12/2010    Performed by OZZIE WEINSTEIN at SURGERY [facility] ORS   • HERNIA REPAIR  3/15/2010    Performed by MARIA G KHAN JR at SURGERY [facility] ORS   • FLAP GRAFT  3/15/2010    Performed by MARIA G KHAN JR at SURGERY [facility] ORS   • PANNICULECTOMY  3/15/2010    Performed by MARIA G KHAN JR at SURGERY [facility] ORS   • ABDOMINAL EXPLORATION  3/11/2010    Performed by MARIA G KHAN JR at SURGERY SAME DAY [facility] ORS   • OTHER  08/09    pulled mesh out of hernia   • OTHER  07/09    abscess removed abd.   • OTHER  06/09    bowel obstruction   • OTHER  04/2009    hernia repair,mesh removal after in aug.09   • OTHER  2005    bariatric surgery gastric bypass   • GASTRIC BYPASS LAPAROSCOPIC  2005   • GYN SURGERY  1984    tubal       Social History     Tobacco Use   • Smoking status: Former Smoker     Packs/day: 0.10     Years: 8.00     Pack years: 0.80     Types: Cigarettes     Last attempt to quit: 4/25/2016     Years since quitting: 3.2   • Smokeless tobacco: Never Used   • Tobacco comment: 1/2 pk a day on and off 10 yrs, 1 cigarette daily   Substance Use Topics   • Alcohol use: Yes     Alcohol/week: 0.0 oz     Comment: 5 a month, social   • Drug use: No       Family History   Problem Relation Age of Onset   • Heart Attack Father    • Hypertension Mother    • Cancer Maternal Grandfather         leukemia   • Cancer Other         leukemia- cousin   • Diabetes Maternal Uncle    • Diabetes Maternal Uncle        ROS:   As in HPI, otherwise negative for chest pain, dyspnea, abdominal pain, dysuria, blood in stool, fever    Exam: /74 (BP Location: Left arm, Patient Position: Sitting, BP Cuff Size: Adult)   Pulse 83   Temp 36.6 °C (97.8 °F)   Resp 12   Ht 1.651 m (5' 5\")   Wt 123.8 kg (273 lb)   SpO2 94%  Body mass index is 45.43 kg/m².    General: Alert, pleasant, obese habitus, well nourished, " well developed female in NAD  Neck: Supple without bruit. Thyroid is not enlarged.  Pulmonary: Clear to ausculation.  Normal effort. No rales, ronchi, or wheezing.  Cardiovascular: Normal rate and rhythm without murmur. Carotid and radial pulses are intact and equal bilaterally.  No lower extremity edema.  Abdomen: Soft  Neurologic: Grossly nonfocal  Lymph: No cervical or supraclavicular lymph nodes are palpable  Skin: Warm and dry.  No obvious lesions.  Lumbar spine: Mildly tender to palpation left buttock, weak tiptoe and heel walk, limited flexion and extension secondary to pain, severe antalgic gait.   Psych: Normal mood and affect. Alert and oriented. Judgment and insight is normal.    Please note that this dictation was created using voice recognition software. I have made every reasonable attempt to correct obvious errors, but I expect that there are errors of grammar and possibly content that I did not discover before finalizing the note.      Assessment/Plan  1. Primary insomnia  We will increase amitriptyline to 150 mg nightly.  Did explain to patient that she can continue to take alprazolam.  Feels that insomnia may be related to anxiety.  Will refer to psychiatry for further evaluation management.  - amitriptyline (ELAVIL) 75 MG Tab; Take 2 Tabs by mouth at bedtime as needed.  Dispense: 180 Tab; Refill: 1  - REFERRAL TO PSYCHIATRY    2. Anxiety    - REFERRAL TO PSYCHIATRY    3. Type 2 diabetes mellitus without complication, without long-term current use of insulin (HCC)  Improved control with metformin.  Continue with metformin 750 mg daily.  Continue to work on lifestyle measures including low-carb diet, weight loss, routine aerobic exercise as tolerated.  - POCT Retinal Eye Exam  - POCT Hemoglobin A1C    4. Fibromyalgia    - amitriptyline (ELAVIL) 75 MG Tab; Take 2 Tabs by mouth at bedtime as needed.  Dispense: 180 Tab; Refill: 1    5. Neuropathy (HCC)    - amitriptyline (ELAVIL) 75 MG Tab; Take 2 Tabs  by mouth at bedtime as needed.  Dispense: 180 Tab; Refill: 1    6. Chronic tension-type headache, not intractable    - amitriptyline (ELAVIL) 75 MG Tab; Take 2 Tabs by mouth at bedtime as needed.  Dispense: 180 Tab; Refill: 1    7. Lumbar back pain with radiculopathy affecting left lower extremity  Patient has chronic low back pain, now presenting with left leg pain.  Will get updated MRI.  Will review to spine surgery.  Strict ER precautions reviewed with patient.  - MR-LUMBAR SPINE-W/O; Future  - REFERRAL TO SPINE SURGERY    8. Severe episode of recurrent major depressive disorder, without psychotic features (HCC)  Patient reports depression feels well controlled, though does have anxiety.  Cymbalta was decreased as recommended by pain management for concern of serotonin syndrome possibility.  - REFERRAL TO PSYCHIATRY    9. Chronic midline low back pain without sciatica      Patient will return to clinic in 1 month for insomnia or sooner if needed.

## 2019-08-07 NOTE — ASSESSMENT & PLAN NOTE
Chronic health problem, uncontrolled with current medications and lifestyle measures.  Taking amitriptyline 100 mg at bedtime.  Also taking alprazolam 1 mg.  Patient reports she is able to fall asleep, but weeks out after 3 to 4 hours and not able to go back to sleep.  She did increase her amitriptyline to 150 mg nightly, but discontinued her alprazolam because she was not sure if that would be too much.  She reports some improvement with increased dose of amitriptyline.  She reports she is also taking Tylenol PM, melatonin, gabapentin.  Reports she was taking Ambien in the past for about 10 years, but it became not effective.  Patient reports sleep study about 8 or 9 years ago, reports being no sleep apnea.  Patient does admit to racing thoughts in the middle of the night, somewhat anxiety related.

## 2019-08-07 NOTE — ASSESSMENT & PLAN NOTE
This is a chronic health problem that is well controlled with current medications and lifestyle measures.  Patient was started on metformin  mg daily about 3 months ago.  Tolerating medication, denies stomach upset.  Point-of-care A1c today is 5.8%.  Has been working on lifestyle measures.  Overdue for eye exam.    Component      Latest Ref Rng & Units 4/25/2019 8/7/2019           1:24 PM 11:00 AM   Glycohemoglobin      0.0 - 5.6 % 6.9 (A) 5.8 (A)

## 2019-08-08 PROBLEM — M54.16 LUMBAR BACK PAIN WITH RADICULOPATHY AFFECTING LEFT LOWER EXTREMITY: Status: ACTIVE | Noted: 2019-08-08

## 2019-08-09 NOTE — ASSESSMENT & PLAN NOTE
Patient struggles with chronic low back pain, usually without sciatica.  She now has new onset of radicular symptoms in left lower extremity.  Pain radiates from the left buttock down posterior thigh to anterior shin.  Reports symptoms and numbness.  Onset 6 weeks ago.  Denies foot drop, saddle paresthesia, fecal or urinary incontinence, fever.  Has had epidural steroid injections, ablations.  Has seen pain management in the past and frustrated with not resolving the issue.  Last time had physical therapy was 4 and 1/2 years ago.  Lumbar MRI from 2/2018 showed multilevel degenerative disc disease and facet degeneration.  No significant central canal or neuroforaminal narrowing.  This patient is having new radicular symptoms, will get updated MRI.

## 2019-08-13 LAB — RETINAL SCREEN: NEGATIVE

## 2019-08-15 ENCOUNTER — HOSPITAL ENCOUNTER (OUTPATIENT)
Dept: RADIOLOGY | Facility: MEDICAL CENTER | Age: 59
End: 2019-08-15
Attending: NURSE PRACTITIONER
Payer: COMMERCIAL

## 2019-08-15 DIAGNOSIS — M54.16 LUMBAR BACK PAIN WITH RADICULOPATHY AFFECTING LEFT LOWER EXTREMITY: ICD-10-CM

## 2019-08-15 PROCEDURE — 72148 MRI LUMBAR SPINE W/O DYE: CPT

## 2019-08-20 DIAGNOSIS — F41.9 ANXIETY: ICD-10-CM

## 2019-08-21 RX ORDER — BUSPIRONE HYDROCHLORIDE 15 MG/1
TABLET ORAL
Qty: 270 TAB | Refills: 0 | Status: SHIPPED | OUTPATIENT
Start: 2019-08-21 | End: 2019-11-14 | Stop reason: SDUPTHER

## 2019-08-21 NOTE — TELEPHONE ENCOUNTER
Was the patient seen in the last year in this department? Yes    Does patient have an active prescription for medications requested? No     Received Request Via: Pharmacy      Pt met protocol?: Yes   Pt last ov 8/2019   BP Readings from Last 1 Encounters:   08/07/19 138/74

## 2019-08-29 DIAGNOSIS — M79.7 FIBROMYALGIA: ICD-10-CM

## 2019-08-30 RX ORDER — METHOCARBAMOL 750 MG/1
TABLET, FILM COATED ORAL
Qty: 30 TAB | Refills: 0 | Status: SHIPPED | OUTPATIENT
Start: 2019-08-30 | End: 2019-09-29 | Stop reason: SDUPTHER

## 2019-08-30 NOTE — TELEPHONE ENCOUNTER
Was the patient seen in the last year in this department? Yes    Does patient have an active prescription for medications requested? No     Received Request Via: Pharmacy    Pt met protocol?: Yes     Last OV 08/07/19

## 2019-09-14 DIAGNOSIS — G44.229 CHRONIC TENSION-TYPE HEADACHE, NOT INTRACTABLE: ICD-10-CM

## 2019-09-14 DIAGNOSIS — F51.01 PRIMARY INSOMNIA: ICD-10-CM

## 2019-09-14 DIAGNOSIS — G62.9 NEUROPATHY: ICD-10-CM

## 2019-09-14 DIAGNOSIS — M79.7 FIBROMYALGIA: ICD-10-CM

## 2019-09-16 RX ORDER — AMITRIPTYLINE HYDROCHLORIDE 50 MG/1
100 TABLET, FILM COATED ORAL NIGHTLY PRN
Qty: 180 TAB | Refills: 0 | OUTPATIENT
Start: 2019-09-16

## 2019-09-19 ENCOUNTER — OFFICE VISIT (OUTPATIENT)
Dept: MEDICAL GROUP | Facility: PHYSICIAN GROUP | Age: 59
End: 2019-09-19
Payer: COMMERCIAL

## 2019-09-19 VITALS
HEIGHT: 65 IN | TEMPERATURE: 99.4 F | SYSTOLIC BLOOD PRESSURE: 124 MMHG | BODY MASS INDEX: 45.35 KG/M2 | WEIGHT: 272.2 LBS | RESPIRATION RATE: 14 BRPM | OXYGEN SATURATION: 93 % | DIASTOLIC BLOOD PRESSURE: 80 MMHG | HEART RATE: 104 BPM

## 2019-09-19 DIAGNOSIS — Z13.29 SCREENING FOR THYROID DISORDER: ICD-10-CM

## 2019-09-19 DIAGNOSIS — E78.1 HYPERTRIGLYCERIDEMIA: ICD-10-CM

## 2019-09-19 DIAGNOSIS — Z13.6 SCREENING FOR CARDIOVASCULAR CONDITION: ICD-10-CM

## 2019-09-19 DIAGNOSIS — M54.50 CHRONIC MIDLINE LOW BACK PAIN WITHOUT SCIATICA: ICD-10-CM

## 2019-09-19 DIAGNOSIS — Z23 INFLUENZA VACCINE NEEDED: ICD-10-CM

## 2019-09-19 DIAGNOSIS — E55.9 VITAMIN D DEFICIENCY DISEASE: ICD-10-CM

## 2019-09-19 DIAGNOSIS — M25.552 LEFT HIP PAIN: ICD-10-CM

## 2019-09-19 DIAGNOSIS — E11.9 TYPE 2 DIABETES MELLITUS WITHOUT COMPLICATION, WITHOUT LONG-TERM CURRENT USE OF INSULIN (HCC): ICD-10-CM

## 2019-09-19 DIAGNOSIS — G89.29 CHRONIC MIDLINE LOW BACK PAIN WITHOUT SCIATICA: ICD-10-CM

## 2019-09-19 PROCEDURE — 90471 IMMUNIZATION ADMIN: CPT | Performed by: NURSE PRACTITIONER

## 2019-09-19 PROCEDURE — 99214 OFFICE O/P EST MOD 30 MIN: CPT | Mod: 25 | Performed by: NURSE PRACTITIONER

## 2019-09-19 PROCEDURE — 90686 IIV4 VACC NO PRSV 0.5 ML IM: CPT | Performed by: NURSE PRACTITIONER

## 2019-09-19 RX ORDER — MELOXICAM 7.5 MG/1
7.5 TABLET ORAL DAILY
Qty: 30 TAB | Refills: 0 | Status: SHIPPED | OUTPATIENT
Start: 2019-09-19 | End: 2019-10-20 | Stop reason: SDUPTHER

## 2019-09-19 NOTE — ASSESSMENT & PLAN NOTE
"Patient reports left hip pain radiating down lateral/posterior leg to calf.  Onset 3 months ago.  Denies injury.  Was thought to be low back pain with radicular symptoms and MRI of Lspine was ordered.  MRI was unremarkable.  Did consult with spine surgeon, Dr. Neri.  I have reviewed his note, in media tab. Thought is that it may be related to left hip and ordered left hip MRI and pelvic MRI.  Patient is scheduled to have imaging done on 9/24/19.  Will also order hip xray.  Pain is not getting any better.  Worse with ambulation causing her to limp. Aggravated during bedtime with movement, affecting her sleep.  Has tried ice, heat, ibuprofen, methocarbamol, and voltaren gel without relief.  She does take gabapentin at bedtime.  Advised that she can increase to take during the day, but she reports it makes her feel \"really out of it.\"    "

## 2019-09-19 NOTE — PROGRESS NOTES
"  CC: Left hip pain    HISTORY OF THE PRESENT ILLNESS: Patient is a 59 y.o. female. This pleasant patient is here today for evaluation management of the following health problems.      Left hip pain  Patient reports left hip pain radiating down lateral/posterior leg to calf.  Onset 3 months ago.  Denies injury.  Was thought to be low back pain with radicular symptoms and MRI of Lspine was ordered.  MRI was unremarkable.  Did consult with spine surgeon, Dr. Neri.  I have reviewed his note, in media tab. Thought is that it may be related to left hip and ordered left hip MRI and pelvic MRI.  Patient is scheduled to have imaging done on 9/24/19.  Will also order hip xray.  Pain is not getting any better.  Worse with ambulation causing her to limp. Aggravated during bedtime with movement, affecting her sleep.  Has tried ice, heat, ibuprofen, methocarbamol, and voltaren gel without relief.  She does take gabapentin at bedtime.  Advised that she can increase to take during the day, but she reports it makes her feel \"really out of it.\"      Chronic low back pain without sciatica  See additional notes.      Allergies: Shellfish allergy    Current Outpatient Medications Ordered in Epic   Medication Sig Dispense Refill   • meloxicam (MOBIC) 7.5 MG Tab Take 1 Tab by mouth every day for 30 days. 30 Tab 0   • methocarbamol (ROBAXIN) 750 MG Tab TAKE 1 TABLET BY MOUTH EVERY DAY AS NEEDED 30 Tab 0   • busPIRone (BUSPAR) 15 MG tablet TAKE 1 TABLET BY MOUTH THREE TIMES A  Tab 0   • amitriptyline (ELAVIL) 75 MG Tab Take 2 Tabs by mouth at bedtime as needed. 180 Tab 1   • hydrochlorothiazide (MICROZIDE) 12.5 MG capsule TAKE 1-2 CAPS BY MOUTH EVERY DAY. 180 Cap 0   • rizatriptan (MAXALT) 10 MG tablet TAKE 1 TAB BY MOUTH ONCE AS NEEDED FOR MIGRAINE FOR UP TO 1 DOSE. MAY REPEAT IN 2 HOURS IF NEEDED 10 Tab 2   • Diclofenac Sodium 1 % Gel APPLY 2 GM UP TO 4 TIMES PER DAY IF NEEDED FOR BACK PAIN 1 Tube 5   • ALPRAZolam (XANAX) 1 MG " "Tab Take 1 Tab by mouth at bedtime as needed for Sleep for up to 45 days. May take an additional 1 mg up to 10 days in the month for anxiety 40 Tab 0   • omeprazole (PRILOSEC) 20 MG delayed-release capsule TAKE 1 CAP BY MOUTH EVERY DAY 30 MINUTES BEFORE FOOD OR DRINK, ON AN EMPTY STOMACH. 90 Cap 0   • gabapentin (NEURONTIN) 300 MG Cap TAKE 1 CAPSULE BY MOUTH THREE TIMES A  Cap 0   • fluticasone (FLONASE) 50 MCG/ACT nasal spray Spray 2 Sprays in nose every day. 16 g 11   • topiramate (TOPAMAX) 50 MG tablet Take 1 Tab by mouth 2 times a day. 60 Tab 5   • metFORMIN ER (GLUCOPHAGE XR) 750 MG TABLET SR 24 HR Take 1 Tab by mouth every day. 90 Tab 1   • acetaminophen (TYLENOL) 325 MG Tab Tylenol 325 mg tablet   Take 2 tablets every 6 hours by oral route.     • albuterol 108 (90 Base) MCG/ACT Aero Soln inhalation aerosol Inhale 2 Puffs by mouth every 6 hours as needed for Shortness of Breath. 6.7 Inhaler 2   • ondansetron (ZOFRAN ODT) 8 MG TABLET DISPERSIBLE Take 1 Tab by mouth every 8 hours as needed for Nausea. 15 Tab 0   • aspirin (ASA) 325 MG TABS Take 325 mg by mouth as needed. weekly        No current Epic-ordered facility-administered medications on file.        Past Medical History:   Diagnosis Date   • Anesthesia     low bp coming out of anesthesia \"one time\"   • Anxiety 11/18/2011   • Arrhythmia     ? pt unsure   • Arthritis     fibromyalgia   • Breast mass, left    • Breath shortness     occasionally   • Bursitis of knee     left   • Cancer ([facility]) 2005    squamous cell on nose   • Chronic pain 11/18/2011   • Depression 11/18/2011   • Fibromyalgia 11/18/2011   • Gastro-esophageal reflux 3/20/2012   • Heart murmur    • Iron deficiency anemia 3/20/2012   • Neuropathy ([facility]) 11/18/2011   • Other specified disorder of intestines    • Pain 6/14/12    3/10 stomach   • Pneumonia 01/2012   • Pulmonary hypertension ([facility]) 3/20/2012   • Syncope and collapse 3/20/2012       Past Surgical History:   Procedure Laterality " Date   • CATH PLACEMENT  1/25/2013    Performed by Lizeth Ferreira M.D. at SURGERY SAME DAY [facility] ORS   • BREAST BIOPSY  1/9/2013    Performed by Lizeth Ferreira M.D. at SURGERY SAME DAY [facility]S   • AXILLARY NODE DISSECTION  1/9/2013    Performed by Lizeth Ferreira M.D. at SURGERY SAME DAY [facility]S   • NODE BIOPSY  1/9/2013    Performed by Lizeth Ferreira M.D. at SURGERY SAME DAY [facility]S   • BREAST BIOPSY  12/11/2012    Performed by Lizeth Ferreira M.D. at SURGERY SAME DAY [facility] ORS   • COLONOSCOPY  6/21/2012    Performed by PARAM VALDEZ at SURGERY [facility] ORS   • GASTROSCOPY  6/21/2012    Performed by PARAM VALDEZ at SURGERY [facility] ORS   • ABDOMINAL EXPLORATION  10/7/2010    Performed by MARIA G KHAN JR at SURGERY [facility] ORS   • IRRIGATION & DEBRIDEMENT GENERAL  4/12/2010    Performed by OZZIE WEINSTEIN at SURGERY [facility] ORS   • HERNIA REPAIR  3/15/2010    Performed by MARIA G KHAN JR at SURGERY [facility] ORS   • FLAP GRAFT  3/15/2010    Performed by MARIA G KHAN JR at SURGERY [facility] ORS   • PANNICULECTOMY  3/15/2010    Performed by MARIA G KHAN JR at SURGERY [facility] ORS   • ABDOMINAL EXPLORATION  3/11/2010    Performed by MARIA G KHAN JR at SURGERY SAME DAY [facility]S   • OTHER  08/09    pulled mesh out of hernia   • OTHER  07/09    abscess removed abd.   • OTHER  06/09    bowel obstruction   • OTHER  04/2009    hernia repair,mesh removal after in aug.09   • OTHER  2005    bariatric surgery gastric bypass   • GASTRIC BYPASS LAPAROSCOPIC  2005   • GYN SURGERY  1984    tubal       Social History     Tobacco Use   • Smoking status: Former Smoker     Packs/day: 0.10     Years: 8.00     Pack years: 0.80     Types: Cigarettes     Last attempt to quit: 4/25/2016     Years since quitting: 3.4   • Smokeless tobacco: Never Used   • Tobacco comment: 1/2 pk a day on and off 10 yrs, 1 cigarette daily   Substance Use Topics   •  "Alcohol use: Yes     Alcohol/week: 0.0 oz     Comment: 5 a month, social   • Drug use: No       Family History   Problem Relation Age of Onset   • Heart Attack Father    • Hypertension Mother    • Cancer Maternal Grandfather         leukemia   • Cancer Other         leukemia- cousin   • Diabetes Maternal Uncle    • Diabetes Maternal Uncle        ROS:   As in HPI, otherwise negative for chest pain, dyspnea, abdominal pain, dysuria, blood in stool, fever          Exam: /80 (BP Location: Right arm, Patient Position: Sitting, BP Cuff Size: Adult)   Pulse (!) 104   Temp 37.4 °C (99.4 °F)   Resp 14   Ht 1.651 m (5' 5\")   Wt 123.5 kg (272 lb 3.2 oz)   SpO2 93%  Body mass index is 45.3 kg/m².    General: Alert, pleasant, obese habitus, Well nourished, well developed female in NAD  Neck: Supple without bruit.   Pulmonary: Clear to ausculation.  Normal effort. No rales, ronchi, or wheezing.  Cardiovascular: Normal rate and rhythm without murmur. Carotid and radial pulses are intact and equal bilaterally.  No lower extremity edema.  Abdomen: Soft, nondistended.   Neurologic: Grossly nonfocal  Skin: Warm and dry.    Musculoskeletal: Moderately antalgic gait, tender to palpation left buttock..   Psych: Normal mood and affect. Alert and oriented. Judgment and insight is normal.    Please note that this dictation was created using voice recognition software. I have made every reasonable attempt to correct obvious errors, but I expect that there are errors of grammar and possibly content that I did not discover before finalizing the note.      Assessment/Plan  1. Hypertriglyceridemia  Will review results at next appointment.  - Lipid Profile; Future    2. Vitamin D deficiency disease  Will review results at next appointment.  - VITAMIN D,25 HYDROXY; Future    3. Influenza vaccine needed  Given.  - Influenza Vaccine Quad Injection (PF)    4. Screening for cardiovascular condition  Will review results at next " appointment.  - Comp Metabolic Panel; Future  - ESTIMATED GFR; Future    5. Screening for thyroid disorder  Will review results at next appointment.  - TSH; Future  - FREE THYROXINE; Future    6.  Left hip pain  Will trial meloxicam.  Instructed to take with food and to not take any other NSAIDS while taking meloxicam.  Patient will notify me when MRI's are completed. Will order hip xray. If normal results, consider physiatry or PT.  - DX-HIP-COMPLETE - UNILATERAL 2+ LEFT; Future  - meloxicam (MOBIC) 7.5 MG Tab; Take 1 Tab by mouth every day for 30 days.  Dispense: 30 Tab; Refill: 0    7. Type 2 diabetes mellitus without complication, without long-term current use of insulin (HCC)  Will review results at next appointment.  - Comp Metabolic Panel; Future  - Lipid Profile; Future  - TSH; Future  - FREE THYROXINE; Future  - ESTIMATED GFR; Future  - HEMOGLOBIN A1C; Future    8. Chronic midline low back pain without sciatica  Continue with medications and lifestyle measures. Continue to monitor.    Patient will return to clinic in 3 months or sooner if needed.

## 2019-09-24 ENCOUNTER — APPOINTMENT (OUTPATIENT)
Dept: RADIOLOGY | Facility: MEDICAL CENTER | Age: 59
End: 2019-09-24
Attending: NEUROLOGICAL SURGERY
Payer: COMMERCIAL

## 2019-09-25 DIAGNOSIS — M79.7 FIBROMYALGIA: ICD-10-CM

## 2019-09-25 DIAGNOSIS — G62.9 NEUROPATHY: ICD-10-CM

## 2019-09-25 RX ORDER — GABAPENTIN 300 MG/1
CAPSULE ORAL
Qty: 270 CAP | Refills: 0 | Status: SHIPPED | OUTPATIENT
Start: 2019-09-25 | End: 2019-12-18 | Stop reason: SDUPTHER

## 2019-09-25 NOTE — TELEPHONE ENCOUNTER
Was the patient seen in the last year in this department? Yes    Does patient have an active prescription for medications requested? No     Received Request Via: Pharmacy      Pt met protocol?: Yes, OV last week

## 2019-09-27 DIAGNOSIS — T39.395A NSAID INDUCED GASTRITIS: ICD-10-CM

## 2019-09-27 DIAGNOSIS — K29.60 NSAID INDUCED GASTRITIS: ICD-10-CM

## 2019-09-29 DIAGNOSIS — M79.7 FIBROMYALGIA: ICD-10-CM

## 2019-09-29 RX ORDER — OMEPRAZOLE 20 MG/1
CAPSULE, DELAYED RELEASE ORAL
Qty: 90 CAP | Refills: 0 | Status: SHIPPED | OUTPATIENT
Start: 2019-09-29 | End: 2019-12-24

## 2019-09-30 RX ORDER — METHOCARBAMOL 750 MG/1
TABLET, FILM COATED ORAL
Qty: 90 TAB | Refills: 1 | Status: SHIPPED | OUTPATIENT
Start: 2019-09-30 | End: 2020-04-02

## 2019-09-30 NOTE — TELEPHONE ENCOUNTER
Was the patient seen in the last year in this department? Yes    Does patient have an active prescription for medications requested? No     Received Request Via: Pharmacy      Pt met protocol?: Yes   Pt last ov 9/2019

## 2019-10-05 DIAGNOSIS — J40 BRONCHITIS: ICD-10-CM

## 2019-10-07 ENCOUNTER — HOSPITAL ENCOUNTER (OUTPATIENT)
Dept: LAB | Facility: MEDICAL CENTER | Age: 59
End: 2019-10-07
Attending: NURSE PRACTITIONER
Payer: COMMERCIAL

## 2019-10-07 DIAGNOSIS — Z13.29 SCREENING FOR THYROID DISORDER: ICD-10-CM

## 2019-10-07 DIAGNOSIS — E11.9 TYPE 2 DIABETES MELLITUS WITHOUT COMPLICATION, WITHOUT LONG-TERM CURRENT USE OF INSULIN (HCC): ICD-10-CM

## 2019-10-07 DIAGNOSIS — Z13.6 SCREENING FOR CARDIOVASCULAR CONDITION: ICD-10-CM

## 2019-10-07 DIAGNOSIS — E55.9 VITAMIN D DEFICIENCY DISEASE: ICD-10-CM

## 2019-10-07 DIAGNOSIS — E78.1 HYPERTRIGLYCERIDEMIA: ICD-10-CM

## 2019-10-07 LAB
25(OH)D3 SERPL-MCNC: 17 NG/ML (ref 30–100)
ALBUMIN SERPL BCP-MCNC: 4.4 G/DL (ref 3.2–4.9)
ALBUMIN/GLOB SERPL: 1.9 G/DL
ALP SERPL-CCNC: 102 U/L (ref 30–99)
ALT SERPL-CCNC: 32 U/L (ref 2–50)
ANION GAP SERPL CALC-SCNC: 9 MMOL/L (ref 0–11.9)
AST SERPL-CCNC: 28 U/L (ref 12–45)
BILIRUB SERPL-MCNC: 0.4 MG/DL (ref 0.1–1.5)
BUN SERPL-MCNC: 20 MG/DL (ref 8–22)
CALCIUM SERPL-MCNC: 8.5 MG/DL (ref 8.5–10.5)
CHLORIDE SERPL-SCNC: 105 MMOL/L (ref 96–112)
CHOLEST SERPL-MCNC: 177 MG/DL (ref 100–199)
CO2 SERPL-SCNC: 24 MMOL/L (ref 20–33)
CREAT SERPL-MCNC: 0.78 MG/DL (ref 0.5–1.4)
FASTING STATUS PATIENT QL REPORTED: NORMAL
GLOBULIN SER CALC-MCNC: 2.3 G/DL (ref 1.9–3.5)
GLUCOSE SERPL-MCNC: 151 MG/DL (ref 65–99)
HDLC SERPL-MCNC: 45 MG/DL
LDLC SERPL CALC-MCNC: 80 MG/DL
POTASSIUM SERPL-SCNC: 4.2 MMOL/L (ref 3.6–5.5)
PROT SERPL-MCNC: 6.7 G/DL (ref 6–8.2)
SODIUM SERPL-SCNC: 138 MMOL/L (ref 135–145)
T4 FREE SERPL-MCNC: 0.72 NG/DL (ref 0.53–1.43)
TRIGL SERPL-MCNC: 259 MG/DL (ref 0–149)
TSH SERPL DL<=0.005 MIU/L-ACNC: 4.04 UIU/ML (ref 0.38–5.33)

## 2019-10-07 PROCEDURE — 84443 ASSAY THYROID STIM HORMONE: CPT

## 2019-10-07 PROCEDURE — 80061 LIPID PANEL: CPT

## 2019-10-07 PROCEDURE — 36415 COLL VENOUS BLD VENIPUNCTURE: CPT

## 2019-10-07 PROCEDURE — 82306 VITAMIN D 25 HYDROXY: CPT

## 2019-10-07 PROCEDURE — 84439 ASSAY OF FREE THYROXINE: CPT

## 2019-10-07 PROCEDURE — 80053 COMPREHEN METABOLIC PANEL: CPT

## 2019-10-07 PROCEDURE — 83036 HEMOGLOBIN GLYCOSYLATED A1C: CPT | Mod: GA

## 2019-10-07 RX ORDER — ALBUTEROL SULFATE 90 UG/1
2 AEROSOL, METERED RESPIRATORY (INHALATION) EVERY 6 HOURS PRN
Qty: 6.7 INHALER | Refills: 2 | Status: SHIPPED | OUTPATIENT
Start: 2019-10-07 | End: 2020-01-07

## 2019-10-07 NOTE — TELEPHONE ENCOUNTER
Was the patient seen in the last year in this department? Yes    Does patient have an active prescription for medications requested? No     Received Request Via: Pharmacy      Pt met protocol?: Yes   Pt last ov 9/19/19

## 2019-10-08 LAB
EST. AVERAGE GLUCOSE BLD GHB EST-MCNC: 126 MG/DL
HBA1C MFR BLD: 6 % (ref 0–5.6)

## 2019-10-09 ENCOUNTER — TELEPHONE (OUTPATIENT)
Dept: MEDICAL GROUP | Facility: PHYSICIAN GROUP | Age: 59
End: 2019-10-09

## 2019-10-09 DIAGNOSIS — E55.9 VITAMIN D DEFICIENCY DISEASE: ICD-10-CM

## 2019-10-09 RX ORDER — ERGOCALCIFEROL 1.25 MG/1
50000 CAPSULE ORAL
Qty: 12 CAP | Refills: 0 | Status: SHIPPED | OUTPATIENT
Start: 2019-10-09 | End: 2020-01-02

## 2019-10-10 ENCOUNTER — HOSPITAL ENCOUNTER (OUTPATIENT)
Dept: RADIOLOGY | Facility: MEDICAL CENTER | Age: 59
End: 2019-10-10
Attending: NEUROLOGICAL SURGERY
Payer: COMMERCIAL

## 2019-10-10 DIAGNOSIS — M79.605 LEFT LEG PAIN: ICD-10-CM

## 2019-10-10 PROCEDURE — A9585 GADOBUTROL INJECTION: HCPCS | Performed by: NEUROLOGICAL SURGERY

## 2019-10-10 PROCEDURE — 73723 MRI JOINT LWR EXTR W/O&W/DYE: CPT | Mod: LT

## 2019-10-10 PROCEDURE — 72197 MRI PELVIS W/O & W/DYE: CPT

## 2019-10-10 PROCEDURE — 700117 HCHG RX CONTRAST REV CODE 255: Performed by: NEUROLOGICAL SURGERY

## 2019-10-10 RX ORDER — GADOBUTROL 604.72 MG/ML
12 INJECTION INTRAVENOUS ONCE
Status: COMPLETED | OUTPATIENT
Start: 2019-10-10 | End: 2019-10-10

## 2019-10-10 RX ADMIN — GADOBUTROL 12 ML: 604.72 INJECTION INTRAVENOUS at 15:12

## 2019-10-16 ENCOUNTER — OFFICE VISIT (OUTPATIENT)
Dept: MEDICAL GROUP | Facility: PHYSICIAN GROUP | Age: 59
End: 2019-10-16
Payer: COMMERCIAL

## 2019-10-16 VITALS
OXYGEN SATURATION: 95 % | BODY MASS INDEX: 45.32 KG/M2 | DIASTOLIC BLOOD PRESSURE: 80 MMHG | HEART RATE: 99 BPM | WEIGHT: 272 LBS | RESPIRATION RATE: 12 BRPM | SYSTOLIC BLOOD PRESSURE: 132 MMHG | TEMPERATURE: 98.3 F | HEIGHT: 65 IN

## 2019-10-16 DIAGNOSIS — F41.9 ANXIETY: ICD-10-CM

## 2019-10-16 DIAGNOSIS — G89.29 CHRONIC PAIN OF LEFT KNEE: ICD-10-CM

## 2019-10-16 DIAGNOSIS — F51.01 PRIMARY INSOMNIA: ICD-10-CM

## 2019-10-16 DIAGNOSIS — E11.9 TYPE 2 DIABETES MELLITUS WITHOUT COMPLICATION, WITHOUT LONG-TERM CURRENT USE OF INSULIN (HCC): ICD-10-CM

## 2019-10-16 DIAGNOSIS — M25.552 LEFT HIP PAIN: ICD-10-CM

## 2019-10-16 DIAGNOSIS — M25.562 CHRONIC PAIN OF LEFT KNEE: ICD-10-CM

## 2019-10-16 PROCEDURE — 99214 OFFICE O/P EST MOD 30 MIN: CPT | Performed by: NURSE PRACTITIONER

## 2019-10-16 RX ORDER — ALPRAZOLAM 1 MG/1
1 TABLET ORAL NIGHTLY PRN
Qty: 40 TAB | Refills: 0 | Status: SHIPPED | OUTPATIENT
Start: 2019-10-16 | End: 2019-11-30

## 2019-10-16 NOTE — PROGRESS NOTES
CC: Anxiety, hip pain    HISTORY OF THE PRESENT ILLNESS: Patient is a 59 y.o. female. This pleasant patient is here today for evaluation and management of the following health problems.      Left hip pain  Patient continues to struggle with left hip pain, radiating down lateral posterior leg to calf.  Onset 4 months ago.  MRI of left hip shows mild tendinopathy of the gluteus medius and minimus tendons and mild degenerative changes in left hip joint.  MRI of lumbar spine is unremarkable.  She has not gotten hip x-ray done.  Pain is aggravated by ambulation, during bedtime with movement, affecting her sleep.  Has tried ice, heat, ibuprofen, methocarbamol, Voltaren gel without relief.  She has been taking gabapentin with minimal relief.  She is open to referral to physical therapy and orthopedics.    HISTORY/REASON FOR EXAM:  Left hip and left leg pain..  History of breast cancer    TECHNIQUE/EXAM DESCRIPTION:  MRI of the left hip without contrast.    The study was performed on a Elle 1.5 Chaya MRI scanner.  T1 axial and T1 coronal images were obtained of both hips.  Axial and coronal proton density fat sat and sagittal proton density images were obtained of the left hip.    COMPARISON: None.    FINDINGS:  There is osteophytic spurring of the left acetabulum. No discrete articular cartilage loss is identified. There is mild thinning of the articular cartilage in the superior joint space. No abnormal bone marrow signal or edema identified. No abnormal   enhancement or enhancing mass.    No displaced labral tear is identified. The ligamentum teres appears intact.    No significant hip effusion is identified.    There is mild edema at the insertion of the gluteus minimus and medius tendons.    The ischiofemoral and quadratus femoris spaces are widely patent.    The hamstring tendons are normal in appearance.    Muscle signal is normal.      Impression       1.  No abnormal enhancement or enhancing mass identified. No  bone lesions to suggest metastasis.    2.  Mild tendinopathy of the gluteus medius and minimus tendons.    3.  Mild degenerative changes in the left hip joint         Anxiety  This is a chronic health problem that is well controlled with current medications and lifestyle measures.  Taking BuSpar 15 mg 3 times a day and alprazolam 1 mg as needed for episodes of anxiety and panic.  40 tabs last patient 45 days.  She is requesting refill today.  She does not drink alcohol or take opioids.  Reviewed risks including respiratory depression and that risks are increased with alcohol or opioids.   reviewed and appropriate.      Allergies: Shellfish allergy    Current Outpatient Medications Ordered in Epic   Medication Sig Dispense Refill   • ALPRAZolam (XANAX) 1 MG Tab Take 1 Tab by mouth at bedtime as needed for Sleep for up to 45 days. May take an additional 1 mg up to 10 days in the month for anxiety 40 Tab 0   • ergocalciferol (DRISDOL) 13602 UNIT capsule Take 1 Cap by mouth every 7 days for 85 days. 12 Cap 0   • albuterol 108 (90 Base) MCG/ACT Aero Soln inhalation aerosol INHALE 2 PUFFS BY MOUTH EVERY 6 HOURS AS NEEDED FOR SHORTNESS OF BREATH 6.7 Inhaler 2   • methocarbamol (ROBAXIN) 750 MG Tab TAKE 1 TABLET BY MOUTH EVERY DAY AS NEEDED 90 Tab 1   • omeprazole (PRILOSEC) 20 MG delayed-release capsule TAKE 1 CAP BY MOUTH EVERY DAY 30 MINUTES BEFORE FOOD OR DRINK, ON AN EMPTY STOMACH. 90 Cap 0   • gabapentin (NEURONTIN) 300 MG Cap TAKE 1 CAPSULE BY MOUTH THREE TIMES A  Cap 0   • meloxicam (MOBIC) 7.5 MG Tab Take 1 Tab by mouth every day for 30 days. 30 Tab 0   • busPIRone (BUSPAR) 15 MG tablet TAKE 1 TABLET BY MOUTH THREE TIMES A  Tab 0   • amitriptyline (ELAVIL) 75 MG Tab Take 2 Tabs by mouth at bedtime as needed. 180 Tab 1   • hydrochlorothiazide (MICROZIDE) 12.5 MG capsule TAKE 1-2 CAPS BY MOUTH EVERY DAY. 180 Cap 0   • rizatriptan (MAXALT) 10 MG tablet TAKE 1 TAB BY MOUTH ONCE AS NEEDED FOR MIGRAINE  "FOR UP TO 1 DOSE. MAY REPEAT IN 2 HOURS IF NEEDED 10 Tab 2   • Diclofenac Sodium 1 % Gel APPLY 2 GM UP TO 4 TIMES PER DAY IF NEEDED FOR BACK PAIN 1 Tube 5   • fluticasone (FLONASE) 50 MCG/ACT nasal spray Spray 2 Sprays in nose every day. 16 g 11   • topiramate (TOPAMAX) 50 MG tablet Take 1 Tab by mouth 2 times a day. 60 Tab 5   • acetaminophen (TYLENOL) 325 MG Tab Tylenol 325 mg tablet   Take 2 tablets every 6 hours by oral route.     • ondansetron (ZOFRAN ODT) 8 MG TABLET DISPERSIBLE Take 1 Tab by mouth every 8 hours as needed for Nausea. 15 Tab 0   • aspirin (ASA) 325 MG TABS Take 325 mg by mouth as needed. weekly      • metFORMIN ER (GLUCOPHAGE XR) 750 MG TABLET SR 24 HR TAKE 1 TABLET BY MOUTH EVERY DAY 90 Tab 0     No current Epic-ordered facility-administered medications on file.        Past Medical History:   Diagnosis Date   • Anesthesia     low bp coming out of anesthesia \"one time\"   • Anxiety 11/18/2011   • Arrhythmia     ? pt unsure   • Arthritis     fibromyalgia   • Breast mass, left    • Breath shortness     occasionally   • Bursitis of knee     left   • Cancer (HCC) 2005    squamous cell on nose   • Chronic pain 11/18/2011   • Depression 11/18/2011   • Fibromyalgia 11/18/2011   • Gastro-esophageal reflux 3/20/2012   • Heart murmur    • Iron deficiency anemia 3/20/2012   • Neuropathy (HCC) 11/18/2011   • Other specified disorder of intestines    • Pain 6/14/12    3/10 stomach   • Pneumonia 01/2012   • Pulmonary hypertension (HCC) 3/20/2012   • Syncope and collapse 3/20/2012       Past Surgical History:   Procedure Laterality Date   • CATH PLACEMENT  1/25/2013    Performed [facility] Liz[facility] Fer[facility] M.D. [facility]   • BREAST BIOPSY  1/9/2013    Performed by Lizeth Ferreira M.D. [facility]   • AXILLARY NODE DISSECTION  1/9/2013    Performed by Lizeth Ferreira M.D. [facility]   • NODE BIOPSY  1/9/2013    Performed by Lizeth Ferreira M.D. at SURGERY " SAME DAY [facility] ORS   • BREAST BIOPSY  12/11/2012    Performed by Lizeth Ferreira M.D. at SURGERY SAME DAY [facility] ORS   • COLONOSCOPY  6/21/2012    Performed by PARAM VALDEZ at SURGERY [facility] ORS   • GASTROSCOPY  6/21/2012    Performed by PARAM VALDEZ at SURGERY [facility] ORS   • ABDOMINAL EXPLORATION  10/7/2010    Performed by MARIA G KHAN JR at SURGERY [facility] ORS   • IRRIGATION & DEBRIDEMENT GENERAL  4/12/2010    Performed by OZZIE WEINSTEIN at SURGERY [facility] ORS   • HERNIA REPAIR  3/15/2010    Performed by MARIA G KHAN JR at SURGERY [facility] ORS   • FLAP GRAFT  3/15/2010    Performed by MARIA G KHAN JR at SURGERY [facility] ORS   • PANNICULECTOMY  3/15/2010    Performed by MARIA G KHAN JR at SURGERY [facility] ORS   • ABDOMINAL EXPLORATION  3/11/2010    Performed by MARIA G KHAN JR at SURGERY SAME DAY [facility]   • OTHER  08/09    pulled mesh out of hernia   • OTHER  07/09    abscess removed abd.   • OTHER  06/09    bowel obstruction   • OTHER  04/2009    hernia repair,mesh removal after in aug.09   • OTHER  2005    bariatric surgery gastric bypass   • GASTRIC BYPASS LAPAROSCOPIC  2005   • GYN SURGERY  1984    tubal       Social History     Tobacco Use   • Smoking status: Former Smoker     Packs/day: 0.10     Years: 8.00     Pack years: 0.80     Types: Cigarettes     Last attempt to quit: 4/25/2016     Years since quitting: 3.4   • Smokeless tobacco: Never Used   • Tobacco comment: 1/2 pk a day on and off 10 yrs, 1 cigarette daily   Substance Use Topics   • Alcohol use: Yes     Alcohol/week: 0.0 oz     Comment: 5 a month, social   • Drug use: No       Family History   Problem Relation Age of Onset   • Heart Attack Father    • Hypertension Mother    • Cancer Maternal Grandfather         leukemia   • Cancer Other         leukemia- cousin   • Diabetes Maternal Uncle    • Diabetes Maternal Uncle        ROS:  As in HPI, otherwise negative for  "chest pain, dyspnea, abdominal pain, dysuria, blood in stool, fever          Exam: /80 (BP Location: Right arm, Patient Position: Sitting, BP Cuff Size: Adult)   Pulse 99   Temp 36.8 °C (98.3 °F)   Resp 12   Ht 1.651 m (5' 5\")   Wt 123.4 kg (272 lb)   SpO2 95%  Body mass index is 45.26 kg/m².    General: Alert, pleasant, obese habitus, well nourished, well developed female in NAD  Pulmonary: Clear to ausculation.  Normal effort. No rales, ronchi, or wheezing.  Cardiovascular: Normal rate and rhythm without murmur.   Abdomen: Soft, nondistended.   Neurologic: Grossly nonfocal  Left hip: Mild tenderness to palpation, patellar DTRs 2+ and equal, gait mildly antalgic, full active range of motion  Skin: Warm and dry.   Psych: Normal mood and affect. Alert and oriented. Judgment and insight is normal.    Please note that this dictation was created using voice recognition software. I have made every reasonable attempt to correct obvious errors, but I expect that there are errors of grammar and possibly content that I did not discover before finalizing the note.      Assessment/Plan  1. Primary insomnia    - ALPRAZolam (XANAX) 1 MG Tab; Take 1 Tab by mouth at bedtime as needed for Sleep for up to 45 days. May take an additional 1 mg up to 10 days in the month for anxiety  Dispense: 40 Tab; Refill: 0    2. Anxiety  Well-controlled with BuSpar and alprazolam.  Patient understands risks as described in HPI.  Patient has upcoming appointment in 12/2019 to establish with psychiatry.  - ALPRAZolam (XANAX) 1 MG Tab; Take 1 Tab by mouth at bedtime as needed for Sleep for up to 45 days. May take an additional 1 mg up to 10 days in the month for anxiety  Dispense: 40 Tab; Refill: 0    3. Left hip pain  Ongoing for last couple months, not improving.  Some changes seen on MRI.  Will refer to physical therapy and orthopedics.  - REFERRAL TO PHYSICAL THERAPY Reason for Therapy: Eval/Treat/Report  - REFERRAL TO " ORTHOPEDICS    4. Chronic pain of left knee  Patient reports chronic knee pain, ongoing.  Will get imaging and refer to physical therapy and orthopedics.  - DX-KNEE COMPLETE 4+ LEFT; Future  - REFERRAL TO PHYSICAL THERAPY Reason for Therapy: Eval/Treat/Report  - REFERRAL TO ORTHOPEDICS    Patient will return to clinic in 2 months or sooner if needed.

## 2019-10-17 RX ORDER — METFORMIN HYDROCHLORIDE 750 MG/1
TABLET, EXTENDED RELEASE ORAL
Qty: 90 TAB | Refills: 0 | Status: SHIPPED | OUTPATIENT
Start: 2019-10-17 | End: 2020-01-13

## 2019-10-17 NOTE — TELEPHONE ENCOUNTER
Was the patient seen in the last year in this department? Yes    Does patient have an active prescription for medications requested? No     Received Request Via: Pharmacy      Pt met protocol?: Yes    LAST OV 10/16/2019    Lab Results   Component Value Date/Time    HBA1C 6.0 (H) 10/07/2019 1019     Lab Results   Component Value Date/Time    AVGLUC 126 10/07/2019 1019     Lab Results   Component Value Date/Time    CHOLSTRLTOT 177 10/07/2019 1019     Lab Results   Component Value Date/Time    TRIGLYCERIDE 259 (H) 10/07/2019 1019     No components found for: HLD  Lab Results   Component Value Date/Time    LDL 80 10/07/2019 1019

## 2019-10-18 NOTE — ASSESSMENT & PLAN NOTE
Patient continues to struggle with left hip pain, radiating down lateral posterior leg to calf.  Onset 4 months ago.  MRI of left hip shows mild tendinopathy of the gluteus medius and minimus tendons and mild degenerative changes in left hip joint.  MRI of lumbar spine is unremarkable.  She has not gotten hip x-ray done.  Pain is aggravated by ambulation, during bedtime with movement, affecting her sleep.  Has tried ice, heat, ibuprofen, methocarbamol, Voltaren gel without relief.  She has been taking gabapentin with minimal relief.  She is open to referral to physical therapy and orthopedics.    HISTORY/REASON FOR EXAM:  Left hip and left leg pain..  History of breast cancer    TECHNIQUE/EXAM DESCRIPTION:  MRI of the left hip without contrast.    The study was performed on a Elle 1.5 Chaya MRI scanner.  T1 axial and T1 coronal images were obtained of both hips.  Axial and coronal proton density fat sat and sagittal proton density images were obtained of the left hip.    COMPARISON: None.    FINDINGS:  There is osteophytic spurring of the left acetabulum. No discrete articular cartilage loss is identified. There is mild thinning of the articular cartilage in the superior joint space. No abnormal bone marrow signal or edema identified. No abnormal   enhancement or enhancing mass.    No displaced labral tear is identified. The ligamentum teres appears intact.    No significant hip effusion is identified.    There is mild edema at the insertion of the gluteus minimus and medius tendons.    The ischiofemoral and quadratus femoris spaces are widely patent.    The hamstring tendons are normal in appearance.    Muscle signal is normal.      Impression       1.  No abnormal enhancement or enhancing mass identified. No bone lesions to suggest metastasis.    2.  Mild tendinopathy of the gluteus medius and minimus tendons.    3.  Mild degenerative changes in the left hip joint

## 2019-10-18 NOTE — ASSESSMENT & PLAN NOTE
This is a chronic health problem that is well controlled with current medications and lifestyle measures.  Taking BuSpar 15 mg 3 times a day and alprazolam 1 mg as needed for episodes of anxiety and panic.  40 tabs last patient 45 days.  She is requesting refill today.  She does not drink alcohol or take opioids.  Reviewed risks including respiratory depression and that risks are increased with alcohol or opioids.   reviewed and appropriate.

## 2019-10-20 DIAGNOSIS — M25.552 LEFT HIP PAIN: ICD-10-CM

## 2019-10-21 RX ORDER — MELOXICAM 7.5 MG/1
TABLET ORAL
Qty: 90 TAB | Refills: 0 | Status: SHIPPED | OUTPATIENT
Start: 2019-10-21 | End: 2020-01-14

## 2019-10-29 ENCOUNTER — PATIENT MESSAGE (OUTPATIENT)
Dept: MEDICAL GROUP | Facility: PHYSICIAN GROUP | Age: 59
End: 2019-10-29

## 2019-10-29 DIAGNOSIS — G62.9 NEUROPATHY: ICD-10-CM

## 2019-10-29 DIAGNOSIS — G44.229 CHRONIC TENSION-TYPE HEADACHE, NOT INTRACTABLE: ICD-10-CM

## 2019-10-29 DIAGNOSIS — F51.01 PRIMARY INSOMNIA: ICD-10-CM

## 2019-10-29 DIAGNOSIS — M79.7 FIBROMYALGIA: ICD-10-CM

## 2019-10-30 RX ORDER — AMITRIPTYLINE HYDROCHLORIDE 75 MG/1
150 TABLET ORAL NIGHTLY PRN
Qty: 180 TAB | Refills: 1 | Status: SHIPPED | OUTPATIENT
Start: 2019-10-30 | End: 2019-12-09

## 2019-11-14 DIAGNOSIS — F41.9 ANXIETY: ICD-10-CM

## 2019-11-14 NOTE — TELEPHONE ENCOUNTER
Was the patient seen in the last year in this department? Yes    Does patient have an active prescription for medications requested? No     Received Request Via: Pharmacy      Pt met protocol?: Yes   Patient last office visit in this department:10/2019    BP Readings from Last 1 Encounters:   10/16/19 132/80

## 2019-11-15 RX ORDER — BUSPIRONE HYDROCHLORIDE 15 MG/1
TABLET ORAL
Qty: 90 TAB | Refills: 0 | Status: SHIPPED | OUTPATIENT
Start: 2019-11-15 | End: 2019-12-09 | Stop reason: SDUPTHER

## 2019-12-07 DIAGNOSIS — G44.229 CHRONIC TENSION-TYPE HEADACHE, NOT INTRACTABLE: ICD-10-CM

## 2019-12-09 ENCOUNTER — TELEMEDICINE2 (OUTPATIENT)
Dept: BEHAVIORAL HEALTH | Facility: CLINIC | Age: 59
End: 2019-12-09
Payer: COMMERCIAL

## 2019-12-09 DIAGNOSIS — F41.1 GAD (GENERALIZED ANXIETY DISORDER): ICD-10-CM

## 2019-12-09 DIAGNOSIS — F43.10 PTSD (POST-TRAUMATIC STRESS DISORDER): ICD-10-CM

## 2019-12-09 DIAGNOSIS — G47.9 SLEEP DISTURBANCE: ICD-10-CM

## 2019-12-09 PROCEDURE — 90833 PSYTX W PT W E/M 30 MIN: CPT | Performed by: NURSE PRACTITIONER

## 2019-12-09 PROCEDURE — 99204 OFFICE O/P NEW MOD 45 MIN: CPT | Performed by: NURSE PRACTITIONER

## 2019-12-09 RX ORDER — BUSPIRONE HYDROCHLORIDE 15 MG/1
15 TABLET ORAL 3 TIMES DAILY
Qty: 90 TAB | Refills: 0 | Status: SHIPPED | OUTPATIENT
Start: 2019-12-09 | End: 2020-01-20 | Stop reason: SDUPTHER

## 2019-12-09 RX ORDER — ALPRAZOLAM 1 MG/1
.5-1 TABLET ORAL NIGHTLY PRN
Qty: 40 TAB | Refills: 0 | Status: SHIPPED
Start: 2019-12-09 | End: 2020-01-30 | Stop reason: SDUPTHER

## 2019-12-09 RX ORDER — ESCITALOPRAM OXALATE 10 MG/1
10 TABLET ORAL DAILY
Qty: 30 TAB | Refills: 1 | Status: SHIPPED | OUTPATIENT
Start: 2019-12-09 | End: 2020-01-06

## 2019-12-09 RX ORDER — TRAZODONE HYDROCHLORIDE 50 MG/1
50 TABLET ORAL EVERY EVENING
Qty: 30 TAB | Refills: 3 | Status: SHIPPED | OUTPATIENT
Start: 2019-12-09 | End: 2020-01-20 | Stop reason: SDUPTHER
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
## 2019-12-10 RX ORDER — TOPIRAMATE 50 MG/1
TABLET, FILM COATED ORAL
Qty: 180 TAB | Refills: 0 | Status: SHIPPED | OUTPATIENT
Start: 2019-12-10 | End: 2021-03-29

## 2019-12-18 ENCOUNTER — OFFICE VISIT (OUTPATIENT)
Dept: MEDICAL GROUP | Facility: PHYSICIAN GROUP | Age: 59
End: 2019-12-18
Payer: COMMERCIAL

## 2019-12-18 VITALS
OXYGEN SATURATION: 94 % | HEIGHT: 65 IN | BODY MASS INDEX: 44.98 KG/M2 | HEART RATE: 71 BPM | DIASTOLIC BLOOD PRESSURE: 68 MMHG | SYSTOLIC BLOOD PRESSURE: 122 MMHG | WEIGHT: 270 LBS | TEMPERATURE: 97.8 F | RESPIRATION RATE: 14 BRPM

## 2019-12-18 DIAGNOSIS — E55.9 VITAMIN D DEFICIENCY DISEASE: ICD-10-CM

## 2019-12-18 DIAGNOSIS — G44.229 CHRONIC TENSION-TYPE HEADACHE, NOT INTRACTABLE: ICD-10-CM

## 2019-12-18 DIAGNOSIS — F51.01 PRIMARY INSOMNIA: ICD-10-CM

## 2019-12-18 DIAGNOSIS — M79.89 BILATERAL SWELLING OF FEET: ICD-10-CM

## 2019-12-18 DIAGNOSIS — M25.552 LEFT HIP PAIN: ICD-10-CM

## 2019-12-18 DIAGNOSIS — F33.2 SEVERE EPISODE OF RECURRENT MAJOR DEPRESSIVE DISORDER, WITHOUT PSYCHOTIC FEATURES (HCC): ICD-10-CM

## 2019-12-18 DIAGNOSIS — Z23 NEED FOR PNEUMOCOCCAL VACCINATION: ICD-10-CM

## 2019-12-18 DIAGNOSIS — M79.7 FIBROMYALGIA: ICD-10-CM

## 2019-12-18 DIAGNOSIS — G62.9 NEUROPATHY: ICD-10-CM

## 2019-12-18 DIAGNOSIS — F43.10 PTSD (POST-TRAUMATIC STRESS DISORDER): ICD-10-CM

## 2019-12-18 DIAGNOSIS — E11.9 TYPE 2 DIABETES MELLITUS WITHOUT COMPLICATION, WITHOUT LONG-TERM CURRENT USE OF INSULIN (HCC): ICD-10-CM

## 2019-12-18 DIAGNOSIS — F41.1 GAD (GENERALIZED ANXIETY DISORDER): ICD-10-CM

## 2019-12-18 DIAGNOSIS — E66.01 MORBID OBESITY WITH BMI OF 40.0-44.9, ADULT (HCC): ICD-10-CM

## 2019-12-18 PROCEDURE — 99214 OFFICE O/P EST MOD 30 MIN: CPT | Mod: 25 | Performed by: NURSE PRACTITIONER

## 2019-12-18 RX ORDER — GABAPENTIN 300 MG/1
CAPSULE ORAL
Qty: 270 CAP | Refills: 1 | Status: SHIPPED | OUTPATIENT
Start: 2019-12-18 | End: 2020-06-11 | Stop reason: SDUPTHER

## 2019-12-18 NOTE — ASSESSMENT & PLAN NOTE
Chronic health problem, uncontrolled.  Psychiatry is working with patient on management.  No longer taking amitriptyline.  Now taking trazodone.  Has not noticed a difference just yet, though has only been taking medications for couple weeks.

## 2019-12-18 NOTE — ASSESSMENT & PLAN NOTE
This is a chronic health problem that is well controlled with current medications and lifestyle measures.  Taking metformin  mg daily.  Also working on lifestyle measures.  A1c is 6%.  Not on ACE or statin.  Component      Latest Ref Rng & Units 8/7/2019 10/7/2019          11:00 AM 10:19 AM   Glycohemoglobin      0.0 - 5.6 % 5.8 (A) 6.0 (H)

## 2019-12-18 NOTE — ASSESSMENT & PLAN NOTE
Patient was found to have vitamin D deficiency with last lab draw.  She is taking high-dose weekly vitamin D.  Has a couple weeks left.  Once done, will switch to daily vitamin D3.  Component      Latest Ref Rng & Units 10/7/2019          10:19 AM   25-Hydroxy   Vitamin D 25      30 - 100 ng/mL 17 (L)

## 2019-12-18 NOTE — ASSESSMENT & PLAN NOTE
This is a chronic health problem that is well controlled with current medications and lifestyle measures.  Patient has neuropathy in bilateral feet.  Well controlled with gabapentin.  Requesting refill today.

## 2019-12-18 NOTE — ASSESSMENT & PLAN NOTE
Chronic health problem.  Physical therapy has helped.  Now doing home exercise program.  Was referred to orthopedics, but has not scheduled yet as she has been dealing a lot with sense health issues.  Has not gotten hip x-ray done yet.

## 2019-12-18 NOTE — ASSESSMENT & PLAN NOTE
This is a chronic health problem that is uncontrolled with current medications and lifestyle measures.  Taking topiramate 100 mg at bedtime.  Was helping, but now seems to be getting headaches almost daily.  Mildly relieved relieved with Maxalt.    Has had Botox injections in the past without relief.  Will trial butterbur daily for prevention.

## 2019-12-18 NOTE — ASSESSMENT & PLAN NOTE
Patient is a 59-year-old female with general anxiety disorder, PTSD, depression.  Now established with psychiatry.  Medication changes were done a couple weeks ago by psychiatry.  Patient has not noticed much of a difference yet in sleep or anxiety.  Patient is to follow-up with him in 1 month, but appointment has not been set.  I will refer for follow-up telemedicine appointment.

## 2019-12-18 NOTE — PROGRESS NOTES
CC: Diabetes, lab review    HISTORY OF THE PRESENT ILLNESS: Patient is a 59 y.o. female. This pleasant patient is here today for evaluation and management of the following health problems.    Health Maintenance: due      Vitamin D deficiency disease  Patient was found to have vitamin D deficiency with last lab draw.  She is taking high-dose weekly vitamin D.  Has a couple weeks left.  Once done, will switch to daily vitamin D3.  Component      Latest Ref Rng & Units 10/7/2019          10:19 AM   25-Hydroxy   Vitamin D 25      30 - 100 ng/mL 17 (L)       Type 2 diabetes mellitus without complication, without long-term current use of insulin (HCC)  This is a chronic health problem that is well controlled with current medications and lifestyle measures.  Taking metformin  mg daily.  Also working on lifestyle measures.  A1c is 6%.  Not on ACE or statin.  Component      Latest Ref Rng & Units 8/7/2019 10/7/2019          11:00 AM 10:19 AM   Glycohemoglobin      0.0 - 5.6 % 5.8 (A) 6.0 (H)       Chronic headache  This is a chronic health problem that is uncontrolled with current medications and lifestyle measures.  Taking topiramate 100 mg at bedtime.  Was helping, but now seems to be getting headaches almost daily.  Mildly relieved relieved with Maxalt.    Has had Botox injections in the past without relief.  Will trial butterbur daily for prevention.        Left hip pain  Chronic health problem.  Physical therapy has helped.  Now doing home exercise program.  Was referred to orthopedics, but has not scheduled yet as she has been dealing a lot with sense health issues.  Has not gotten hip x-ray done yet.      Neuropathy  This is a chronic health problem that is well controlled with current medications and lifestyle measures.  Patient has neuropathy in bilateral feet.  Well controlled with gabapentin.  Requesting refill today.    CODY (generalized anxiety disorder)  Patient is a 59-year-old female with general anxiety  disorder, PTSD, depression.  Now established with psychiatry.  Medication changes were done a couple weeks ago by psychiatry.  Patient has not noticed much of a difference yet in sleep or anxiety.  Patient is to follow-up with him in 1 month, but appointment has not been set.  I will refer for follow-up telemedicine appointment.    Bilateral swelling of feet  Patient reports she has noticed bilateral pedal edema in the last month.  She is not sure if it is a reaction to some of the new medication she is taking for her mental health.  Denies shortness of breath, chest pain.  She does mention today that she stopped taking her hydrochlorothiazide about 6 weeks ago.    Insomnia  Chronic health problem, uncontrolled.  Psychiatry is working with patient on management.  No longer taking amitriptyline.  Now taking trazodone.  Has not noticed a difference just yet, though has only been taking medications for couple weeks.      Allergies: Shellfish allergy    Current Outpatient Medications Ordered in Epic   Medication Sig Dispense Refill   • gabapentin (NEURONTIN) 300 MG Cap TAKE 1 CAPSULE BY MOUTH THREE TIMES A  Cap 1   • topiramate (TOPAMAX) 50 MG tablet TAKE 1 TABLET BY MOUTH TWICE A  Tab 0   • escitalopram (LEXAPRO) 10 MG Tab Take 1 Tab by mouth every day. 30 Tab 1   • traZODone (DESYREL) 50 MG Tab Take 1 Tab by mouth every evening. 30 Tab 3   • ALPRAZolam (XANAX) 1 MG Tab Take 0.5-1 Tabs by mouth at bedtime as needed for Sleep or Anxiety for up to 45 days. 40 Tab 0   • meloxicam (MOBIC) 7.5 MG Tab TAKE 1 TABLET BY MOUTH EVERY DAY 90 Tab 0   • metFORMIN ER (GLUCOPHAGE XR) 750 MG TABLET SR 24 HR TAKE 1 TABLET BY MOUTH EVERY DAY 90 Tab 0   • ergocalciferol (DRISDOL) 64989 UNIT capsule Take 1 Cap by mouth every 7 days for 85 days. 12 Cap 0   • albuterol 108 (90 Base) MCG/ACT Aero Soln inhalation aerosol INHALE 2 PUFFS BY MOUTH EVERY 6 HOURS AS NEEDED FOR SHORTNESS OF BREATH 6.7 Inhaler 2   • methocarbamol  "(ROBAXIN) 750 MG Tab TAKE 1 TABLET BY MOUTH EVERY DAY AS NEEDED 90 Tab 1   • omeprazole (PRILOSEC) 20 MG delayed-release capsule TAKE 1 CAP BY MOUTH EVERY DAY 30 MINUTES BEFORE FOOD OR DRINK, ON AN EMPTY STOMACH. 90 Cap 0   • hydrochlorothiazide (MICROZIDE) 12.5 MG capsule TAKE 1-2 CAPS BY MOUTH EVERY DAY. 180 Cap 0   • rizatriptan (MAXALT) 10 MG tablet TAKE 1 TAB BY MOUTH ONCE AS NEEDED FOR MIGRAINE FOR UP TO 1 DOSE. MAY REPEAT IN 2 HOURS IF NEEDED 10 Tab 2   • Diclofenac Sodium 1 % Gel APPLY 2 GM UP TO 4 TIMES PER DAY IF NEEDED FOR BACK PAIN 1 Tube 5   • fluticasone (FLONASE) 50 MCG/ACT nasal spray Spray 2 Sprays in nose every day. 16 g 11   • acetaminophen (TYLENOL) 325 MG Tab Tylenol 325 mg tablet   Take 2 tablets every 6 hours by oral route.     • ondansetron (ZOFRAN ODT) 8 MG TABLET DISPERSIBLE Take 1 Tab by mouth every 8 hours as needed for Nausea. 15 Tab 0   • aspirin (ASA) 325 MG TABS Take 325 mg by mouth as needed. weekly      • busPIRone (BUSPAR) 15 MG tablet Take 1 Tab by mouth 3 times a day. 90 Tab 0     No current Epic-ordered facility-administered medications on file.        Past Medical History:   Diagnosis Date   • Anesthesia     low bp coming out of anesthesia \"one time\"   • Anxiety 11/18/2011   • Arrhythmia     ? pt unsure   • Arthritis     fibromyalgia   • Breast mass, left    • Breath shortness     occasionally   • Bursitis of knee     left   • Cancer ([facility]) 2005    squamous cell on nose   • Chronic pain 11/18/2011   • Depression 11/18/2011   • Fibromyalgia 11/18/2011   • Gastro-esophageal reflux 3/20/2012   • Heart murmur    • Iron deficiency anemia 3/20/2012   • Neuropathy ([facility]) 11/18/2011   • Other specified disorder of intestines    • Pain 6/14/12    3/10 stomach   • Pneumonia 01/2012   • Pulmonary hypertension ([facility]) 3/20/2012   • Syncope and collapse 3/20/2012       Past Surgical History:   Procedure Laterality Date   • CATH PLACEMENT  1/25/2013    Performed by Lizeth Ferreira M.D. at " SURGERY SAME DAY [facility] ORS   • BREAST BIOPSY  1/9/2013    Performed by Lizeth Ferreira M.D. at SURGERY SAME DAY [facility] ORS   • AXILLARY NODE DISSECTION  1/9/2013    Performed by Lizeth Ferreira M.D. at SURGERY SAME DAY [facility] OR[facility]   • NODE BIOPSY  1/9/2013    Performed by Lizeth Ferreira M.D. at SURGERY SAME DAY [facility] ORS   • BREAST BIOPSY  12/11/2012    Performed by Lizeth Ferreira M.D. at SURGERY SAME DAY [facility] OR[facility]   • COLONOSCOPY  6/21/2012    Performed by PARAM VALDEZ at SURGERY [facility] ORS   • GASTROSCOPY  6/21/2012    Performed by PARAM VALDEZ at SURGERY [facility] ORS   • ABDOMINAL EXPLORATION  10/7/2010    Performed by MARIA G KHAN JR at SURGERY [facility] ORS   • IRRIGATION & DEBRIDEMENT GENERAL  4/12/2010    Performed by OZZIE WEINSTEIN at SURGERY [facility] ORS   • HERNIA REPAIR  3/15/2010    Performed by MARIA G KHAN JR at SURGERY [facility] ORS   • FLAP GRAFT  3/15/2010    Performed by MARIA G KHAN JR at SURGERY [facility] ORS   • PANNICULECTOMY  3/15/2010    Performed by MARIA G KHAN JR at SURGERY [facility] ORS   • ABDOMINAL EXPLORATION  3/11/2010    Performed by MARIA G KHAN JR at SURGERY SAME DAY [facility] OR[facility]   • OTHER  08/09    pulled mesh out of hernia   • OTHER  07/09    abscess removed abd.   • OTHER  06/09    bowel obstruction   • OTHER  04/2009    hernia repair,mesh removal after in aug.09   • OTHER  2005    bariatric surgery gastric bypass   • GASTRIC BYPASS LAPAROSCOPIC  2005   • GYN SURGERY  1984    tubal       Social History     Tobacco Use   • Smoking status: Former Smoker     Packs/day: 0.10     Years: 8.00     Pack years: 0.80     Types: Cigarettes     Last attempt to quit: 4/25/2016     Years since quitting: 3.6   • Smokeless tobacco: Never Used   • Tobacco comment: 1/2 pk a day on and off 10 yrs, 1 cigarette daily   Substance Use Topics   • Alcohol use: Yes     Alcohol/week: 0.0 oz     Comment: 1-2 times a year   •  "Drug use: No       Family History   Problem Relation Age of Onset   • Heart Attack Father    • Anxiety disorder Father    • Depression Father    • Schizophrenia Father    • Hypertension Mother    • Cancer Maternal Grandfather         leukemia   • Cancer Other         leukemia- cousin   • Diabetes Maternal Uncle    • Anxiety disorder Maternal Uncle    • Diabetes Maternal Uncle    • Anxiety disorder Maternal Uncle        ROS:   As in HPI, otherwise negative for chest pain, dyspnea, abdominal pain, dysuria, blood in stool, fever         Exam: /68 (BP Location: Left arm, Patient Position: Sitting, BP Cuff Size: Adult long)   Pulse 71   Temp 36.6 °C (97.8 °F)   Resp 14   Ht 1.651 m (5' 5\")   Wt 122.5 kg (270 lb)   SpO2 94%  Body mass index is 44.93 kg/m².    General: Alert, pleasant, obese habitus, well nourished, well developed female in NAD  Neck: Supple without bruit.   Pulmonary: Clear to ausculation.  Normal effort. No rales, ronchi, or wheezing.  Cardiovascular: Normal rate and rhythm without murmur. Carotid and radial pulses are intact and equal bilaterally.  1+ bilateral pitting pedal edema.  Abdomen: Soft, nontender, nondistended.  Neurologic: Grossly nonfocal  Skin: Warm and dry.   Musculoskeletal: Normal gait.   Psych: Normal mood and affect. Alert and oriented. Judgment and insight is normal.      Diabetic Foot Exam: No ulcers or skin lesions present, patient tested with a 10 g force and is sensitive bilaterally throughout the ball of the foot, great toe and heel.  Dorsalis pedis and posterior tibial pulses are present and intact bilaterally    Please note that this dictation was created using voice recognition software. I have made every reasonable attempt to correct obvious errors, but I expect that there are errors of grammar and possibly content that I did not discover before finalizing the note.      Assessment/Plan  1. Vitamin D deficiency disease  Continue with high-dose weekly vitamin D " until gone, then will switch to daily vitamin D3.    2. Type 2 diabetes mellitus without complication, without long-term current use of insulin ([facility])  Well-controlled.  Continue with metformin and lifestyle measures.  Continue to monitor.  - Comp Metabolic Panel; Future  - ESTIMATED GFR; Future  - HEMOGLOBIN A1C; Future  - MICROALBUMIN CREAT RATIO URINE; Future    3. Chronic tension-type headache, not intractable  Patient will trial butterbur daily.  ER precautions reviewed with patient.    4. Left hip pain  Improved with physical therapy, though still present.  Working on home exercise program.  Patient will schedule with orthopedics after the new year.    5. Neuropathy  Patient requesting refill today.  Well-managed with gabapentin.  - gabapentin (NEURONTIN) 300 MG Cap; TAKE 1 CAPSULE BY MOUTH THREE TIMES A DAY  Dispense: 270 Cap; Refill: 1    6. Fibromyalgia    - gabapentin (NEURONTIN) 300 MG Cap; TAKE 1 CAPSULE BY MOUTH THREE TIMES A DAY  Dispense: 270 Cap; Refill: 1    7. Severe episode of recurrent major depressive disorder, without psychotic features ([facility])  Patient was to have 1 month follow-up with psychiatry around 1/6/2020.  Will refer back to telemedicine.  - REFERRAL TO PSYCHIATRY    8. PTSD (post-traumatic stress disorder)    - REFERRAL TO PSYCHIATRY    9. CODY (generalized anxiety disorder)    - REFERRAL TO PSYCHIATRY    10. Bilateral swelling of feet  Advised patient that discontinuing the hydrochlorothiazide 6 weeks ago is likely what is contributing to the bilateral swelling of feet as that is why she was taking in the first place.  Patient will restart hydrochlorothiazide.    11. Primary insomnia  Managed by psychiatry.  Continue with trazodone.      12. Morbid obesity with BMI of 40.0-44.9, adult ([facility])    - Patient identified as having weight management issue.  Appropriate orders and counseling given.    13. Need for pneumococcal vaccination  Given  - Pneumococcal Conjugate Vaccine 13-Valent  IM      Patient will return to clinic in 4 months for diabetes and lab review or sooner if needed.

## 2019-12-18 NOTE — ASSESSMENT & PLAN NOTE
Patient reports she has noticed bilateral pedal edema in the last month.  She is not sure if it is a reaction to some of the new medication she is taking for her mental health.  Denies shortness of breath, chest pain.  She does mention today that she stopped taking her hydrochlorothiazide about 6 weeks ago.

## 2019-12-19 PROCEDURE — 90471 IMMUNIZATION ADMIN: CPT | Performed by: NURSE PRACTITIONER

## 2019-12-19 PROCEDURE — 90670 PCV13 VACCINE IM: CPT | Performed by: NURSE PRACTITIONER

## 2019-12-23 DIAGNOSIS — K29.60 NSAID INDUCED GASTRITIS: ICD-10-CM

## 2019-12-23 DIAGNOSIS — T39.395A NSAID INDUCED GASTRITIS: ICD-10-CM

## 2019-12-24 RX ORDER — OMEPRAZOLE 20 MG/1
CAPSULE, DELAYED RELEASE ORAL
Qty: 90 CAP | Refills: 1 | Status: SHIPPED | OUTPATIENT
Start: 2019-12-24 | End: 2020-06-16

## 2019-12-24 NOTE — TELEPHONE ENCOUNTER
Was the patient seen in the last year in this department? Yes    Does patient have an active prescription for medications requested? No     Received Request Via: Pharmacy      Pt met protocol?: Yes last ov 12/19  Last labs 10/19

## 2020-01-06 DIAGNOSIS — J40 BRONCHITIS: ICD-10-CM

## 2020-01-07 RX ORDER — ALBUTEROL SULFATE 90 UG/1
2 AEROSOL, METERED RESPIRATORY (INHALATION) EVERY 6 HOURS PRN
Qty: 1 INHALER | Refills: 5 | Status: SHIPPED | OUTPATIENT
Start: 2020-01-07 | End: 2021-03-29

## 2020-01-12 DIAGNOSIS — E11.9 TYPE 2 DIABETES MELLITUS WITHOUT COMPLICATION, WITHOUT LONG-TERM CURRENT USE OF INSULIN (HCC): ICD-10-CM

## 2020-01-13 RX ORDER — METFORMIN HYDROCHLORIDE 750 MG/1
TABLET, EXTENDED RELEASE ORAL
Qty: 90 TAB | Refills: 1 | Status: SHIPPED | OUTPATIENT
Start: 2020-01-13 | End: 2020-08-18 | Stop reason: SDUPTHER

## 2020-01-13 NOTE — TELEPHONE ENCOUNTER
Was the patient seen in the last year in this department? Yes    Does patient have an active prescription for medications requested? No     Received Request Via: Pharmacy     Last OV 12/8/19, last labs 10/7/19

## 2020-01-14 DIAGNOSIS — M25.552 LEFT HIP PAIN: ICD-10-CM

## 2020-01-14 RX ORDER — MELOXICAM 7.5 MG/1
TABLET ORAL
Qty: 90 TAB | Refills: 0 | Status: SHIPPED | OUTPATIENT
Start: 2020-01-14 | End: 2020-04-13

## 2020-01-14 NOTE — TELEPHONE ENCOUNTER
Was the patient seen in the last year in this department? Yes    Does patient have an active prescription for medications requested? No     Received Request Via: Pharmacy     Last OV 12/18/19, last labs 10/7/19
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
## 2020-01-20 ENCOUNTER — TELEMEDICINE2 (OUTPATIENT)
Dept: BEHAVIORAL HEALTH | Facility: CLINIC | Age: 60
End: 2020-01-20
Payer: COMMERCIAL

## 2020-01-20 VITALS
DIASTOLIC BLOOD PRESSURE: 88 MMHG | BODY MASS INDEX: 45.98 KG/M2 | HEIGHT: 65 IN | TEMPERATURE: 97.8 F | WEIGHT: 276 LBS | HEART RATE: 96 BPM | SYSTOLIC BLOOD PRESSURE: 140 MMHG

## 2020-01-20 DIAGNOSIS — G47.9 SLEEP DISTURBANCE: ICD-10-CM

## 2020-01-20 DIAGNOSIS — F43.10 PTSD (POST-TRAUMATIC STRESS DISORDER): ICD-10-CM

## 2020-01-20 DIAGNOSIS — F41.1 GAD (GENERALIZED ANXIETY DISORDER): ICD-10-CM

## 2020-01-20 PROCEDURE — 99214 OFFICE O/P EST MOD 30 MIN: CPT | Performed by: NURSE PRACTITIONER

## 2020-01-20 RX ORDER — TRAZODONE HYDROCHLORIDE 50 MG/1
100-150 TABLET ORAL EVERY EVENING
Qty: 90 TAB | Refills: 1 | Status: SHIPPED | OUTPATIENT
Start: 2020-01-20 | End: 2020-02-19 | Stop reason: SDUPTHER

## 2020-01-20 RX ORDER — BUSPIRONE HYDROCHLORIDE 15 MG/1
15 TABLET ORAL 3 TIMES DAILY
Qty: 90 TAB | Refills: 1 | Status: SHIPPED | OUTPATIENT
Start: 2020-01-20 | End: 2020-02-12

## 2020-01-20 RX ORDER — ESCITALOPRAM OXALATE 10 MG/1
10 TABLET ORAL DAILY
Qty: 90 TAB | Refills: 0 | Status: SHIPPED | OUTPATIENT
Start: 2020-01-20 | End: 2020-05-20

## 2020-01-30 DIAGNOSIS — F41.1 GAD (GENERALIZED ANXIETY DISORDER): ICD-10-CM

## 2020-01-30 RX ORDER — ALPRAZOLAM 1 MG/1
.5-1 TABLET ORAL
Qty: 40 TAB | Refills: 0 | Status: SHIPPED
Start: 2020-01-30 | End: 2020-03-25 | Stop reason: SDUPTHER

## 2020-01-30 NOTE — TELEPHONE ENCOUNTER
Received request via: Patient    Was the patient seen in the last year in this department? Yes    Does the patient have an active prescription (recently filled or refills available) for medication(s) requested? No     : 12/9/19 40 for 45 days

## 2020-02-19 ENCOUNTER — PATIENT MESSAGE (OUTPATIENT)
Dept: BEHAVIORAL HEALTH | Facility: CLINIC | Age: 60
End: 2020-02-19

## 2020-02-19 RX ORDER — TRAZODONE HYDROCHLORIDE 50 MG/1
100-150 TABLET ORAL EVERY EVENING
Qty: 90 TAB | Refills: 1 | Status: SHIPPED | OUTPATIENT
Start: 2020-02-19 | End: 2020-03-25 | Stop reason: SDUPTHER

## 2020-02-26 ENCOUNTER — PATIENT MESSAGE (OUTPATIENT)
Dept: MEDICAL GROUP | Facility: PHYSICIAN GROUP | Age: 60
End: 2020-02-26

## 2020-02-26 DIAGNOSIS — R20.2 TINGLING IN EXTREMITIES: ICD-10-CM

## 2020-03-25 RX ORDER — TRAZODONE HYDROCHLORIDE 50 MG/1
TABLET ORAL
Qty: 90 TAB | Refills: 1 | Status: SHIPPED | OUTPATIENT
Start: 2020-03-25 | End: 2020-04-16

## 2020-04-02 DIAGNOSIS — M79.7 FIBROMYALGIA: ICD-10-CM

## 2020-04-02 RX ORDER — METHOCARBAMOL 750 MG/1
TABLET, FILM COATED ORAL
Qty: 90 TAB | Refills: 0 | Status: SHIPPED | OUTPATIENT
Start: 2020-04-02 | End: 2020-07-01

## 2020-04-02 NOTE — TELEPHONE ENCOUNTER
Received request via: Pharmacy    Was the patient seen in the last year in this department? Yes    Does the patient have an active prescription (recently filled or refills available) for medication(s) requested? No     Last OV 12/18/20, last labs 10/7/19

## 2020-04-06 RX ORDER — BUSPIRONE HYDROCHLORIDE 15 MG/1
TABLET ORAL
Qty: 90 TAB | Refills: 0 | Status: SHIPPED | OUTPATIENT
Start: 2020-04-06 | End: 2020-05-06

## 2020-04-09 NOTE — TELEPHONE ENCOUNTER
Was the patient seen in the last year in this department? Yes    Does patient have an active prescription for medications requested? No     Received Request Via: Pharmacy    Pt met protocol?: Yes     Last OV 12/18/2019

## 2020-04-12 DIAGNOSIS — M25.552 LEFT HIP PAIN: ICD-10-CM

## 2020-04-12 NOTE — TELEPHONE ENCOUNTER
Was the patient seen in the last year in this department? Yes  lov 12/18/19  Does patient have an active prescription for medications requested? No     Received Request Via: Patient    Hospital Outpatient Visit on 10/07/2019   Component Date Value   • Sodium 10/07/2019 138    • Potassium 10/07/2019 4.2    • Chloride 10/07/2019 105    • Co2 10/07/2019 24    • Anion Gap 10/07/2019 9.0    • Glucose 10/07/2019 151*   • Bun 10/07/2019 20    • Creatinine 10/07/2019 0.78    • Calcium 10/07/2019 8.5    • AST(SGOT) 10/07/2019 28    • ALT(SGPT) 10/07/2019 32    • Alkaline Phosphatase 10/07/2019 102*   • Total Bilirubin 10/07/2019 0.4    • Albumin 10/07/2019 4.4    • Total Protein 10/07/2019 6.7    • Globulin 10/07/2019 2.3    • A-G Ratio 10/07/2019 1.9    • Cholesterol,Tot 10/07/2019 177    • Triglycerides 10/07/2019 259*   • HDL 10/07/2019 45    • LDL 10/07/2019 80    • TSH 10/07/2019 4.040    • Free T-4 10/07/2019 0.72    • 25-Hydroxy   Vitamin D 25 10/07/2019 17*   • GFR If  10/07/2019 >60    • GFR If Non  Ameri* 10/07/2019 >60    • Glycohemoglobin 10/07/2019 6.0*   • Est Avg Glucose 10/07/2019 126    • Fasting Status 10/07/2019 Fasting    Office Visit on 08/07/2019   Component Date Value   • RETINAL SCREEN 08/07/2019 Negative    • Glycohemoglobin 08/07/2019 5.8*   • Internal Control Negative 08/07/2019 Negative    • Internal Control Positive 08/07/2019 Positive    Office Visit on 04/25/2019   Component Date Value   • Glycohemoglobin 04/25/2019 6.9*   • Internal Control Negative 04/25/2019 Negative    • Internal Control Positive 04/25/2019 Positive    ]

## 2020-04-13 RX ORDER — MELOXICAM 7.5 MG/1
TABLET ORAL
Qty: 90 TAB | Refills: 0 | Status: SHIPPED | OUTPATIENT
Start: 2020-04-13 | End: 2020-07-06

## 2020-04-16 RX ORDER — TRAZODONE HYDROCHLORIDE 50 MG/1
TABLET ORAL
Qty: 90 TAB | Refills: 1 | Status: SHIPPED | OUTPATIENT
Start: 2020-04-16 | End: 2020-05-11

## 2020-04-17 LAB — HBA1C MFR BLD: 6.6 % (ref 0–5.6)

## 2020-04-21 ENCOUNTER — TELEMEDICINE (OUTPATIENT)
Dept: MEDICAL GROUP | Facility: PHYSICIAN GROUP | Age: 60
End: 2020-04-21
Payer: COMMERCIAL

## 2020-04-21 DIAGNOSIS — M54.16 LUMBAR BACK PAIN WITH RADICULOPATHY AFFECTING LEFT LOWER EXTREMITY: ICD-10-CM

## 2020-04-21 DIAGNOSIS — D50.9 IRON DEFICIENCY ANEMIA, UNSPECIFIED IRON DEFICIENCY ANEMIA TYPE: ICD-10-CM

## 2020-04-21 DIAGNOSIS — E11.9 TYPE 2 DIABETES MELLITUS WITHOUT COMPLICATION, WITHOUT LONG-TERM CURRENT USE OF INSULIN (HCC): ICD-10-CM

## 2020-04-21 DIAGNOSIS — J30.89 ENVIRONMENTAL AND SEASONAL ALLERGIES: ICD-10-CM

## 2020-04-21 DIAGNOSIS — F41.1 GENERALIZED ANXIETY DISORDER: ICD-10-CM

## 2020-04-21 DIAGNOSIS — R06.83 SNORES: ICD-10-CM

## 2020-04-21 DIAGNOSIS — F41.1 GAD (GENERALIZED ANXIETY DISORDER): ICD-10-CM

## 2020-04-21 DIAGNOSIS — M25.552 LEFT HIP PAIN: ICD-10-CM

## 2020-04-21 DIAGNOSIS — F51.01 PRIMARY INSOMNIA: ICD-10-CM

## 2020-04-21 PROBLEM — G47.9 SLEEP DISTURBANCE: Status: RESOLVED | Noted: 2019-12-09 | Resolved: 2020-04-21

## 2020-04-21 PROCEDURE — 99214 OFFICE O/P EST MOD 30 MIN: CPT | Mod: 95,CR | Performed by: NURSE PRACTITIONER

## 2020-04-21 RX ORDER — ALPRAZOLAM 1 MG/1
.5-1 TABLET ORAL
Qty: 40 TAB | Refills: 1 | Status: SHIPPED | OUTPATIENT
Start: 2020-04-26 | End: 2020-06-10

## 2020-04-21 RX ORDER — MONTELUKAST SODIUM 10 MG/1
10 TABLET ORAL DAILY
Qty: 30 TAB | Refills: 2 | Status: SHIPPED | OUTPATIENT
Start: 2020-04-21 | End: 2020-05-15

## 2020-04-21 NOTE — ASSESSMENT & PLAN NOTE
As a means of avoiding spread of COVID-19, this visit is being conducted by video.    Patient is 59-year-old female with general anxiety disorder, PTSD, depression.  Was just established with psychiatry when psychiatrist left the practice.  Patient now has pending appointment with psychiatry and counseling for August 2020.  She continues to take buspirone, escitalopram, alprazolam as needed.

## 2020-04-21 NOTE — ASSESSMENT & PLAN NOTE
As a means of avoiding spread of COVID-19, this visit is being conducted by video.    This is a chronic health problem that is uncontrolled with current medications and lifestyle measures.  Taking alprazolam 0.5 mg to 1 mg as needed, trazodone 100 to 150 mg with some relief.  Patient reports she has no trouble falling asleep at bedtime, but wakes frequently throughout the night and has difficulty falling back asleep.  She reports she has been working on good sleep hygiene.  Does report chronic left hip pain (see additional notes) has been waking her up and has ordered a new mattress.  Will be getting new mattress delivered today.  Does report snores.  Reportedly had sleep study over 12 years ago which did not show sleep apnea.

## 2020-04-21 NOTE — ASSESSMENT & PLAN NOTE
"As a means of avoiding spread of COVID-19, this visit is being conducted by video.    This is a chronic health problem that is well controlled with current medications and lifestyle measures.  Taking metformin  mg daily.  Tolerating medication, denies GI upset.  Hemoglobin A1c is 6.6%.  Labs scanned into media.  Denies vision changes, polyuria, polydipsia.  Does report intermittent \"zapping\" sensation.  Not on ACE or statin.  "

## 2020-04-21 NOTE — ASSESSMENT & PLAN NOTE
As a means of avoiding spread of COVID-19, this visit is being conducted by video.    Patient reports chronic seasonal and environmental allergies, worsening in the last year.  Uses Flonase nasal spray daily in addition to second-generation antihistamine.  Denies fever, cough, sore throat, otalgia.

## 2020-04-21 NOTE — ASSESSMENT & PLAN NOTE
As a means of avoiding spread of COVID-19, this visit is being conducted by video.    Patient has distant history of iron deficiency anemia.  Reportedly took iron supplementation at one time.  Recent CBC shows mild ANUPAM.  Labs scanned into media.  Currently taking supplementation.  Denies blood in stool, dark black stool.  Does report some easy bruising.  Platelet count normal.

## 2020-04-21 NOTE — ASSESSMENT & PLAN NOTE
Patient reports lower back pain improved.  Did consult with spine specialist, Dr. Torres.  I have reviewed his note.  MRI did not show any cause for left hip pain.

## 2020-04-21 NOTE — ASSESSMENT & PLAN NOTE
As a means of avoiding spread of COVID-19, this visit is being conducted by video.    Patient reports chronic left hip pain.  MRI did show acetabular spur.  Was to have hip x-ray and was referred to orthopedics at last appointment.  Patient reports she did not get hip x-ray done and never heard from orthopedics.    HISTORY/REASON FOR EXAM:  Left hip and left leg pain..  History of breast cancer     TECHNIQUE/EXAM DESCRIPTION:  MRI of the left hip without contrast.     The study was performed on a Elle 1.5 Chaya MRI scanner.  T1 axial and T1 coronal images were obtained of both hips.  Axial and coronal proton density fat sat and sagittal proton density images were obtained of the left hip.     COMPARISON: None.     FINDINGS:  There is osteophytic spurring of the left acetabulum. No discrete articular cartilage loss is identified. There is mild thinning of the articular cartilage in the superior joint space. No abnormal bone marrow signal or edema identified. No abnormal   enhancement or enhancing mass.     No displaced labral tear is identified. The ligamentum teres appears intact.     No significant hip effusion is identified.     There is mild edema at the insertion of the gluteus minimus and medius tendons.     The ischiofemoral and quadratus femoris spaces are widely patent.     The hamstring tendons are normal in appearance.     Muscle signal is normal.     IMPRESSION:     1.  No abnormal enhancement or enhancing mass identified. No bone lesions to suggest metastasis.     2.  Mild tendinopathy of the gluteus medius and minimus tendons.     3.  Mild degenerative changes in the left hip joint

## 2020-04-21 NOTE — PROGRESS NOTES
"  Telemedicine Visit: Established Patient     This encounter was conducted via Zoom .   Verbal consent was obtained. Patient's identity was verified.    Subjective:   CC: Lab review, hip pain, insomnia, diabetes  Mikayla Knight is a 60 y.o. female presenting for evaluation and management of:    Insomnia  As a means of avoiding spread of COVID-19, this visit is being conducted by video.    This is a chronic health problem that is uncontrolled with current medications and lifestyle measures.  Taking alprazolam 0.5 mg to 1 mg as needed, trazodone 100 to 150 mg with some relief.  Patient reports she has no trouble falling asleep at bedtime, but wakes frequently throughout the night and has difficulty falling back asleep.  She reports she has been working on good sleep hygiene.  Does report chronic left hip pain (see additional notes) has been waking her up and has ordered a new mattress.  Will be getting new mattress delivered today.  Does report snores.  Reportedly had sleep study over 12 years ago which did not show sleep apnea.      Type 2 diabetes mellitus without complication, without long-term current use of insulin (HCC)  As a means of avoiding spread of COVID-19, this visit is being conducted by video.    This is a chronic health problem that is well controlled with current medications and lifestyle measures.  Taking metformin  mg daily.  Tolerating medication, denies GI upset.  Hemoglobin A1c is 6.6%.  Labs scanned into media.  Denies vision changes, polyuria, polydipsia.  Does report intermittent \"zapping\" sensation.  Not on ACE or statin.    Lumbar back pain with radiculopathy affecting left lower extremity  Patient reports lower back pain improved.  Did consult with spine specialist, Dr. Torres.  I have reviewed his note.  MRI did not show any cause for left hip pain.    Left hip pain  As a means of avoiding spread of COVID-19, this visit is being conducted by video.    Patient reports chronic " left hip pain.  MRI did show acetabular spur.  Was to have hip x-ray and was referred to orthopedics at last appointment.  Patient reports she did not get hip x-ray done and never heard from orthopedics.    HISTORY/REASON FOR EXAM:  Left hip and left leg pain..  History of breast cancer     TECHNIQUE/EXAM DESCRIPTION:  MRI of the left hip without contrast.     The study was performed on a Elle 1.5 Chaya MRI scanner.  T1 axial and T1 coronal images were obtained of both hips.  Axial and coronal proton density fat sat and sagittal proton density images were obtained of the left hip.     COMPARISON: None.     FINDINGS:  There is osteophytic spurring of the left acetabulum. No discrete articular cartilage loss is identified. There is mild thinning of the articular cartilage in the superior joint space. No abnormal bone marrow signal or edema identified. No abnormal   enhancement or enhancing mass.     No displaced labral tear is identified. The ligamentum teres appears intact.     No significant hip effusion is identified.     There is mild edema at the insertion of the gluteus minimus and medius tendons.     The ischiofemoral and quadratus femoris spaces are widely patent.     The hamstring tendons are normal in appearance.     Muscle signal is normal.     IMPRESSION:     1.  No abnormal enhancement or enhancing mass identified. No bone lesions to suggest metastasis.     2.  Mild tendinopathy of the gluteus medius and minimus tendons.     3.  Mild degenerative changes in the left hip joint    CODY (generalized anxiety disorder)  As a means of avoiding spread of COVID-19, this visit is being conducted by video.    Patient is 59-year-old female with general anxiety disorder, PTSD, depression.  Was just established with psychiatry when psychiatrist left the practice.  Patient now has pending appointment with psychiatry and counseling for August 2020.  She continues to take buspirone, escitalopram, alprazolam as  needed.    Iron deficiency anemia  As a means of avoiding spread of COVID-19, this visit is being conducted by video.    Patient has distant history of iron deficiency anemia.  Reportedly took iron supplementation at one time.  Recent CBC shows mild ANUPAM.  Labs scanned into media.  Currently taking supplementation.  Denies blood in stool, dark black stool.  Does report some easy bruising.  Platelet count normal.      Environmental and seasonal allergies  As a means of avoiding spread of COVID-19, this visit is being conducted by video.    Patient reports chronic seasonal and environmental allergies, worsening in the last year.  Uses Flonase nasal spray daily in addition to second-generation antihistamine.  Denies fever, cough, sore throat, otalgia.      ROS   Denies any recent fevers or chills. No nausea or vomiting. No chest pains or shortness of breath.     Allergies   Allergen Reactions   • Shellfish Allergy Anaphylaxis     SHRIMP ONLY, not anything else, not iodine.       Current medicines (including changes today)  Current Outpatient Medications   Medication Sig Dispense Refill   • [START ON 4/26/2020] ALPRAZolam (XANAX) 1 MG Tab Take 0.5-1 Tabs by mouth 1 time daily as needed for Anxiety for up to 45 days. 40 Tab 1   • montelukast (SINGULAIR) 10 MG Tab Take 1 Tab by mouth every day. 30 Tab 2   • traZODone (DESYREL) 50 MG Tab TAKE 2-3 TABS BY MOUTH EVERY EVENING. 90 Tab 1   • meloxicam (MOBIC) 7.5 MG Tab TAKE 1 TABLET BY MOUTH EVERY DAY 90 Tab 0   • Diclofenac Sodium 1 % Gel APPLY 2 GM UP TO 4 TIMES PER DAY IF NEEDED FOR BACK PAIN 100 g 2   • busPIRone (BUSPAR) 15 MG tablet TAKE 1 TABLET BY MOUTH THREE TIMES A DAY 90 Tab 0   • methocarbamol (ROBAXIN) 750 MG Tab TAKE 1 TABLET BY MOUTH EVERY DAY AS NEEDED 90 Tab 0   • traZODone (DESYREL) 50 MG Tab Take 2-3 Tabs by mouth every evening. 90 Tab 1   • escitalopram (LEXAPRO) 10 MG Tab Take 1 Tab by mouth every day. 90 Tab 0   • metFORMIN ER (GLUCOPHAGE XR) 750 MG  TABLET SR 24 HR TAKE 1 TABLET BY MOUTH EVERY DAY 90 Tab 1   • albuterol 108 (90 Base) MCG/ACT Aero Soln inhalation aerosol INHALE 2 PUFFS BY MOUTH EVERY 6 HOURS AS NEEDED FOR SHORTNESS OF BREATH 1 Inhaler 5   • omeprazole (PRILOSEC) 20 MG delayed-release capsule TAKE 1 CAP BY MOUTH EVERY DAY 30 MINUTES BEFORE FOOD OR DRINK, ON AN EMPTY STOMACH. 90 Cap 1   • gabapentin (NEURONTIN) 300 MG Cap TAKE 1 CAPSULE BY MOUTH THREE TIMES A  Cap 1   • topiramate (TOPAMAX) 50 MG tablet TAKE 1 TABLET BY MOUTH TWICE A  Tab 0   • hydrochlorothiazide (MICROZIDE) 12.5 MG capsule TAKE 1-2 CAPS BY MOUTH EVERY DAY. 180 Cap 0   • rizatriptan (MAXALT) 10 MG tablet TAKE 1 TAB BY MOUTH ONCE AS NEEDED FOR MIGRAINE FOR UP TO 1 DOSE. MAY REPEAT IN 2 HOURS IF NEEDED 10 Tab 2   • fluticasone (FLONASE) 50 MCG/ACT nasal spray Spray 2 Sprays in nose every day. 16 g 11   • acetaminophen (TYLENOL) 325 MG Tab Tylenol 325 mg tablet   Take 2 tablets every 6 hours by oral route.     • ondansetron (ZOFRAN ODT) 8 MG TABLET DISPERSIBLE Take 1 Tab by mouth every 8 hours as needed for Nausea. 15 Tab 0   • aspirin (ASA) 325 MG TABS Take 325 mg by mouth as needed. weekly        No current facility-administered medications for this visit.        Patient Active Problem List    Diagnosis Date Noted   • Environmental and seasonal allergies 04/21/2020   • Bilateral swelling of feet 12/18/2019   • CODY (generalized anxiety disorder) 12/09/2019   • PTSD (post-traumatic stress disorder) 12/09/2019   • Left hip pain 09/19/2019   • Type 2 diabetes mellitus without complication, without long-term current use of insulin ([facility]) 12/18/2017   • Morbid obesity with BMI of 40.0-44.9, adult ([facility]) 05/16/2017   • Hypertriglyceridemia 12/20/2016   • Bilateral hand pain 05/24/2016   • Bilateral knee pain 01/27/2016   • Vitamin D deficiency disease 06/10/2015   • Heart palpitations 02/12/2014   • Chronic low back pain without sciatica 12/05/2013   • Chronic headache  "05/09/2013   • Breast cancer, left breast (HCC) 12/31/2012   • Pulmonary hypertension (HCC) 03/20/2012   • Iron deficiency anemia 03/20/2012   • Fibromyalgia 11/18/2011   • Neuropathy 11/18/2011   • Depression 11/18/2011   • Insomnia 11/18/2011       Family History   Problem Relation Age of Onset   • Heart Attack Father    • Anxiety disorder Father    • Depression Father    • Schizophrenia Father    • Hypertension Mother    • Cancer Maternal Grandfather         leukemia   • Cancer Other         leukemia- cousin   • Diabetes Maternal Uncle    • Anxiety disorder Maternal Uncle    • Diabetes Maternal Uncle    • Anxiety disorder Maternal Uncle        She  has a past medical history of Anesthesia, Anxiety (11/18/2011), Arrhythmia, Arthritis, Breast mass, left, Breath shortness, Bursitis of knee, Cancer (HCC) (2005), Chronic pain (11/18/2011), Depression (11/18/2011), Fibromyalgia (11/18/2011), Gastro-esophageal reflux (3/20/2012), Heart murmur, Iron deficiency anemia (3/20/2012), Neuropathy (HCC) (11/18/2011), Other specified disorder of intestines, Pain (6/14/12), Pneumonia (01/2012), Pulmonary hypertension (HCC) (3/20/2012), and Syncope and collapse (3/20/2012).  She  has a past surgical history that includes abdominal exploration (3/11/2010); hernia repair (3/15/2010); flap graft (3/15/2010); panniculectomy (3/15/2010); gyn surgery (1984); other (04/2009); other (2005); other (06/09); other (07/09); other (08/09); irrigation & debridement general (4/12/2010); abdominal exploration (10/7/2010); gastric bypass laparoscopic (2005); colonoscopy (6/21/2012); gastroscopy (6/21/2012); breast biopsy (12/11/2012); breast biopsy (1/9/2013); axillary node dissection (1/9/2013); node biopsy (1/9/2013); and cath placement (1/25/2013).       Objective:   Vitals obtained by patient:  Respirations through observation: 16, Height: 5'5\" and Weight: 264 lb, Temp 98.6    Physical Exam:  Constitutional: Alert, no distress, " well-groomed.  Skin: No rashes in visible areas.  Eye: Round. Conjunctiva clear, lids normal. No icterus.   ENMT: Lips pink without lesions, good dentition, moist mucous membranes. Phonation normal.  Neck: No masses, no thyromegaly. Moves freely without pain.  CV: Pulse as reported by patient  Respiratory: Unlabored respiratory effort, no cough or audible wheeze  Psych: Alert and oriented x3, normal affect and mood.       Assessment and Plan:   The following treatment plan was discussed:     1. CODY (generalized anxiety disorder)  Continue with medications, no changes.  Pending consultation with psychiatry and counseling.  Will refill alprazolam until establish with psychiatry.  Reviewed risks of benzodiazepines including respiratory depression.  Patient does not drink alcohol.   reviewed.  - ALPRAZolam (XANAX) 1 MG Tab; Take 0.5-1 Tabs by mouth 1 time daily as needed for Anxiety for up to 45 days.  Dispense: 40 Tab; Refill: 1    2. Left hip pain  Patient continues with left hip pain.  Never did hear from orthopedics.  Will refer to orthopedist in [place].  Patient is open to referral.  Getting new mattress, hopefully this will help.  Has had PT which helped mildly.  Reviewed lifestyle measures.  - REFERRAL TO ORTHOPEDICS    3. Iron deficiency anemia, unspecified iron deficiency anemia type  Advised patient to start on low-dose iron every other day.  Will get updated CBC and iron level prior to next appointment.  - CBC WITH DIFFERENTIAL; Future  - FERRITIN; Future    4. Primary insomnia  Patient continues to suffer from insomnia.  She does snore and is obese.  Will get repeat sleep study.  - REFERRAL TO SLEEP STUDIES    5. Snores    - REFERRAL TO SLEEP STUDIES    6. Environmental and seasonal allergies  Continue with Flonase, second-generation antihistamine.  Advised on nasal saline irrigation.  Did advise on Afrin nasal spray up to 3 consecutive days if needed.  We will also add Singulair.  - montelukast (SINGULAIR)  10 MG Tab; Take 1 Tab by mouth every day.  Dispense: 30 Tab; Refill: 2    7. Type 2 diabetes mellitus without complication, without long-term current use of insulin (HCC)  Well-controlled.  Continue with metformin.  Reviewed lifestyle measures.    8. Lumbar back pain with radiculopathy affecting left lower extremity  Improved.    9. CODY (generalized anxiety disorder)        Follow-up: Return for 3 to 4 months lab review.

## 2020-04-23 DIAGNOSIS — M79.7 FIBROMYALGIA: ICD-10-CM

## 2020-04-23 DIAGNOSIS — G62.9 NEUROPATHY: ICD-10-CM

## 2020-04-23 DIAGNOSIS — G44.229 CHRONIC TENSION-TYPE HEADACHE, NOT INTRACTABLE: ICD-10-CM

## 2020-04-23 DIAGNOSIS — F51.01 PRIMARY INSOMNIA: ICD-10-CM

## 2020-04-23 NOTE — TELEPHONE ENCOUNTER
Was the patient seen in the last year in this department? Yes    Does patient have an active prescription for medications requested? No     Received Request Via: Pharmacy      Pt met protocol?: Yes    LAST OV 12/18/2019, MED HAS BEEN D/C

## 2020-04-24 RX ORDER — AMITRIPTYLINE HYDROCHLORIDE 75 MG/1
150 TABLET ORAL NIGHTLY PRN
Qty: 180 TAB | Refills: 1 | Status: SHIPPED | OUTPATIENT
Start: 2020-04-24 | End: 2020-09-29

## 2020-04-29 DIAGNOSIS — G44.229 CHRONIC TENSION-TYPE HEADACHE, NOT INTRACTABLE: ICD-10-CM

## 2020-04-29 DIAGNOSIS — J30.1 SEASONAL ALLERGIC RHINITIS DUE TO POLLEN: ICD-10-CM

## 2020-04-29 RX ORDER — RIZATRIPTAN BENZOATE 10 MG/1
TABLET ORAL
Qty: 10 TAB | Refills: 2 | Status: SHIPPED | OUTPATIENT
Start: 2020-04-29 | End: 2020-11-03

## 2020-04-29 RX ORDER — FLUTICASONE PROPIONATE 50 MCG
2 SPRAY, SUSPENSION (ML) NASAL DAILY
Qty: 16 G | Refills: 11 | Status: SHIPPED | OUTPATIENT
Start: 2020-04-29 | End: 2021-03-29

## 2020-04-29 NOTE — TELEPHONE ENCOUNTER
Was the patient seen in the last year in this department? 12/18/19    Does patient have an active prescription for medications requested? Yes    Received Request Via: Pharmacy    Abstract on 04/20/2020   Component Date Value   • Glycohemoglobin 04/17/2020 6.6*   Hospital Outpatient Visit on 10/07/2019   Component Date Value   • Sodium 10/07/2019 138    • Potassium 10/07/2019 4.2    • Chloride 10/07/2019 105    • Co2 10/07/2019 24    • Anion Gap 10/07/2019 9.0    • Glucose 10/07/2019 151*   • Bun 10/07/2019 20    • Creatinine 10/07/2019 0.78    • Calcium 10/07/2019 8.5    • AST(SGOT) 10/07/2019 28    • ALT(SGPT) 10/07/2019 32    • Alkaline Phosphatase 10/07/2019 102*   • Total Bilirubin 10/07/2019 0.4    • Albumin 10/07/2019 4.4    • Total Protein 10/07/2019 6.7    • Globulin 10/07/2019 2.3    • A-G Ratio 10/07/2019 1.9    • Cholesterol,Tot 10/07/2019 177    • Triglycerides 10/07/2019 259*   • HDL 10/07/2019 45    • LDL 10/07/2019 80    • TSH 10/07/2019 4.040    • Free T-4 10/07/2019 0.72    • 25-Hydroxy   Vitamin D 25 10/07/2019 17*   • GFR If  10/07/2019 >60    • GFR If Non  Ameri* 10/07/2019 >60    • Glycohemoglobin 10/07/2019 6.0*   • Est Avg Glucose 10/07/2019 126    • Fasting Status 10/07/2019 Fasting    Office Visit on 08/07/2019   Component Date Value   • RETINAL SCREEN 08/07/2019 Negative    • Glycohemoglobin 08/07/2019 5.8*   • Internal Control Negative 08/07/2019 Negative    • Internal Control Positive 08/07/2019 Positive

## 2020-04-29 NOTE — TELEPHONE ENCOUNTER
Was the patient seen in the last year in this department? Yes lov 12/18/19    Does patient have an active prescription for medications requested? Yes    Received Request Via: Patient    Abstract on 04/20/2020   Component Date Value   • Glycohemoglobin 04/17/2020 6.6*   Hospital Outpatient Visit on 10/07/2019   Component Date Value   • Sodium 10/07/2019 138    • Potassium 10/07/2019 4.2    • Chloride 10/07/2019 105    • Co2 10/07/2019 24    • Anion Gap 10/07/2019 9.0    • Glucose 10/07/2019 151*   • Bun 10/07/2019 20    • Creatinine 10/07/2019 0.78    • Calcium 10/07/2019 8.5    • AST(SGOT) 10/07/2019 28    • ALT(SGPT) 10/07/2019 32    • Alkaline Phosphatase 10/07/2019 102*   • Total Bilirubin 10/07/2019 0.4    • Albumin 10/07/2019 4.4    • Total Protein 10/07/2019 6.7    • Globulin 10/07/2019 2.3    • A-G Ratio 10/07/2019 1.9    • Cholesterol,Tot 10/07/2019 177    • Triglycerides 10/07/2019 259*   • HDL 10/07/2019 45    • LDL 10/07/2019 80    • TSH 10/07/2019 4.040    • Free T-4 10/07/2019 0.72    • 25-Hydroxy   Vitamin D 25 10/07/2019 17*   • GFR If  10/07/2019 >60    • GFR If Non  Ameri* 10/07/2019 >60    • Glycohemoglobin 10/07/2019 6.0*   • Est Avg Glucose 10/07/2019 126    • Fasting Status 10/07/2019 Fasting    Office Visit on 08/07/2019   Component Date Value   • RETINAL SCREEN 08/07/2019 Negative    • Glycohemoglobin 08/07/2019 5.8*   • Internal Control Negative 08/07/2019 Negative    • Internal Control Positive 08/07/2019 Positive    ]

## 2020-05-06 RX ORDER — BUSPIRONE HYDROCHLORIDE 15 MG/1
TABLET ORAL
Qty: 90 TAB | Refills: 0 | Status: SHIPPED | OUTPATIENT
Start: 2020-05-06 | End: 2020-06-01

## 2020-05-11 RX ORDER — TRAZODONE HYDROCHLORIDE 50 MG/1
TABLET ORAL
Qty: 90 TAB | Refills: 1 | Status: SHIPPED | OUTPATIENT
Start: 2020-05-11 | End: 2020-06-05

## 2020-05-15 DIAGNOSIS — J30.89 ENVIRONMENTAL AND SEASONAL ALLERGIES: ICD-10-CM

## 2020-05-15 RX ORDER — MONTELUKAST SODIUM 10 MG/1
TABLET ORAL
Qty: 90 TAB | Refills: 3 | Status: SHIPPED | OUTPATIENT
Start: 2020-05-15 | End: 2021-03-29

## 2020-05-15 NOTE — TELEPHONE ENCOUNTER
Was the patient seen in the last year in this department? Yes    Does patient have an active prescription for medications requested? Yes    Received Request Via: Pharmacy    Abstract on 04/20/2020   Component Date Value   • Glycohemoglobin 04/17/2020 6.6*   Hospital Outpatient Visit on 10/07/2019   Component Date Value   • Sodium 10/07/2019 138    • Potassium 10/07/2019 4.2    • Chloride 10/07/2019 105    • Co2 10/07/2019 24    • Anion Gap 10/07/2019 9.0    • Glucose 10/07/2019 151*   • Bun 10/07/2019 20    • Creatinine 10/07/2019 0.78    • Calcium 10/07/2019 8.5    • AST(SGOT) 10/07/2019 28    • ALT(SGPT) 10/07/2019 32    • Alkaline Phosphatase 10/07/2019 102*   • Total Bilirubin 10/07/2019 0.4    • Albumin 10/07/2019 4.4    • Total Protein 10/07/2019 6.7    • Globulin 10/07/2019 2.3    • A-G Ratio 10/07/2019 1.9    • Cholesterol,Tot 10/07/2019 177    • Triglycerides 10/07/2019 259*   • HDL 10/07/2019 45    • LDL 10/07/2019 80    • TSH 10/07/2019 4.040    • Free T-4 10/07/2019 0.72    • 25-Hydroxy   Vitamin D 25 10/07/2019 17*   • GFR If  10/07/2019 >60    • GFR If Non  Ameri* 10/07/2019 >60    • Glycohemoglobin 10/07/2019 6.0*   • Est Avg Glucose 10/07/2019 126    • Fasting Status 10/07/2019 Fasting    Office Visit on 08/07/2019   Component Date Value   • RETINAL SCREEN 08/07/2019 Negative    • Glycohemoglobin 08/07/2019 5.8*   • Internal Control Negative 08/07/2019 Negative    • Internal Control Positive 08/07/2019 Positive    ]

## 2020-05-20 RX ORDER — ESCITALOPRAM OXALATE 10 MG/1
TABLET ORAL
Qty: 90 TAB | Refills: 0 | Status: SHIPPED | OUTPATIENT
Start: 2020-05-20 | End: 2020-08-14

## 2020-06-01 RX ORDER — BUSPIRONE HYDROCHLORIDE 15 MG/1
TABLET ORAL
Qty: 90 TAB | Refills: 0 | Status: SHIPPED | OUTPATIENT
Start: 2020-06-01 | End: 2020-06-27

## 2020-06-05 RX ORDER — TRAZODONE HYDROCHLORIDE 50 MG/1
TABLET ORAL
Qty: 90 TAB | Refills: 1 | Status: SHIPPED | OUTPATIENT
Start: 2020-06-05 | End: 2020-06-30

## 2020-06-11 DIAGNOSIS — M79.7 FIBROMYALGIA: ICD-10-CM

## 2020-06-11 DIAGNOSIS — G62.9 NEUROPATHY: ICD-10-CM

## 2020-06-11 RX ORDER — GABAPENTIN 300 MG/1
CAPSULE ORAL
Qty: 270 CAP | Refills: 1 | Status: SHIPPED | OUTPATIENT
Start: 2020-06-11 | End: 2020-09-29

## 2020-06-16 DIAGNOSIS — T39.395A NSAID INDUCED GASTRITIS: ICD-10-CM

## 2020-06-16 DIAGNOSIS — K29.60 NSAID INDUCED GASTRITIS: ICD-10-CM

## 2020-06-16 RX ORDER — OMEPRAZOLE 20 MG/1
CAPSULE, DELAYED RELEASE ORAL
Qty: 90 CAP | Refills: 3 | Status: SHIPPED | OUTPATIENT
Start: 2020-06-16 | End: 2021-05-28

## 2020-06-27 RX ORDER — BUSPIRONE HYDROCHLORIDE 15 MG/1
TABLET ORAL
Qty: 90 TAB | Refills: 0 | Status: SHIPPED | OUTPATIENT
Start: 2020-06-27 | End: 2020-07-24

## 2020-06-30 RX ORDER — TRAZODONE HYDROCHLORIDE 50 MG/1
TABLET ORAL
Qty: 90 TAB | Refills: 1 | Status: SHIPPED | OUTPATIENT
Start: 2020-06-30 | End: 2020-07-24

## 2020-07-01 DIAGNOSIS — M79.7 FIBROMYALGIA: ICD-10-CM

## 2020-07-01 RX ORDER — METHOCARBAMOL 750 MG/1
TABLET, FILM COATED ORAL
Qty: 90 TAB | Refills: 0 | Status: SHIPPED | OUTPATIENT
Start: 2020-07-01 | End: 2020-09-28

## 2020-07-02 ENCOUNTER — PATIENT MESSAGE (OUTPATIENT)
Dept: MEDICAL GROUP | Facility: PHYSICIAN GROUP | Age: 60
End: 2020-07-02

## 2020-07-02 DIAGNOSIS — F41.1 GAD (GENERALIZED ANXIETY DISORDER): ICD-10-CM

## 2020-07-02 RX ORDER — ALPRAZOLAM 1 MG/1
.5-1 TABLET ORAL
Qty: 40 TAB | Refills: 0
Start: 2020-07-02 | End: 2020-08-16

## 2020-07-02 NOTE — PATIENT COMMUNICATION
Received request via:Patient     Was the patient seen in the last year in this department? Yes    Does the patient have an active prescription (recently filled or refills available) for medication(s) requested? No

## 2020-07-06 DIAGNOSIS — M25.552 LEFT HIP PAIN: ICD-10-CM

## 2020-07-06 RX ORDER — MELOXICAM 7.5 MG/1
TABLET ORAL
Qty: 90 TAB | Refills: 0 | Status: SHIPPED | OUTPATIENT
Start: 2020-07-06 | End: 2020-09-29

## 2020-07-07 ENCOUNTER — HOSPITAL ENCOUNTER (OUTPATIENT)
Dept: LAB | Facility: MEDICAL CENTER | Age: 60
End: 2020-07-07
Attending: NURSE PRACTITIONER
Payer: COMMERCIAL

## 2020-07-07 DIAGNOSIS — D50.9 IRON DEFICIENCY ANEMIA, UNSPECIFIED IRON DEFICIENCY ANEMIA TYPE: ICD-10-CM

## 2020-07-07 LAB
ANISOCYTOSIS BLD QL SMEAR: ABNORMAL
BASOPHILS # BLD AUTO: 1.2 % (ref 0–1.8)
BASOPHILS # BLD: 0.1 K/UL (ref 0–0.12)
COMMENT 1642: NORMAL
EOSINOPHIL # BLD AUTO: 0.12 K/UL (ref 0–0.51)
EOSINOPHIL NFR BLD: 1.5 % (ref 0–6.9)
ERYTHROCYTE [DISTWIDTH] IN BLOOD BY AUTOMATED COUNT: 48.5 FL (ref 35.9–50)
FERRITIN SERPL-MCNC: 12.3 NG/ML (ref 10–291)
HCT VFR BLD AUTO: 36.1 % (ref 37–47)
HGB BLD-MCNC: 10.4 G/DL (ref 12–16)
IMM GRANULOCYTES # BLD AUTO: 0.04 K/UL (ref 0–0.11)
IMM GRANULOCYTES NFR BLD AUTO: 0.5 % (ref 0–0.9)
LYMPHOCYTES # BLD AUTO: 2.24 K/UL (ref 1–4.8)
LYMPHOCYTES NFR BLD: 27.8 % (ref 22–41)
MCH RBC QN AUTO: 23.3 PG (ref 27–33)
MCHC RBC AUTO-ENTMCNC: 28.8 G/DL (ref 33.6–35)
MCV RBC AUTO: 80.8 FL (ref 81.4–97.8)
MICROCYTES BLD QL SMEAR: ABNORMAL
MONOCYTES # BLD AUTO: 0.6 K/UL (ref 0–0.85)
MONOCYTES NFR BLD AUTO: 7.4 % (ref 0–13.4)
MORPHOLOGY BLD-IMP: NORMAL
NEUTROPHILS # BLD AUTO: 4.96 K/UL (ref 2–7.15)
NEUTROPHILS NFR BLD: 61.6 % (ref 44–72)
NRBC # BLD AUTO: 0 K/UL
NRBC BLD-RTO: 0 /100 WBC
OVALOCYTES BLD QL SMEAR: NORMAL
PLATELET # BLD AUTO: 400 K/UL (ref 164–446)
PLATELET BLD QL SMEAR: NORMAL
PMV BLD AUTO: 9.4 FL (ref 9–12.9)
POIKILOCYTOSIS BLD QL SMEAR: NORMAL
RBC # BLD AUTO: 4.47 M/UL (ref 4.2–5.4)
RBC BLD AUTO: PRESENT
WBC # BLD AUTO: 8.1 K/UL (ref 4.8–10.8)

## 2020-07-07 PROCEDURE — 85025 COMPLETE CBC W/AUTO DIFF WBC: CPT

## 2020-07-07 PROCEDURE — 36415 COLL VENOUS BLD VENIPUNCTURE: CPT

## 2020-07-07 PROCEDURE — 82728 ASSAY OF FERRITIN: CPT

## 2020-07-14 ENCOUNTER — OFFICE VISIT (OUTPATIENT)
Dept: MEDICAL GROUP | Facility: PHYSICIAN GROUP | Age: 60
End: 2020-07-14
Payer: COMMERCIAL

## 2020-07-14 VITALS
TEMPERATURE: 99 F | HEIGHT: 65 IN | SYSTOLIC BLOOD PRESSURE: 118 MMHG | OXYGEN SATURATION: 95 % | BODY MASS INDEX: 43.65 KG/M2 | WEIGHT: 262 LBS | DIASTOLIC BLOOD PRESSURE: 84 MMHG | HEART RATE: 100 BPM

## 2020-07-14 DIAGNOSIS — M25.552 LEFT HIP PAIN: ICD-10-CM

## 2020-07-14 DIAGNOSIS — E11.9 TYPE 2 DIABETES MELLITUS WITHOUT COMPLICATION, WITHOUT LONG-TERM CURRENT USE OF INSULIN (HCC): ICD-10-CM

## 2020-07-14 DIAGNOSIS — M79.89 BILATERAL SWELLING OF FEET: ICD-10-CM

## 2020-07-14 DIAGNOSIS — D50.9 IRON DEFICIENCY ANEMIA, UNSPECIFIED IRON DEFICIENCY ANEMIA TYPE: ICD-10-CM

## 2020-07-14 DIAGNOSIS — Z23 NEED FOR PNEUMOCOCCAL VACCINE: ICD-10-CM

## 2020-07-14 DIAGNOSIS — R06.09 DYSPNEA ON EXERTION: ICD-10-CM

## 2020-07-14 DIAGNOSIS — J30.89 ENVIRONMENTAL AND SEASONAL ALLERGIES: ICD-10-CM

## 2020-07-14 PROCEDURE — 99214 OFFICE O/P EST MOD 30 MIN: CPT | Mod: 25 | Performed by: NURSE PRACTITIONER

## 2020-07-14 PROCEDURE — 90732 PPSV23 VACC 2 YRS+ SUBQ/IM: CPT | Performed by: NURSE PRACTITIONER

## 2020-07-14 PROCEDURE — 90471 IMMUNIZATION ADMIN: CPT | Performed by: NURSE PRACTITIONER

## 2020-07-14 ASSESSMENT — PATIENT HEALTH QUESTIONNAIRE - PHQ9
4. FEELING TIRED OR HAVING LITTLE ENERGY: NEARLY EVERY DAY
2. FEELING DOWN, DEPRESSED, IRRITABLE, OR HOPELESS: NOT AT ALL
7. TROUBLE CONCENTRATING ON THINGS, SUCH AS READING THE NEWSPAPER OR WATCHING TELEVISION: NOT AT ALL
SUM OF ALL RESPONSES TO PHQ QUESTIONS 1-9: 7
SUM OF ALL RESPONSES TO PHQ9 QUESTIONS 1 AND 2: 1
5. POOR APPETITE OR OVEREATING: NEARLY EVERY DAY
3. TROUBLE FALLING OR STAYING ASLEEP OR SLEEPING TOO MUCH: NOT AT ALL
8. MOVING OR SPEAKING SO SLOWLY THAT OTHER PEOPLE COULD HAVE NOTICED. OR THE OPPOSITE, BEING SO FIGETY OR RESTLESS THAT YOU HAVE BEEN MOVING AROUND A LOT MORE THAN USUAL: NOT AT ALL
6. FEELING BAD ABOUT YOURSELF - OR THAT YOU ARE A FAILURE OR HAVE LET YOURSELF OR YOUR FAMILY DOWN: NOT AL ALL
1. LITTLE INTEREST OR PLEASURE IN DOING THINGS: SEVERAL DAYS
9. THOUGHTS THAT YOU WOULD BE BETTER OFF DEAD, OR OF HURTING YOURSELF: NOT AT ALL

## 2020-07-14 ASSESSMENT — FIBROSIS 4 INDEX: FIB4 SCORE: 0.73

## 2020-07-14 NOTE — PROGRESS NOTES
CC: Lab review, shortness of breath and fatigue    HISTORY OF THE PRESENT ILLNESS: Patient is a 60 y.o. female. This pleasant patient is here today for evaluation and management of the following health problems.    Health Maintenance: due  Patient will call to schedule appointment for Pap.      Bilateral swelling of feet  Chronic health problem, controlled with lifestyle measures.  Rarely needs to take hydrochlorothiazide.  Denies edema.    Iron deficiency anemia  Intermittent problem.  Patient has iron deficiency anemia.  Started iron supplementation 3 months ago.  Taking it daily.  No improvement in lab results.  Does report increased fatigue and shortness of breath with exertion in the last 3 weeks.  Denies chest pain, blood in stool, dark black stool, hemoptysis.    Component      Latest Ref Rng & Units 7/7/2020   WBC      4.8 - 10.8 K/uL 8.1   RBC      4.20 - 5.40 M/uL 4.47   Hemoglobin      12.0 - 16.0 g/dL 10.4 (L)   Hematocrit      37.0 - 47.0 % 36.1 (L)   MCV      81.4 - 97.8 fL 80.8 (L)   MCH      27.0 - 33.0 pg 23.3 (L)   MCHC      33.6 - 35.0 g/dL 28.8 (L)   RDW      35.9 - 50.0 fL 48.5   Platelet Count      164 - 446 K/uL 400   MPV      9.0 - 12.9 fL 9.4   Neutrophils-Polys      44.00 - 72.00 % 61.60   Lymphocytes      22.00 - 41.00 % 27.80   Monocytes      0.00 - 13.40 % 7.40   Eosinophils      0.00 - 6.90 % 1.50   Basophils      0.00 - 1.80 % 1.20   Immature Granulocytes      0.00 - 0.90 % 0.50   Nucleated RBC      /100 WBC 0.00   Neutrophils (Absolute)      2.00 - 7.15 K/uL 4.96   Lymphs (Absolute)      1.00 - 4.80 K/uL 2.24   Monos (Absolute)      0.00 - 0.85 K/uL 0.60   Eos (Absolute)      0.00 - 0.51 K/uL 0.12   Baso (Absolute)      0.00 - 0.12 K/uL 0.10   Immature Granulocytes (abs)      0.00 - 0.11 K/uL 0.04   NRBC (Absolute)      K/uL 0.00   Anisocytosis       1+   Microcytosis       1+   Ferritin      10.0 - 291.0 ng/mL 12.3       Dyspnea on exertion  Patient reports 3-week onset of shortness  of breath with exertion, lightheadedness, feeling rundown and fatigued.  Gets winded when walking across the room.  Denies chest pain, wheezing, edema.  Does have mild iron deficiency anemia.  History of pulmonary hypertension.  Will get echocardiogram.    Left hip pain  Patient reports that she saw orthopedist in [place].  Reportedly told that it was some kind of pinched nerve.  Patient frustrated that no treatment plan was advised.  She is no longer wanting to pursue her intermittent hip pain.  I will request records.    Environmental and seasonal allergies  Chronic health problem.  Reports addition of Singulair has greatly helped with allergy symptoms.      Allergies: Shellfish allergy    Current Outpatient Medications Ordered in Epic   Medication Sig Dispense Refill   • meloxicam (MOBIC) 7.5 MG Tab TAKE 1 TABLET BY MOUTH EVERY DAY 90 Tab 0   • methocarbamol (ROBAXIN) 750 MG Tab TAKE 1 TABLET BY MOUTH EVERY DAY AS NEEDED 90 Tab 0   • Diclofenac Sodium 1 % Gel APPLY 2 GM UP TO 4 TIMES PER DAY IF NEEDED FOR BACK PAIN 100 g 0   • traZODone (DESYREL) 50 MG Tab TAKE 2-3 TABS BY MOUTH EVERY EVENING. 90 Tab 1   • busPIRone (BUSPAR) 15 MG tablet TAKE 1 TABLET BY MOUTH THREE TIMES A DAY 90 Tab 0   • gabapentin (NEURONTIN) 300 MG Cap TAKE 1 CAPSULE BY MOUTH THREE TIMES A  Cap 1   • escitalopram (LEXAPRO) 10 MG Tab TAKE 1 TABLET BY MOUTH EVERY DAY 90 Tab 0   • montelukast (SINGULAIR) 10 MG Tab TAKE 1 TABLET BY MOUTH EVERY DAY 90 Tab 3   • fluticasone (FLONASE) 50 MCG/ACT nasal spray Spray 2 Sprays in nose every day. 16 g 11   • rizatriptan (MAXALT) 10 MG tablet TAKE 1 TAB BY MOUTH ONCE AS NEEDED FOR MIGRAINE FOR UP TO 1 DOSE. MAY REPEAT IN 2 HOURS IF NEEDED 10 Tab 2   • amitriptyline (ELAVIL) 75 MG Tab Take 2 Tabs by mouth at bedtime as needed. 180 Tab 1   • metFORMIN ER (GLUCOPHAGE XR) 750 MG TABLET SR 24 HR TAKE 1 TABLET BY MOUTH EVERY DAY 90 Tab 1   • albuterol 108 (90 Base) MCG/ACT Aero Soln inhalation  "aerosol INHALE 2 PUFFS BY MOUTH EVERY 6 HOURS AS NEEDED FOR SHORTNESS OF BREATH 1 Inhaler 5   • topiramate (TOPAMAX) 50 MG tablet TAKE 1 TABLET BY MOUTH TWICE A  Tab 0   • hydrochlorothiazide (MICROZIDE) 12.5 MG capsule TAKE 1-2 CAPS BY MOUTH EVERY DAY. 180 Cap 0   • acetaminophen (TYLENOL) 325 MG Tab Tylenol 325 mg tablet   Take 2 tablets every 6 hours by oral route.     • ondansetron (ZOFRAN ODT) 8 MG TABLET DISPERSIBLE Take 1 Tab by mouth every 8 hours as needed for Nausea. 15 Tab 0   • aspirin (ASA) 325 MG TABS Take 325 mg by mouth as needed. weekly      • omeprazole (PRILOSEC) 20 MG delayed-release capsule TAKE 1 CAP BY MOUTH EVERY DAY 30 MINUTES BEFORE FOOD OR DRINK, ON AN EMPTY STOMACH. 90 Cap 3     No current Epic-ordered facility-administered medications on file.        Past Medical History:   Diagnosis Date   • Anesthesia     low bp coming out of anesthesia \"one time\"   • Anxiety 11/18/2011   • Arrhythmia     ? pt unsure   • Arthritis     fibromyalgia   • Breast mass, left    • Breath shortness     occasionally   • Bursitis of knee     left   • Cancer (HCC) 2005    squamous cell on nose   • Chronic pain 11/18/2011   • Depression 11/18/2011   • Fibromyalgia 11/18/2011   • Gastro-esophageal reflux 3/20/2012   • Heart murmur    • Iron deficiency anemia 3/20/2012   • Neuropathy 11/18/2011   • Other specified disorder of intestines    • Pain 6/14/12    3/10 stomach   • Pneumonia 01/2012   • Pulmonary hypertension (HCC) 3/20/2012   • Syncope and collapse 3/20/2012       Past Surgical History:   Procedure Laterality Date   • CATH PLACEMENT  1/25/2013    Performed by Lizeth Ferreira M.D. at SURGERY SAME DAY [facility]   • BREAST BIOPSY  1/9/2013    Performed by Lizeth Ferreira M.D. at SURGERY SAME DAY [facility]   • AXILLARY NODE DISSECTION  1/9/2013    Performed by Lizeth Ferreira M.D. at SURGERY SAME DAY [facility]   • NODE BIOPSY  1/9/2013    Performed by Lizeth Ferreira M.D. at SURGERY SAME DAY " [facility]   • BREAST BIOPSY  2012    Performed by Lizeth Ferreira M.D. at SURGERY SAME DAY [facility]   • COLONOSCOPY  2012    Performed by PARAM VALDEZ at SURGERY [facility]   • GASTROSCOPY  2012    Performed by PARAM VALDEZ at SURGERY [facility]   • ABDOMINAL EXPLORATION  10/7/2010    Performed by MARIA G KHAN JR at SURGERY [facility]   • IRRIGATION & DEBRIDEMENT GENERAL  2010    Performed by OZZIE WEINSTEIN at SURGERY [facility]   • HERNIA REPAIR  3/15/2010    Performed by MARIA G KHAN JR at SURGERY [facility]   • FLAP GRAFT  3/15/2010    Performed by MARIA G KHAN JR at SURGERY [facility]   • PANNICULECTOMY  3/15/2010    Performed by MARIA G KHAN JR at SURGERY [facility]   • ABDOMINAL EXPLORATION  3/11/2010    Performed by MARIA G KHAN JR at SURGERY SAME DAY [facility]   • OTHER      pulled mesh out of hernia   • OTHER      abscess removed abd.   • OTHER      bowel obstruction   • OTHER  2009    hernia repair,mesh removal after in aug.09   • OTHER      bariatric surgery gastric bypass   • GASTRIC BYPASS LAPAROSCOPIC     • GYN SURGERY      tubal       Social History     Tobacco Use   • Smoking status: Former Smoker     Packs/day: 0.10     Years: 8.00     Pack years: 0.80     Types: Cigarettes     Last attempt to quit: 2016     Years since quittin.2   • Smokeless tobacco: Never Used   • Tobacco comment: 1/2 pk a day on and off 10 yrs, 1 cigarette daily   Substance Use Topics   • Alcohol use: Yes     Alcohol/week: 0.0 oz     Comment: 1-2 times a year   • Drug use: No       Family History   Problem Relation Age of Onset   • Heart Attack Father    • Anxiety disorder Father    • Depression Father    • Schizophrenia Father    • Hypertension Mother    • Cancer Maternal Grandfather         leukemia   • Cancer Other         leukemia- cousin   • Diabetes Maternal Uncle    •  "Anxiety disorder Maternal Uncle    • Diabetes Maternal Uncle    • Anxiety disorder Maternal Uncle        ROS:   As in HPI, otherwise negative for chest pain, abdominal pain, dysuria, blood in stool, fever           Exam: /84 (BP Location: Right arm, Patient Position: Sitting, BP Cuff Size: Large adult)   Pulse 100   Temp 37.2 °C (99 °F) (Temporal)   Ht 1.651 m (5' 5\")   Wt 118.8 kg (262 lb)   SpO2 95%  Body mass index is 43.6 kg/m².    General: Alert, pleasant, obese habitus, well nourished, well developed female in NAD  Neck: Supple without bruit. Thyroid is not enlarged.  Pulmonary: Clear to ausculation.  Normal effort. No rales, ronchi, or wheezing.  Cardiovascular: Normal rate and rhythm without murmur. Carotid and radial pulses are intact and equal bilaterally.  No lower extremity edema.  Abdomen: Soft, nontender, nondistended.   Neurologic: Grossly nonfocal  Lymph: No cervical or supraclavicular lymph nodes are palpable  Skin: Warm and dry.  .  Musculoskeletal: Normal gait.   Psych: Normal mood and affect. Alert and oriented. Judgment and insight is normal.    Please note that this dictation was created using voice recognition software. I have made every reasonable attempt to correct obvious errors, but I expect that there are errors of grammar and possibly content that I did not discover before finalizing the note.      Assessment/Plan  1. Dyspnea on exertion  Relatively new onset.  Also has mild iron deficiency anemia.  Will get updated echocardiogram.  ER precautions reviewed with patient.  - EC-ECHOCARDIOGRAM COMPLETE W/O CONT; Future    2. Iron deficiency anemia, unspecified iron deficiency anemia type  No improvement with iron supplementation.  Advised patient to decrease supplementation to every other day.  We will also refer to gastroenterology for further evaluation.  - REFERRAL TO GASTROENTEROLOGY    3. Need for pneumococcal vaccine  Given.  - PneumoVax PPV23 =>1yo    4. Bilateral swelling " of feet  Well-controlled.  Not needing to take hydrochlorothiazide.  Continue to monitor.    5. Type 2 diabetes mellitus without complication, without long-term current use of insulin (HCC)  We will get updated labs and review at next appointment.  - Comp Metabolic Panel; Future  - HEMOGLOBIN A1C; Future  - ESTIMATED GFR; Future  - TSH WITH REFLEX TO FT4; Future    6. Left hip pain  We will request records from [facility] in [place].    7. Environmental and seasonal allergies  Improved with addition of Singulair.      Patient will return to clinic in 3 months or sooner if needed.

## 2020-07-14 NOTE — ASSESSMENT & PLAN NOTE
Chronic health problem, controlled with lifestyle measures.  Rarely needs to take hydrochlorothiazide.  Denies edema.

## 2020-07-14 NOTE — PATIENT INSTRUCTIONS
Take iron supplement every other day    Referred to Gastroenterologist    Echocardiogram ordered, get done at your convenience

## 2020-07-15 NOTE — ASSESSMENT & PLAN NOTE
Patient reports that she saw orthopedist in [place].  Reportedly told that it was some kind of pinched nerve.  Patient frustrated that no treatment plan was advised.  She is no longer wanting to pursue her intermittent hip pain.  I will request records.

## 2020-07-15 NOTE — ASSESSMENT & PLAN NOTE
Intermittent problem.  Patient has iron deficiency anemia.  Started iron supplementation 3 months ago.  Taking it daily.  No improvement in lab results.  Does report increased fatigue and shortness of breath with exertion in the last 3 weeks.  Denies chest pain, blood in stool, dark black stool, hemoptysis.    Component      Latest Ref Rng & Units 7/7/2020   WBC      4.8 - 10.8 K/uL 8.1   RBC      4.20 - 5.40 M/uL 4.47   Hemoglobin      12.0 - 16.0 g/dL 10.4 (L)   Hematocrit      37.0 - 47.0 % 36.1 (L)   MCV      81.4 - 97.8 fL 80.8 (L)   MCH      27.0 - 33.0 pg 23.3 (L)   MCHC      33.6 - 35.0 g/dL 28.8 (L)   RDW      35.9 - 50.0 fL 48.5   Platelet Count      164 - 446 K/uL 400   MPV      9.0 - 12.9 fL 9.4   Neutrophils-Polys      44.00 - 72.00 % 61.60   Lymphocytes      22.00 - 41.00 % 27.80   Monocytes      0.00 - 13.40 % 7.40   Eosinophils      0.00 - 6.90 % 1.50   Basophils      0.00 - 1.80 % 1.20   Immature Granulocytes      0.00 - 0.90 % 0.50   Nucleated RBC      /100 WBC 0.00   Neutrophils (Absolute)      2.00 - 7.15 K/uL 4.96   Lymphs (Absolute)      1.00 - 4.80 K/uL 2.24   Monos (Absolute)      0.00 - 0.85 K/uL 0.60   Eos (Absolute)      0.00 - 0.51 K/uL 0.12   Baso (Absolute)      0.00 - 0.12 K/uL 0.10   Immature Granulocytes (abs)      0.00 - 0.11 K/uL 0.04   NRBC (Absolute)      K/uL 0.00   Anisocytosis       1+   Microcytosis       1+   Ferritin      10.0 - 291.0 ng/mL 12.3

## 2020-07-15 NOTE — ASSESSMENT & PLAN NOTE
Patient reports 3-week onset of shortness of breath with exertion, lightheadedness, feeling rundown and fatigued.  Gets winded when walking across the room.  Denies chest pain, wheezing, edema.  Does have mild iron deficiency anemia.  History of pulmonary hypertension.  Will get echocardiogram.

## 2020-07-24 DIAGNOSIS — M79.641 BILATERAL HAND PAIN: ICD-10-CM

## 2020-07-24 DIAGNOSIS — M79.642 BILATERAL HAND PAIN: ICD-10-CM

## 2020-07-24 RX ORDER — TRAZODONE HYDROCHLORIDE 50 MG/1
TABLET ORAL
Qty: 90 TAB | Refills: 0 | Status: SHIPPED | OUTPATIENT
Start: 2020-07-24 | End: 2020-08-17

## 2020-07-24 RX ORDER — BUSPIRONE HYDROCHLORIDE 15 MG/1
TABLET ORAL
Qty: 90 TAB | Refills: 0 | Status: SHIPPED | OUTPATIENT
Start: 2020-07-24 | End: 2020-08-17

## 2020-08-14 RX ORDER — ESCITALOPRAM OXALATE 10 MG/1
TABLET ORAL
Qty: 90 TAB | Refills: 0 | Status: SHIPPED | OUTPATIENT
Start: 2020-08-14 | End: 2020-08-19

## 2020-08-17 RX ORDER — BUSPIRONE HYDROCHLORIDE 15 MG/1
TABLET ORAL
Qty: 90 TAB | Refills: 0 | Status: SHIPPED | OUTPATIENT
Start: 2020-08-17 | End: 2020-08-19 | Stop reason: SDUPTHER

## 2020-08-17 RX ORDER — TRAZODONE HYDROCHLORIDE 50 MG/1
TABLET ORAL
Qty: 90 TAB | Refills: 0 | Status: SHIPPED | OUTPATIENT
Start: 2020-08-17 | End: 2020-08-19 | Stop reason: DRUGHIGH

## 2020-08-18 DIAGNOSIS — E11.9 TYPE 2 DIABETES MELLITUS WITHOUT COMPLICATION, WITHOUT LONG-TERM CURRENT USE OF INSULIN (HCC): ICD-10-CM

## 2020-08-18 RX ORDER — METFORMIN HYDROCHLORIDE 750 MG/1
750 TABLET, EXTENDED RELEASE ORAL
Qty: 90 TAB | Refills: 1 | Status: SHIPPED | OUTPATIENT
Start: 2020-08-18 | End: 2021-02-11 | Stop reason: SDUPTHER

## 2020-08-18 NOTE — TELEPHONE ENCOUNTER
Received request via: Pharmacy    Was the patient seen in the last year in this department? Yes    Does the patient have an active prescription (recently filled or refills available) for medication(s) requested? no

## 2020-08-19 ENCOUNTER — TELEMEDICINE2 (OUTPATIENT)
Dept: BEHAVIORAL HEALTH | Facility: CLINIC | Age: 60
End: 2020-08-19
Payer: COMMERCIAL

## 2020-08-19 DIAGNOSIS — F41.1 GAD (GENERALIZED ANXIETY DISORDER): ICD-10-CM

## 2020-08-19 DIAGNOSIS — E66.01 CLASS 3 SEVERE OBESITY DUE TO EXCESS CALORIES IN ADULT, UNSPECIFIED BMI, UNSPECIFIED WHETHER SERIOUS COMORBIDITY PRESENT (HCC): ICD-10-CM

## 2020-08-19 DIAGNOSIS — F43.10 PTSD (POST-TRAUMATIC STRESS DISORDER): ICD-10-CM

## 2020-08-19 DIAGNOSIS — G47.00 INSOMNIA, UNSPECIFIED TYPE: ICD-10-CM

## 2020-08-19 PROCEDURE — 90833 PSYTX W PT W E/M 30 MIN: CPT | Mod: 95,CR | Performed by: PSYCHIATRY & NEUROLOGY

## 2020-08-19 PROCEDURE — 99214 OFFICE O/P EST MOD 30 MIN: CPT | Mod: 95,CR | Performed by: PSYCHIATRY & NEUROLOGY

## 2020-08-19 RX ORDER — HYDROXYZINE PAMOATE 25 MG/1
CAPSULE ORAL
Qty: 90 CAP | Refills: 2 | Status: SHIPPED | OUTPATIENT
Start: 2020-08-19 | End: 2021-03-29

## 2020-08-19 RX ORDER — TRAZODONE HYDROCHLORIDE 100 MG/1
TABLET ORAL
Qty: 90 TAB | Refills: 0 | Status: SHIPPED | OUTPATIENT
Start: 2020-08-19 | End: 2020-09-11

## 2020-08-19 RX ORDER — BUSPIRONE HYDROCHLORIDE 15 MG/1
TABLET ORAL
Qty: 270 TAB | Refills: 0 | Status: ON HOLD | OUTPATIENT
Start: 2020-08-19 | End: 2020-10-02

## 2020-08-19 RX ORDER — ALPRAZOLAM 1 MG/1
TABLET ORAL
Qty: 40 TAB | Refills: 0 | Status: SHIPPED | OUTPATIENT
Start: 2020-08-19 | End: 2020-09-19

## 2020-08-19 RX ORDER — ESCITALOPRAM OXALATE 20 MG/1
20 TABLET ORAL DAILY
Qty: 90 TAB | Refills: 0 | Status: ON HOLD | OUTPATIENT
Start: 2020-08-19 | End: 2020-10-02
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
## 2020-08-25 ENCOUNTER — TELEMEDICINE (OUTPATIENT)
Dept: BEHAVIORAL HEALTH | Facility: CLINIC | Age: 60
End: 2020-08-25
Attending: PSYCHIATRY & NEUROLOGY
Payer: COMMERCIAL

## 2020-08-25 DIAGNOSIS — F43.10 POSTTRAUMATIC STRESS DISORDER: ICD-10-CM

## 2020-08-25 DIAGNOSIS — F41.1 GENERALIZED ANXIETY DISORDER: ICD-10-CM

## 2020-08-25 DIAGNOSIS — F33.2 SEVERE EPISODE OF RECURRENT MAJOR DEPRESSIVE DISORDER, WITHOUT PSYCHOTIC FEATURES (HCC): ICD-10-CM

## 2020-08-25 PROCEDURE — 90791 PSYCH DIAGNOSTIC EVALUATION: CPT | Performed by: MARRIAGE & FAMILY THERAPIST

## 2020-08-31 ENCOUNTER — PATIENT MESSAGE (OUTPATIENT)
Dept: MEDICAL GROUP | Facility: PHYSICIAN GROUP | Age: 60
End: 2020-08-31

## 2020-09-01 ENCOUNTER — APPOINTMENT (OUTPATIENT)
Dept: SCHEDULING | Facility: IMAGING CENTER | Age: 60
End: 2020-09-01
Payer: COMMERCIAL

## 2020-09-08 ENCOUNTER — OFFICE VISIT (OUTPATIENT)
Dept: MEDICAL GROUP | Facility: PHYSICIAN GROUP | Age: 60
End: 2020-09-08
Payer: COMMERCIAL

## 2020-09-08 VITALS
SYSTOLIC BLOOD PRESSURE: 108 MMHG | WEIGHT: 257.8 LBS | HEIGHT: 65 IN | BODY MASS INDEX: 42.95 KG/M2 | OXYGEN SATURATION: 94 % | HEART RATE: 98 BPM | TEMPERATURE: 98 F | DIASTOLIC BLOOD PRESSURE: 60 MMHG

## 2020-09-08 DIAGNOSIS — R11.0 NAUSEA: ICD-10-CM

## 2020-09-08 DIAGNOSIS — R10.12 LUQ PAIN: ICD-10-CM

## 2020-09-08 DIAGNOSIS — R10.13 EPIGASTRIC PAIN: ICD-10-CM

## 2020-09-08 PROCEDURE — 99214 OFFICE O/P EST MOD 30 MIN: CPT | Performed by: NURSE PRACTITIONER

## 2020-09-08 RX ORDER — ONDANSETRON 8 MG/1
8 TABLET, ORALLY DISINTEGRATING ORAL EVERY 8 HOURS PRN
Qty: 15 TAB | Refills: 0 | Status: SHIPPED | OUTPATIENT
Start: 2020-09-08 | End: 2020-09-29

## 2020-09-08 ASSESSMENT — FIBROSIS 4 INDEX: FIB4 SCORE: 0.73

## 2020-09-08 NOTE — PROGRESS NOTES
CC: Abdominal pain    HISTORY OF THE PRESENT ILLNESS: Patient is a 60 y.o. female. This pleasant patient is here today for evaluation and management of the following health problems.      Epigastric pain  Patient reports to 1/2-week onset of epigastric and left upper quadrant pain.  Has been worsening.  Quality is a pulling/tearing pain.  Occasional nausea.  Takes omeprazole daily.  Denies diarrhea, blood in stool, dark black stool.  Does report frequency of bowel movements have decreased to every other day.  Pain is aggravated by movement.  Not aggravated by eating.  History of gastric bypass and several abdominal surgeries.  Area of pain that patient points to is the top of her vertical surgical scar.  Had consultation with GI for iron deficiency anemia difficult swallowing.  Patient did mention pain.  Patient is now scheduled for EGD with possible esophageal dilation and colonoscopy for mid October.      Allergies: Shellfish allergy    Current Outpatient Medications Ordered in Epic   Medication Sig Dispense Refill   • ondansetron (ZOFRAN ODT) 8 MG TABLET DISPERSIBLE Take 1 Tab by mouth every 8 hours as needed for Nausea. 15 Tab 0   • escitalopram (LEXAPRO) 20 MG tablet Take 1 Tab by mouth every day. 90 Tab 0   • traZODone (DESYREL) 100 MG Tab Take 1 to 3 tablets by mouth at bedtime as needed for insomnia 90 Tab 0   • hydrOXYzine pamoate (VISTARIL) 25 MG Cap Take 1 to 3 tablets by mouth once a day as need for anxiety or insomnia 90 Cap 2   • busPIRone (BUSPAR) 15 MG tablet TAKE 1 TABLET BY MOUTH THREE TIMES A  Tab 0   • ALPRAZolam (XANAX) 1 MG Tab Take 1/2 to 1 tablet by mouth once a day as needed for anxiety or insomnia 40 Tab 0   • metFORMIN ER (GLUCOPHAGE XR) 750 MG TABLET SR 24 HR Take 1 Tab by mouth every day. 90 Tab 1   • Diclofenac Sodium 1 % Gel APPLY 2 GM UP TO 4 TIMES PER DAY IF NEEDED FOR BACK PAIN 100 g 0   • meloxicam (MOBIC) 7.5 MG Tab TAKE 1 TABLET BY MOUTH EVERY DAY 90 Tab 0   •  "methocarbamol (ROBAXIN) 750 MG Tab TAKE 1 TABLET BY MOUTH EVERY DAY AS NEEDED 90 Tab 0   • omeprazole (PRILOSEC) 20 MG delayed-release capsule TAKE 1 CAP BY MOUTH EVERY DAY 30 MINUTES BEFORE FOOD OR DRINK, ON AN EMPTY STOMACH. 90 Cap 3   • gabapentin (NEURONTIN) 300 MG Cap TAKE 1 CAPSULE BY MOUTH THREE TIMES A  Cap 1   • montelukast (SINGULAIR) 10 MG Tab TAKE 1 TABLET BY MOUTH EVERY DAY 90 Tab 3   • fluticasone (FLONASE) 50 MCG/ACT nasal spray Spray 2 Sprays in nose every day. 16 g 11   • rizatriptan (MAXALT) 10 MG tablet TAKE 1 TAB BY MOUTH ONCE AS NEEDED FOR MIGRAINE FOR UP TO 1 DOSE. MAY REPEAT IN 2 HOURS IF NEEDED 10 Tab 2   • amitriptyline (ELAVIL) 75 MG Tab Take 2 Tabs by mouth at bedtime as needed. 180 Tab 1   • albuterol 108 (90 Base) MCG/ACT Aero Soln inhalation aerosol INHALE 2 PUFFS BY MOUTH EVERY 6 HOURS AS NEEDED FOR SHORTNESS OF BREATH 1 Inhaler 5   • topiramate (TOPAMAX) 50 MG tablet TAKE 1 TABLET BY MOUTH TWICE A  Tab 0   • hydrochlorothiazide (MICROZIDE) 12.5 MG capsule TAKE 1-2 CAPS BY MOUTH EVERY DAY. 180 Cap 0   • acetaminophen (TYLENOL) 325 MG Tab Tylenol 325 mg tablet   Take 2 tablets every 6 hours by oral route.     • aspirin (ASA) 325 MG TABS Take 325 mg by mouth as needed. weekly        No current Epic-ordered facility-administered medications on file.        Past Medical History:   Diagnosis Date   • Anesthesia     low bp coming out of anesthesia \"one time\"   • Anxiety 11/18/2011   • Arrhythmia     ? pt unsure   • Arthritis     fibromyalgia   • Breast mass, left    • Breath shortness     occasionally   • Bursitis of knee     left   • Cancer (HCC) 2005    squamous cell on nose   • Chronic pain 11/18/2011   • Depression 11/18/2011   • Fibromyalgia 11/18/2011   • Gastro-esophageal reflux 3/20/2012   • Heart murmur    • Iron deficiency anemia 3/20/2012   • Neuropathy 11/18/2011   • Other specified disorder of intestines    • Pain 6/14/12    3/10 stomach   • Pneumonia 01/2012   • " Pulmonary hypertension (HCC) 3/20/2012   • Syncope and collapse 3/20/2012       Past Surgical History:   Procedure Laterality Date   • CATH PLACEMENT  2013    Performed by Lizeth Ferreira M.D. at [facility]   • BREAST BIOPSY  2013    Performed by Lizeth Ferreira M.D. at [facility]   • AXILLARY NODE DISSECTION  2013    Performed by Lizeth Ferreira M.D. at [facility]   • NODE BIOPSY  2013    Performed by Lizeth Ferreira M.D. at [facility]   • BREAST BIOPSY  2012    Performed by Lizeth Ferreira M.D. at [facility]ERY [facility]   • COLONOSCOPY  2012    Performed by PARAM VALDEZ at SURGERY [facility]   • GASTROSCOPY  2012    Performed by PARAM VALDEZ at SURGERY [facility]   • ABDOMINAL EXPLORATION  10/7/2010    Performed by MARIA G KHAN JR at SURGERY [facility]   • IRRIGATION & DEBRIDEMENT GENERAL  2010    Performed by OZZIE WEINSTEIN at SURGERY [facility]   • HERNIA REPAIR  3/15/2010    Performed by MARIA G KHAN JR at SURGERY [facility]   • FLAP GRAFT  3/15/2010    Performed by MARIA G KHAN JR at SURGERY [facility]   • PANNICULECTOMY  3/15/2010    Performed by MARIA G KHAN JR at SURGERY [facility]   • ABDOMINAL EXPLORATION  3/11/2010    Performed by MARIA G KHAN JR at [facility]ERY [facility]   • OTHER      pulled mesh out of hernia   • OTHER      abscess removed abd.   • OTHER      bowel obstruction   • OTHER  2009    hernia repair,mesh removal after in aug.09   • OTHER      bariatric surgery gastric bypass   • GASTRIC BYPASS LAPAROSCOPIC     • GYN SURGERY      tubal       Social History     Tobacco Use   • Smoking status: Former Smoker     Packs/day: 0.10     Years: 8.00     Pack years: 0.80     Types: Cigarettes     Quit date: 2016     Years since quittin.3   • Smokeless  "tobacco: Never Used   • Tobacco comment: 1/2 pk a day on and off 10 yrs, 1 cigarette daily   Substance Use Topics   • Alcohol use: Yes     Alcohol/week: 0.0 oz     Comment: 1-2 times a year   • Drug use: No       Family History   Problem Relation Age of Onset   • Heart Attack Father    • Anxiety disorder Father    • Depression Father    • Schizophrenia Father    • Hypertension Mother    • Cancer Maternal Grandfather         leukemia   • Cancer Other         leukemia- cousin   • Diabetes Maternal Uncle    • Anxiety disorder Maternal Uncle    • Diabetes Maternal Uncle    • Anxiety disorder Maternal Uncle        ROS:  As in HPI, otherwise negative for chest pain, dyspnea, abdominal pain, dysuria, blood in stool, fever             Exam: /60 (BP Location: Right arm, Patient Position: Sitting)   Pulse 98   Temp 36.7 °C (98 °F) (Temporal)   Ht 1.651 m (5' 5\")   Wt 116.9 kg (257 lb 12.8 oz)   SpO2 94%  Body mass index is 42.9 kg/m².    General: Alert, pleasant, obese habitus, well nourished, well developed female in mild distress from abdominal pain  Neck: Supple without bruit.  Pulmonary: Clear to ausculation.  Normal effort. No rales, ronchi, or wheezing.  Cardiovascular: Normal rate and rhythm without murmur.   Abdomen: Soft, moderate tenderness left upper quadrant just below rib cage, nondistended.  Soft fluctuant mass palpated left upper quadrant proximal vertical surgical scar.  Normal bowel sounds. Liver not palpable.  Unable to appreciate spleen due to abdominal pain.  Neurologic: Grossly nonfocal  Skin: Warm and dry.    Musculoskeletal: Normal gait.   Psych: Normal mood and affect. Alert and oriented. Judgment and insight is normal.    Please note that this dictation was created using voice recognition software. I have made every reasonable attempt to correct obvious errors, but I expect that there are errors of grammar and possibly content that I did not discover before finalizing the " note.      Assessment/Plan  1. Nausea    - ondansetron (ZOFRAN ODT) 8 MG TABLET DISPERSIBLE; Take 1 Tab by mouth every 8 hours as needed for Nausea.  Dispense: 15 Tab; Refill: 0    2. Epigastric pain  Patient having moderate to severe epigastric and left upper quadrant pain.  Will get updated labs in addition to CT of abdomen.  Strict ER precautions reviewed with patient, go to ER if abdominal pain worsens.  - CT-ABDOMEN W/O; Future  - CBC WITH DIFFERENTIAL; Future  - AMYLASE; Future  - LIPASE; Future    3. LUQ pain    - CT-ABDOMEN W/O; Future  - CBC WITH DIFFERENTIAL; Future  - AMYLASE; Future  - LIPASE; Future

## 2020-09-08 NOTE — ASSESSMENT & PLAN NOTE
Patient reports to 1/2-week onset of epigastric and left upper quadrant pain.  Has been worsening.  Quality is a pulling/tearing pain.  Occasional nausea.  Takes omeprazole daily.  Denies diarrhea, blood in stool, dark black stool.  Does report frequency of bowel movements have decreased to every other day.  Pain is aggravated by movement.  Not aggravated by eating.  History of gastric bypass and several abdominal surgeries.  Area of pain that patient points to is the top of her vertical surgical scar.  Had consultation with GI for iron deficiency anemia difficult swallowing.  Patient did mention pain.  Patient is now scheduled for EGD with possible esophageal dilation and colonoscopy for mid October.

## 2020-09-09 NOTE — BH THERAPY
[facility]  Therapy Progress Note      This evaluation was conducted via {platform:76490} using secure and encrypted videoconferencing technology. {Virtual or Telemedicine:32516}     Patient Name: Mikayla Knight  Patient MRN: [id number]  Today's Date: 9/14/2020     Type of session:Individual psychotherapy  Length of session: 45 minutes  Persons in attendance:Patient    Subjective/New Info: ***    Objective/Observations:   Participation: Active verbal participation, Engaged and Open to feedback   Grooming: Casual   Cognition: Fully Oriented   Eye contact: Good   Mood: Depressed and Anxious   Affect: Full range and Congruent with content   Thought process: Logical   Speech: Rate within normal limits   Other:     Diagnoses: No diagnosis found.     Current risk:   SUICIDE: Not applicable   Homicide: Not applicable   Self-harm: Not applicable   Relapse: Not applicable   Other:    Safety Plan reviewed? Not Indicated   If evidence of imminent risk is present, intervention/plan:     Therapeutic Intervention(s): Communication skills, Distress tolerance skills, Goal-setting, Interpersonal effectiveness skills, Leisure and recreation skills, Limit-setting, Maladaptive behavior addressed, Positive behavior reinforced, Problem-solving, Role-playing, Self-care skills, Stressors assessed and Supportive psychotherapy    Treatment Goal(s)/Objective(s) addressed: ***     Progress toward Treatment Goals: Mild improvement    Plan:  - Patient is in agreement with the above plan:  YES    JOE Remy  9/9/2020                                  [facility] Therapy Progress Note      This evaluation was conducted via {platform:87334} using secure and encrypted videoconferencing technology. {Virtual or Telemedicine:29278}     Patient Name: Mikayla Knight  Patient MRN: [id number]  Today's Date: 9/9/2020     Type of session:{[facility] SERVICES:90361886}  Length of session: *** minutes  Persons in  attendance:{[facility] HEALTH ATTENDEES:13442859}    Subjective/New Info: ***    Objective/Observations:   Participation: {[facility] PARTICIPATION MEASURES:48398898}   Grooming: {[facility] GROOMIN}   Cognition: {[facility] ORIENTATION:12690796}   Eye contact: {[facility] EYE CONTACT:11403330}   Mood: {RB MOOD:35016870}   Affect: {RB AFFECT:62508327}   Thought process: {RB THOUGHT PROCESS:15208357}   Speech: {RB SPEECH:89434191}   Other:     Diagnoses: No diagnosis found.     Current risk:   SUICIDE: {[facility] RATINGS:59220723}   Homicide: {[facility] RATINGS:88044885}   Self-harm: {[facility] RATINGS:24194232}   Relapse: {[facility] RATINGS:89869633}   Other:    Safety Plan reviewed? {YES/NO/NOT INDICATED:66412}   If evidence of imminent risk is present, intervention/plan:     Therapeutic Intervention(s): {[facility] THERAPEUTIC INTERVENTION:78964570}    Treatment Goal(s)/Objective(s) addressed: ***     Progress toward Treatment Goals: {[facility] GOAL PROGRESS:00911739}    Plan:  {[facility] TREATMENT PLANS:29232980}    JOE Remy  2020                                  [facility]  Therapy Progress Note      This evaluation was conducted via {platform:89857} using secure and encrypted videoconferencing technology. {Virtual or Telemedicine:90491}     Patient Name: Mikayla Knight  Patient MRN: [id number]  Today's Date: 2020     Type of session:Individual psychotherapy  Length of session: 45  minutes  Persons in attendance:Patient    Subjective/New Info: ***    Objective/Observations:   Participation: {[facility] PARTICIPATION MEASURES:38187345}   Grooming: {[facility] GROOMIN}   Cognition: {[facility] ORIENTATION:63765009}   Eye contact: {[facility] EYE CONTACT:63372934}   Mood: {RB MOOD:23805193}   Affect: {RB AFFECT:43587497}   Thought process: {[facility] THOUGHT PROCESS:89726074}   Speech: {RB SPEECH:35623272}   Other:     Diagnoses: No diagnosis found.     Current risk:   SUICIDE: {[facility]  RATINGS:33441797}   Homicide: {[facility] RATINGS:02947855}   Self-harm: {[facility] RATINGS:36367137}   Relapse: {[facility] RATINGS:22459009}   Other:    Safety Plan reviewed? {YES/NO/NOT INDICATED:62765}   If evidence of imminent risk is present, intervention/plan:     Therapeutic Intervention(s): {[facility] THERAPEUTIC INTERVENTION:82332461}    Treatment Goal(s)/Objective(s) addressed: ***     Progress toward Treatment Goals: {[facility] GOAL PROGRESS:15323257}    Plan:  {[facility] TREATMENT PLANS:55289252}    JOE Remy  9/9/2020

## 2020-09-11 RX ORDER — TRAZODONE HYDROCHLORIDE 100 MG/1
TABLET ORAL
Qty: 90 TAB | Refills: 0 | Status: SHIPPED | OUTPATIENT
Start: 2020-09-11 | End: 2020-10-06

## 2020-09-14 ENCOUNTER — APPOINTMENT (OUTPATIENT)
Dept: BEHAVIORAL HEALTH | Facility: CLINIC | Age: 60
End: 2020-09-14
Payer: COMMERCIAL

## 2020-09-21 ENCOUNTER — PATIENT MESSAGE (OUTPATIENT)
Dept: MEDICAL GROUP | Facility: PHYSICIAN GROUP | Age: 60
End: 2020-09-21

## 2020-09-21 DIAGNOSIS — S30.1XXA ABDOMINAL WALL SEROMA, INITIAL ENCOUNTER: ICD-10-CM

## 2020-09-23 ENCOUNTER — TELEPHONE (OUTPATIENT)
Dept: MEDICAL GROUP | Facility: PHYSICIAN GROUP | Age: 60
End: 2020-09-23

## 2020-09-23 DIAGNOSIS — S30.1XXA ABDOMINAL WALL SEROMA, INITIAL ENCOUNTER: ICD-10-CM

## 2020-09-23 NOTE — TELEPHONE ENCOUNTER
Pt plastic surgeon Dr Nuñez [facility] asking about getting a referral for surgery     I called spoke to pt and asked about the referral and she stated she is having trouble with the surgery centers not wanting to do the surgery they all want her to go back to her plastic surgeon from 10 years ago who did the previous surgery     Can a referral for Dr Nuñez be done in case the [facility] will not due the surgery like the previous one

## 2020-09-24 NOTE — TELEPHONE ENCOUNTER
Seroma is in abdomen.  Not sure if patient had previous plastic surgery on abdomen.  I have messaged patient.

## 2020-09-24 NOTE — PROGRESS NOTES
Message to patient.  She reports she had multiple abdominal repair surgeries done by Dr. Nuñez 10 years ago.  Referral process started to Dr. Nuñez for abdominal seroma.

## 2020-09-24 NOTE — TELEPHONE ENCOUNTER
Talk with patient.  Reports had several abdominal surgeries by Dr. Nuñez 10 years ago.  Referral placed for Dr. Nuñez.

## 2020-09-26 DIAGNOSIS — M79.7 FIBROMYALGIA: ICD-10-CM

## 2020-09-28 RX ORDER — METHOCARBAMOL 750 MG/1
TABLET, FILM COATED ORAL
Qty: 90 TAB | Refills: 2 | Status: SHIPPED | OUTPATIENT
Start: 2020-09-28 | End: 2020-09-29

## 2020-09-29 ENCOUNTER — HOSPITAL ENCOUNTER (INPATIENT)
Facility: MEDICAL CENTER | Age: 60
LOS: 3 days | DRG: 857 | End: 2020-10-02
Attending: EMERGENCY MEDICINE | Admitting: HOSPITALIST
Payer: COMMERCIAL

## 2020-09-29 ENCOUNTER — APPOINTMENT (OUTPATIENT)
Dept: RADIOLOGY | Facility: MEDICAL CENTER | Age: 60
DRG: 857 | End: 2020-09-29
Attending: STUDENT IN AN ORGANIZED HEALTH CARE EDUCATION/TRAINING PROGRAM
Payer: COMMERCIAL

## 2020-09-29 DIAGNOSIS — M25.552 LEFT HIP PAIN: ICD-10-CM

## 2020-09-29 DIAGNOSIS — L02.211 ABDOMINAL WALL ABSCESS: ICD-10-CM

## 2020-09-29 LAB
ALBUMIN SERPL BCP-MCNC: 3.2 G/DL (ref 3.2–4.9)
ALBUMIN/GLOB SERPL: 1 G/DL
ALP SERPL-CCNC: 170 U/L (ref 30–99)
ALT SERPL-CCNC: 17 U/L (ref 2–50)
ANION GAP SERPL CALC-SCNC: 11 MMOL/L (ref 7–16)
AST SERPL-CCNC: 22 U/L (ref 12–45)
BASOPHILS # BLD AUTO: 0.5 % (ref 0–1.8)
BASOPHILS # BLD: 0.05 K/UL (ref 0–0.12)
BILIRUB SERPL-MCNC: 0.3 MG/DL (ref 0.1–1.5)
BUN SERPL-MCNC: 9 MG/DL (ref 8–22)
CALCIUM SERPL-MCNC: 9.1 MG/DL (ref 8.5–10.5)
CHLORIDE SERPL-SCNC: 99 MMOL/L (ref 96–112)
CO2 SERPL-SCNC: 27 MMOL/L (ref 20–33)
COVID ORDER STATUS COVID19: NORMAL
CREAT SERPL-MCNC: 0.72 MG/DL (ref 0.5–1.4)
EOSINOPHIL # BLD AUTO: 0.17 K/UL (ref 0–0.51)
EOSINOPHIL NFR BLD: 1.6 % (ref 0–6.9)
ERYTHROCYTE [DISTWIDTH] IN BLOOD BY AUTOMATED COUNT: 52 FL (ref 35.9–50)
GLOBULIN SER CALC-MCNC: 3.3 G/DL (ref 1.9–3.5)
GLUCOSE SERPL-MCNC: 146 MG/DL (ref 65–99)
HCT VFR BLD AUTO: 35.2 % (ref 37–47)
HGB BLD-MCNC: 10.6 G/DL (ref 12–16)
IMM GRANULOCYTES # BLD AUTO: 0.09 K/UL (ref 0–0.11)
IMM GRANULOCYTES NFR BLD AUTO: 0.8 % (ref 0–0.9)
LYMPHOCYTES # BLD AUTO: 1.02 K/UL (ref 1–4.8)
LYMPHOCYTES NFR BLD: 9.3 % (ref 22–41)
MCH RBC QN AUTO: 26.6 PG (ref 27–33)
MCHC RBC AUTO-ENTMCNC: 30.1 G/DL (ref 33.6–35)
MCV RBC AUTO: 88.2 FL (ref 81.4–97.8)
MONOCYTES # BLD AUTO: 0.68 K/UL (ref 0–0.85)
MONOCYTES NFR BLD AUTO: 6.2 % (ref 0–13.4)
NEUTROPHILS # BLD AUTO: 8.9 K/UL (ref 2–7.15)
NEUTROPHILS NFR BLD: 81.6 % (ref 44–72)
NRBC # BLD AUTO: 0 K/UL
NRBC BLD-RTO: 0 /100 WBC
PLATELET # BLD AUTO: 420 K/UL (ref 164–446)
PMV BLD AUTO: 7.9 FL (ref 9–12.9)
POTASSIUM SERPL-SCNC: 3.8 MMOL/L (ref 3.6–5.5)
PROT SERPL-MCNC: 6.5 G/DL (ref 6–8.2)
RBC # BLD AUTO: 3.99 M/UL (ref 4.2–5.4)
SODIUM SERPL-SCNC: 137 MMOL/L (ref 135–145)
WBC # BLD AUTO: 10.9 K/UL (ref 4.8–10.8)

## 2020-09-29 PROCEDURE — 770006 HCHG ROOM/CARE - MED/SURG/GYN SEMI*

## 2020-09-29 PROCEDURE — U0003 INFECTIOUS AGENT DETECTION BY NUCLEIC ACID (DNA OR RNA); SEVERE ACUTE RESPIRATORY SYNDROME CORONAVIRUS 2 (SARS-COV-2) (CORONAVIRUS DISEASE [COVID-19]), AMPLIFIED PROBE TECHNIQUE, MAKING USE OF HIGH THROUGHPUT TECHNOLOGIES AS DESCRIBED BY CMS-2020-01-R: HCPCS

## 2020-09-29 PROCEDURE — 80053 COMPREHEN METABOLIC PANEL: CPT

## 2020-09-29 PROCEDURE — 96374 THER/PROPH/DIAG INJ IV PUSH: CPT

## 2020-09-29 PROCEDURE — 700111 HCHG RX REV CODE 636 W/ 250 OVERRIDE (IP): Performed by: STUDENT IN AN ORGANIZED HEALTH CARE EDUCATION/TRAINING PROGRAM

## 2020-09-29 PROCEDURE — 99285 EMERGENCY DEPT VISIT HI MDM: CPT

## 2020-09-29 PROCEDURE — 700105 HCHG RX REV CODE 258

## 2020-09-29 PROCEDURE — C9803 HOPD COVID-19 SPEC COLLECT: HCPCS | Performed by: INTERNAL MEDICINE

## 2020-09-29 PROCEDURE — 700111 HCHG RX REV CODE 636 W/ 250 OVERRIDE (IP): Performed by: EMERGENCY MEDICINE

## 2020-09-29 PROCEDURE — 700105 HCHG RX REV CODE 258: Performed by: STUDENT IN AN ORGANIZED HEALTH CARE EDUCATION/TRAINING PROGRAM

## 2020-09-29 PROCEDURE — 85025 COMPLETE CBC W/AUTO DIFF WBC: CPT

## 2020-09-29 PROCEDURE — 71045 X-RAY EXAM CHEST 1 VIEW: CPT

## 2020-09-29 RX ORDER — AMITRIPTYLINE HYDROCHLORIDE 75 MG/1
75 TABLET ORAL NIGHTLY PRN
Status: DISCONTINUED | OUTPATIENT
Start: 2020-09-29 | End: 2020-10-01

## 2020-09-29 RX ORDER — SODIUM CHLORIDE 9 MG/ML
INJECTION, SOLUTION INTRAVENOUS CONTINUOUS
Status: DISCONTINUED | OUTPATIENT
Start: 2020-09-29 | End: 2020-09-30

## 2020-09-29 RX ORDER — OMEPRAZOLE 20 MG/1
20 CAPSULE, DELAYED RELEASE ORAL DAILY
Status: DISCONTINUED | OUTPATIENT
Start: 2020-09-30 | End: 2020-10-02 | Stop reason: HOSPADM

## 2020-09-29 RX ORDER — AMITRIPTYLINE HYDROCHLORIDE 75 MG/1
75 TABLET ORAL NIGHTLY PRN
COMMUNITY
End: 2020-12-02

## 2020-09-29 RX ORDER — OMEPRAZOLE 20 MG/1
20 CAPSULE, DELAYED RELEASE ORAL DAILY
Status: DISCONTINUED | OUTPATIENT
Start: 2020-09-29 | End: 2020-09-29

## 2020-09-29 RX ORDER — MORPHINE SULFATE 4 MG/ML
4 INJECTION, SOLUTION INTRAMUSCULAR; INTRAVENOUS ONCE
Status: COMPLETED | OUTPATIENT
Start: 2020-09-29 | End: 2020-09-29

## 2020-09-29 RX ORDER — MORPHINE SULFATE 4 MG/ML
2 INJECTION, SOLUTION INTRAMUSCULAR; INTRAVENOUS EVERY 4 HOURS PRN
Status: DISCONTINUED | OUTPATIENT
Start: 2020-09-29 | End: 2020-09-30

## 2020-09-29 RX ORDER — MELOXICAM 7.5 MG/1
TABLET ORAL
Qty: 90 TAB | Refills: 0 | OUTPATIENT
Start: 2020-09-29

## 2020-09-29 RX ORDER — SODIUM CHLORIDE 9 MG/ML
INJECTION, SOLUTION INTRAVENOUS
Status: COMPLETED
Start: 2020-09-29 | End: 2020-09-29

## 2020-09-29 RX ORDER — METHOCARBAMOL 750 MG/1
750 TABLET, FILM COATED ORAL
Status: ON HOLD | COMMUNITY
End: 2020-10-02

## 2020-09-29 RX ORDER — BISACODYL 10 MG
10 SUPPOSITORY, RECTAL RECTAL
Status: DISCONTINUED | OUTPATIENT
Start: 2020-09-29 | End: 2020-10-02 | Stop reason: HOSPADM

## 2020-09-29 RX ORDER — GABAPENTIN 300 MG/1
300 CAPSULE ORAL 3 TIMES DAILY PRN
Status: ON HOLD | COMMUNITY
End: 2021-01-26

## 2020-09-29 RX ORDER — POLYETHYLENE GLYCOL 3350 17 G/17G
1 POWDER, FOR SOLUTION ORAL
Status: DISCONTINUED | OUTPATIENT
Start: 2020-09-29 | End: 2020-10-02 | Stop reason: HOSPADM

## 2020-09-29 RX ORDER — AMOXICILLIN 250 MG
2 CAPSULE ORAL 2 TIMES DAILY
Status: DISCONTINUED | OUTPATIENT
Start: 2020-09-29 | End: 2020-10-02 | Stop reason: HOSPADM

## 2020-09-29 RX ADMIN — SODIUM CHLORIDE 500 ML: 9 INJECTION, SOLUTION INTRAVENOUS at 22:46

## 2020-09-29 RX ADMIN — MORPHINE SULFATE 4 MG: 4 INJECTION INTRAVENOUS at 18:33

## 2020-09-29 RX ADMIN — SODIUM CHLORIDE: 9 INJECTION, SOLUTION INTRAVENOUS at 22:25

## 2020-09-29 RX ADMIN — PIPERACILLIN AND TAZOBACTAM 3.38 G: 3; .375 INJECTION, POWDER, LYOPHILIZED, FOR SOLUTION INTRAVENOUS; PARENTERAL at 22:24

## 2020-09-29 ASSESSMENT — PATIENT HEALTH QUESTIONNAIRE - PHQ9
3. TROUBLE FALLING OR STAYING ASLEEP OR SLEEPING TOO MUCH: SEVERAL DAYS
8. MOVING OR SPEAKING SO SLOWLY THAT OTHER PEOPLE COULD HAVE NOTICED. OR THE OPPOSITE, BEING SO FIGETY OR RESTLESS THAT YOU HAVE BEEN MOVING AROUND A LOT MORE THAN USUAL: NOT AT ALL
9. THOUGHTS THAT YOU WOULD BE BETTER OFF DEAD, OR OF HURTING YOURSELF: NOT AT ALL
SUM OF ALL RESPONSES TO PHQ9 QUESTIONS 1 AND 2: 1
5. POOR APPETITE OR OVEREATING: SEVERAL DAYS
1. LITTLE INTEREST OR PLEASURE IN DOING THINGS: SEVERAL DAYS
6. FEELING BAD ABOUT YOURSELF - OR THAT YOU ARE A FAILURE OR HAVE LET YOURSELF OR YOUR FAMILY DOWN: NOT AL ALL
7. TROUBLE CONCENTRATING ON THINGS, SUCH AS READING THE NEWSPAPER OR WATCHING TELEVISION: SEVERAL DAYS
4. FEELING TIRED OR HAVING LITTLE ENERGY: SEVERAL DAYS
2. FEELING DOWN, DEPRESSED, IRRITABLE, OR HOPELESS: NOT AT ALL
SUM OF ALL RESPONSES TO PHQ QUESTIONS 1-9: 5

## 2020-09-29 ASSESSMENT — COGNITIVE AND FUNCTIONAL STATUS - GENERAL
SUGGESTED CMS G CODE MODIFIER DAILY ACTIVITY: CI
SUGGESTED CMS G CODE MODIFIER MOBILITY: CH
DAILY ACTIVITIY SCORE: 23
DRESSING REGULAR LOWER BODY CLOTHING: A LITTLE
MOBILITY SCORE: 24

## 2020-09-29 ASSESSMENT — LIFESTYLE VARIABLES
TOTAL SCORE: 0
EVER HAD A DRINK FIRST THING IN THE MORNING TO STEADY YOUR NERVES TO GET RID OF A HANGOVER: NO
AVERAGE NUMBER OF DAYS PER WEEK YOU HAVE A DRINK CONTAINING ALCOHOL: 0
TOTAL SCORE: 0
CONSUMPTION TOTAL: INCOMPLETE
HAVE YOU EVER FELT YOU SHOULD CUT DOWN ON YOUR DRINKING: NO
TOTAL SCORE: 0
TOTAL SCORE: 0
HAVE YOU EVER FELT YOU SHOULD CUT DOWN ON YOUR DRINKING: NO
TOTAL SCORE: 0
HOW MANY TIMES IN THE PAST YEAR HAVE YOU HAD 5 OR MORE DRINKS IN A DAY: 0
ALCOHOL_USE: NO
TOTAL SCORE: 0
HAVE PEOPLE ANNOYED YOU BY CRITICIZING YOUR DRINKING: NO
EVER HAD A DRINK FIRST THING IN THE MORNING TO STEADY YOUR NERVES TO GET RID OF A HANGOVER: NO
DOES PATIENT WANT TO STOP DRINKING: NO
HAVE PEOPLE ANNOYED YOU BY CRITICIZING YOUR DRINKING: NO
CONSUMPTION TOTAL: NEGATIVE
EVER FELT BAD OR GUILTY ABOUT YOUR DRINKING: NO
EVER FELT BAD OR GUILTY ABOUT YOUR DRINKING: NO
ON A TYPICAL DAY WHEN YOU DRINK ALCOHOL HOW MANY DRINKS DO YOU HAVE: 0
DO YOU DRINK ALCOHOL: NO

## 2020-09-29 ASSESSMENT — FIBROSIS 4 INDEX
FIB4 SCORE: 0.75
FIB4 SCORE: 0.73

## 2020-09-29 ASSESSMENT — PAIN DESCRIPTION - PAIN TYPE: TYPE: ACUTE PAIN

## 2020-09-29 NOTE — ED TRIAGE NOTES
Pt comes in via ems from [place]. Pt was seen for abd pain. Ct showed mass. Mass was drained at [place]. Pt sent to this facility for further workup and possible surgical intervention. Pt received 100 mcg of fentanyl, 1 gm of tylenol, and zosyn 4.5 mg prior to arrival.

## 2020-09-30 ENCOUNTER — ANESTHESIA (OUTPATIENT)
Dept: SURGERY | Facility: MEDICAL CENTER | Age: 60
DRG: 857 | End: 2020-09-30
Payer: COMMERCIAL

## 2020-09-30 ENCOUNTER — APPOINTMENT (OUTPATIENT)
Dept: RADIOLOGY | Facility: MEDICAL CENTER | Age: 60
DRG: 857 | End: 2020-09-30
Attending: EMERGENCY MEDICINE
Payer: COMMERCIAL

## 2020-09-30 ENCOUNTER — ANESTHESIA EVENT (OUTPATIENT)
Dept: SURGERY | Facility: MEDICAL CENTER | Age: 60
DRG: 857 | End: 2020-09-30
Payer: COMMERCIAL

## 2020-09-30 ENCOUNTER — HOSPITAL ENCOUNTER (OUTPATIENT)
Dept: RADIOLOGY | Facility: MEDICAL CENTER | Age: 60
End: 2020-09-30
Payer: COMMERCIAL

## 2020-09-30 PROBLEM — S30.1XXA ABDOMINAL WALL SEROMA: Status: ACTIVE | Noted: 2020-09-30

## 2020-09-30 LAB
ALBUMIN SERPL BCP-MCNC: 2.9 G/DL (ref 3.2–4.9)
ALBUMIN/GLOB SERPL: 0.9 G/DL
ALP SERPL-CCNC: 159 U/L (ref 30–99)
ALT SERPL-CCNC: 16 U/L (ref 2–50)
ANION GAP SERPL CALC-SCNC: 12 MMOL/L (ref 7–16)
AST SERPL-CCNC: 14 U/L (ref 12–45)
BASOPHILS # BLD AUTO: 0.6 % (ref 0–1.8)
BASOPHILS # BLD: 0.07 K/UL (ref 0–0.12)
BILIRUB SERPL-MCNC: 0.3 MG/DL (ref 0.1–1.5)
BUN SERPL-MCNC: 7 MG/DL (ref 8–22)
CALCIUM SERPL-MCNC: 8.5 MG/DL (ref 8.5–10.5)
CHLORIDE SERPL-SCNC: 97 MMOL/L (ref 96–112)
CO2 SERPL-SCNC: 26 MMOL/L (ref 20–33)
COVID ORDER STATUS COVID19: NORMAL
CREAT SERPL-MCNC: 0.76 MG/DL (ref 0.5–1.4)
EKG IMPRESSION: NORMAL
EOSINOPHIL # BLD AUTO: 0.13 K/UL (ref 0–0.51)
EOSINOPHIL NFR BLD: 1.2 % (ref 0–6.9)
ERYTHROCYTE [DISTWIDTH] IN BLOOD BY AUTOMATED COUNT: 52.2 FL (ref 35.9–50)
GLOBULIN SER CALC-MCNC: 3.3 G/DL (ref 1.9–3.5)
GLUCOSE BLD-MCNC: 132 MG/DL (ref 65–99)
GLUCOSE BLD-MCNC: 137 MG/DL (ref 65–99)
GLUCOSE BLD-MCNC: 138 MG/DL (ref 65–99)
GLUCOSE BLD-MCNC: 154 MG/DL (ref 65–99)
GLUCOSE BLD-MCNC: 168 MG/DL (ref 65–99)
GLUCOSE BLD-MCNC: 193 MG/DL (ref 65–99)
GLUCOSE SERPL-MCNC: 139 MG/DL (ref 65–99)
HCT VFR BLD AUTO: 32.6 % (ref 37–47)
HGB BLD-MCNC: 10 G/DL (ref 12–16)
IMM GRANULOCYTES # BLD AUTO: 0.08 K/UL (ref 0–0.11)
IMM GRANULOCYTES NFR BLD AUTO: 0.7 % (ref 0–0.9)
INR PPP: 1.1 (ref 0.87–1.13)
LACTATE BLD-SCNC: 0.8 MMOL/L (ref 0.5–2)
LYMPHOCYTES # BLD AUTO: 0.97 K/UL (ref 1–4.8)
LYMPHOCYTES NFR BLD: 8.7 % (ref 22–41)
MAGNESIUM SERPL-MCNC: 1.7 MG/DL (ref 1.5–2.5)
MCH RBC QN AUTO: 27 PG (ref 27–33)
MCHC RBC AUTO-ENTMCNC: 30.7 G/DL (ref 33.6–35)
MCV RBC AUTO: 88.1 FL (ref 81.4–97.8)
MONOCYTES # BLD AUTO: 0.85 K/UL (ref 0–0.85)
MONOCYTES NFR BLD AUTO: 7.7 % (ref 0–13.4)
NEUTROPHILS # BLD AUTO: 9.01 K/UL (ref 2–7.15)
NEUTROPHILS NFR BLD: 81.1 % (ref 44–72)
NRBC # BLD AUTO: 0 K/UL
NRBC BLD-RTO: 0 /100 WBC
PLATELET # BLD AUTO: 369 K/UL (ref 164–446)
PMV BLD AUTO: 8 FL (ref 9–12.9)
POTASSIUM SERPL-SCNC: 3.7 MMOL/L (ref 3.6–5.5)
PROT SERPL-MCNC: 6.2 G/DL (ref 6–8.2)
PROTHROMBIN TIME: 14.6 SEC (ref 12–14.6)
RBC # BLD AUTO: 3.7 M/UL (ref 4.2–5.4)
SARS-COV+SARS-COV-2 AG RESP QL IA.RAPID: NOTDETECTED
SARS-COV-2 RNA RESP QL NAA+PROBE: NOTDETECTED
SODIUM SERPL-SCNC: 135 MMOL/L (ref 135–145)
SPECIMEN SOURCE: NORMAL
SPECIMEN SOURCE: NORMAL
WBC # BLD AUTO: 11.1 K/UL (ref 4.8–10.8)

## 2020-09-30 PROCEDURE — 700105 HCHG RX REV CODE 258: Performed by: ANESTHESIOLOGY

## 2020-09-30 PROCEDURE — 82962 GLUCOSE BLOOD TEST: CPT | Mod: 91

## 2020-09-30 PROCEDURE — 87040 BLOOD CULTURE FOR BACTERIA: CPT

## 2020-09-30 PROCEDURE — 87075 CULTR BACTERIA EXCEPT BLOOD: CPT

## 2020-09-30 PROCEDURE — 85610 PROTHROMBIN TIME: CPT

## 2020-09-30 PROCEDURE — 93010 ELECTROCARDIOGRAM REPORT: CPT | Performed by: INTERNAL MEDICINE

## 2020-09-30 PROCEDURE — 83735 ASSAY OF MAGNESIUM: CPT

## 2020-09-30 PROCEDURE — 700111 HCHG RX REV CODE 636 W/ 250 OVERRIDE (IP): Performed by: HOSPITALIST

## 2020-09-30 PROCEDURE — 160035 HCHG PACU - 1ST 60 MINS PHASE I: Performed by: SURGERY

## 2020-09-30 PROCEDURE — 160009 HCHG ANES TIME/MIN: Performed by: SURGERY

## 2020-09-30 PROCEDURE — 160038 HCHG SURGERY MINUTES - EA ADDL 1 MIN LEVEL 2: Performed by: SURGERY

## 2020-09-30 PROCEDURE — A9270 NON-COVERED ITEM OR SERVICE: HCPCS | Performed by: HOSPITALIST

## 2020-09-30 PROCEDURE — 160048 HCHG OR STATISTICAL LEVEL 1-5: Performed by: SURGERY

## 2020-09-30 PROCEDURE — 160036 HCHG PACU - EA ADDL 30 MINS PHASE I: Performed by: SURGERY

## 2020-09-30 PROCEDURE — 87205 SMEAR GRAM STAIN: CPT

## 2020-09-30 PROCEDURE — 501838 HCHG SUTURE GENERAL: Performed by: SURGERY

## 2020-09-30 PROCEDURE — 87426 SARSCOV CORONAVIRUS AG IA: CPT

## 2020-09-30 PROCEDURE — 700105 HCHG RX REV CODE 258: Performed by: STUDENT IN AN ORGANIZED HEALTH CARE EDUCATION/TRAINING PROGRAM

## 2020-09-30 PROCEDURE — 770006 HCHG ROOM/CARE - MED/SURG/GYN SEMI*

## 2020-09-30 PROCEDURE — 36415 COLL VENOUS BLD VENIPUNCTURE: CPT

## 2020-09-30 PROCEDURE — 93005 ELECTROCARDIOGRAM TRACING: CPT | Performed by: INTERNAL MEDICINE

## 2020-09-30 PROCEDURE — 160027 HCHG SURGERY MINUTES - 1ST 30 MINS LEVEL 2: Performed by: SURGERY

## 2020-09-30 PROCEDURE — 87076 CULTURE ANAEROBE IDENT EACH: CPT

## 2020-09-30 PROCEDURE — 700102 HCHG RX REV CODE 250 W/ 637 OVERRIDE(OP): Performed by: EMERGENCY MEDICINE

## 2020-09-30 PROCEDURE — C9803 HOPD COVID-19 SPEC COLLECT: HCPCS | Performed by: SURGERY

## 2020-09-30 PROCEDURE — 700102 HCHG RX REV CODE 250 W/ 637 OVERRIDE(OP): Performed by: HOSPITALIST

## 2020-09-30 PROCEDURE — 87070 CULTURE OTHR SPECIMN AEROBIC: CPT

## 2020-09-30 PROCEDURE — 700111 HCHG RX REV CODE 636 W/ 250 OVERRIDE (IP): Performed by: ANESTHESIOLOGY

## 2020-09-30 PROCEDURE — 83605 ASSAY OF LACTIC ACID: CPT

## 2020-09-30 PROCEDURE — 99223 1ST HOSP IP/OBS HIGH 75: CPT | Mod: GC,AI | Performed by: HOSPITALIST

## 2020-09-30 PROCEDURE — 700101 HCHG RX REV CODE 250: Performed by: ANESTHESIOLOGY

## 2020-09-30 PROCEDURE — 0KCL0ZZ EXTIRPATION OF MATTER FROM LEFT ABDOMEN MUSCLE, OPEN APPROACH: ICD-10-PCS | Performed by: SURGERY

## 2020-09-30 PROCEDURE — 85025 COMPLETE CBC W/AUTO DIFF WBC: CPT

## 2020-09-30 PROCEDURE — A9270 NON-COVERED ITEM OR SERVICE: HCPCS | Performed by: EMERGENCY MEDICINE

## 2020-09-30 PROCEDURE — 700111 HCHG RX REV CODE 636 W/ 250 OVERRIDE (IP): Performed by: STUDENT IN AN ORGANIZED HEALTH CARE EDUCATION/TRAINING PROGRAM

## 2020-09-30 PROCEDURE — 160002 HCHG RECOVERY MINUTES (STAT): Performed by: SURGERY

## 2020-09-30 PROCEDURE — 700102 HCHG RX REV CODE 250 W/ 637 OVERRIDE(OP): Performed by: INTERNAL MEDICINE

## 2020-09-30 PROCEDURE — 700102 HCHG RX REV CODE 250 W/ 637 OVERRIDE(OP): Performed by: STUDENT IN AN ORGANIZED HEALTH CARE EDUCATION/TRAINING PROGRAM

## 2020-09-30 PROCEDURE — 0KCK0ZZ EXTIRPATION OF MATTER FROM RIGHT ABDOMEN MUSCLE, OPEN APPROACH: ICD-10-PCS | Performed by: SURGERY

## 2020-09-30 PROCEDURE — 3E02340 INTRODUCTION OF INFLUENZA VACCINE INTO MUSCLE, PERCUTANEOUS APPROACH: ICD-10-PCS | Performed by: HOSPITALIST

## 2020-09-30 PROCEDURE — 80053 COMPREHEN METABOLIC PANEL: CPT

## 2020-09-30 PROCEDURE — 700102 HCHG RX REV CODE 250 W/ 637 OVERRIDE(OP): Performed by: ANESTHESIOLOGY

## 2020-09-30 PROCEDURE — 90686 IIV4 VACC NO PRSV 0.5 ML IM: CPT | Performed by: HOSPITALIST

## 2020-09-30 PROCEDURE — A9270 NON-COVERED ITEM OR SERVICE: HCPCS | Performed by: ANESTHESIOLOGY

## 2020-09-30 PROCEDURE — A9270 NON-COVERED ITEM OR SERVICE: HCPCS | Performed by: INTERNAL MEDICINE

## 2020-09-30 RX ORDER — BUSPIRONE HYDROCHLORIDE 10 MG/1
15 TABLET ORAL 3 TIMES DAILY
Status: DISCONTINUED | OUTPATIENT
Start: 2020-09-30 | End: 2020-10-01

## 2020-09-30 RX ORDER — SODIUM CHLORIDE, SODIUM LACTATE, POTASSIUM CHLORIDE, CALCIUM CHLORIDE 600; 310; 30; 20 MG/100ML; MG/100ML; MG/100ML; MG/100ML
INJECTION, SOLUTION INTRAVENOUS CONTINUOUS
Status: DISCONTINUED | OUTPATIENT
Start: 2020-09-30 | End: 2020-09-30 | Stop reason: HOSPADM

## 2020-09-30 RX ORDER — OXYCODONE HCL 5 MG/5 ML
10 SOLUTION, ORAL ORAL
Status: COMPLETED | OUTPATIENT
Start: 2020-09-30 | End: 2020-09-30

## 2020-09-30 RX ORDER — MIDAZOLAM HYDROCHLORIDE 1 MG/ML
INJECTION INTRAMUSCULAR; INTRAVENOUS PRN
Status: DISCONTINUED | OUTPATIENT
Start: 2020-09-30 | End: 2020-09-30 | Stop reason: SURG

## 2020-09-30 RX ORDER — DEXMEDETOMIDINE HYDROCHLORIDE 100 UG/ML
INJECTION, SOLUTION INTRAVENOUS PRN
Status: DISCONTINUED | OUTPATIENT
Start: 2020-09-30 | End: 2020-09-30 | Stop reason: SURG

## 2020-09-30 RX ORDER — MONTELUKAST SODIUM 10 MG/1
10 TABLET ORAL
Status: DISCONTINUED | OUTPATIENT
Start: 2020-09-30 | End: 2020-10-02 | Stop reason: HOSPADM

## 2020-09-30 RX ORDER — LORAZEPAM 2 MG/ML
0.5 INJECTION INTRAMUSCULAR
Status: DISCONTINUED | OUTPATIENT
Start: 2020-09-30 | End: 2020-09-30 | Stop reason: HOSPADM

## 2020-09-30 RX ORDER — ROCURONIUM BROMIDE 10 MG/ML
INJECTION, SOLUTION INTRAVENOUS PRN
Status: DISCONTINUED | OUTPATIENT
Start: 2020-09-30 | End: 2020-09-30 | Stop reason: SURG

## 2020-09-30 RX ORDER — SODIUM CHLORIDE 9 MG/ML
INJECTION, SOLUTION INTRAVENOUS CONTINUOUS
Status: DISCONTINUED | OUTPATIENT
Start: 2020-09-30 | End: 2020-10-01

## 2020-09-30 RX ORDER — DEXAMETHASONE SODIUM PHOSPHATE 4 MG/ML
INJECTION, SOLUTION INTRA-ARTICULAR; INTRALESIONAL; INTRAMUSCULAR; INTRAVENOUS; SOFT TISSUE PRN
Status: DISCONTINUED | OUTPATIENT
Start: 2020-09-30 | End: 2020-09-30 | Stop reason: SURG

## 2020-09-30 RX ORDER — ONDANSETRON 2 MG/ML
INJECTION INTRAMUSCULAR; INTRAVENOUS PRN
Status: DISCONTINUED | OUTPATIENT
Start: 2020-09-30 | End: 2020-09-30 | Stop reason: SURG

## 2020-09-30 RX ORDER — SODIUM CHLORIDE, SODIUM LACTATE, POTASSIUM CHLORIDE, CALCIUM CHLORIDE 600; 310; 30; 20 MG/100ML; MG/100ML; MG/100ML; MG/100ML
INJECTION, SOLUTION INTRAVENOUS
Status: DISCONTINUED | OUTPATIENT
Start: 2020-09-30 | End: 2020-09-30 | Stop reason: SURG

## 2020-09-30 RX ORDER — HYDROMORPHONE HYDROCHLORIDE 1 MG/ML
0.4 INJECTION, SOLUTION INTRAMUSCULAR; INTRAVENOUS; SUBCUTANEOUS
Status: DISCONTINUED | OUTPATIENT
Start: 2020-09-30 | End: 2020-09-30 | Stop reason: HOSPADM

## 2020-09-30 RX ORDER — HYDROMORPHONE HYDROCHLORIDE 1 MG/ML
0.5 INJECTION, SOLUTION INTRAMUSCULAR; INTRAVENOUS; SUBCUTANEOUS
Status: DISCONTINUED | OUTPATIENT
Start: 2020-09-30 | End: 2020-09-30 | Stop reason: HOSPADM

## 2020-09-30 RX ORDER — PHENYLEPHRINE HYDROCHLORIDE 10 MG/ML
INJECTION, SOLUTION INTRAMUSCULAR; INTRAVENOUS; SUBCUTANEOUS PRN
Status: DISCONTINUED | OUTPATIENT
Start: 2020-09-30 | End: 2020-09-30 | Stop reason: SURG

## 2020-09-30 RX ORDER — CEFAZOLIN SODIUM 1 G/3ML
INJECTION, POWDER, FOR SOLUTION INTRAMUSCULAR; INTRAVENOUS PRN
Status: DISCONTINUED | OUTPATIENT
Start: 2020-09-30 | End: 2020-09-30 | Stop reason: SURG

## 2020-09-30 RX ORDER — DIPHENHYDRAMINE HYDROCHLORIDE 50 MG/ML
12.5 INJECTION INTRAMUSCULAR; INTRAVENOUS
Status: DISCONTINUED | OUTPATIENT
Start: 2020-09-30 | End: 2020-09-30 | Stop reason: HOSPADM

## 2020-09-30 RX ORDER — OXYCODONE HCL 5 MG/5 ML
5 SOLUTION, ORAL ORAL
Status: COMPLETED | OUTPATIENT
Start: 2020-09-30 | End: 2020-09-30

## 2020-09-30 RX ORDER — ONDANSETRON 2 MG/ML
4 INJECTION INTRAMUSCULAR; INTRAVENOUS
Status: DISCONTINUED | OUTPATIENT
Start: 2020-09-30 | End: 2020-09-30 | Stop reason: HOSPADM

## 2020-09-30 RX ORDER — FLUTICASONE PROPIONATE 50 MCG
2 SPRAY, SUSPENSION (ML) NASAL DAILY
Status: DISCONTINUED | OUTPATIENT
Start: 2020-09-30 | End: 2020-10-02 | Stop reason: HOSPADM

## 2020-09-30 RX ORDER — HYDROMORPHONE HYDROCHLORIDE 1 MG/ML
1 INJECTION, SOLUTION INTRAMUSCULAR; INTRAVENOUS; SUBCUTANEOUS EVERY 4 HOURS PRN
Status: DISCONTINUED | OUTPATIENT
Start: 2020-09-30 | End: 2020-10-01

## 2020-09-30 RX ORDER — MEPERIDINE HYDROCHLORIDE 25 MG/ML
12.5 INJECTION INTRAMUSCULAR; INTRAVENOUS; SUBCUTANEOUS
Status: DISCONTINUED | OUTPATIENT
Start: 2020-09-30 | End: 2020-09-30 | Stop reason: HOSPADM

## 2020-09-30 RX ORDER — TOPIRAMATE 25 MG/1
50 TABLET ORAL 2 TIMES DAILY
Status: DISCONTINUED | OUTPATIENT
Start: 2020-09-30 | End: 2020-10-02 | Stop reason: HOSPADM

## 2020-09-30 RX ORDER — DEXTROSE MONOHYDRATE 25 G/50ML
50 INJECTION, SOLUTION INTRAVENOUS
Status: DISCONTINUED | OUTPATIENT
Start: 2020-09-30 | End: 2020-10-02 | Stop reason: HOSPADM

## 2020-09-30 RX ORDER — GABAPENTIN 300 MG/1
300 CAPSULE ORAL 3 TIMES DAILY PRN
Status: DISCONTINUED | OUTPATIENT
Start: 2020-09-30 | End: 2020-10-01

## 2020-09-30 RX ORDER — ALBUTEROL SULFATE 90 UG/1
2 AEROSOL, METERED RESPIRATORY (INHALATION) EVERY 6 HOURS PRN
Status: DISCONTINUED | OUTPATIENT
Start: 2020-09-30 | End: 2020-10-02 | Stop reason: HOSPADM

## 2020-09-30 RX ORDER — HYDROMORPHONE HYDROCHLORIDE 1 MG/ML
0.2 INJECTION, SOLUTION INTRAMUSCULAR; INTRAVENOUS; SUBCUTANEOUS
Status: DISCONTINUED | OUTPATIENT
Start: 2020-09-30 | End: 2020-09-30 | Stop reason: HOSPADM

## 2020-09-30 RX ORDER — HALOPERIDOL 5 MG/ML
1 INJECTION INTRAMUSCULAR
Status: DISCONTINUED | OUTPATIENT
Start: 2020-09-30 | End: 2020-09-30 | Stop reason: HOSPADM

## 2020-09-30 RX ORDER — LIDOCAINE HYDROCHLORIDE 20 MG/ML
INJECTION, SOLUTION EPIDURAL; INFILTRATION; INTRACAUDAL; PERINEURAL PRN
Status: DISCONTINUED | OUTPATIENT
Start: 2020-09-30 | End: 2020-09-30 | Stop reason: SURG

## 2020-09-30 RX ORDER — ACETAMINOPHEN 500 MG
1000 TABLET ORAL EVERY 6 HOURS PRN
Status: DISCONTINUED | OUTPATIENT
Start: 2020-09-30 | End: 2020-10-02 | Stop reason: HOSPADM

## 2020-09-30 RX ADMIN — LIDOCAINE HYDROCHLORIDE 100 MG: 20 INJECTION, SOLUTION EPIDURAL; INFILTRATION; INTRACAUDAL at 15:37

## 2020-09-30 RX ADMIN — ACETAMINOPHEN 1000 MG: 500 TABLET ORAL at 12:06

## 2020-09-30 RX ADMIN — OXYCODONE HYDROCHLORIDE 10 MG: 5 SOLUTION ORAL at 16:44

## 2020-09-30 RX ADMIN — SUGAMMADEX 200 MG: 100 INJECTION, SOLUTION INTRAVENOUS at 16:04

## 2020-09-30 RX ADMIN — BUSPIRONE HYDROCHLORIDE 15 MG: 10 TABLET ORAL at 06:11

## 2020-09-30 RX ADMIN — MORPHINE SULFATE 2 MG: 4 INJECTION INTRAVENOUS at 09:15

## 2020-09-30 RX ADMIN — INFLUENZA A VIRUS A/GUANGDONG-MAONAN/SWL1536/2019 CNIC-1909 (H1N1) ANTIGEN (FORMALDEHYDE INACTIVATED), INFLUENZA A VIRUS A/HONG KONG/2671/2019 (H3N2) ANTIGEN (FORMALDEHYDE INACTIVATED), INFLUENZA B VIRUS B/PHUKET/3073/2013 ANTIGEN (FORMALDEHYDE INACTIVATED), AND INFLUENZA B VIRUS B/WASHINGTON/02/2019 ANTIGEN (FORMALDEHYDE INACTIVATED) 0.5 ML: 15; 15; 15; 15 INJECTION, SUSPENSION INTRAMUSCULAR at 06:25

## 2020-09-30 RX ADMIN — BUSPIRONE HYDROCHLORIDE 15 MG: 10 TABLET ORAL at 18:11

## 2020-09-30 RX ADMIN — FENTANYL CITRATE 50 MCG: 50 INJECTION, SOLUTION INTRAMUSCULAR; INTRAVENOUS at 16:04

## 2020-09-30 RX ADMIN — HYDROMORPHONE HYDROCHLORIDE 0.4 MG: 1 INJECTION, SOLUTION INTRAMUSCULAR; INTRAVENOUS; SUBCUTANEOUS at 16:50

## 2020-09-30 RX ADMIN — PIPERACILLIN AND TAZOBACTAM 3.38 G: 3; .375 INJECTION, POWDER, LYOPHILIZED, FOR SOLUTION INTRAVENOUS; PARENTERAL at 14:00

## 2020-09-30 RX ADMIN — HYDROMORPHONE HYDROCHLORIDE 1 MG: 1 INJECTION, SOLUTION INTRAMUSCULAR; INTRAVENOUS; SUBCUTANEOUS at 20:51

## 2020-09-30 RX ADMIN — PROPOFOL 150 MG: 10 INJECTION, EMULSION INTRAVENOUS at 15:37

## 2020-09-30 RX ADMIN — FENTANYL CITRATE 50 MCG: 50 INJECTION, SOLUTION INTRAMUSCULAR; INTRAVENOUS at 16:35

## 2020-09-30 RX ADMIN — MORPHINE SULFATE 2 MG: 4 INJECTION INTRAVENOUS at 02:00

## 2020-09-30 RX ADMIN — ACETAMINOPHEN 1000 MG: 500 TABLET ORAL at 19:46

## 2020-09-30 RX ADMIN — PHENYLEPHRINE HYDROCHLORIDE 100 MCG: 10 INJECTION INTRAVENOUS at 15:46

## 2020-09-30 RX ADMIN — TOPIRAMATE 50 MG: 25 TABLET, FILM COATED ORAL at 06:11

## 2020-09-30 RX ADMIN — FENTANYL CITRATE 100 MCG: 50 INJECTION, SOLUTION INTRAMUSCULAR; INTRAVENOUS at 15:37

## 2020-09-30 RX ADMIN — SODIUM CHLORIDE, POTASSIUM CHLORIDE, SODIUM LACTATE AND CALCIUM CHLORIDE: 600; 310; 30; 20 INJECTION, SOLUTION INTRAVENOUS at 15:33

## 2020-09-30 RX ADMIN — SODIUM CHLORIDE: 9 INJECTION, SOLUTION INTRAVENOUS at 09:56

## 2020-09-30 RX ADMIN — DEXMEDETOMIDINE HYDROCHLORIDE 40 MCG: 100 INJECTION, SOLUTION INTRAVENOUS at 15:37

## 2020-09-30 RX ADMIN — PIPERACILLIN AND TAZOBACTAM 3.38 G: 3; .375 INJECTION, POWDER, LYOPHILIZED, FOR SOLUTION INTRAVENOUS; PARENTERAL at 06:12

## 2020-09-30 RX ADMIN — SODIUM CHLORIDE: 9 INJECTION, SOLUTION INTRAVENOUS at 06:29

## 2020-09-30 RX ADMIN — INSULIN HUMAN 1 UNITS: 100 INJECTION, SOLUTION PARENTERAL at 18:13

## 2020-09-30 RX ADMIN — BUSPIRONE HYDROCHLORIDE 15 MG: 10 TABLET ORAL at 11:35

## 2020-09-30 RX ADMIN — ONDANSETRON 4 MG: 2 INJECTION INTRAMUSCULAR; INTRAVENOUS at 16:04

## 2020-09-30 RX ADMIN — DIPHENHYDRAMINE HYDROCHLORIDE 12.5 MG: 50 INJECTION INTRAMUSCULAR; INTRAVENOUS at 17:28

## 2020-09-30 RX ADMIN — CEFAZOLIN 3 G: 330 INJECTION, POWDER, FOR SOLUTION INTRAMUSCULAR; INTRAVENOUS at 15:33

## 2020-09-30 RX ADMIN — SODIUM CHLORIDE: 9 INJECTION, SOLUTION INTRAVENOUS at 18:10

## 2020-09-30 RX ADMIN — ROCURONIUM BROMIDE 50 MG: 10 INJECTION, SOLUTION INTRAVENOUS at 15:37

## 2020-09-30 RX ADMIN — PIPERACILLIN AND TAZOBACTAM 3.38 G: 3; .375 INJECTION, POWDER, LYOPHILIZED, FOR SOLUTION INTRAVENOUS; PARENTERAL at 21:54

## 2020-09-30 RX ADMIN — OMEPRAZOLE 20 MG: 20 CAPSULE, DELAYED RELEASE ORAL at 06:10

## 2020-09-30 RX ADMIN — MIDAZOLAM HYDROCHLORIDE 2 MG: 1 INJECTION, SOLUTION INTRAMUSCULAR; INTRAVENOUS at 15:33

## 2020-09-30 RX ADMIN — INSULIN HUMAN 1 UNITS: 100 INJECTION, SOLUTION PARENTERAL at 23:20

## 2020-09-30 RX ADMIN — MORPHINE SULFATE 2 MG: 4 INJECTION INTRAVENOUS at 00:04

## 2020-09-30 RX ADMIN — HYDROMORPHONE HYDROCHLORIDE 1 MG: 1 INJECTION, SOLUTION INTRAMUSCULAR; INTRAVENOUS; SUBCUTANEOUS at 10:50

## 2020-09-30 RX ADMIN — MONTELUKAST 10 MG: 10 TABLET, FILM COATED ORAL at 06:11

## 2020-09-30 RX ADMIN — DEXAMETHASONE SODIUM PHOSPHATE 4 MG: 4 INJECTION, SOLUTION INTRA-ARTICULAR; INTRALESIONAL; INTRAMUSCULAR; INTRAVENOUS; SOFT TISSUE at 15:44

## 2020-09-30 RX ADMIN — TOPIRAMATE 50 MG: 25 TABLET, FILM COATED ORAL at 18:10

## 2020-09-30 ASSESSMENT — ENCOUNTER SYMPTOMS
WEIGHT LOSS: 0
POLYDIPSIA: 0
SPUTUM PRODUCTION: 0
DOUBLE VISION: 0
WHEEZING: 0
SHORTNESS OF BREATH: 0
NAUSEA: 1
VOMITING: 0
DIZZINESS: 0
FEVER: 0
HEARTBURN: 1
DIARRHEA: 1
BLOOD IN STOOL: 0
FEVER: 1
ORTHOPNEA: 0
CHILLS: 1
BLURRED VISION: 0
HEADACHES: 1
PALPITATIONS: 0
FLANK PAIN: 0
HEMOPTYSIS: 0
ABDOMINAL PAIN: 1
CONSTIPATION: 0
MYALGIAS: 0
COUGH: 0

## 2020-09-30 ASSESSMENT — PAIN DESCRIPTION - PAIN TYPE
TYPE: ACUTE PAIN

## 2020-09-30 ASSESSMENT — PAIN SCALES - GENERAL: PAIN_LEVEL: 2

## 2020-09-30 NOTE — CARE PLAN
Problem: Nutritional:  Goal: Achieve adequate nutritional intake  Description: Patient will consume >50% of meals on diet > clear liquids  Outcome: NOT MET   NPO pending procedure. See RD note.    RD following.

## 2020-09-30 NOTE — PROGRESS NOTES
Triage Note    Mikayla Knight is a 60 y.o. female with Hx multiple abdominal surgeries who presented to the hospital in [place] with abdominal pain.  CT revealed an abdominal wall mass that was drained at [place] and noted to be a seroma.  She was transferred to [facility] for further work-up and possible surgical intervention.  She received Zosyn prior to arrival.    General surgeon was consulted, and recommended resuscitation and IV antibiotics.  They will see her tomorrow.    Discussed with Dr. Campuzano.  I requested [facility], Dr. Ortega evaluate this patient for admission.

## 2020-09-30 NOTE — DIETARY
"Nutrition services: Day 1 of admit.  Mikayla Knight is a 60 y.o. female with admitting DX of infected seroma, abdominal wall seroma.  Consult received for MST 3 (14-23 lb wt loss over 6 weeks, poor PO).    Spoke with patient at bedside. Upon visual inspection, pt appears adequately nourished. Pt relays she has been holding steady at 272 lb until approximately 6 weeks ago (severe 7.7% wt loss). States she and her  have been attempting to lose weight by limiting carbohydrates, adhering to a restricted eating window- unsure degree wt loss intentional versus unintentional. Pt states abdominal pain has limited her intake since Monday, but states prior to this time she was consuming 1/2 bottle SlimFast plus 2 small meals (protein + fruit + juice) per day.     Assessment:  Height: 165.1 cm (5' 5\")  Weight: 114.2 kg (251 lb 12.3 oz) via stand up scale 9/29.   Body mass index is 41.9 kg/m²., BMI classification: Obesity Class III  Diet/Intake: NPO pending surgery.     Evaluation:   1. PMH: T2DM, breast cancer, bariatric surgery  2. MAR: low dose SSI, NS IV @ 100 mL/h  3. Labs: glu 139-146 over past 24 hr (acceptable), A1C 6.6, BUN 7 (L), alk phos 159 (H)  4. Last bowel movement PTA- 9/29 per pt.   5. No documented pressure injuries. Pt noted with generalized edema.   6. Pt pending IR drainage today.    Malnutrition Risk: Patient does not meet ASPEN criteria at this time.    Recommendations/Plan:  1. BMI - Pt with BMI >40 (=Body mass index is 41.9 kg/m².), morbid obesity. Weight loss counseling not appropriate in acute care setting. RECOMMEND - Referral to outpatient nutrition services for weight management after D/C.   2. Advance diet per MD.  3. Encourage intake of meals once diet advanced.  4. Document intake of all meals as % taken in ADL's to provide interdisciplinary communication across all shifts.   5. Monitor weight.    RD following.          "

## 2020-09-30 NOTE — PROGRESS NOTES
Report received from RN, assumed care at 0800  Pt is A0X4, and responds appropriately   Pt declines any SOB, chest pain, new onset of numbness/ tingiling  Pt rates pain at 8/10, on a scale of 1-10, pt medicated per MAR  Pt is voiding adequatly and without hesitancy  Pt has + flatus, + bowel sounds,  BM on 9/29/2020 PTA  Pt ambulates with a steady gait up self    Pt is NPO at this time awaiting surgery, pt denies any nausea/vomiting  Pt's abdomen is tender to the touch, swollen, pt awaiting IR drainage today       Plan of care discussed, all questions answered. Explained importance of calling before getting OOB and pt verbalizes understanding. Explained importance of oral care. Call light is within reach, treaded slipper socks on, bed in lowest/ locked position, hourly rounding in place, all needs met at this time

## 2020-09-30 NOTE — H&P
History & Physical Note    Date of Admission: 9/30/2020  Admission Status: Inpatient  [facility] Team: [facility] IM Red Team   Attending: Fred Miranda M.D.   Senior Resident: Dr. Parkinson  Contact Number: [phone]    Chief Complaint: Worsening abdominal pain due to seroma    History of Present Illness (HPI): Pt is a 60 year old female w/ PMH of DM2 on metformin, migraines, breast cancer, and anxiety who presents to the hospital with worsening abdominal pain related to abdominal wall seroma. Pt says that her abdominal pain began in about 7-8 weeks ago. Patient says that she has history of operations on her abdomen so she tried to see a surgeon outpatient, but she wasn't able to schedule an appointment. She presented to her PCP who referred her to GI and patient had endoscopy schedule in October. She went to ER at outside hospital 3 weeks ago where she was found to have a seroma. She says that the pain continued to get worse after the ER visit and she thought that she couldn't wait until her endoscopy appointment in October so she went to ER at outside hospital again and it was found that she had 2 seromas, which had increased in size since last visit. Patient says that one of her seromas was drained and the fluid was foul smelling and purulent, so [facility].     Patient says that she has fibrous scar tissue pain on her abdomen, but this pain is new and she denies any similar episodes in the past. She reports taking ibuprofen and acetaminophen for pain control during this time. She says that she has had night sweats on her abdominal region for past 4 nights along with nausea and funny smelling diarrhea for 2 days. She also reports loss of appetite for 2 weeks. Denies any fevers, but reports chills.     Review of Systems:   Review of Systems   Constitutional: Positive for chills. Negative for fever.   HENT: Negative for ear discharge, ear pain, hearing loss and nosebleeds.     Eyes: Negative for blurred vision and double vision.   Respiratory: Negative for sputum production, shortness of breath and wheezing.    Cardiovascular: Negative for chest pain, palpitations and leg swelling.   Gastrointestinal: Positive for diarrhea and nausea. Negative for blood in stool, constipation, melena and vomiting.   Genitourinary: Negative for dysuria, hematuria and urgency.   Musculoskeletal: Negative for myalgias.   Skin: Negative for itching and rash.   Neurological: Positive for headaches. Negative for dizziness.   Endo/Heme/Allergies: Negative for polydipsia.         Past Medical History:   Past Medical History was reviewed with patient.   has a past medical history of Anesthesia, Anxiety (11/18/2011), Arrhythmia, Arthritis, Breast mass, left, Breath shortness, Bursitis of knee, Cancer (HCC) (2005), Chronic pain (11/18/2011), Depression (11/18/2011), Fibromyalgia (11/18/2011), Gastro-esophageal reflux (3/20/2012), Heart murmur, Iron deficiency anemia (3/20/2012), Neuropathy (11/18/2011), Other specified disorder of intestines, Pain (6/14/12), Pneumonia (01/2012), Pulmonary hypertension (HCC) (3/20/2012), and Syncope and collapse (3/20/2012).    Past Surgical History: Past Surgical History was reviewed with patient.   has a past surgical history that includes abdominal exploration (3/11/2010); hernia repair (3/15/2010); flap graft (3/15/2010); panniculectomy (3/15/2010); gyn surgery (1984); other (04/2009); other (2005); other (06/09); other (07/09); other (08/09); irrigation & debridement general (4/12/2010); abdominal exploration (10/7/2010); gastric bypass laparoscopic (2005); colonoscopy (6/21/2012); gastroscopy (6/21/2012); breast biopsy (12/11/2012); breast biopsy (1/9/2013); axillary node dissection (1/9/2013); node biopsy (1/9/2013); and cath placement (1/25/2013).    Medications: Medications have been reviewed with patient.  Prior to Admission Medications   Prescriptions Last Dose Informant  Patient Reported? Taking?   Diclofenac Sodium 1 % Gel 9/29/2020 at 0800 Patient No No   Sig: APPLY 2 GM UP TO 4 TIMES PER DAY IF NEEDED FOR BACK PAIN   albuterol 108 (90 Base) MCG/ACT Aero Soln inhalation aerosol 9/28/2020 at 1730 Patient No No   Sig: INHALE 2 PUFFS BY MOUTH EVERY 6 HOURS AS NEEDED FOR SHORTNESS OF BREATH   amitriptyline (ELAVIL) 75 MG Tab 9/28/2020 at 2200 Patient Yes Yes   Sig: Take 75 mg by mouth at bedtime as needed for Sleep.   aspirin EC (ECOTRIN) 81 MG Tablet Delayed Response 9/29/2020 at 0800 Patient Yes Yes   Sig: Take 81 mg by mouth every day.   busPIRone (BUSPAR) 15 MG tablet 9/29/2020 at 0800 Patient No No   Sig: TAKE 1 TABLET BY MOUTH THREE TIMES A DAY   escitalopram (LEXAPRO) 20 MG tablet 9/29/2020 at 1000 Patient No No   Sig: Take 1 Tab by mouth every day.   fluticasone (FLONASE) 50 MCG/ACT nasal spray 9/28/2020 at 0900 Patient No No   Sig: Spray 2 Sprays in nose every day.   gabapentin (NEURONTIN) 300 MG Cap 9/28/2020 at 2200 Patient Yes Yes   Sig: Take 300 mg by mouth 3 times a day as needed. Indications: Neuropathic Pain   hydrOXYzine pamoate (VISTARIL) 25 MG Cap 9/28/2020 at 2200 Patient No No   Sig: Take 1 to 3 tablets by mouth once a day as need for anxiety or insomnia   metFORMIN ER (GLUCOPHAGE XR) 750 MG TABLET SR 24 HR 9/29/2020 at 0800 Patient No No   Sig: Take 1 Tab by mouth every day.   methocarbamol (ROBAXIN) 750 MG Tab 9/28/2020 at 2200 Patient Yes Yes   Sig: Take 750 mg by mouth 1 time daily as needed. Indications: Musculoskeletal Pain   montelukast (SINGULAIR) 10 MG Tab 9/29/2020 at 0800 Patient No No   Sig: TAKE 1 TABLET BY MOUTH EVERY DAY   omeprazole (PRILOSEC) 20 MG delayed-release capsule 9/29/2020 at 0800 Patient No No   Sig: TAKE 1 CAP BY MOUTH EVERY DAY 30 MINUTES BEFORE FOOD OR DRINK, ON AN EMPTY STOMACH.   rizatriptan (MAXALT) 10 MG tablet 9/28/2020 at 2200 Patient No No   Sig: TAKE 1 TAB BY MOUTH ONCE AS NEEDED FOR MIGRAINE FOR UP TO 1 DOSE. MAY REPEAT IN 2  HOURS IF NEEDED   topiramate (TOPAMAX) 50 MG tablet 9/29/2020 at 0800 Patient No No   Sig: TAKE 1 TABLET BY MOUTH TWICE A DAY   traZODone (DESYREL) 100 MG Tab 9/28/2020 at 2200 Patient No No   Sig: TAKE 1 TO 3 TABLETS BY MOUTH AT BEDTIME AS NEEDED FOR INSOMNIA      Facility-Administered Medications: None        Allergies: Allergies have been reviewed with patient.  Allergies   Allergen Reactions   • Shellfish Allergy Anaphylaxis     SHRIMP ONLY, not anything else, not iodine.       Family History: No pertinent family history  family history includes Anxiety disorder in her father, maternal uncle, and maternal uncle; Cancer in her maternal grandfather and another family member; Depression in her father; Diabetes in her maternal uncle and maternal uncle; Heart Attack in her father; Hypertension in her mother; Schizophrenia in her father.     Social History:   Tobacco: Denies any smoking history  Alcohol: Denies any recent alcohol use. Denies history of heavy use.  Recreational drugs (illegal and prescription):  Denies any previous history.  Employment: Unemployed  Living situation:  Lives at home with   Primary Care Provider: not reviewed YOSEPH Welch    Vitals:  Temp:  [36.6 °C (97.8 °F)-37.2 °C (98.9 °F)] 37.2 °C (98.9 °F)  Pulse:  [] 110  Resp:  [18-28] 20  BP: (114-154)/(55-85) 128/85  SpO2:  [92 %-99 %] 97 %    Physical Exam  Constitutional:       General: She is not in acute distress.     Appearance: She is obese.   HENT:      Mouth/Throat:      Mouth: Mucous membranes are dry.      Pharynx: Oropharynx is clear. No oropharyngeal exudate or posterior oropharyngeal erythema.   Eyes:      General: No scleral icterus.        Right eye: No discharge.         Left eye: No discharge.   Cardiovascular:      Rate and Rhythm: Normal rate and regular rhythm.      Heart sounds: No murmur. No friction rub. No gallop.    Pulmonary:      Effort: Pulmonary effort is normal. No respiratory  distress.      Breath sounds: Normal breath sounds. No wheezing or rales.   Abdominal:      General: Bowel sounds are normal. There is distension.      Palpations: There is no mass.      Tenderness: There is abdominal tenderness. There is no right CVA tenderness, left CVA tenderness, guarding or rebound.      Hernia: No hernia is present.       Musculoskeletal:         General: No swelling, tenderness, deformity or signs of injury.      Right lower leg: No edema.      Left lower leg: No edema.   Skin:     General: Skin is warm and dry.      Coloration: Skin is not jaundiced.      Findings: No bruising or lesion.   Neurological:      Mental Status: She is alert and oriented to person, place, and time.   Psychiatric:         Mood and Affect: Mood normal.         Labs: Please see results section     Imaging:   DX-CHEST-PORTABLE (1 VIEW)   Final Result         1.  No acute cardiopulmonary disease.      IR-CONSULT AND TREAT    (Results Pending)       Previous Data Review: reviewed    Problem Representation:   * Abdominal wall seroma  Assessment & Plan  Patient found to have enlarging abdominal wall seroma at outside hospital w/ ~6 week complaint of abdominal pain. Patient had seroma drained and thought to be infected after fluid found to be foul smelling and purulent.    -Patient has extensive history of abdominal surgeries w/ scarring noted on physical exam.  -General surgery consulted in the ED for possible surgical management. Appreciate recs.  -Patient made NPO over midnight in anticipation of possible procedure.  -Patient has no fever or leukocytosis on CBC   -Patient was given loading dose of zosyn at outside hospital. Will continue zosyn for possible abdominal infection.  -Patient has no previous history or MRSA and no risk factors for MRSA. Consider starting vancomycin if MRSA is suspected.  -Pt started to develop tachypnea and tachycardia during initial stay. Ordered lactic acid level for possible development of  sepsis.  -Collected blood cultuers x2. Patient also had blood cultures drawn at outside hospital before administration of antibiotics. Consider requesting outside records for blood cultures.  -Continue IVF w/ NS 100ml/hour.      Type 2 diabetes mellitus without complication, without long-term current use of insulin (HCC)- (present on admission)  Assessment & Plan  Patient has history of diabetes controlled with metformin.    -Started on SSI.    Shortness of breath- (present on admission)  Assessment & Plan  Pt reports shortness of breath associated with environmental allergies.    -Continue home fluticasone and singulair.  -Continue home albuterol PRN.    Insomnia- (present on admission)  Assessment & Plan  Patient has history of insomnia treated with amitriptyline.    -Restarted home amitriptyline.     Anxiety- (present on admission)  Assessment & Plan  Patient has history of anxiety.    -Will restart home buspirone and topamax.

## 2020-09-30 NOTE — PROGRESS NOTES
Daily Progress Note:     Date of Service: 9/30/2020  Primary Team: [facility] IM Red Team    Attending: Fred Miranda M.D.   Senior Resident: Dr. Nieto  Contact:  [phone]    Chief Complaint:   Abdominal Pain    Subjective:  - No acute overnight events  - She reports persistent abdominal pain primarily located in epigastric region with radiation to different quadrants. Her pain regimen has been changed to Dilaudid  - Had imaging in [place], which showed increased in size of Seroma along with ?appearance of new one, one of which was drained getting out 200cc of purulent foul smelling fluid.  - Evaluated by Plastic surgery in morning, who signed off  - General surgery was consulted for further evaluation.  - She continued to be NPO along with continuation of Zosyn  Consultants/Specialty:  - Surgery  Review of Systems:    Review of Systems   Constitutional: Positive for chills. Negative for fever.   HENT: Negative for ear discharge, ear pain, hearing loss and nosebleeds.    Eyes: Negative for blurred vision and double vision.   Respiratory: Negative for sputum production, shortness of breath and wheezing.    Cardiovascular: Negative for chest pain, palpitations and leg swelling.   Gastrointestinal: Positive for abdominal pain, diarrhea and nausea. Negative for blood in stool, constipation, melena and vomiting.   Genitourinary: Negative for dysuria, hematuria and urgency.   Musculoskeletal: Negative for myalgias.   Skin: Negative for itching and rash.   Neurological: Positive for headaches. Negative for dizziness.   Endo/Heme/Allergies: Negative for polydipsia.       Objective Data:   Physical Exam:   Vitals:   Temp:  [36.6 °C (97.8 °F)-37.5 °C (99.5 °F)] 37.1 °C (98.7 °F)  Pulse:  [] 94  Resp:  [16-28] 16  BP: ()/(55-85) 100/74  SpO2:  [92 %-99 %] 93 %     Physical Exam  Constitutional:       General: She is not in acute distress.     Appearance: She is obese.   HENT:      Mouth/Throat:      Mouth:  Mucous membranes are dry.      Pharynx: Oropharynx is clear. No oropharyngeal exudate or posterior oropharyngeal erythema.   Eyes:      General: No scleral icterus.        Right eye: No discharge.         Left eye: No discharge.   Cardiovascular:      Rate and Rhythm: Normal rate and regular rhythm.      Heart sounds: No murmur. No friction rub. No gallop.    Pulmonary:      Effort: Pulmonary effort is normal. No respiratory distress.      Breath sounds: Normal breath sounds. No wheezing or rales.   Abdominal:      General: Bowel sounds are normal. There is distension.      Palpations: There is no mass.      Tenderness: There is abdominal tenderness. There is no right CVA tenderness, left CVA tenderness, guarding or rebound.      Hernia: No hernia is present.      Comments: Very tender swelling in the epigastric region, with localized erythema, warmth.   Musculoskeletal:         General: No swelling, tenderness, deformity or signs of injury.      Right lower leg: No edema.      Left lower leg: No edema.   Skin:     General: Skin is warm and dry.      Coloration: Skin is not jaundiced.      Findings: No bruising or lesion.   Neurological:      Mental Status: She is alert and oriented to person, place, and time.   Psychiatric:         Mood and Affect: Mood normal.             * Abdominal wall seroma  Assessment & Plan  Enlarging abdominal wall seroma(10*15cm) found on imaging at outside hospital with subsequent drainage of about 200cc of purulent fluid  History significant with extensive abdominal surgeries(bariatric surgery, abdominal hernias)  Very tender swelling in the epigastric region, with localized erythema, warmth.  Afebrile with no significant leukocytosis  Plan:  - NPO  - Continue  Zosyn  - IV fluids  - General surgery consulted to determine further management with ?likely drainage of seroma          Type 2 diabetes mellitus without complication, without long-term current use of insulin (HCC)- (present on  admission)  Assessment & Plan  Patient has history of diabetes controlled with metformin.    -Started on SSI.    Shortness of breath- (present on admission)  Assessment & Plan  Pt reports shortness of breath associated with environmental allergies.    -Continue home fluticasone and singulair.  -Continue home albuterol PRN.    Insomnia- (present on admission)  Assessment & Plan  Patient has history of insomnia treated with amitriptyline.    -Restarted home amitriptyline.     Anxiety- (present on admission)  Assessment & Plan  Patient has history of anxiety.    -Will restart home buspirone and topamax.

## 2020-09-30 NOTE — ANESTHESIA TIME REPORT
Anesthesia Start and Stop Event Times     Date Time Event    9/30/2020 1525 Ready for Procedure     1533 Anesthesia Start     1623 Anesthesia Stop        Responsible Staff  09/30/20    Name Role Begin End    Ismael Shipman M.D. Anesth 1533 1623        Preop Diagnosis (Free Text):  Pre-op Diagnosis     abdominal wall infection         Preop Diagnosis (Codes):    Post op Diagnosis  Abdominal wall abscess  complex abdominal wall infection with abscess    Premium Reason  K. Alert    Comments:

## 2020-09-30 NOTE — ANESTHESIA PROCEDURE NOTES
Airway    Date/Time: 9/30/2020 3:38 PM  Performed by: Ismael Shipman M.D.  Authorized by: Ismael Shipman M.D.     Location:  OR  Urgency:  Elective  Difficult Airway: No    Indications for Airway Management:  Anesthesia      Spontaneous Ventilation: absent    Sedation Level:  Deep  Preoxygenated: Yes    Patient Position:  Sniffing  Mask Difficulty Assessment:  1 - vent by mask  Final Airway Type:  Endotracheal airway  Final Endotracheal Airway:  ETT  Cuffed: Yes    Technique Used for Successful ETT Placement:  Direct laryngoscopy    Insertion Site:  Oral  Blade Type:  Solorio  Laryngoscope Blade/Videolaryngoscope Blade Size:  2  ETT Size (mm):  7.0  Measured from:  Lips  ETT to Lips (cm):  20  Placement Verified by: auscultation and capnometry    Cormack-Lehane Classification:  Grade IIa - partial view of glottis  Number of Attempts at Approach:  1

## 2020-09-30 NOTE — OP REPORT
POST-OPERATIVE NOTE    PREOPERATIVE DIAGNOSIS: Complex abdominal wall abscess    POSTOPERATIVE DIAGNOSIS: Same    PROCEDURE PERFORMED: 1.  Incision and drainage of a complex abdominal wall abscess  2.  Negative pressure dressing placement greater than 50 cm²    SURGEON: Fede Russ M.D., MD    ASSISTANT: None    ANESTHESIOLOGIST:  Ismeal Shipman MD., MD    ANESTHESIA: General    FINDINGS: Large abdominal wall abscess.  Multiple foreign bodies within the abscess cavity to include pieces of old mesh, suture and clips.     SPECIMEN: Cultures    ESTIMATED BLOOD LOSS: 50 cc mL    Indications: Patient is a 60-year-old female who presented 10 years status post her last procedure with an abdominal wall complex abscess.  Patient was counseled extensively as of the risk first benefits of surgery and agreed proceed fully informed.    Description:    The patient was prepped and draped in the standard sterile surgical fashion after induction of general anesthesia.  An appropriate timeout was performed and antibiotics delivered.  I began with a midline incision excising portion of an old scar.  This was over the area of obvious erythema and fluctuance.  I was immediately rewarded with approximately 100 cc of purulence.  This was cultured for future antibiotic tapering.    Within the wound cavity there were several Ethibond sutures and clips.  There was fragments of old mesh which I removed.  There did appear to be incorporated mesh as well.  There was no biliary spillage or evidence of a fistula.    I did follow any loculations inferiorly and laterally.  I assured to break these up prior to irrigating the wound.  I irrigated the wound with multiple liters of saline.    Once that was accomplished and hemostasis was achieved I placed a medium sponge.  I then sealed this with the accompanying sticker material.  The Sanz was applied.  This held suction after the wound VAC was applied.  The patient was then extubated, to  recovery in satisfactory condition.    Disposition:    A wound care consult will be obtained.  The patient will return to the medicine service.          ____________________________________     Fede Russ M.D., MD    DT: 9/30/2020  4:20 PM      Cc: YOSEPH Welch

## 2020-09-30 NOTE — ANESTHESIA POSTPROCEDURE EVALUATION
Patient: Mikayla Knight    Procedure Summary     Date: 09/30/20 Room / Location: [place] [id number] / SURGERY [facility]    Anesthesia Start: 1533 Anesthesia Stop: 1623    Procedure: IRRIGATION AND DEBRIDEMENT, WOUND-ABD WALL, WOUND VAC PLACEMENT (N/A Abdomen) Diagnosis: (abdominal wall infection )    Surgeon: Fede Russ M.D. Responsible Provider: Ismael Shipman M.D.    Anesthesia Type: general ASA Status: 3          Final Anesthesia Type: general  Last vitals  BP   Blood Pressure: 100/74    Temp   37.1 °C (98.7 °F)    Pulse   Pulse: 94   Resp   16    SpO2   93 %      Anesthesia Post Evaluation    Patient location during evaluation: PACU  Patient participation: complete - patient participated  Level of consciousness: awake and alert  Pain score: 2    Airway patency: patent  Anesthetic complications: no  Cardiovascular status: hemodynamically stable  Respiratory status: acceptable  Hydration status: euvolemic    PONV: none

## 2020-09-30 NOTE — ED PROVIDER NOTES
"ED Provider Note    CHIEF COMPLAINT  Chief Complaint   Patient presents with   • Abdominal Pain       HPI  Mikayla Knight is a 60 y.o. female who presents in an outside hospital for abdominal wall abscess.  6 weeks ago she noticed a protrusion in her abdomen.  Visiting CT was suggestive of a seroma.  It is gotten more painful and more swollen.  Today she had another scan which showed it was larger.  This was aspirated under ultrasound and 200 cc of purulent fluid was withdrawn.  She was started on Zosyn.  She was transferred here for surgical consultation.  She is not had surgery for quite some time.  She had a Fredo-en-Y by Dr. mazariegos 11 years ago.  Dr. Jan Lopez was last surgeon saw her, he explored her abdomen fix a hernia and performed a panniculectomy it sounds.  Patient is not had a fever.  There is been no chest pain or shortness of breath.  No change in bowel or bladder.  There is no other complaint.    PAST MEDICAL HISTORY  Past Medical History:   Diagnosis Date   • Anesthesia     low bp coming out of anesthesia \"one time\"   • Anxiety 11/18/2011   • Arrhythmia     ? pt unsure   • Arthritis     fibromyalgia   • Breast mass, left    • Breath shortness     occasionally   • Bursitis of knee     left   • Cancer (HCC) 2005    squamous cell on nose   • Chronic pain 11/18/2011   • Depression 11/18/2011   • Fibromyalgia 11/18/2011   • Gastro-esophageal reflux 3/20/2012   • Heart murmur    • Iron deficiency anemia 3/20/2012   • Neuropathy 11/18/2011   • Other specified disorder of intestines    • Pain 6/14/12    3/10 stomach   • Pneumonia 01/2012   • Pulmonary hypertension (HCC) 3/20/2012   • Syncope and collapse 3/20/2012       FAMILY HISTORY  Family History   Problem Relation Age of Onset   • Heart Attack Father    • Anxiety disorder Father    • Depression Father    • Schizophrenia Father    • Hypertension Mother    • Cancer Maternal Grandfather         leukemia   • Cancer Other         leukemia- cousin   • " Diabetes Maternal Uncle    • Anxiety disorder Maternal Uncle    • Diabetes Maternal Uncle    • Anxiety disorder Maternal Uncle        SOCIAL HISTORY  Social History     Tobacco Use   • Smoking status: Former Smoker     Packs/day: 0.10     Years: 8.00     Pack years: 0.80     Types: Cigarettes     Quit date: 2016     Years since quittin.4   • Smokeless tobacco: Never Used   • Tobacco comment: 1/2 pk a day on and off 10 yrs, 1 cigarette daily   Substance Use Topics   • Alcohol use: Yes     Alcohol/week: 0.0 oz     Comment: 1-2 times a year   • Drug use: No         SURGICAL HISTORY  Past Surgical History:   Procedure Laterality Date   • CATH PLACEMENT  2013    Performed by Lizeth Ferreira M.D. at SURGERY SAME DAY [facility]   • BREAST BIOPSY  2013    Performed by Lizeth Ferreira M.D. at SURGERY SAME DAY [facility]   • AXILLARY NODE DISSECTION  2013    Performed by Lizeth Ferreira M.D. at SURGERY SAME DAY [facility]   • NODE BIOPSY  2013    Performed by Lizeth Ferreira M.D. at SURGERY SAME DAY [facility]   • BREAST BIOPSY  2012    Performed by Lizeth Ferreira M.D. at SURGERY SAME DAY [facility]   • COLONOSCOPY  2012    Performed by PARAM VALDEZ at SURGERY [facility]S   • GASTROSCOPY  2012    Performed by PARAM VALDEZ at SURGERY [facility]   • ABDOMINAL EXPLORATION  10/7/2010    Performed by MARIA G KHAN JR at SURGERY [facility]   • IRRIGATION & DEBRIDEMENT GENERAL  2010    Performed by OZZIE WEINSTEIN at SURGERY [facility]   • HERNIA REPAIR  3/15/2010    Performed by MARIA G KHAN JR at SURGERY [facility]   • FLAP GRAFT  3/15/2010    Performed by MARIA G KHAN JR at SURGERY [facility]   • PANNICULECTOMY  3/15/2010    Performed by MARIA G KHAN JR at SURGERY [facility]   • ABDOMINAL EXPLORATION  3/11/2010    Performed by MARIA G KHAN JR at SURGERY SAME DAY [facility]   • OTHER       "pulled mesh out of hernia   • OTHER  07/09    abscess removed abd.   • OTHER  06/09    bowel obstruction   • OTHER  04/2009    hernia repair,mesh removal after in aug.09   • OTHER  2005    bariatric surgery gastric bypass   • GASTRIC BYPASS LAPAROSCOPIC  2005   • GYN SURGERY  1984    tubal       CURRENT MEDICATIONS  Home Medications    **Home medications have not yet been reviewed for this encounter**         I have reviewed the nurses notes and/or the list brought with the patient.    ALLERGIES  Allergies   Allergen Reactions   • Shellfish Allergy Anaphylaxis     SHRIMP ONLY, not anything else, not iodine.       REVIEW OF SYSTEMS  See HPI for further details. Review of systems as above, otherwise all other systems are negative.     PHYSICAL EXAM  VITAL SIGNS: /63   Pulse 97   Temp 36.6 °C (97.8 °F) (Temporal)   Resp 18   Ht 1.651 m (5' 5\")   Wt 114.3 kg (252 lb)   LMP 03/18/2010   SpO2 92%   BMI 41.93 kg/m²     Constitutional: Well appearing patient in no acute distress.  Not toxic, nor ill in appearance.  HENT: Mucus membranes moist.  Oropharynx is clear.  Eyes: Pupils equally round.  No scleral icterus.   Neck: Full nontender range of motion.  Lymphatic: No cervical lymphadenopathy noted.   Cardiovascular: Regular heart rate and rhythm.  No murmurs, rubs, nor gallop appreciated.   Thorax & Lungs: Chest is nontender.  Lungs are clear to auscultation with good air movement bilaterally.  No wheeze, rhonchi, nor rales.   Abdomen: Soft, multiple scars.  There is a fullness over the upper abdomen which is quite tender.  No other belly tenderness.  Skin: No purpura nor petechia noted.  Extremities/Musculoskeletal: No sign of trauma.  Calves are nontender with no cords nor edema.  No Veronica's sign.  Pulses are intact all around.   Neurologic: Alert & oriented.  Strength and sensation is intact all around.  Gait is normal.  Psychiatric: Normal affect appropriate for the clinical situation.    LABS  CBC at the " outside hospital was normal.  Neutrophils are 78 %.  Basic chemistries are normal.  Blood sugar 153.  Urinalysis showed epithelial cells and white blood cells, consistent with a contaminated specimen.    RADIOLOGY/PROCEDURES  I have reviewed the patient's film interpretations myself, and they are read out by the radiologist as: Noted above.    MEDICAL RECORD  I have reviewed patient's medical record and pertinent results are listed above.    COURSE & MEDICAL DECISION MAKING  I have reviewed any medical record information, laboratory studies and radiographic results as noted above.  This patient is sent over for an abdominal wall infected seroma.  She is already been started antibiotics.  A sample was sent for culture and sensitivity at the outside hospital.  She was sent here ostensibly for surgical consultation.  I discussed her case with Dr. Petty, on-call for Dr. Nuñez.  He agrees with admission to the hospital service, IV antibiotics, and to see if interventional radiologist can put a pigtail into that lesion.  To drain it.  However he will have Dr. Nuñez notify the patient is here to consult as well.  This was discussed with the patient who is amenable to the plan.  Case discussed with the [facility] triage officer, and she will be admitted to their team.    FINAL IMPRESSION  1. Abdominal wall abscess           This dictation was created using voice recognition software.    Electronically signed by: Suhas Campuzano M.D., 9/29/2020 6:31 PM

## 2020-09-30 NOTE — CARE PLAN
Problem: Communication  Goal: The ability to communicate needs accurately and effectively will improve  Outcome: PROGRESSING AS EXPECTED  Pt calls appropriately     Problem: Pain Management  Goal: Pain level will decrease to patient's comfort goal  Outcome: PROGRESSING AS EXPECTED  Pt medicated per MAR

## 2020-09-30 NOTE — CONSULTS
DATE OF SERVICE:  09/30/2020    CHIEF COMPLAINT:  Reported abdominal wall seroma.    BRIEF HISTORY:  We were called because apparently my partner, Dr. Nuñez, was   the last surgeon who operated on her 10 years ago.  I have personally spoken   with Dr. Nuñez who told me at that time, the patient had removal of infected   mesh and repair of her hernia primarily with no additional or new mesh placed.    The patient had healed and had not been seen for over 9 years by Dr. Nuñez.    In any event, the patient has a multiple-week history of discomfort of her   abdomen.  She reportedly sought care at a hospital in [place] where   radiographically she was diagnosed with a seroma.  She was sent home and told   to go find a general surgeon.  Apparently, she was not able to find a general   surgeon and recently represented to the hospital in [place] with increasing   abdominal pain.  There, they reimaged her, found her seroma to be of the same   size, but now she had 2 separate seromas.  There is a report that needle   aspiration of the seroma resulted in turbid fluid; however, there is no   documentation of a Gram stain or no documentation in the hospital record that   there actually was turbid fluid aspirated.  This is only by history.  The   patient other than abdominal pressure generally feels fine.  She has not had   fevers or chills according to her.  She does admit to having some loss of   appetite.  I was called last night to see if there is any role for plastic   surgical intervention of this problem.    PAST MEDICAL HISTORY:  Patient has multiple medical comorbidities.    Comorbidities include anxiety, cardiac arrhythmia, arthritis, breast mass,   shortness of breath, bursitis, chronic pain, depression, fibromyalgia,   gastroesophageal reflux disease, heart murmur, iron deficiency anemia,   neuropathy, pneumonia, pulmonary hypertension, diabetes, morbid obesity.    PAST SURGICAL HISTORY:  The patient has a history of  lumpectomy with axillary   node dissection, gastric bypass, irrigation and debridement of abdominal wall   on multiple occurrences, GYN surgery, panniculectomy, hernia repair, removal   of all abdominal mesh.    MEDICATIONS:  Patient's home medication includes diclofenac, albuterol,   Elavil, Ecotrin, BuSpar, Lexapro, Flonase, Neurontin, metformin, hydroxyzine,   methocarbamol, Singulair, Prilosec, Maxalt, Topamax, Desyrel.    ALLERGIES TO MEDICATIONS:  The patient has no allergies to medications.  SHE   DOES HAVE AN ANAPHYLACTIC REACTION TO SHRIMP.    SOCIAL HISTORY:  The patient has smoked heavily in the past.  It is not clear   if she is currently smoking.  Alcohol use is not significant.  The patient   denies previous drug use.  The patient is unemployed and resides in [place]   with her .    REVIEW OF SYSTEMS:  The patient was asked a greater than 12-point review of   systems.  This includes head, eyes, ears, mouth, throat, heart,   gastrointestinal, genitourinary, neurologic, psychiatric, constitutional, and   others and all were answered negatively or noncontributory or already   mentioned in the patient's history.    PHYSICAL EXAMINATION:  GENERAL:  The patient is morbidly obese.  She is in no acute distress.  VITAL SIGNS:  Most recent temperature is 37.4.  She has been afebrile during   all of her vital signs here.  Heart rate 105, respiratory rate 18, blood   pressure 109/72, saturating 97% on 2 liters per nasal cannula.  HEENT:  The patient's head is atraumatic and normocephalic.  Both pupils are   equal, round, reactive to light.  Extraocular motions are intact.  The   oropharynx is moist.  NECK:  The neck is supple.  There is no adenopathy.  LUNGS:  Lungs have distant breath sounds bilaterally.  HEART:  Heart is  tachycardic.  There is no murmur.  ABDOMEN:  Abdomen is again morbidly obese.  There are multiple scars on the   abdominal wall.  There is an area in the epigastric region just to the  left of   the midline where the patient says there is maximal tenderness.  There is   maybe some swelling in this area, but it is hard to discern.  Certainly, I am   not able to feel a fluid wave here.  There is tenderness.  The remainder of   the abdomen is difficult to examine just because of the sheer size.  It is   certainly soft.  BACK:  There is no CVA tenderness.  GENITOURINARY:  Deferred.  EXTREMITIES:  There is no clubbing, cyanosis or edema.    RADIOGRAPHIC EVALUATION:  There are no records from [place] where I can see   what her radiographic evaluation was of the seroma.    Radiographically, there are no images for me to review.    LABORATORY EVALUATION:  The patient has a white count of 11.1, which is   elevated compared with her admission white count.    ASSESSMENT AND PLAN:  This does not appear to be a plastic surgical issue.    She may have a seroma, but this is 10 years out from her previous procedure.    I have discussed the case with Dr. Nuñez, who was her doctor at that time, who   says that all of her mesh has been removed.  With the fact that there was no   mesh, I think that this could be treated with percutaneous drainage by   interventional radiology leaving drains in each of the seroma cavities.    Hopefully, there would be resolution of her seroma and potential infection at   that time.  We will sign off the case.             ____________________________________     MD ЮЛИЯ THOMPSON / ALLIE    DD:  09/30/2020 09:03:31  DT:  09/30/2020 11:31:24    D#:  [id number]  Job#:  [id number]

## 2020-09-30 NOTE — ASSESSMENT & PLAN NOTE
Enlarging abdominal wall swelling (10*15cm) found on imaging at outside hospital with subsequent drainage of about 200cc of purulent fluid  History significant with extensive abdominal surgeries(bariatric surgery, abdominal hernias)  Very tender swelling in the epigastric region, with localized erythema, warmth on admission  I&D for complex abdominal wall abscess done 9/30 with multiple foreign bodies within the abscess cavity that includes pieces of old mesh, suture and slips  Afebrile with mild leukocytosis  Plan:  - Regular diet  - Continue  Zosyn  - Pain control with Norco  - Pending cultures

## 2020-09-30 NOTE — CONSULTS
General Surgery Consult    CHIEF COMPLAINT: Abdominal pain.     HISTORY OF PRESENT ILLNESS: The patient is a 60 y.o. female, who presents with abdominal wall pain.  She has a significant surgical history to include bariatric surgery by Dr. Elizabeth, multiple hernia repairs first by Dr. Barragan in [place] and secondly by Dr. Jan Nuñez of plastic surgery.  He removed the mesh that was placed prior.  The patient currently should have no mesh.  She developed abdominal wall pain and erythema in the epigastric region.  She presented to the emergency room in [place] was subsequently transferred to [facility].  The on-call surgeon was contacted last night however he elected not to take the consult and deferred care to Dr. Nuñez who had done a procedure 10 years ago.  Dr. Nuñez felt appropriately this was a general surgery issue and deferred to the on call surgeon today.  She describes epigastric abdominal pain that is worse with activity and without relieving factors.    She has no obstructive symptoms.      PAST MEDICAL HISTORY:  has a past medical history of Anesthesia, Anxiety (11/18/2011), Arrhythmia, Arthritis, Breast mass, left, Breath shortness, Bursitis of knee, Cancer (HCC) (2005), Chronic pain (11/18/2011), Depression (11/18/2011), Fibromyalgia (11/18/2011), Gastro-esophageal reflux (3/20/2012), Heart murmur, Iron deficiency anemia (3/20/2012), Neuropathy (11/18/2011), Other specified disorder of intestines, Pain (6/14/12), Pneumonia (01/2012), Pulmonary hypertension (HCC) (3/20/2012), and Syncope and collapse (3/20/2012).     PAST SURGICAL HISTORY:  has a past surgical history that includes abdominal exploration (3/11/2010); hernia repair (3/15/2010); flap graft (3/15/2010); panniculectomy (3/15/2010); gyn surgery (1984); other (04/2009); other (2005); other (06/09); other (07/09); other (08/09); irrigation & debridement general (4/12/2010); abdominal exploration (10/7/2010); gastric bypass laparoscopic (2005); colonoscopy  (6/21/2012); gastroscopy (6/21/2012); breast biopsy (12/11/2012); breast biopsy (1/9/2013); axillary node dissection (1/9/2013); node biopsy (1/9/2013); and cath placement (1/25/2013).     ALLERGIES:   Allergies   Allergen Reactions   • Shellfish Allergy Anaphylaxis     SHRIMP ONLY, not anything else, not iodine.        CURRENT MEDICATIONS:   Home Medications     Reviewed by Rachel Velasquez R.N. (Registered Nurse) on 09/30/20 at 1454  Med List Status: Complete   Medication Last Dose Status   acetaminophen (TYLENOL) tablet 1,000 mg  Active   albuterol 108 (90 Base) MCG/ACT Aero Soln inhalation aerosol 9/28/2020 Active   albuterol inhaler 2 Puff  Active   amitriptyline (ELAVIL) 75 MG Tab 9/28/2020 Active   amitriptyline (ELAVIL) tablet 75 mg  Active   aspirin EC (ECOTRIN) 81 MG Tablet Delayed Response 9/29/2020 Active   bisacodyl (DULCOLAX) suppository 10 mg  Active   busPIRone (BUSPAR) 15 MG tablet 9/29/2020 Active   busPIRone (BUSPAR) tablet 15 mg  Active   dextrose 50% (D50W) injection 50 mL  Active   Diclofenac Sodium 1 % Gel 9/29/2020 Active   escitalopram (LEXAPRO) 20 MG tablet 9/29/2020 Active   fluticasone (FLONASE) 50 MCG/ACT nasal spray 9/28/2020 Active   fluticasone (FLONASE) nasal spray 100 mcg  Active   gabapentin (NEURONTIN) 300 MG Cap 9/28/2020 Active   gabapentin (NEURONTIN) capsule 300 mg  Active   glucose 4 g chewable tablet 16 g  Active   HYDROmorphone pf (DILAUDID) injection 1 mg  Active   hydrOXYzine pamoate (VISTARIL) 25 MG Cap 9/28/2020 Active   insulin regular (HumuLIN R,NovoLIN R) injection  Active   magnesium hydroxide (MILK OF MAGNESIA) suspension 30 mL  Active   metFORMIN ER (GLUCOPHAGE XR) 750 MG TABLET SR 24 HR 9/29/2020 Active   methocarbamol (ROBAXIN) 750 MG Tab 9/28/2020 Active   montelukast (SINGULAIR) 10 MG Tab 9/29/2020 Active   montelukast (SINGULAIR) tablet 10 mg  Active   NS infusion  Active   omeprazole (PRILOSEC) 20 MG delayed-release capsule 9/29/2020 Active   omeprazole  "(PRILOSEC) capsule 20 mg  Active   piperacillin-tazobactam (ZOSYN) 3.375 g in  mL IVPB  Active   polyethylene glycol/lytes (MIRALAX) PACKET 1 Packet  Active   rizatriptan (MAXALT) 10 MG tablet 2020 Active   senna-docusate (PERICOLACE or SENOKOT S) 8.6-50 MG per tablet 2 Tab  Active   topiramate (TOPAMAX) 50 MG tablet 2020 Active   topiramate (TOPAMAX) tablet 50 mg  Active   traZODone (DESYREL) 100 MG Tab 2020 Active                FAMILY HISTORY:   Family History   Problem Relation Age of Onset   • Heart Attack Father    • Anxiety disorder Father    • Depression Father    • Schizophrenia Father    • Hypertension Mother    • Cancer Maternal Grandfather         leukemia   • Cancer Other         leukemia- cousin   • Diabetes Maternal Uncle    • Anxiety disorder Maternal Uncle    • Diabetes Maternal Uncle    • Anxiety disorder Maternal Uncle         SOCIAL HISTORY:   Social History     Tobacco Use   • Smoking status: Former Smoker     Packs/day: 0.10     Years: 8.00     Pack years: 0.80     Types: Cigarettes     Quit date: 2016     Years since quittin.4   • Smokeless tobacco: Never Used   • Tobacco comment: 1/2 pk a day on and off 10 yrs, 1 cigarette daily   Substance and Sexual Activity   • Alcohol use: Yes     Alcohol/week: 0.0 oz     Comment: 1-2 times a year   • Drug use: No   • Sexual activity: Not Currently     Partners: Male       REVIEW OF SYSTEMS: Comprehensive review of systems was negative aside from pain described in the history and physical    PHYSICAL EXAMINATION:     GENERAL: The patient is in no acute distress.   VITAL SIGNS: BP (!) 93/65   Pulse 95   Temp 37.1 °C (98.7 °F) (Temporal)   Resp 18   Ht 1.651 m (5' 5\")   Wt 114.2 kg (251 lb 12.3 oz)   SpO2 94%   HEAD AND NECK: Demonstrates symmetric, reactive pupils. Extraocular muscles   are intact. Nares and oropharynx are clear.   NECK: Supple. No adenopathy.  CHEST:No respiratory distress.    CARDIOVASCULAR: Regular " rate. The extremities are well perfused.   ABDOMEN: Obese abdomen.  Scarring consistent with surgical history.  Erythematous mound in the epigastric region consistent with the abscess seen on CAT scan.   EXTREMITIES: Examination of the upper and lower extremities demonstrates no cyanosis edema or clubbing.  NEUROLOGIC: Alert & oriented x 3, Normal motor function, Normal sensory function, No focal deficits noted.    LABORATORY VALUES:   Recent Labs     09/29/20 2146 09/30/20 0321   WBC 10.9* 11.1*   RBC 3.99* 3.70*   HEMOGLOBIN 10.6* 10.0*   HEMATOCRIT 35.2* 32.6*   MCV 88.2 88.1   MCH 26.6* 27.0   MCHC 30.1* 30.7*   RDW 52.0* 52.2*   PLATELETCT 420 369   MPV 7.9* 8.0*     Recent Labs     09/29/20 2146 09/30/20 0321   SODIUM 137 135   POTASSIUM 3.8 3.7   CHLORIDE 99 97   CO2 27 26   GLUCOSE 146* 139*   BUN 9 7*   CREATININE 0.72 0.76   CALCIUM 9.1 8.5     Recent Labs     09/29/20 2146 09/30/20  0321 09/30/20  1304   ASTSGOT 22 14  --    ALTSGPT 17 16  --    TBILIRUBIN 0.3 0.3  --    ALKPHOSPHAT 170* 159*  --    GLOBULIN 3.3 3.3  --    INR  --   --  1.10     Recent Labs     09/30/20  1304   INR 1.10        IMAGING:   DX-CHEST-PORTABLE (1 VIEW)   Final Result         1.  No acute cardiopulmonary disease.      CT-DRAIN-PERITONEAL    (Results Pending)       IMPRESSION AND PLAN:     1.  Abdominal wall complex abscess.  2.  Morbid obesity    1.  The patient will be taken to the operating room for an incision and drainage with wound VAC placement. The surgical conduct was explained. Potential complications including but not limited to infection, bleeding, damage to adjacent structures, anesthetic complications were discussed in detail. Questions were elicited and answered to her satisfaction. She understands the rationale for surgery and elects to proceed.  Operative consent signed.    2.  I have some concern that this could be a late fistula.  I will initially proceed with control of the infection and then reevaluate  postoperatively.      3.  I have no plans to fix her hernia at this point.  In the face of infection I could not do an adequate repair and given her current weight she would likely recur.    4.  I discussed this plan with the patient and she agreed to proceed fully informed.      ___________________________________   Fede Russ M.D.    DD: 9/30/2020 DT: 3:20 PM

## 2020-09-30 NOTE — ASSESSMENT & PLAN NOTE
Pt reports shortness of breath associated with environmental allergies.    -Continue home fluticasone and singulair.  -Continue home albuterol PRN.

## 2020-09-30 NOTE — PROGRESS NOTES
Pt is A&O x4, calls appropriately  Pain 7/10, medicated per MAR  Denies nausea  Tolerating NPO  + Void  + flatus  Last BM PTA  Up SBA  Bed alarm off, pt no fall risk per katie austin  Reviewed plan of care with patient, bed in lowest position and locked, pt resting comfortably now, call light within reach, all needs met at this time. Interventions will be executed per plan of care

## 2020-09-30 NOTE — ED NOTES
Med rec updated   And complete. Allergies reviewed. Met with pt at bedside. Dicussed current medications and last doses taken.  Pt denies antibiotic use in last 14 days.      Home pharmacy [facility]

## 2020-09-30 NOTE — SENIOR ADMIT NOTE
Senior Admit Note                              Chief complaint: Abdominal pain.     Brief HPI:  Mikayla Knight is a 60 y.o. female  with past medical history of gastric bypass surgery, reconstruction of infected prosthetic material on abdominal wall in the past more than 10 years ago, iron deficiency anemia, type 2 diabetes mellitus on metformin, fibromyalgia, PTSD, major depressive disorder, dyslipidemia, history of left breast cancer status post partial mastectomy who presented to the ED after being transferred from [place] for possibly infected seroma of the abdominal wall. Patient has history of abdominal wall pain that started in August, seen by primary care physician referred to gastroenterology do colonoscopy in October however with worsening pain for which a CT scan was done about 3 weeks back which showed a seroma without any intervention at that time.  Pain progressively worsened for which he presented to following this time where a repeat CT was done which showed enlargement of the previous seroma with a second seroma, which was drained which produced?  Purulent drainage which was foul-smelling as per patient, started on Zosyn at outside facility and transferred to [facility] for possible surgical intervention.    History positive for last 4 nights of night sweats. No other GI symptoms.        Surgery consulted by ED physician who is to see patient tomorrow and recommends for now IV antibiotics and resuscitation.    In the ED, vitals were stable except for ocassional tachycardia and tachypnea.  CBC: Mild leukocytosis, normocytic anemia.  CMP: Hyperglycemia and elevated ALP  CXR: No acute cardiopulmonary process.   EKG: Pending    On exam:     Vitals:    09/29/20 2031 09/29/20 2101 09/29/20 2132 09/29/20 2220   BP: 149/55 154/76  128/85   Pulse: 97 100 (!) 102 (!) 110   Resp: (!) 28 (!) 21 (!) 27 20   Temp:    37.2 °C (98.9 °F)   TempSrc:    Temporal   SpO2: 98% 99% 97% 97%   Weight:    114.2 kg (251 lb  "12.3 oz)   Height:         Body mass index is 41.9 kg/m².  /85   Pulse (!) 110 Comment: RN Notified  Temp 37.2 °C (98.9 °F) (Temporal)   Resp 20   Ht 1.651 m (5' 5\")   Wt 114.2 kg (251 lb 12.3 oz)   LMP 03/18/2010   SpO2 97%   BMI 41.90 kg/m²   O2 therapy: Pulse Oximetry: 97 %, O2 (LPM): 2, O2 Delivery Device: Silicone Nasal Cannula    Gen/Neuro: NAD, Moving all extremities,, no focal deficits,A&Ox3  Heart/Lungs: RRR, no MGR, CTAB  Abdo/Pelvis: tenderness at sight of lump at left upper to mid quadrant abdomen.   Skin/Ext: No rashes/erythema. -edema, no cyanosis, not tender, pulses intact, cap refill <2 sec    Problem list:     #Infected seroma.   -S/p drainage at [facility]    Other chronic problems.   #DM  #Chronic pain, Neuropathy  #Migraine  #Insomnia, Other psych disorders.   #GERD        Plan:   Admit to medical   Will continue zosyn. May consider adding vanco and switching zosyn to unasyn   Blood cultures.   Surgery to see patient in the [place] for possible intervention   NPO  Pain management  IVF  Hold home metformin. Sliding Scale insulin.   Will continue home amitriptyline, buspar, gabapentin topamax, inhalers and montelukast. Holding es citalopram, aspirin.       DVT prophylaxis: SCD  Code status: FULL    For complete details, please refer to H&P by Dr. Neetu Ortega M.D.  "

## 2020-09-30 NOTE — ANESTHESIA PREPROCEDURE EVALUATION
Morbid obesity, BMI 41, anxiety, DM type 2, former smoker. Denies: MI/CHF/CVA/CKD      Relevant Problems   PULMONARY   (+) Dyspnea on exertion   (+) Shortness of breath      CARDIAC   (+) Dyspnea on exertion   (+) Pulmonary hypertension (HCC)      ENDO   (+) Type 2 diabetes mellitus without complication, without long-term current use of insulin (HCC)       Physical Exam    Airway   Mallampati: II  TM distance: >3 FB  Neck ROM: full       Cardiovascular - normal exam  Rhythm: regular  Rate: normal  (-) murmur     Dental - normal exam           Pulmonary - normal exam  Breath sounds clear to auscultation     Abdominal    Neurological - normal exam                 Anesthesia Plan    ASA 3   ASA physical status 3 criteria: morbid obesity - BMI greater than or equal to 40    Plan - general       Airway plan will be ETT        Induction: intravenous    Postoperative Plan: Postoperative administration of opioids is intended.    Pertinent diagnostic labs and testing reviewed    Informed Consent:    Anesthetic plan and risks discussed with patient.    Use of blood products discussed with: patient whom consented to blood products.

## 2020-09-30 NOTE — NON-PROVIDER
Internal Medicine Admitting History and Physical    Note Author: Parth Vick, Student       Name Mikayla Knight     1960   Age/Sex 60 y.o. female   MRN [id number]   Code Status Full Code     Chief Complaint:   Abdominal Pain    HPI: 59 y/o female presents for surgical consult follow up after being seem in an outside hospitals, ED for ABD protrusion and pain x 6 weeks. Most recent visiting CT indicated a possible Seroma to epigastric region. Since first noticed,  protrusion has increased in size and has become more painful. New scan confirms size increase of mass. 200cc of purulent material was aspirated from protrusion at outside hospital nad subsequently started on Zosyn. Pt remains at a constant 7/10 pressure pain that radiates to all 4 ABD quadrants. Nothing has improved sx and time has made sx worse. Pt has significant ABD surgery hx with Fredo-en-Y 11 yrs ago by Dr. Elizabeth and a hernia repair/ panniculectomy 10 yrs ago, By Dr. Jan Loepz. Pt denies, chest pain, SOB, changes bladder habits, melena, hematochezia, hemoptysis, vomiting, and weight changes. Pt complains of chills/ fever/diarrhea x 3 days, reduction in appetite, and mild dysphagia.    Review of Systems   Constitutional: Positive for chills, fever and malaise/fatigue. Negative for weight loss.   Respiratory: Negative for cough and hemoptysis.    Cardiovascular: Negative for chest pain, orthopnea and leg swelling.   Gastrointestinal: Positive for abdominal pain, diarrhea, heartburn and nausea. Negative for blood in stool, constipation, melena and vomiting.   Genitourinary: Negative for dysuria, flank pain, frequency, hematuria and urgency.   Musculoskeletal: Negative for myalgias.              Past Medical History (Chronic medical problem, known complications and current treatment)    Abdominal Wall Seroma   CODY   Insomnia  DM2   Fibromyalgia   Neuropathy    Depression   Pulmonary HTN  Iron Deficiency Anemia   Breast Cancer Left    Chronic Headaches   Low Back Pain   PTSD   Obesity   Hypertriglyceridemia    Seasonal Allergies    Past Surgical History:  Past Surgical History:   Procedure Laterality Date   • CATH PLACEMENT  1/25/2013    Performed by Lizeth Ferreira M.D. at SURGERY SAME DAY [facility]   • BREAST BIOPSY  1/9/2013    Performed by Lizeth Ferreira M.D. at SURGERY SAME DAY [facility]   • AXILLARY NODE DISSECTION  1/9/2013    Performed by Lizeth Ferreira M.D. at SURGERY SAME DAY [facility]   • NODE BIOPSY  1/9/2013    Performed by Lizeth Ferreira M.D. at SURGERY SAME DAY [facility]   • BREAST BIOPSY  12/11/2012    Performed by Lizeth Ferreira M.D. at SURGERY SAME DAY [facility]   • COLONOSCOPY  6/21/2012    Performed by PARAM VALDEZ at SURGERY [facility]S   • GASTROSCOPY  6/21/2012    Performed by PARAM VALDEZ at SURGERY [facility]   • ABDOMINAL EXPLORATION  10/7/2010    Performed by MARIA G KHAN JR at SURGERY [facility] ORS   • IRRIGATION & DEBRIDEMENT GENERAL  4/12/2010    Performed by OZZIE WEINSTEIN at SURGERY [facility]S   • HERNIA REPAIR  3/15/2010    Performed by MARIA G KHAN JR at SURGERY [facility]   • FLAP GRAFT  3/15/2010    Performed by MARIA G KHAN JR at SURGERY [facility]   • PANNICULECTOMY  3/15/2010    Performed by MARIA G KHAN JR at SURGERY [facility]   • ABDOMINAL EXPLORATION  3/11/2010    Performed by MARIA G KHAN JR at SURGERY SAME DAY [facility]   • OTHER  08/09    pulled mesh out of hernia   • OTHER  07/09    abscess removed abd.   • OTHER  06/09    bowel obstruction   • OTHER  04/2009    hernia repair,mesh removal after in aug.09   • OTHER  2005    bariatric surgery gastric bypass   • GASTRIC BYPASS LAPAROSCOPIC  2005   • GYN SURGERY  1984    tubal       Current Outpatient Medications:  Home Medications     Reviewed by Maryjane Moreno on 09/29/20 at 1850  Med List Status: Complete   Medication Last Dose Status   albuterol  108 (90 Base) MCG/ACT Aero Soln inhalation aerosol 9/28/2020 Active   amitriptyline (ELAVIL) 75 MG Tab 9/28/2020 Active   aspirin EC (ECOTRIN) 81 MG Tablet Delayed Response 9/29/2020 Active   busPIRone (BUSPAR) 15 MG tablet 9/29/2020 Active   Diclofenac Sodium 1 % Gel 9/29/2020 Active   escitalopram (LEXAPRO) 20 MG tablet 9/29/2020 Active   fluticasone (FLONASE) 50 MCG/ACT nasal spray 9/28/2020 Active   gabapentin (NEURONTIN) 300 MG Cap 9/28/2020 Active   hydrOXYzine pamoate (VISTARIL) 25 MG Cap 9/28/2020 Active   metFORMIN ER (GLUCOPHAGE XR) 750 MG TABLET SR 24 HR 9/29/2020 Active   methocarbamol (ROBAXIN) 750 MG Tab 9/28/2020 Active   montelukast (SINGULAIR) 10 MG Tab 9/29/2020 Active   omeprazole (PRILOSEC) 20 MG delayed-release capsule 9/29/2020 Active   rizatriptan (MAXALT) 10 MG tablet 9/28/2020 Active   topiramate (TOPAMAX) 50 MG tablet 9/29/2020 Active   traZODone (DESYREL) 100 MG Tab 9/28/2020 Active                Medication Allergy/Sensitivities:  Allergies   Allergen Reactions   • Shellfish Allergy Anaphylaxis     SHRIMP ONLY, not anything else, not iodine.         Family History  Father - Anxiety, depression, MI, Schizophrenia   Mother - HTN  Family screened for CVA, DM, MI, HTN, Cancer, and CAD.     Social History  Living situation: Lives at home with \[facility] [place] [facility]\""  Occupation: unemployed  Tobacco: Never   ETOH: Denies recent use    Recreational drugs: Never    Physical Exam     Vitals:    09/29/20 2132 09/29/20 2220 09/30/20 0405 09/30/20 0726   BP:  128/85 109/72 102/70   Pulse: (!) 102 (!) 110 (!) 105 (!) 106   Resp: (!) 27 20 18 18   Temp:  37.2 °C (98.9 °F) 37.4 °C (99.4 °F) 37.5 °C (99.5 °F)   TempSrc:  Temporal Oral Temporal   SpO2: 97% 97% 97% 93%   Weight:  114.2 kg (251 lb 12.3 oz)     Height:         Body mass index is 41.9 kg/m².  O2 therapy: Pulse Oximetry: 93 %, O2 (LPM): 2, O2 Delivery Device: Nasal Cannula    Physical Exam   Constitutional: She is oriented to person, place, and time and  well-developed, well-nourished, and in no distress. No distress.   Obese   HENT:   Head: Normocephalic and atraumatic.   Cardiovascular: Normal rate, regular rhythm and intact distal pulses. Exam reveals no gallop and no friction rub.   No murmur heard.  Pulmonary/Chest: Breath sounds normal. No accessory muscle usage. No tachypnea. No respiratory distress. She exhibits no tenderness.   Abdominal: Soft. Bowel sounds are normal. She exhibits mass. There is abdominal tenderness in the right upper quadrant and left upper quadrant. There is no rigidity, no rebound, no guarding and negative Mitchell's sign. No hernia.       Diffuse ABD tenderness with predominant pain in the epigastric region at site of the mass. Epigastric Mass 5 cm x 4 cm.   Neurological: She is alert and oriented to person, place, and time. GCS score is 15.   Skin: Skin is warm and dry. She is not diaphoretic.   Psychiatric: Affect normal.         Data Review     Lab Data Review:  Recent Results (from the past 24 hour(s))   COVID/SARS CoV-2 PCR    Collection Time: 09/29/20  7:34 PM    Specimen: Nasopharyngeal; Respirate   Result Value Ref Range    COVID Order Status Received    SARS-CoV-2, PCR (In-House)    Collection Time: 09/29/20  7:34 PM   Result Value Ref Range    SARS-CoV-2 Source Nasal Swab    CBC WITH DIFFERENTIAL    Collection Time: 09/29/20  9:46 PM   Result Value Ref Range    WBC 10.9 (H) 4.8 - 10.8 K/uL    RBC 3.99 (L) 4.20 - 5.40 M/uL    Hemoglobin 10.6 (L) 12.0 - 16.0 g/dL    Hematocrit 35.2 (L) 37.0 - 47.0 %    MCV 88.2 81.4 - 97.8 fL    MCH 26.6 (L) 27.0 - 33.0 pg    MCHC 30.1 (L) 33.6 - 35.0 g/dL    RDW 52.0 (H) 35.9 - 50.0 fL    Platelet Count 420 164 - 446 K/uL    MPV 7.9 (L) 9.0 - 12.9 fL    Neutrophils-Polys 81.60 (H) 44.00 - 72.00 %    Lymphocytes 9.30 (L) 22.00 - 41.00 %    Monocytes 6.20 0.00 - 13.40 %    Eosinophils 1.60 0.00 - 6.90 %    Basophils 0.50 0.00 - 1.80 %    Immature Granulocytes 0.80 0.00 - 0.90 %    Nucleated RBC  0.00 /100 WBC    Neutrophils (Absolute) 8.90 (H) 2.00 - 7.15 K/uL    Lymphs (Absolute) 1.02 1.00 - 4.80 K/uL    Monos (Absolute) 0.68 0.00 - 0.85 K/uL    Eos (Absolute) 0.17 0.00 - 0.51 K/uL    Baso (Absolute) 0.05 0.00 - 0.12 K/uL    Immature Granulocytes (abs) 0.09 0.00 - 0.11 K/uL    NRBC (Absolute) 0.00 K/uL   Comp Metabolic Panel    Collection Time: 09/29/20  9:46 PM   Result Value Ref Range    Sodium 137 135 - 145 mmol/L    Potassium 3.8 3.6 - 5.5 mmol/L    Chloride 99 96 - 112 mmol/L    Co2 27 20 - 33 mmol/L    Anion Gap 11.0 7.0 - 16.0    Glucose 146 (H) 65 - 99 mg/dL    Bun 9 8 - 22 mg/dL    Creatinine 0.72 0.50 - 1.40 mg/dL    Calcium 9.1 8.5 - 10.5 mg/dL    AST(SGOT) 22 12 - 45 U/L    ALT(SGPT) 17 2 - 50 U/L    Alkaline Phosphatase 170 (H) 30 - 99 U/L    Total Bilirubin 0.3 0.1 - 1.5 mg/dL    Albumin 3.2 3.2 - 4.9 g/dL    Total Protein 6.5 6.0 - 8.2 g/dL    Globulin 3.3 1.9 - 3.5 g/dL    A-G Ratio 1.0 g/dL   ESTIMATED GFR    Collection Time: 09/29/20  9:46 PM   Result Value Ref Range    GFR If African American >60 >60 mL/min/1.73 m 2    GFR If Non African American >60 >60 mL/min/1.73 m 2   ACCU-CHEK GLUCOSE    Collection Time: 09/30/20  1:51 AM   Result Value Ref Range    Glucose - Accu-Ck 154 (H) 65 - 99 mg/dL   ACCU-CHEK GLUCOSE    Collection Time: 09/30/20  2:51 AM   Result Value Ref Range    Glucose - Accu-Ck 138 (H) 65 - 99 mg/dL   CBC WITH DIFFERENTIAL    Collection Time: 09/30/20  3:21 AM   Result Value Ref Range    WBC 11.1 (H) 4.8 - 10.8 K/uL    RBC 3.70 (L) 4.20 - 5.40 M/uL    Hemoglobin 10.0 (L) 12.0 - 16.0 g/dL    Hematocrit 32.6 (L) 37.0 - 47.0 %    MCV 88.1 81.4 - 97.8 fL    MCH 27.0 27.0 - 33.0 pg    MCHC 30.7 (L) 33.6 - 35.0 g/dL    RDW 52.2 (H) 35.9 - 50.0 fL    Platelet Count 369 164 - 446 K/uL    MPV 8.0 (L) 9.0 - 12.9 fL    Neutrophils-Polys 81.10 (H) 44.00 - 72.00 %    Lymphocytes 8.70 (L) 22.00 - 41.00 %    Monocytes 7.70 0.00 - 13.40 %    Eosinophils 1.20 0.00 - 6.90 %    Basophils  0.60 0.00 - 1.80 %    Immature Granulocytes 0.70 0.00 - 0.90 %    Nucleated RBC 0.00 /100 WBC    Neutrophils (Absolute) 9.01 (H) 2.00 - 7.15 K/uL    Lymphs (Absolute) 0.97 (L) 1.00 - 4.80 K/uL    Monos (Absolute) 0.85 0.00 - 0.85 K/uL    Eos (Absolute) 0.13 0.00 - 0.51 K/uL    Baso (Absolute) 0.07 0.00 - 0.12 K/uL    Immature Granulocytes (abs) 0.08 0.00 - 0.11 K/uL    NRBC (Absolute) 0.00 K/uL   Comp Metabolic Panel    Collection Time: 20  3:21 AM   Result Value Ref Range    Sodium 135 135 - 145 mmol/L    Potassium 3.7 3.6 - 5.5 mmol/L    Chloride 97 96 - 112 mmol/L    Co2 26 20 - 33 mmol/L    Anion Gap 12.0 7.0 - 16.0    Glucose 139 (H) 65 - 99 mg/dL    Bun 7 (L) 8 - 22 mg/dL    Creatinine 0.76 0.50 - 1.40 mg/dL    Calcium 8.5 8.5 - 10.5 mg/dL    AST(SGOT) 14 12 - 45 U/L    ALT(SGPT) 16 2 - 50 U/L    Alkaline Phosphatase 159 (H) 30 - 99 U/L    Total Bilirubin 0.3 0.1 - 1.5 mg/dL    Albumin 2.9 (L) 3.2 - 4.9 g/dL    Total Protein 6.2 6.0 - 8.2 g/dL    Globulin 3.3 1.9 - 3.5 g/dL    A-G Ratio 0.9 g/dL   MAGNESIUM    Collection Time: 20  3:21 AM   Result Value Ref Range    Magnesium 1.7 1.5 - 2.5 mg/dL   ESTIMATED GFR    Collection Time: 20  3:21 AM   Result Value Ref Range    GFR If African American >60 >60 mL/min/1.73 m 2    GFR If Non African American >60 >60 mL/min/1.73 m 2   LACTIC ACID    Collection Time: 20  3:50 AM   Result Value Ref Range    Lactic Acid 0.8 0.5 - 2.0 mmol/L   EKG    Collection Time: 20  5:14 AM   Result Value Ref Range    Report       [facility]    Test Date:  2020  Pt Name:    TIMMY VIVAS                 Department: 141  MRN:        [id number]                      Room:       T436  Gender:     Female                       Technician: INÉS  :        1960                   Requested By:CHANTE GLOVER  Order #:    [id number]                    Reading MD: Eulalio Mckinney MD    Measurements  Intervals                                Axis  Rate:        103                          P:          69  NY:         180                          QRS:        -21  QRSD:       82                           T:          19  QT:         344  QTc:        451    Interpretive Statements  SINUS TACHYCARDIA  Compared to ECG 12/11/2012 08:36:51  Sinus rhythm no longer present  Electronically Signed On 9- 7:50:31 PDT by Eulalio Mckinney MD     ACCU-CHEK GLUCOSE    Collection Time: 09/30/20  6:18 AM   Result Value Ref Range    Glucose - Accu-Ck 137 (H) 65 - 99 mg/dL       Imaging/Procedures Review:    DX-CHEST-PORTABLE (1 VIEW)   Final Result         1.  No acute cardiopulmonary disease.      CT-DRAIN-PERITONEAL    (Results Pending)        No acute cardiopulmonary disease    EKG:   EKG Independent Review:  QTc:451, HR: 103, Sinus Tacycardia, no ST/T changes          Assessment/Plan       1. Abdominal Wall Seroma    -Awaiting consult from general surgery   - Continue zosyn for ABD infection  -Stay NPO until general surgery consult  - Continue mantainance fluid 100ml/hr      2. Type 2 Diabetes Mellitus   Well controlled on metformin and insulin.  Continue till surgery decided    3.  Insomnia    Patient well controlled with amitriptyline. Restart at home   4. Anxiety     Pt well controlled with buspirone and Topamax. Restart at home.   4. Abdominal pain    Pain control switched to Hydromorphone 1mg Q 4 hrs

## 2020-10-01 PROBLEM — D72.829 LEUKOCYTOSIS: Status: ACTIVE | Noted: 2020-10-01

## 2020-10-01 PROBLEM — L02.211 ABDOMINAL WALL ABSCESS: Status: ACTIVE | Noted: 2020-09-30

## 2020-10-01 LAB
ANION GAP SERPL CALC-SCNC: 10 MMOL/L (ref 7–16)
ANISOCYTOSIS BLD QL SMEAR: ABNORMAL
BASOPHILS # BLD AUTO: 0.5 % (ref 0–1.8)
BASOPHILS # BLD: 0.08 K/UL (ref 0–0.12)
BUN SERPL-MCNC: 8 MG/DL (ref 8–22)
CALCIUM SERPL-MCNC: 9 MG/DL (ref 8.5–10.5)
CHLORIDE SERPL-SCNC: 100 MMOL/L (ref 96–112)
CO2 SERPL-SCNC: 25 MMOL/L (ref 20–33)
COMMENT 1642: NORMAL
CREAT SERPL-MCNC: 0.51 MG/DL (ref 0.5–1.4)
EOSINOPHIL # BLD AUTO: 0.04 K/UL (ref 0–0.51)
EOSINOPHIL NFR BLD: 0.3 % (ref 0–6.9)
ERYTHROCYTE [DISTWIDTH] IN BLOOD BY AUTOMATED COUNT: 52.4 FL (ref 35.9–50)
GLUCOSE BLD-MCNC: 122 MG/DL (ref 65–99)
GLUCOSE BLD-MCNC: 147 MG/DL (ref 65–99)
GLUCOSE BLD-MCNC: 172 MG/DL (ref 65–99)
GLUCOSE SERPL-MCNC: 154 MG/DL (ref 65–99)
GRAM STN SPEC: NORMAL
HCT VFR BLD AUTO: 30.6 % (ref 37–47)
HGB BLD-MCNC: 9.1 G/DL (ref 12–16)
HYPOCHROMIA BLD QL SMEAR: ABNORMAL
IMM GRANULOCYTES # BLD AUTO: 0.11 K/UL (ref 0–0.11)
IMM GRANULOCYTES NFR BLD AUTO: 0.7 % (ref 0–0.9)
LYMPHOCYTES # BLD AUTO: 1.13 K/UL (ref 1–4.8)
LYMPHOCYTES NFR BLD: 7.2 % (ref 22–41)
MCH RBC QN AUTO: 26.8 PG (ref 27–33)
MCHC RBC AUTO-ENTMCNC: 29.7 G/DL (ref 33.6–35)
MCV RBC AUTO: 90.3 FL (ref 81.4–97.8)
MICROCYTES BLD QL SMEAR: ABNORMAL
MONOCYTES # BLD AUTO: 0.77 K/UL (ref 0–0.85)
MONOCYTES NFR BLD AUTO: 4.9 % (ref 0–13.4)
MORPHOLOGY BLD-IMP: NORMAL
NEUTROPHILS # BLD AUTO: 13.6 K/UL (ref 2–7.15)
NEUTROPHILS NFR BLD: 86.4 % (ref 44–72)
NRBC # BLD AUTO: 0 K/UL
NRBC BLD-RTO: 0 /100 WBC
PLATELET # BLD AUTO: 374 K/UL (ref 164–446)
PLATELET BLD QL SMEAR: NORMAL
PMV BLD AUTO: 8.2 FL (ref 9–12.9)
POLYCHROMASIA BLD QL SMEAR: NORMAL
POTASSIUM SERPL-SCNC: 4.1 MMOL/L (ref 3.6–5.5)
RBC # BLD AUTO: 3.39 M/UL (ref 4.2–5.4)
RBC BLD AUTO: PRESENT
SIGNIFICANT IND 70042: NORMAL
SITE SITE: NORMAL
SODIUM SERPL-SCNC: 135 MMOL/L (ref 135–145)
SOURCE SOURCE: NORMAL
WBC # BLD AUTO: 15.7 K/UL (ref 4.8–10.8)

## 2020-10-01 PROCEDURE — 700102 HCHG RX REV CODE 250 W/ 637 OVERRIDE(OP): Performed by: EMERGENCY MEDICINE

## 2020-10-01 PROCEDURE — 700111 HCHG RX REV CODE 636 W/ 250 OVERRIDE (IP): Performed by: STUDENT IN AN ORGANIZED HEALTH CARE EDUCATION/TRAINING PROGRAM

## 2020-10-01 PROCEDURE — A9270 NON-COVERED ITEM OR SERVICE: HCPCS | Performed by: EMERGENCY MEDICINE

## 2020-10-01 PROCEDURE — 99233 SBSQ HOSP IP/OBS HIGH 50: CPT | Mod: GC | Performed by: HOSPITALIST

## 2020-10-01 PROCEDURE — 700105 HCHG RX REV CODE 258: Performed by: STUDENT IN AN ORGANIZED HEALTH CARE EDUCATION/TRAINING PROGRAM

## 2020-10-01 PROCEDURE — 770006 HCHG ROOM/CARE - MED/SURG/GYN SEMI*

## 2020-10-01 PROCEDURE — 82962 GLUCOSE BLOOD TEST: CPT | Mod: 91

## 2020-10-01 PROCEDURE — A9270 NON-COVERED ITEM OR SERVICE: HCPCS | Performed by: STUDENT IN AN ORGANIZED HEALTH CARE EDUCATION/TRAINING PROGRAM

## 2020-10-01 PROCEDURE — 85025 COMPLETE CBC W/AUTO DIFF WBC: CPT

## 2020-10-01 PROCEDURE — 36415 COLL VENOUS BLD VENIPUNCTURE: CPT

## 2020-10-01 PROCEDURE — 80048 BASIC METABOLIC PNL TOTAL CA: CPT

## 2020-10-01 PROCEDURE — 700111 HCHG RX REV CODE 636 W/ 250 OVERRIDE (IP): Performed by: HOSPITALIST

## 2020-10-01 PROCEDURE — 700102 HCHG RX REV CODE 250 W/ 637 OVERRIDE(OP): Performed by: STUDENT IN AN ORGANIZED HEALTH CARE EDUCATION/TRAINING PROGRAM

## 2020-10-01 PROCEDURE — 700102 HCHG RX REV CODE 250 W/ 637 OVERRIDE(OP): Performed by: INTERNAL MEDICINE

## 2020-10-01 PROCEDURE — A9270 NON-COVERED ITEM OR SERVICE: HCPCS | Performed by: INTERNAL MEDICINE

## 2020-10-01 RX ORDER — ALPRAZOLAM 0.5 MG/1
.5-1 TABLET ORAL
Status: DISCONTINUED | OUTPATIENT
Start: 2020-10-01 | End: 2020-10-02 | Stop reason: HOSPADM

## 2020-10-01 RX ORDER — HYDROMORPHONE HYDROCHLORIDE 1 MG/ML
0.5 INJECTION, SOLUTION INTRAMUSCULAR; INTRAVENOUS; SUBCUTANEOUS EVERY 6 HOURS PRN
Status: DISCONTINUED | OUTPATIENT
Start: 2020-10-01 | End: 2020-10-02 | Stop reason: HOSPADM

## 2020-10-01 RX ORDER — DIPHENHYDRAMINE HCL 25 MG
25 TABLET ORAL EVERY 6 HOURS PRN
Status: DISCONTINUED | OUTPATIENT
Start: 2020-10-01 | End: 2020-10-02 | Stop reason: HOSPADM

## 2020-10-01 RX ORDER — ESCITALOPRAM OXALATE 10 MG/1
20 TABLET ORAL DAILY
Status: DISCONTINUED | OUTPATIENT
Start: 2020-10-01 | End: 2020-10-01

## 2020-10-01 RX ORDER — ALPRAZOLAM 1 MG/1
.5-1 TABLET ORAL
COMMUNITY
End: 2020-10-08 | Stop reason: SDUPTHER

## 2020-10-01 RX ORDER — TRAZODONE HYDROCHLORIDE 50 MG/1
100 TABLET ORAL
Status: DISCONTINUED | OUTPATIENT
Start: 2020-10-01 | End: 2020-10-02 | Stop reason: HOSPADM

## 2020-10-01 RX ORDER — HYDROCODONE BITARTRATE AND ACETAMINOPHEN 5; 325 MG/1; MG/1
1-2 TABLET ORAL EVERY 4 HOURS PRN
Status: DISCONTINUED | OUTPATIENT
Start: 2020-10-01 | End: 2020-10-02 | Stop reason: HOSPADM

## 2020-10-01 RX ADMIN — TOPIRAMATE 50 MG: 25 TABLET, FILM COATED ORAL at 16:48

## 2020-10-01 RX ADMIN — BUSPIRONE HYDROCHLORIDE 15 MG: 10 TABLET ORAL at 11:11

## 2020-10-01 RX ADMIN — HYDROCODONE BITARTRATE AND ACETAMINOPHEN 2 TABLET: 5; 325 TABLET ORAL at 11:07

## 2020-10-01 RX ADMIN — HYDROCODONE BITARTRATE AND ACETAMINOPHEN 2 TABLET: 5; 325 TABLET ORAL at 21:21

## 2020-10-01 RX ADMIN — BUSPIRONE HYDROCHLORIDE 15 MG: 10 TABLET ORAL at 16:48

## 2020-10-01 RX ADMIN — ESCITALOPRAM OXALATE 20 MG: 10 TABLET ORAL at 14:38

## 2020-10-01 RX ADMIN — PIPERACILLIN AND TAZOBACTAM 3.38 G: 3; .375 INJECTION, POWDER, LYOPHILIZED, FOR SOLUTION INTRAVENOUS; PARENTERAL at 14:39

## 2020-10-01 RX ADMIN — OMEPRAZOLE 20 MG: 20 CAPSULE, DELAYED RELEASE ORAL at 05:14

## 2020-10-01 RX ADMIN — MONTELUKAST 10 MG: 10 TABLET, FILM COATED ORAL at 05:14

## 2020-10-01 RX ADMIN — HYDROMORPHONE HYDROCHLORIDE 1 MG: 1 INJECTION, SOLUTION INTRAMUSCULAR; INTRAVENOUS; SUBCUTANEOUS at 05:14

## 2020-10-01 RX ADMIN — TOPIRAMATE 50 MG: 25 TABLET, FILM COATED ORAL at 05:13

## 2020-10-01 RX ADMIN — PIPERACILLIN AND TAZOBACTAM 3.38 G: 3; .375 INJECTION, POWDER, LYOPHILIZED, FOR SOLUTION INTRAVENOUS; PARENTERAL at 05:13

## 2020-10-01 RX ADMIN — BUSPIRONE HYDROCHLORIDE 15 MG: 10 TABLET ORAL at 05:13

## 2020-10-01 RX ADMIN — INSULIN HUMAN 1 UNITS: 100 INJECTION, SOLUTION PARENTERAL at 11:16

## 2020-10-01 RX ADMIN — PIPERACILLIN AND TAZOBACTAM 3.38 G: 3; .375 INJECTION, POWDER, LYOPHILIZED, FOR SOLUTION INTRAVENOUS; PARENTERAL at 21:21

## 2020-10-01 RX ADMIN — HYDROCODONE BITARTRATE AND ACETAMINOPHEN 2 TABLET: 5; 325 TABLET ORAL at 16:47

## 2020-10-01 RX ADMIN — ENOXAPARIN SODIUM 40 MG: 40 INJECTION SUBCUTANEOUS at 11:12

## 2020-10-01 ASSESSMENT — ENCOUNTER SYMPTOMS
DOUBLE VISION: 0
WHEEZING: 0
BLOOD IN STOOL: 0
CHILLS: 1
CONSTIPATION: 0
VOMITING: 0
POLYDIPSIA: 0
SHORTNESS OF BREATH: 0
MYALGIAS: 0
DIARRHEA: 1
BLURRED VISION: 0
FEVER: 0
HEADACHES: 1
ABDOMINAL PAIN: 1
PALPITATIONS: 0
DIZZINESS: 0
NAUSEA: 1
SPUTUM PRODUCTION: 0

## 2020-10-01 ASSESSMENT — PAIN DESCRIPTION - PAIN TYPE
TYPE: ACUTE PAIN

## 2020-10-01 NOTE — PROGRESS NOTES
Doing well  Vac in place  Minimal pain  Ok to d/c with outpatient wound vac from surgery standpoint  F/u with Dr. Russ

## 2020-10-01 NOTE — DISCHARGE PLANNING
Received Choice form at [phone]  Agency/Facility Name: [facility]   Referral sent per Choice form @ [phone]     @[phone]  Agency/Facility Name: [facility]   Spoke To: Mellisa  Outcome: Per Mellisa, insurance auth is pending.

## 2020-10-01 NOTE — WOUND TEAM
Consult received notifying Wound Team of negative pressure wound therapy placement to abdominal surgical wound on 09/30/2020. First post op NPWT dressing change will be 10/02/2020. Placed Dressing Care order for nursing maintenance of vac dressing. Consult completed and Wound Team will see patient on 10/02

## 2020-10-01 NOTE — PROGRESS NOTES
Pt is A&O x4, calls appropriately  Pain 7/10, medicated per MAR  Denies nausea  Tolerating full liquid diet, advance as tolerated  CDI wound vac on medial of upper abdomen  + Void  + flatus  Last BM PTA  Up SBA  Bed alarm off, pt low fall risk per katie austin  Reviewed plan of care with patient, bed in lowest position and locked, pt resting comfortably now, call light within reach, all needs met at this time. Interventions will be executed per plan of care

## 2020-10-01 NOTE — PROGRESS NOTES
Pt back from TPACU     Wound vac to mid abdomen, no leaks noted at this time    VSS    Declines pain     Diet ordered     Pt's son updated

## 2020-10-01 NOTE — PROGRESS NOTES
Assessment complete.  AA&Ox4.   SpO2 >90% on RA. Denies SOB.  Reporting 5/10 pain. Tolerable at this time per pt.  Wound vac to mid upper abdomen. First change to be completed 10/2. On and functioning properly. No leaks noted.  Tolerating regular diabetic diet. Denies N/V.  + void.   LBM PTA.   Pt up with SBA.   All needs met at this time. Call light within reach. Pt calls appropriately.

## 2020-10-01 NOTE — PROGRESS NOTES
Daily Progress Note:     Date of Service: 10/1/2020  Primary Team: [facility] IM Red Team    Attending: Fred Miranda M.D.   Senior Resident: Dr. Nieto  Contact:  [phone]    Chief Complaint:   Abdominal pain    Subjective:  - No acute overnight events  - She had I&D done for complex abdominal wall abscess done yesterday evening with multiple foreign bodies within the abscess cavity that includes pieces of old mesh, suture and slips.  - Pain medication was de-escalated to [place]    Consultants/Specialty:  Surgery  Review of Systems:   Review of Systems   Constitutional: Positive for chills. Negative for fever.   HENT: Negative for ear discharge, ear pain, hearing loss and nosebleeds.    Eyes: Negative for blurred vision and double vision.   Respiratory: Negative for sputum production, shortness of breath and wheezing.    Cardiovascular: Negative for chest pain, palpitations and leg swelling.   Gastrointestinal: Positive for abdominal pain, diarrhea and nausea. Negative for blood in stool, constipation, melena and vomiting.   Genitourinary: Negative for dysuria, hematuria and urgency.   Musculoskeletal: Negative for myalgias.   Skin: Negative for itching and rash.   Neurological: Positive for headaches. Negative for dizziness.   Endo/Heme/Allergies: Negative for polydipsia.       Objective Data:   Physical Exam:   Vitals:   Temp:  [36.2 °C (97.2 °F)-36.8 °C (98.2 °F)] 36.4 °C (97.6 °F)  Pulse:  [71-98] 71  Resp:  [12-18] 18  BP: ()/(56-75) 102/64  SpO2:  [90 %-95 %] 94 %    Physical Exam  Constitutional:       General: She is not in acute distress.     Appearance: She is obese.   HENT:      Mouth/Throat:      Mouth: Mucous membranes are dry.      Pharynx: Oropharynx is clear. No oropharyngeal exudate or posterior oropharyngeal erythema.   Eyes:      General: No scleral icterus.        Right eye: No discharge.         Left eye: No discharge.   Cardiovascular:      Rate and Rhythm: Normal rate and regular  rhythm.      Heart sounds: No murmur. No friction rub. No gallop.    Pulmonary:      Effort: Pulmonary effort is normal. No respiratory distress.      Breath sounds: Normal breath sounds. No wheezing or rales.   Abdominal:      General: Bowel sounds are normal. There is distension.      Palpations: There is no mass.      Tenderness: There is abdominal tenderness. There is no right CVA tenderness, left CVA tenderness, guarding or rebound.      Hernia: No hernia is present.      Comments: Very tender swelling in the epigastric region, with localized erythema, warmth.   Musculoskeletal:         General: No swelling, tenderness, deformity or signs of injury.      Right lower leg: No edema.      Left lower leg: No edema.   Skin:     General: Skin is warm and dry.      Coloration: Skin is not jaundiced.      Findings: No bruising or lesion.   Neurological:      Mental Status: She is alert and oriented to person, place, and time.   Psychiatric:         Mood and Affect: Mood normal.               * Abdominal wall abscess  Assessment & Plan  Enlarging abdominal wall swelling (10*15cm) found on imaging at outside hospital with subsequent drainage of about 200cc of purulent fluid  History significant with extensive abdominal surgeries(bariatric surgery, abdominal hernias)  Very tender swelling in the epigastric region, with localized erythema, warmth on admission  I&D for complex abdominal wall abscess done 9/30 with multiple foreign bodies within the abscess cavity that includes pieces of old mesh, suture and slips  Afebrile with mild leukocytosis  Plan:  - Regular diet  - Continue  Zosyn  - Pain control with Norco  - Pending cultures            Type 2 diabetes mellitus without complication, without long-term current use of insulin (HCC)- (present on admission)  Assessment & Plan  Patient has history of diabetes controlled with metformin.    -Started on SSI.    Shortness of breath- (present on admission)  Assessment &  Plan  Pt reports shortness of breath associated with environmental allergies.    -Continue home fluticasone and singulair.  -Continue home albuterol PRN.    Insomnia- (present on admission)  Assessment & Plan  Patient has history of insomnia treated with amitriptyline.    -Restarted home amitriptyline.     Anxiety- (present on admission)  Assessment & Plan  Patient has history of anxiety.    -Will restart home buspirone and Lexapro

## 2020-10-01 NOTE — DISCHARGE PLANNING
LSW met with patient and verified information on face sheet. Patient states that she lives with her  and receives support from him and her adult children. Patient informed LSW that they are her legal medical decision makers if something were to happen to her and her . Patient confirmed the insurance on file and stated that she also has tri-care.    LSW informed patient that wound care needs to be set up as patient has a wound vac and explained options. Patient states she does not want to drive to [place] and prefers outpatient wound care in [place]. LSW explained that the [facility] does not always have availability. Patient understood and stated her second choice for wound care would be home health.    Care Transition Team Assessment    Information Source  Orientation : Oriented x 4  Information Given By: Patient  Informant's Name: (Mikayla)  Who is responsible for making decisions for patient? : Patient    Readmission Evaluation  Is this a readmission?: No    Elopement Risk  Legal Hold: No  Ambulatory or Self Mobile in Wheelchair: Yes  Disoriented: No  Psychiatric Symptoms: None  History of Wandering: No  Elopement this Admit: No  Vocalizing Wanting to Leave: No  Displays Behaviors, Body Language Wanting to Leave: No-Not at Risk for Elopement  Elopement Risk: Not at Risk for Elopement    Interdisciplinary Discharge Planning  Patient or legal guardian wants to designate a caregiver: No    Discharge Preparedness  What is your plan after discharge?: Home with help  What are your discharge supports?: Child, Spouse  Prior Functional Level: Ambulatory, Independent with Activities of Daily Living  Difficulity with ADLs: None  Difficulity with IADLs: None    Functional Assesment  Prior Functional Level: Ambulatory, Independent with Activities of Daily Living    Finances  Financial Barriers to Discharge: No  Prescription Coverage: Yes    Vision / Hearing Impairment  Vision Impairment : Yes  Right Eye Vision:  Impaired, Wears Glasses  Left Eye Vision: Impaired, Wears Glasses  Hearing Impairment : No         Advance Directive  Advance Directive?: DPOA for Health Care  Durable Power of  Name and Contact : Missael Urbina    Domestic Abuse  Have you ever been the victim of abuse or violence?: No  Physical Abuse or Sexual Abuse: No  Verbal Abuse or Emotional Abuse: No  Possible Abuse/Neglect Reported to:: Not Applicable    Psychological Assessment  History of Substance Abuse: None  History of Psychiatric Problems: No  Non-compliant with Treatment: No  Newly Diagnosed Illness: No    Discharge Risks or Barriers  Discharge risks or barriers?: No    Anticipated Discharge Information  Discharge Disposition: Discharged to home/self care (01)  Discharge Address: [address] [place] [id number]  Discharge Contact Phone Number: (husband- [phone])

## 2020-10-01 NOTE — CARE PLAN
Problem: Communication  Goal: The ability to communicate needs accurately and effectively will improve  Outcome: PROGRESSING AS EXPECTED  Education provided on importance of using call light to alert staff of patient needs. Pt demonstrates understanding by using call light appropriately.      Problem: Infection  Goal: Will remain free from infection  Outcome: PROGRESSING AS EXPECTED   Pt on IV abx.

## 2020-10-01 NOTE — OR NURSING
1750- Patient states she is comfortable.  Wound vac functioning well, no leaks. Meds with good effect.  Report to floor.

## 2020-10-01 NOTE — DISCHARGE PLANNING
Anticipated Discharge Disposition: Home with HHC    Action: JANINEW met with the patient at bedside to discuss her discharge plan. The patient stated she wants to discharge home with HH, her choice is [facility], choice form provided to [facility].    JANINEW spoke with Dr. Nieto via phone. LSW requested the HHC and F2F. JANINEW explained, [facility] requires the [facility] Wound Form is signed by the Attending. Dr. Nieto stated, his Attending will sign the [employer] Form tomorrow morning, form placed int he patient's physical chart. JANINEW faxed the clinical documentation to Jo Ann at [facility].    JANINEW explained, the patient is indicating she wants to discharge home tomorrow; however, if the wound vac or HHC is authorized by tomorrow, the discharge may be delayed until Monday.    Barriers to Discharge: Acceptance from HHC and Approval for the Home Wound Vac.    Plan: As Above.

## 2020-10-02 VITALS
DIASTOLIC BLOOD PRESSURE: 77 MMHG | BODY MASS INDEX: 41.95 KG/M2 | WEIGHT: 251.77 LBS | HEIGHT: 65 IN | TEMPERATURE: 97.7 F | HEART RATE: 92 BPM | RESPIRATION RATE: 17 BRPM | SYSTOLIC BLOOD PRESSURE: 100 MMHG | OXYGEN SATURATION: 93 %

## 2020-10-02 LAB
BASOPHILS # BLD AUTO: 0.5 % (ref 0–1.8)
BASOPHILS # BLD: 0.07 K/UL (ref 0–0.12)
EOSINOPHIL # BLD AUTO: 0.2 K/UL (ref 0–0.51)
EOSINOPHIL NFR BLD: 1.3 % (ref 0–6.9)
ERYTHROCYTE [DISTWIDTH] IN BLOOD BY AUTOMATED COUNT: 50.4 FL (ref 35.9–50)
GLUCOSE BLD-MCNC: 101 MG/DL (ref 65–99)
GLUCOSE BLD-MCNC: 112 MG/DL (ref 65–99)
GLUCOSE BLD-MCNC: 112 MG/DL (ref 65–99)
GLUCOSE BLD-MCNC: 150 MG/DL (ref 65–99)
HCT VFR BLD AUTO: 28.9 % (ref 37–47)
HGB BLD-MCNC: 8.9 G/DL (ref 12–16)
IMM GRANULOCYTES # BLD AUTO: 0.19 K/UL (ref 0–0.11)
IMM GRANULOCYTES NFR BLD AUTO: 1.3 % (ref 0–0.9)
LYMPHOCYTES # BLD AUTO: 1.39 K/UL (ref 1–4.8)
LYMPHOCYTES NFR BLD: 9.2 % (ref 22–41)
MCH RBC QN AUTO: 26.8 PG (ref 27–33)
MCHC RBC AUTO-ENTMCNC: 30.8 G/DL (ref 33.6–35)
MCV RBC AUTO: 87 FL (ref 81.4–97.8)
MONOCYTES # BLD AUTO: 0.72 K/UL (ref 0–0.85)
MONOCYTES NFR BLD AUTO: 4.8 % (ref 0–13.4)
NEUTROPHILS # BLD AUTO: 12.58 K/UL (ref 2–7.15)
NEUTROPHILS NFR BLD: 82.9 % (ref 44–72)
NRBC # BLD AUTO: 0 K/UL
NRBC BLD-RTO: 0 /100 WBC
PLATELET # BLD AUTO: 411 K/UL (ref 164–446)
PMV BLD AUTO: 8.4 FL (ref 9–12.9)
RBC # BLD AUTO: 3.32 M/UL (ref 4.2–5.4)
WBC # BLD AUTO: 15.2 K/UL (ref 4.8–10.8)

## 2020-10-02 PROCEDURE — A9270 NON-COVERED ITEM OR SERVICE: HCPCS | Performed by: INTERNAL MEDICINE

## 2020-10-02 PROCEDURE — 700105 HCHG RX REV CODE 258: Performed by: STUDENT IN AN ORGANIZED HEALTH CARE EDUCATION/TRAINING PROGRAM

## 2020-10-02 PROCEDURE — 700102 HCHG RX REV CODE 250 W/ 637 OVERRIDE(OP): Performed by: STUDENT IN AN ORGANIZED HEALTH CARE EDUCATION/TRAINING PROGRAM

## 2020-10-02 PROCEDURE — A9270 NON-COVERED ITEM OR SERVICE: HCPCS | Performed by: HOSPITALIST

## 2020-10-02 PROCEDURE — 82962 GLUCOSE BLOOD TEST: CPT | Mod: 91

## 2020-10-02 PROCEDURE — A9270 NON-COVERED ITEM OR SERVICE: HCPCS | Performed by: STUDENT IN AN ORGANIZED HEALTH CARE EDUCATION/TRAINING PROGRAM

## 2020-10-02 PROCEDURE — 36415 COLL VENOUS BLD VENIPUNCTURE: CPT

## 2020-10-02 PROCEDURE — 700102 HCHG RX REV CODE 250 W/ 637 OVERRIDE(OP): Performed by: EMERGENCY MEDICINE

## 2020-10-02 PROCEDURE — 700111 HCHG RX REV CODE 636 W/ 250 OVERRIDE (IP): Performed by: STUDENT IN AN ORGANIZED HEALTH CARE EDUCATION/TRAINING PROGRAM

## 2020-10-02 PROCEDURE — A9270 NON-COVERED ITEM OR SERVICE: HCPCS | Performed by: EMERGENCY MEDICINE

## 2020-10-02 PROCEDURE — 700102 HCHG RX REV CODE 250 W/ 637 OVERRIDE(OP): Performed by: INTERNAL MEDICINE

## 2020-10-02 PROCEDURE — 700102 HCHG RX REV CODE 250 W/ 637 OVERRIDE(OP): Performed by: HOSPITALIST

## 2020-10-02 PROCEDURE — 97605 NEG PRS WND THER DME<=50SQCM: CPT

## 2020-10-02 PROCEDURE — 85025 COMPLETE CBC W/AUTO DIFF WBC: CPT

## 2020-10-02 PROCEDURE — 99239 HOSP IP/OBS DSCHRG MGMT >30: CPT | Mod: GC | Performed by: HOSPITALIST

## 2020-10-02 RX ORDER — METRONIDAZOLE 500 MG/1
500 TABLET ORAL EVERY 8 HOURS
Qty: 15 TAB | Refills: 0 | OUTPATIENT
Start: 2020-10-03 | End: 2020-10-08

## 2020-10-02 RX ORDER — CEFDINIR 300 MG/1
300 CAPSULE ORAL 2 TIMES DAILY
Qty: 10 CAP | Refills: 0 | Status: SHIPPED | OUTPATIENT
Start: 2020-10-03 | End: 2020-10-08

## 2020-10-02 RX ORDER — CEFDINIR 300 MG/1
300 CAPSULE ORAL 2 TIMES DAILY
Qty: 10 CAP | Refills: 0 | OUTPATIENT
Start: 2020-10-03

## 2020-10-02 RX ORDER — HYDROCODONE BITARTRATE AND ACETAMINOPHEN 5; 325 MG/1; MG/1
1 TABLET ORAL EVERY 6 HOURS PRN
Qty: 15 TAB | Refills: 0 | OUTPATIENT
Start: 2020-10-02 | End: 2020-10-09

## 2020-10-02 RX ORDER — METRONIDAZOLE 500 MG/1
500 TABLET ORAL EVERY 8 HOURS
Qty: 15 TAB | Refills: 0 | Status: SHIPPED | OUTPATIENT
Start: 2020-10-03 | End: 2020-10-08

## 2020-10-02 RX ORDER — HYDROCODONE BITARTRATE AND ACETAMINOPHEN 5; 325 MG/1; MG/1
1 TABLET ORAL EVERY 6 HOURS PRN
Qty: 10 TAB | Refills: 0 | Status: SHIPPED | OUTPATIENT
Start: 2020-10-02 | End: 2020-10-09

## 2020-10-02 RX ORDER — CIPROFLOXACIN 500 MG/1
500 TABLET, FILM COATED ORAL 2 TIMES DAILY
Qty: 10 TAB | Refills: 0 | Status: CANCELLED | OUTPATIENT
Start: 2020-10-03 | End: 2020-10-08

## 2020-10-02 RX ADMIN — ALPRAZOLAM 0.5 MG: 0.5 TABLET ORAL at 00:29

## 2020-10-02 RX ADMIN — FLUTICASONE PROPIONATE 100 MCG: 50 SPRAY, METERED NASAL at 06:38

## 2020-10-02 RX ADMIN — ALPRAZOLAM 0.5 MG: 0.5 TABLET ORAL at 11:19

## 2020-10-02 RX ADMIN — HYDROCODONE BITARTRATE AND ACETAMINOPHEN 2 TABLET: 5; 325 TABLET ORAL at 08:32

## 2020-10-02 RX ADMIN — ACETAMINOPHEN 1000 MG: 500 TABLET ORAL at 00:30

## 2020-10-02 RX ADMIN — MONTELUKAST 10 MG: 10 TABLET, FILM COATED ORAL at 06:35

## 2020-10-02 RX ADMIN — PIPERACILLIN AND TAZOBACTAM 3.38 G: 3; .375 INJECTION, POWDER, LYOPHILIZED, FOR SOLUTION INTRAVENOUS; PARENTERAL at 06:12

## 2020-10-02 RX ADMIN — PIPERACILLIN AND TAZOBACTAM 3.38 G: 3; .375 INJECTION, POWDER, LYOPHILIZED, FOR SOLUTION INTRAVENOUS; PARENTERAL at 14:22

## 2020-10-02 RX ADMIN — TOPIRAMATE 50 MG: 25 TABLET, FILM COATED ORAL at 17:33

## 2020-10-02 RX ADMIN — DOCUSATE SODIUM 50 MG AND SENNOSIDES 8.6 MG 2 TABLET: 8.6; 5 TABLET, FILM COATED ORAL at 06:35

## 2020-10-02 RX ADMIN — HYDROCODONE BITARTRATE AND ACETAMINOPHEN 2 TABLET: 5; 325 TABLET ORAL at 16:20

## 2020-10-02 RX ADMIN — HYDROMORPHONE HYDROCHLORIDE 0.5 MG: 1 INJECTION, SOLUTION INTRAMUSCULAR; INTRAVENOUS; SUBCUTANEOUS at 15:05

## 2020-10-02 RX ADMIN — ENOXAPARIN SODIUM 40 MG: 40 INJECTION SUBCUTANEOUS at 06:35

## 2020-10-02 RX ADMIN — DOCUSATE SODIUM 50 MG AND SENNOSIDES 8.6 MG 2 TABLET: 8.6; 5 TABLET, FILM COATED ORAL at 17:33

## 2020-10-02 RX ADMIN — TRAZODONE HYDROCHLORIDE 100 MG: 50 TABLET ORAL at 00:30

## 2020-10-02 RX ADMIN — TOPIRAMATE 50 MG: 25 TABLET, FILM COATED ORAL at 06:35

## 2020-10-02 RX ADMIN — OMEPRAZOLE 20 MG: 20 CAPSULE, DELAYED RELEASE ORAL at 06:35

## 2020-10-02 ASSESSMENT — PAIN DESCRIPTION - PAIN TYPE
TYPE: ACUTE PAIN

## 2020-10-02 NOTE — PROGRESS NOTES
Assumed care of patient. Assessment complete.  AA&Ox4. Denies CP/SOB.  Reporting 7/10 pain. Medicated per MAR.  Wound vac remains in place. Dressing CDI.  Tolerating ADA diet. Denies N/V.  + void. - BM this shift. Last BM PTA per pt. report.  Pt ambulates with stand by assist.  All needs met at this time. Call light within reach. Hourly rounding in place. Pt calls appropriately. Will continue to monitor.

## 2020-10-02 NOTE — DISCHARGE SUMMARY
Discharge Summary    Date of Admission: 9/29/2020  Date of Discharge: No discharge date for patient encounter.  Discharging Attending: Fred Miranda M.D.   Discharging Senior Resident:       CHIEF COMPLAINT ON ADMISSION  Chief Complaint   Patient presents with   • Abdominal Pain       Reason for Admission  Abdominal Pain    Admission Date  9/29/2020    CODE STATUS  Full Code    HPI & HOSPITAL COURSE  Pt is a 60 year old female with PMHx of Type 2 DM on metformin, migraines, breast cancer, and anxiety who presents to the hospital with worsening abdominal pain related to abdominal swelling located in the epigastric region.. Patient says that her abdominal pain began in about 7-8 weeks ago. Her history is significant for multiple abdominal surgeries which includes bypass, multiple hernia operations. She initially went to the hospital in [place], had imaging done and was told it is a seroma, advised to follow up with surgeon but could not get hold of appointment. Her abdominal pain got progressively with increase in size of swelling which prompted her to present again to ER in [place], where small amount of swelling was drained(200cc) which had purulent appearance and was foul smeelling.Imaging showed increased in size of swelling compared to prior along with appearance of new one. She was then advised to go to [facility] for further management. Surgery() here did I&D of swelling, reported it as Abdominal wall abscess with sutures,mesh present in the cavity and placed wound Vac. She was started on Zosyn in the hospital with cultures having no growth to date. Efforts were made to obtain cultures from [facility] but was informed there was only growth in anaerobic plate with no available sensitivities. Her abdominal pain got progressively better post I&D and home health was set up along with outpatient wound vac. Antibiotics have been transitioned to Cefdinir along with Flagyl to take for 5  days. She is advised to follow up with surgery outpatient in 's clinic.      Therefore, she is discharged in fair and stable condition to home with close outpatient follow-up.    The patient met 2-midnight criteria for an inpatient stay at the time of discharge.    PHYSICAL EXAM ON DISCHARGE  Temp:  [36.2 °C (97.1 °F)-36.9 °C (98.4 °F)] 36.3 °C (97.4 °F)  Pulse:  [91-98] 98  Resp:  [16-18] 18  BP: (110-132)/(75-80) 132/79  SpO2:  [92 %-94 %] 92 %    Physical Exam    Discharge Date  10/2/2020    FOLLOW UP ITEMS POST DISCHARGE  - Follow up with surgery  - Follow up with PCP    DISCHARGE DIAGNOSES  Principal Problem:    Abdominal wall abscess POA: Unknown  Active Problems:    Leukocytosis POA: Unknown      Overview: Uptrending WBC's but afebrile secondary to abdominal wall abscess s/p       drainage      On zosyn, continue to monitor    Anxiety POA: Yes    Insomnia POA: Yes    Shortness of breath POA: Yes    Type 2 diabetes mellitus without complication, without long-term current use of insulin (HCC) POA: Yes  Resolved Problems:    * No resolved hospital problems. *      FOLLOW UP  No future appointments.  Fede Russ M.D.  [address]  [facility] [address]  [phone]    In 1 week      YOSEPH Welch  [address]  [place] [address] [phone]    In 1 week        MEDICATIONS ON DISCHARGE     Medication List      START taking these medications      Instructions   cefdinir 300 MG Caps  Start taking on: October 3, 2020  Commonly known as: OMNICEF   Take 1 Cap by mouth 2 times a day for 5 days.  Dose: 300 mg     HYDROcodone-acetaminophen 5-325 MG Tabs per tablet  Commonly known as: NORCO   Take 1 Tab by mouth every 6 hours as needed for up to 7 days.  Dose: 1 Tab     metroNIDAZOLE 500 MG Tabs  Start taking on: October 3, 2020  Commonly known as: FLAGYL   Take 1 Tab by mouth every 8 hours for 5 days.  Dose: 500 mg        CONTINUE taking these medications      Instructions    albuterol 108 (90 Base) MCG/ACT Aers inhalation aerosol   Doctor's comments: Please choose whichever albuterol is covered by insurance company: either Ventolin, ProAir or Proventil  INHALE 2 PUFFS BY MOUTH EVERY 6 HOURS AS NEEDED FOR SHORTNESS OF BREATH  Dose: 2 Puff     ALPRAZolam 1 MG Tabs  Commonly known as: XANAX   Take 0.5-1 mg by mouth 1 time daily as needed for Sleep or Anxiety.  Dose: 0.5-1 mg     amitriptyline 75 MG Tabs  Commonly known as: ELAVIL   Take 75 mg by mouth at bedtime as needed for Sleep.  Dose: 75 mg     Diclofenac Sodium 1 % Gel   APPLY 2 GM UP TO 4 TIMES PER DAY IF NEEDED FOR BACK PAIN     fluticasone 50 MCG/ACT nasal spray  Commonly known as: FLONASE   Spray 2 Sprays in nose every day.  Dose: 2 Spray     gabapentin 300 MG Caps  Commonly known as: NEURONTIN   Take 300 mg by mouth 3 times a day as needed. Indications: Neuropathic Pain  Dose: 300 mg     hydrOXYzine pamoate 25 MG Caps  Commonly known as: VISTARIL   Take 1 to 3 tablets by mouth once a day as need for anxiety or insomnia     metFORMIN  MG Tb24  Commonly known as: GLUCOPHAGE XR   Take 1 Tab by mouth every day.  Dose: 750 mg     montelukast 10 MG Tabs  Commonly known as: SINGULAIR   TAKE 1 TABLET BY MOUTH EVERY DAY     omeprazole 20 MG delayed-release capsule  Commonly known as: PRILOSEC   TAKE 1 CAP BY MOUTH EVERY DAY 30 MINUTES BEFORE FOOD OR DRINK, ON AN EMPTY STOMACH.     rizatriptan 10 MG tablet  Commonly known as: MAXALT   TAKE 1 TAB BY MOUTH ONCE AS NEEDED FOR MIGRAINE FOR UP TO 1 DOSE. MAY REPEAT IN 2 HOURS IF NEEDED     topiramate 50 MG tablet  Commonly known as: TOPAMAX   TAKE 1 TABLET BY MOUTH TWICE A DAY     traZODone 100 MG Tabs  Commonly known as: DESYREL   TAKE 1 TO 3 TABLETS BY MOUTH AT BEDTIME AS NEEDED FOR INSOMNIA        STOP taking these medications    aspirin EC 81 MG Tbec  Commonly known as: ECOTRIN     busPIRone 15 MG tablet  Commonly known as: BUSPAR     escitalopram 20 MG tablet  Commonly known as:  Lexapro     methocarbamol 750 MG Tabs  Commonly known as: ROBAXIN            Allergies  Allergies   Allergen Reactions   • Shellfish Allergy Anaphylaxis     SHRIMP ONLY, not anything else, not iodine.       DIET  Orders Placed This Encounter   Procedures   • Diet Order Diabetic, Regular     Standing Status:   Standing     Number of Occurrences:   1     Order Specific Question:   Diet:     Answer:   Diabetic [3]     Order Specific Question:   Diet:     Answer:   Regular [1]       ACTIVITY  As tolerated.  Weight bearing as tolerated    CONSULTATIONS  Dr. Russ with General Surgery Service consulted.  Treatment options were discussed and plan of care agreed upon.    PROCEDURES  I&D of abdominal wall abscess

## 2020-10-02 NOTE — DISCHARGE PLANNING
Anticipated Discharge Disposition: Home with HHC and Home Wound Vac.    Action: Per CCA, [facility] accepted. LSW sent a Tiger Text message to Dr. Nieto regarding signing the [facility] Wound Vac, form signed and faxed to [employer].    Per Jo Ann, the wound vac is approved and will be delivered today at 4:30pm to the patient's room, RN and MD aware.    Barriers to Discharge: None.    Plan: Home with HHC and Home Wound Vac, pending the delivery of the wound vac and medical clearance.

## 2020-10-02 NOTE — CARE PLAN
Problem: Nutritional:  Goal: Achieve adequate nutritional intake  Description: Patient will consume >50% of meals on diet > clear liquids  Outcome: PROGRESSING AS EXPECTED  Note: Pt with diet advanced to diabetic/regular yesterday on 10/1/20. Spoke with RN, pt consumed slightly >50% for her meal tray this am. Continue to monitor.

## 2020-10-02 NOTE — DISCHARGE PLANNING
Agency/Facility Name: [facility]  Spoke To: Mellisa   Outcome: Notified Mellisa pt is discharging today. Per Mellisa, pt will be seen tomorrow.

## 2020-10-02 NOTE — CARE PLAN
Problem: Communication  Goal: The ability to communicate needs accurately and effectively will improve  Outcome: PROGRESSING AS EXPECTED  Note: Pt calls appropriately     Problem: Respiratory:  Goal: Respiratory status will improve  Outcome: PROGRESSING AS EXPECTED  Note: Pt tolerating RA

## 2020-10-02 NOTE — DISCHARGE PLANNING
Agency/Facility Name: [facility]   Spoke To: Mellisa   Outcome: Per Mellisa, referral is accepted. Can see pt this weekend. Wound vac delivery is still pending. Requested DC summary.   LSW notified.

## 2020-10-02 NOTE — CARE PLAN
Problem: Communication  Goal: The ability to communicate needs accurately and effectively will improve  Outcome: PROGRESSING AS EXPECTED  POC reviewed with patient. All questions and concerns addressed.     Problem: Safety  Goal: Will remain free from injury  Outcome: PROGRESSING AS EXPECTED  Patient free from injury. Reoriented to fall precautions and call light.

## 2020-10-02 NOTE — PROGRESS NOTES
Doing well clinically  Ok to D/c with outpatient wound vac and oral abx from surgery standpoint  F/u with Dr. Russ in the office.

## 2020-10-02 NOTE — PROGRESS NOTES
Bedside report received from Bacilio RN; assumed care of pt. Assessment completed.  Pt is A&O x4. Pt on room air. VSS  Pt complains of 8/10 abd pain Medicating PRN per MAR  Denies nausea.   - numbness, - tingling.    Abd woundvac in place  Last BM PTA   +void toilet.  Finger sticks q 6 .... 150 mg/dL last accucheck   Regular diabetic diet. Tolerates well.   Pt up self, steady gait. Tolerates well.   Call light within reach. All needs met at this time. Fall Precautions and hourly rounding in place.

## 2020-10-03 NOTE — DISCHARGE INSTRUCTIONS
Discharge Instructions    Discharged to home by car with relative. Discharged via wheelchair, hospital escort: Yes.  Special equipment needed: Wound VAC    Be sure to schedule a follow-up appointment with your primary care doctor or any specialists as instructed.     Discharge Plan:   Influenza Vaccine Indication: Indicated: 9 to 64 years of age  Influenza Vaccine Given - only chart on this line when given: Influenza Vaccine Given (See MAR)    I understand that a diet low in cholesterol, fat, and sodium is recommended for good health. Unless I have been given specific instructions below for another diet, I accept this instruction as my diet prescription.   Other diet: diabetic    Special Instructions: None    · Is patient discharged on Warfarin / Coumadin?   No     Depression / Suicide Risk    As you are discharged from this [facility] facility, it is important to learn how to keep safe from harming yourself.    Recognize the warning signs:  · Abrupt changes in personality, positive or negative- including increase in energy   · Giving away possessions  · Change in eating patterns- significant weight changes-  positive or negative  · Change in sleeping patterns- unable to sleep or sleeping all the time   · Unwillingness or inability to communicate  · Depression  · Unusual sadness, discouragement and loneliness  · Talk of wanting to die  · Neglect of personal appearance   · Rebelliousness- reckless behavior  · Withdrawal from people/activities they love  · Confusion- inability to concentrate     If you or a loved one observes any of these behaviors or has concerns about self-harm, here's what you can do:  · Talk about it- your feelings and reasons for harming yourself  · Remove any means that you might use to hurt yourself (examples: pills, rope, extension cords, firearm)  · Get professional help from the community (Mental Health, Substance Abuse, psychological counseling)  · Do not be alone:Call your Safe Contact-  someone whom you trust who will be there for you.  · Call your local CRISIS HOTLINE 649-3812 or 566-238-6862  · Call your local Children's Mobile Crisis Response Team [place] (793) 573-5701 or www.WeFi  · Call the toll free National Suicide Prevention Hotlines   · National Suicide Prevention Lifeline 135-107-USLX (3444)  · [facility] Line Network 800-SUICIDE (873-0954)      Vacuum-Assisted Closure Therapy  Vacuum-assisted closure (VAC) therapy uses a device that removes fluid and germs from wounds to help them heal. It is used on wounds that cannot be closed with stitches. They often heal slowly. Vacuum-assisted therapy helps the wound stay clean and healthy while the open wound slowly grows back together.  Vacuum-assisted closure therapy uses a bandage (dressing) that is made of foam. It is put inside the wound. Then, a drape is placed over the wound. This drape sticks to your skin to keep air out, and to protect the wound. A tube is hooked up to a small pump and is attached to the drape. The pump sucks out the fluid and germs. Vacuum-assisted closure therapy can also help reduce the bad smell that comes from the wound.  HOW DOES IT WORK?   The vacuum pump pulls fluid through the foam dressing. The dressing may wrinkle during this process. The fluid goes into the tube and away from the wound. The fluid then goes into a container. The fluid in the container must be replaced if it is full or at least once a week, even if the container is not full. The pulling from the pump helps to close the wound and bring better circulation to the wound area.  The foam dressing covers and protects the wound. It helps your wound heal faster.   HOW DOES IT FEEL?   · You might feel a little pulling when the pump is on.  · You might also feel a mild vibrating sensation.    · You might feel some discomfort when the dressing is taken off.  CAN I MOVE AROUND WITH VACUUM-ASSISTED CLOSURE THERAPY?  Yes, it has a backup  battery which is used when the machine is not plugged in, as long as the battery is working, you can move freely.  WHAT ARE SOME THINGS I MUST KNOW?  · Do not turn off the pump yourself, unless instructed to do so by your healthcare provider, such as for bathing.  · Do not take off the dressing yourself, unless instructed to do so by your caregiver.  · You can wash or shower with the dressing. However, do not take the pump into the shower. Make sure the wound dressing is protected and covered with plastic. The wound area must stay dry.  · Do not turn off the pump for more than 2 hours. If the pump is off for more than 2 hours, your nurse must change your dressing.  · Check frequently that the machine is on, that the machine indicates the therapy is on, and that all clamps are open.  THE ALARM IS SOUNDING! WHAT SHOULD I DO?   · Stay calm.  · Do not turn off the pump or do anything with the dressing.  · Call your clinic or caregiver right away if the alarm goes off and you cannot fix the problem. Some reasons the alarm might go off include:  ¨ The fluid collection container is full.  ¨ The battery is low.  ¨ The dressing has a leak.  · Explain to your caregiver what is happening. Follow the instructions you receive.  WHEN SHOULD I CALL FOR HELP?   · You have severe pain.  · You have difficulty breathing.  · You have bleeding that will not stop.  · Your wound smells bad.  · You have redness, swelling, or fluid leaking from your wound.  · Your alarm goes off and you do not know what to do.  · You have a fever.  · Your wound itches severely.  · Your dressing changes are often painful or bleeding often occurs.  · You have diarrhea.  · You have a sore throat.  · You have a rash around the dressing or anywhere else on your body.  · You feel nauseous.  · You feel dizzy or weak.  · The VAC machine has been off for more than 2 hours.  HOW DO I GET READY TO GO HOME WITH A PUMP?   A trained caregiver will talk to you and answer  your questions about your vacuum-assisted closure therapy before you go home. He or she will explain what to expect. A caregiver will come to your home to apply the pump and care for your wound.  The at-home caregiver will be available for questions and will come back for the scheduled dressing changes, usually every 48-72 hours (or more often for severely infected wounds). Your at-home caregiver will also come if you are having an unexpected problem. If you have questions or do not know what to do when you go home, talk to your healthcare provider.  This information is not intended to replace advice given to you by your health care provider. Make sure you discuss any questions you have with your health care provider.  Document Released: 11/30/2009 Document Revised: 08/20/2014 Document Reviewed: 09/22/2016  [employer] Patient Education © 2017 [employer]    Negative Pressure Wound Therapy Home Guide  Negative pressure wound therapy (NPWT) uses a sponge or foam-like material (dressing) placed on or inside the wound. The wound is then covered and sealed with a cover dressing that sticks to your skin (is adhesive). This keeps air out. A tube is attached to the cover dressing, and this tube connects to a small pump. The pump sucks fluid and germs from the wound. NPWT helps to increase blood flow to the wound and heal it from the inside.  What are the risks?  NPWT is usually safe to use. However, problems can occur, including:  · Skin irritation from the dressing adhesive.  · Bleeding.  · Infection.  · Dehydration. Wounds with large amounts of drainage can cause excessive fluid loss.  · Pain.  Supplies needed:  · A disposable garbage bag.  · Soap and water, or hand .  · Wound cleanser or salt-water solution (saline).  · New sponge and cover dressing.  · Protective clothing.  · Gauze pad.  · Vinyl gloves.  · Tape.  · Skin protectant. This may be a wipe, film, or spray.  · Clean or germ-free (sterile)  scissors.  · Eye protection.  How to change your dressing  Prepare to change your dressing    1. If told by your health care provider, take pain medicine 30 minutes before changing the dressing.  2. Wash your hands with soap and water. Dry your hands with a clean towel. If soap and water are not available, use hand .  3. Set up a clean station for wound care.  4. Open the dressing package so that the sponge dressing remains on the inside of the package.  5. Wear gloves, protective clothing, and eye protection.  Remove old dressing    1. Turn off the pump and disconnect the tubing from the dressing.  2. Carefully remove the adhesive cover dressing in the direction of your hair growth.  3. Remove the sponge dressing that is inside the wound. If the sponge sticks, use a wound cleanser or saline solution to wet the sponge and help it come off more easily.  4. Throw the old sponge and cover dressing supplies into the garbage bag.  5. Remove your gloves by grabbing the cuff and turning the glove inside out. Place the gloves in the trash immediately.  6. Wash your hands with soap and water. Dry your hands with a clean towel. If soap and water are not available, use hand .  Clean your wound  · Wear gloves, protective clothing, and eye protection.  Follow your health care provider's instructions on how to clean your wound. You may be told to:  1. Clean the wound using a saline solution or a wound cleanser and a clean gauze pad.  2. Pat the wound dry with a gauze pad. Do not rub the wound.  3. Throw the gauze pad into the garbage bag.  4. Remove your gloves by grabbing the cuff and turning the glove inside out. Place the gloves in the trash immediately.  5. Wash your hands with soap and water. Dry your hands with a clean towel. If soap and water are not available, use hand .  Apply new dressing  · Wear gloves, protective clothing, and eye protection.  1. If told by your health care provider, apply a  skin protectant to any skin that will be exposed to adhesive. Let the skin protectant dry.  2. Cut a piece of new sponge dressing and put it on or in the wound.  3. Using clean scissors, cut a nickel-sized hole in the new cover dressing.  4. Apply the cover dressing.  5. Attach the suction tube over the hole in the cover dressing.  6. Take off your gloves. Put them in the plastic bag with the old dressing. Tie the bag shut and throw it away.  7. Wash your hands with soap and water. Dry your hands with a clean towel. If soap and water are not available, use hand .  8. Turn the pump back on. The sponge dressing should collapse. Do not change the settings on the machine without talking to a health care provider.  9. Replace the container in the pump that collects fluid if it is full. Replace the container per the 's instructions or at least once a week, even if it is not full.  General tips and recommendations  If the alarm sounds:  · Stay calm.  · Do not turn off the pump or do anything with the dressing.  · Reasons the alarm may go off:  ? The battery is low. Change the battery or plug the device into electrical power.  ? The dressing has a leak. Find the leak and put tape over the leak.  ? The fluid collection container is full. Change the fluid container.  · Call your health care provider right away if you cannot fix the problem.  · Explain to your health care provider what is happening. Follow his or her instructions.  General instructions  · Do not turn off the pump unless told to do so by your health care provider.  · Do not turn off the pump for more than 2 hours. If the pump is off for more than 2 hours, the dressing will need to be changed.  · If your health care provider says it is okay to shower:  ? Do not take the pump into the shower.  ? Make sure the wound dressing is protected and sealed. The wound dressing must stay dry.  · Check frequently that the machine indicates that therapy  is on and that all clamps are open.  · Do not use over-the-counter medicated or antiseptic creams, sprays, liquids, or dressings unless your health care provider approves.  Contact a health care provider if:  · You have new pain.  · You develop irritation, a rash, or itching around the wound or dressing.  · You see new black or yellow tissue in your wound.  · The dressing changes are painful or cause bleeding.  · The pump has been off for more than 2 hours, and you do not know how to change the dressing.  · The pump alarm goes off, and you do not know what to do.  Get help right away if:  · You have a lot of bleeding.  · The wound breaks open.  · You have severe pain.  · You have signs of infection, such as:  ? More redness, swelling, or pain.  ? More fluid or blood.  ? Warmth.  ? Pus or a bad smell.  ? Red streaks leading from the wound.  ? A fever.  · You see a sudden change in the color or texture of the drainage.  · You have signs of dehydration, such as:  ? Little or no tears, urine, or sweat.  ? Muscle cramps.  ? Very dry mouth.  ? Headache.  ? Dizziness.  Summary  · Negative pressure wound therapy (NPWT) is a device that helps your wound heal.  · Set up a clean station for wound care. Your health care provider will tell you what supplies to use.  · Follow your health care provider's instructions on how to clean your wound and how to change the dressing.  · Contact a health care provider if you have new pain, an irritation, or a rash, or if the alarm goes off and you do not know what to do.  · Get help right away if you have a lot of bleeding, your wound breaks open, or you have severe pain. Also, get help if you have signs of infection.  This information is not intended to replace advice given to you by your health care provider. Make sure you discuss any questions you have with your health care provider.  Document Released: 03/11/2013 Document Revised: 04/10/2020 Document Reviewed: 03/06/2020  [employer]  Patient Education © 2020 [employer]

## 2020-10-03 NOTE — WOUND TEAM
[facility]  Inpatient Services  Initial Wound and Skin Care Evaluation    Admission Date: 9/29/2020     Consult Date: 10/02/2020   HPI, PMH, SH: Reviewed    Unit where seen by Wound Team: T436/02     WOUND CONSULT RELATED TO:  First post op Negative Pressure Wound Therapy (NPWT) dressing change.     Self Report / Pain Level:  8/10       OBJECTIVE:  Previous dressing intact.    WOUND TYPE, LOCATION, CHARACTERISTICS (Pressure Injuries: location, stage, POA or date identified)      Negative Pressure Wound Therapy 09/30/20 Surgical Abdomen Medial (Active)   NPWT Pump Mode / Pressure Setting Ulta;Continuous;125 mmHg 10/02/20 1500   Dressing Type Black Foam (Regular);Medium 10/02/20 1500   Number of Foam Pieces Used 2 10/02/20 1500   Canister Changed No 10/02/20 1500   Output (mL) 0 mL 10/02/20 1703   NEXT Dressing Change/Treatment Date 10/05/20 10/02/20 1500           Wound 09/30/20 Full Thickness Wound Abdomen Distal open complicated surgical (Active)   Wound Image   10/02/20 1500   Site Assessment Red;Painful 10/02/20 1500   Periwound Assessment Scar tissue;Intact 10/02/20 1500   Margins Unattached edges;Defined edges 10/02/20 1500   Closure Secondary intention 10/02/20 1500   Drainage Amount Small 10/02/20 1500   Drainage Description Serosanguineous 10/02/20 1500   Treatments Cleansed;Site care 10/02/20 1500   Wound Cleansing Normal Saline Irrigation 10/02/20 1500   Periwound Protectant Benzoin 10/02/20 1500   Dressing Cleansing/Solutions Not Applicable 10/02/20 1500   Dressing Options Wound Vac 10/02/20 1500   Dressing Changed Changed 10/02/20 1500   Dressing Status Clean;Dry;Intact 10/02/20 1500   Dressing Change/Treatment Frequency Monday, Wednesday, Friday, and As Needed 10/02/20 1500   NEXT Dressing Change/Treatment Date 10/05/20 10/02/20 1500   NEXT Weekly Photo (Inpatient Only) 10/09/20 10/02/20 1500   Non-staged Wound Description Full thickness 10/02/20 1500   Wound Length (cm) 8.2 cm 10/02/20  1500   Wound Width (cm) 4.5 cm 10/02/20 1500   Wound Depth (cm) 6 cm 10/02/20 1500   Wound Surface Area (cm^2) 36.9 cm^2 10/02/20 1500   Wound Volume (cm^3) 221.4 cm^3 10/02/20 1500   Wound Bed Granulation (%) 20 % 10/02/20 1500   Wound Bed Epithelium (%) 0 % 10/02/20 1500   Wound Bed Slough (%) 0 % 10/02/20 1500   Wound Bed Eschar (%) 0 % 10/02/20 1500   Tunneling (cm) 0 cm 10/02/20 1500   Undermining (cm) 0 cm 10/02/20 1500   Shape danii 10/02/20 1500   Wound Odor None 10/02/20 1500   Pulses N/A 10/02/20 1500   Exposed Structures Muscle 10/02/20 1500        Vascular:    Dorsal Pedal pulses:  NA not related to BLE or vascular issue  Posterior tib pulses:   NA    PETE:      NA    Lab Values:    Lab Results   Component Value Date/Time    WBC 15.2 (H) 10/02/2020 03:04 AM    RBC 3.32 (L) 10/02/2020 03:04 AM    HEMOGLOBIN 8.9 (L) 10/02/2020 03:04 AM    HEMATOCRIT 28.9 (L) 10/02/2020 03:04 AM                    Lab Results   Component Value Date/Time    HBA1C 6.6 (A) 04/17/2020           Culture:   Obtained no, wound is clean with no signs of infection, Results show NA    INTERVENTIONS BY WOUND TEAM:  Patient premedicated. Used wound cleanser to moisten and lift drape, moisten black foam but it burned patient so used saline to soak foam. Removed the 4 staples that were holding foam into wound. Removed foam. Inspected wound with flashlight and cotton tipped swab, no retained foam in wound. Irrigated wound with NS and cleaned it gently with gauze 4 x 4s. Benzoin to periwound which burned so need to use No Sting next time. 1 piece of black foam inserted into the depth of wound, then one more to fill wound bed. Drape applied, hole cut for trac pad and trac pad placed. Suction obtained at 125 mmHg continuous. Patient waiting for her home wound vac to arrive then she is discharging.    Interdisciplinary consultation: Patient, Bedside RN     EVALUATION: clean wound bed will benefit from NPWT. NPWT encourages granulation tissue  development, manages drainage and maintains optimal moisture needed for wound healing.     Goals: Steady decrease in wound area and depth weekly.    NURSING PLAN OF CARE ORDERS (X):  Dressing changes: See Dressing Care orders: X  Skin care: See Skin Care orders:   Rectal tube care: See Rectal Tube Care orders:        Other orders:                           RSKIN:   CURRENTLY IN PLACE (X), APPLIED THIS VISIT (A), ORDERED (O):   Q shift Madi:  X  Q shift pressure point assessments:  X  Pressure redistribution mattress     X                        Low Airloss                        Bariatric APOLINAR                     Bariatric foam                        Heel float boots                     Heel Silicone dressing                         Float Heels off Bed with Pillows               Barrier wipes         Barrier Cream         Barrier paste          Sacral silicone dressing         Silicone O2 tubing         Anchorfast         Cannula fixation Device (Tender )          Gray Foam Ear protectors           Trach with Optifoam split foam                 Waffle cushion        Waffle Overlay         Rectal tube or BMS    Purwick/Condom Cath          Antifungal tx      Interdry          Reposition q 2 hours    Patient up self, turns self    Up to chair        Ambulate      PT/OT        Dietician        Diabetes Education      PO   X  TF     TPN     NPO   # days   Other        WOUND TEAM PLAN OF CARE (X):   Dressing changes by wound team:          Follow up 1-2 times weekly:               Follow up 3 times weekly:                NPWT change 3 times weekly:   X  Follow up as needed:       Other (explain):     Anticipated discharge plans (X):   LTACH:        SNF/Rehab:                  Home Care:   X        Outpatient Wound Center:            Self Care:            Other:

## 2020-10-03 NOTE — PROGRESS NOTES
Wound Vac delivered. Patient awaiting escort to discharge home per physician order.  Patient to discharge with .  Demonstrated understanding of discharge instructions, follow up appointments, home medications, prescriptions, home care for surgical wound, and nursing care instructions for Wound Vac.  Ambulating without assistance, voiding without difficulty, pain well controlled, tolerating oral medications, oxygen saturation greater than 90% , tolerating diet. Educational handouts given and discussed.  Verbalized understanding of discharge instructions and educational handouts.  Stated several reasons why to return to ed or seek medical attention. All questions answered.  Belongings, medications stored in pharmacy and dressing supplies with patient at this time.

## 2020-10-04 LAB
BACTERIA WND AEROBE CULT: NORMAL
GRAM STN SPEC: NORMAL
SIGNIFICANT IND 70042: NORMAL
SITE SITE: NORMAL
SOURCE SOURCE: NORMAL

## 2020-10-05 LAB
BACTERIA BLD CULT: NORMAL
BACTERIA BLD CULT: NORMAL
SIGNIFICANT IND 70042: NORMAL
SIGNIFICANT IND 70042: NORMAL
SITE SITE: NORMAL
SITE SITE: NORMAL
SOURCE SOURCE: NORMAL
SOURCE SOURCE: NORMAL

## 2020-10-06 RX ORDER — TRAZODONE HYDROCHLORIDE 100 MG/1
TABLET ORAL
Qty: 90 TAB | Refills: 0 | Status: SHIPPED | OUTPATIENT
Start: 2020-10-06 | End: 2020-10-30

## 2020-10-08 DIAGNOSIS — F41.9 ANXIETY: ICD-10-CM

## 2020-10-08 RX ORDER — ALPRAZOLAM 1 MG/1
TABLET ORAL
Qty: 15 TAB | Refills: 0 | Status: SHIPPED | OUTPATIENT
Start: 2020-10-08 | End: 2020-11-08

## 2020-10-08 NOTE — PROGRESS NOTES
One last prescription for Xanax sent to the patient's pharmacy.  Let her know this MD will not write any additional prescriptions for Xanax.  1 mg #15,  reviewed.

## 2020-10-09 LAB
BACTERIA SPEC ANAEROBE CULT: ABNORMAL
BACTERIA SPEC ANAEROBE CULT: ABNORMAL
SIGNIFICANT IND 70042: ABNORMAL
SITE SITE: ABNORMAL
SOURCE SOURCE: ABNORMAL

## 2020-10-30 RX ORDER — TRAZODONE HYDROCHLORIDE 100 MG/1
TABLET ORAL
Qty: 90 TAB | Refills: 0 | Status: SHIPPED | OUTPATIENT
Start: 2020-10-30 | End: 2020-11-23

## 2020-11-02 DIAGNOSIS — G44.229 CHRONIC TENSION-TYPE HEADACHE, NOT INTRACTABLE: ICD-10-CM

## 2020-11-02 NOTE — TELEPHONE ENCOUNTER
Received request via: Pharmacy    Was the patient seen in the last year in this department? Yes Seen 9/8/20    Does the patient have an active prescription (recently filled or refills available) for medication(s) requested? No

## 2020-11-03 RX ORDER — RIZATRIPTAN BENZOATE 10 MG/1
TABLET ORAL
Qty: 10 TAB | Refills: 2 | Status: SHIPPED | OUTPATIENT
Start: 2020-11-03 | End: 2020-12-31

## 2020-11-17 ENCOUNTER — TELEPHONE (OUTPATIENT)
Dept: MEDICAL GROUP | Facility: PHYSICIAN GROUP | Age: 60
End: 2020-11-17

## 2020-11-17 NOTE — TELEPHONE ENCOUNTER
[place] with [employer] called stating that one of their employees tested positive for COVID and this patient was exposed to them. The employee was asymptomatic and will be retest to see if it was a false positive. [employer] did notify the patient of this and is checking with the patient regarding symptoms.

## 2020-11-23 RX ORDER — TRAZODONE HYDROCHLORIDE 100 MG/1
TABLET ORAL
Qty: 90 TAB | Refills: 0 | Status: SHIPPED | OUTPATIENT
Start: 2020-11-23 | End: 2020-12-22

## 2020-12-02 ENCOUNTER — PATIENT MESSAGE (OUTPATIENT)
Dept: MEDICAL GROUP | Facility: PHYSICIAN GROUP | Age: 60
End: 2020-12-02

## 2020-12-02 DIAGNOSIS — F51.01 PRIMARY INSOMNIA: ICD-10-CM

## 2020-12-02 RX ORDER — AMITRIPTYLINE HYDROCHLORIDE 75 MG/1
75 TABLET ORAL NIGHTLY PRN
Qty: 90 TAB | Refills: 3 | Status: CANCELLED | OUTPATIENT
Start: 2020-12-02

## 2020-12-22 RX ORDER — TRAZODONE HYDROCHLORIDE 100 MG/1
TABLET ORAL
Qty: 90 TAB | Refills: 0 | Status: SHIPPED | OUTPATIENT
Start: 2020-12-22 | End: 2021-01-14

## 2020-12-30 DIAGNOSIS — G44.229 CHRONIC TENSION-TYPE HEADACHE, NOT INTRACTABLE: ICD-10-CM

## 2020-12-31 RX ORDER — RIZATRIPTAN BENZOATE 10 MG/1
TABLET ORAL
Qty: 12 TAB | Refills: 2 | Status: SHIPPED | OUTPATIENT
Start: 2020-12-31 | End: 2021-03-29

## 2021-01-14 RX ORDER — TRAZODONE HYDROCHLORIDE 100 MG/1
TABLET ORAL
Qty: 90 TAB | Refills: 0 | Status: SHIPPED | OUTPATIENT
Start: 2021-01-14 | End: 2021-02-08

## 2021-01-20 ENCOUNTER — PRE-ADMISSION TESTING (OUTPATIENT)
Dept: ADMISSIONS | Facility: MEDICAL CENTER | Age: 61
DRG: 908 | End: 2021-01-20
Attending: SURGERY
Payer: COMMERCIAL

## 2021-01-20 ENCOUNTER — HOME HEALTH ADMISSION (OUTPATIENT)
Dept: HOME HEALTH SERVICES | Facility: HOME HEALTHCARE | Age: 61
End: 2021-01-20
Payer: COMMERCIAL

## 2021-01-20 DIAGNOSIS — Z01.810 PRE-OPERATIVE CARDIOVASCULAR EXAMINATION: ICD-10-CM

## 2021-01-20 DIAGNOSIS — Z01.812 PRE-OPERATIVE LABORATORY EXAMINATION: ICD-10-CM

## 2021-01-20 LAB
ANION GAP SERPL CALC-SCNC: 8 MMOL/L (ref 7–16)
BUN SERPL-MCNC: 13 MG/DL (ref 8–22)
CALCIUM SERPL-MCNC: 9.5 MG/DL (ref 8.5–10.5)
CHLORIDE SERPL-SCNC: 100 MMOL/L (ref 96–112)
CO2 SERPL-SCNC: 24 MMOL/L (ref 20–33)
CREAT SERPL-MCNC: 0.78 MG/DL (ref 0.5–1.4)
EKG IMPRESSION: NORMAL
ERYTHROCYTE [DISTWIDTH] IN BLOOD BY AUTOMATED COUNT: 48.1 FL (ref 35.9–50)
EST. AVERAGE GLUCOSE BLD GHB EST-MCNC: 148 MG/DL
GLUCOSE SERPL-MCNC: 153 MG/DL (ref 65–99)
HBA1C MFR BLD: 6.8 % (ref 0–5.6)
HCT VFR BLD AUTO: 44.8 % (ref 37–47)
HGB BLD-MCNC: 13.8 G/DL (ref 12–16)
MCH RBC QN AUTO: 29.1 PG (ref 27–33)
MCHC RBC AUTO-ENTMCNC: 30.8 G/DL (ref 33.6–35)
MCV RBC AUTO: 94.3 FL (ref 81.4–97.8)
PLATELET # BLD AUTO: 290 K/UL (ref 164–446)
PMV BLD AUTO: 9.4 FL (ref 9–12.9)
POTASSIUM SERPL-SCNC: 4.4 MMOL/L (ref 3.6–5.5)
RBC # BLD AUTO: 4.75 M/UL (ref 4.2–5.4)
SARS-COV-2 RNA RESP QL NAA+PROBE: NOTDETECTED
SODIUM SERPL-SCNC: 132 MMOL/L (ref 135–145)
SPECIMEN SOURCE: NORMAL
WBC # BLD AUTO: 6 K/UL (ref 4.8–10.8)

## 2021-01-20 PROCEDURE — 85027 COMPLETE CBC AUTOMATED: CPT

## 2021-01-20 PROCEDURE — U0003 INFECTIOUS AGENT DETECTION BY NUCLEIC ACID (DNA OR RNA); SEVERE ACUTE RESPIRATORY SYNDROME CORONAVIRUS 2 (SARS-COV-2) (CORONAVIRUS DISEASE [COVID-19]), AMPLIFIED PROBE TECHNIQUE, MAKING USE OF HIGH THROUGHPUT TECHNOLOGIES AS DESCRIBED BY CMS-2020-01-R: HCPCS

## 2021-01-20 PROCEDURE — 93010 ELECTROCARDIOGRAM REPORT: CPT | Performed by: INTERNAL MEDICINE

## 2021-01-20 PROCEDURE — 93005 ELECTROCARDIOGRAM TRACING: CPT

## 2021-01-20 PROCEDURE — C9803 HOPD COVID-19 SPEC COLLECT: HCPCS

## 2021-01-20 PROCEDURE — 80048 BASIC METABOLIC PNL TOTAL CA: CPT

## 2021-01-20 PROCEDURE — 83036 HEMOGLOBIN GLYCOSYLATED A1C: CPT

## 2021-01-20 PROCEDURE — 36415 COLL VENOUS BLD VENIPUNCTURE: CPT

## 2021-01-20 PROCEDURE — U0005 INFEC AGEN DETEC AMPLI PROBE: HCPCS

## 2021-01-20 ASSESSMENT — FIBROSIS 4 INDEX: FIB4 SCORE: 0.5

## 2021-01-26 ENCOUNTER — HOSPITAL ENCOUNTER (INPATIENT)
Facility: MEDICAL CENTER | Age: 61
LOS: 2 days | DRG: 908 | End: 2021-01-28
Attending: SURGERY | Admitting: SURGERY
Payer: COMMERCIAL

## 2021-01-26 ENCOUNTER — ANESTHESIA EVENT (OUTPATIENT)
Dept: SURGERY | Facility: MEDICAL CENTER | Age: 61
DRG: 908 | End: 2021-01-26
Payer: COMMERCIAL

## 2021-01-26 ENCOUNTER — ANESTHESIA (OUTPATIENT)
Dept: SURGERY | Facility: MEDICAL CENTER | Age: 61
DRG: 908 | End: 2021-01-26
Payer: COMMERCIAL

## 2021-01-26 DIAGNOSIS — G89.18 POST-OPERATIVE PAIN: ICD-10-CM

## 2021-01-26 LAB
GLUCOSE BLD-MCNC: 138 MG/DL (ref 65–99)
GLUCOSE BLD-MCNC: 150 MG/DL (ref 65–99)
GRAM STN SPEC: NORMAL
PATHOLOGY CONSULT NOTE: NORMAL
SIGNIFICANT IND 70042: NORMAL
SITE SITE: NORMAL
SOURCE SOURCE: NORMAL

## 2021-01-26 PROCEDURE — 700102 HCHG RX REV CODE 250 W/ 637 OVERRIDE(OP): Performed by: SURGERY

## 2021-01-26 PROCEDURE — 0JC80ZZ EXTIRPATION OF MATTER FROM ABDOMEN SUBCUTANEOUS TISSUE AND FASCIA, OPEN APPROACH: ICD-10-PCS | Performed by: SURGERY

## 2021-01-26 PROCEDURE — 700105 HCHG RX REV CODE 258: Performed by: SURGERY

## 2021-01-26 PROCEDURE — 0JD80ZZ EXTRACTION OF ABDOMEN SUBCUTANEOUS TISSUE AND FASCIA, OPEN APPROACH: ICD-10-PCS | Performed by: SURGERY

## 2021-01-26 PROCEDURE — 160009 HCHG ANES TIME/MIN: Performed by: SURGERY

## 2021-01-26 PROCEDURE — 501838 HCHG SUTURE GENERAL: Performed by: SURGERY

## 2021-01-26 PROCEDURE — 700111 HCHG RX REV CODE 636 W/ 250 OVERRIDE (IP): Performed by: ANESTHESIOLOGY

## 2021-01-26 PROCEDURE — 770006 HCHG ROOM/CARE - MED/SURG/GYN SEMI*

## 2021-01-26 PROCEDURE — 700101 HCHG RX REV CODE 250: Performed by: SURGERY

## 2021-01-26 PROCEDURE — 82962 GLUCOSE BLOOD TEST: CPT

## 2021-01-26 PROCEDURE — 700102 HCHG RX REV CODE 250 W/ 637 OVERRIDE(OP): Performed by: ANESTHESIOLOGY

## 2021-01-26 PROCEDURE — 160002 HCHG RECOVERY MINUTES (STAT): Performed by: SURGERY

## 2021-01-26 PROCEDURE — 160036 HCHG PACU - EA ADDL 30 MINS PHASE I: Performed by: SURGERY

## 2021-01-26 PROCEDURE — 700111 HCHG RX REV CODE 636 W/ 250 OVERRIDE (IP): Performed by: SURGERY

## 2021-01-26 PROCEDURE — 160031 HCHG SURGERY MINUTES - 1ST 30 MINS LEVEL 5: Performed by: SURGERY

## 2021-01-26 PROCEDURE — 87205 SMEAR GRAM STAIN: CPT

## 2021-01-26 PROCEDURE — A9270 NON-COVERED ITEM OR SERVICE: HCPCS | Performed by: SURGERY

## 2021-01-26 PROCEDURE — 88300 SURGICAL PATH GROSS: CPT

## 2021-01-26 PROCEDURE — 160042 HCHG SURGERY MINUTES - EA ADDL 1 MIN LEVEL 5: Performed by: SURGERY

## 2021-01-26 PROCEDURE — 160048 HCHG OR STATISTICAL LEVEL 1-5: Performed by: SURGERY

## 2021-01-26 PROCEDURE — 87070 CULTURE OTHR SPECIMN AEROBIC: CPT

## 2021-01-26 PROCEDURE — A9270 NON-COVERED ITEM OR SERVICE: HCPCS | Performed by: ANESTHESIOLOGY

## 2021-01-26 PROCEDURE — 160035 HCHG PACU - 1ST 60 MINS PHASE I: Performed by: SURGERY

## 2021-01-26 PROCEDURE — 700101 HCHG RX REV CODE 250: Performed by: ANESTHESIOLOGY

## 2021-01-26 PROCEDURE — 87075 CULTR BACTERIA EXCEPT BLOOD: CPT

## 2021-01-26 RX ORDER — METFORMIN HYDROCHLORIDE 750 MG/1
750 TABLET, EXTENDED RELEASE ORAL DAILY
Status: DISCONTINUED | OUTPATIENT
Start: 2021-01-26 | End: 2021-01-28 | Stop reason: HOSPADM

## 2021-01-26 RX ORDER — LIDOCAINE HYDROCHLORIDE 10 MG/ML
INJECTION, SOLUTION INFILTRATION; PERINEURAL PRN
Status: DISCONTINUED | OUTPATIENT
Start: 2021-01-26 | End: 2021-01-26 | Stop reason: SURG

## 2021-01-26 RX ORDER — ONDANSETRON 2 MG/ML
4 INJECTION INTRAMUSCULAR; INTRAVENOUS
Status: DISCONTINUED | OUTPATIENT
Start: 2021-01-26 | End: 2021-01-26 | Stop reason: HOSPADM

## 2021-01-26 RX ORDER — PHENYLEPHRINE HYDROCHLORIDE 10 MG/ML
INJECTION, SOLUTION INTRAMUSCULAR; INTRAVENOUS; SUBCUTANEOUS PRN
Status: DISCONTINUED | OUTPATIENT
Start: 2021-01-26 | End: 2021-01-26 | Stop reason: SURG

## 2021-01-26 RX ORDER — MONTELUKAST SODIUM 10 MG/1
10 TABLET ORAL DAILY
Status: DISCONTINUED | OUTPATIENT
Start: 2021-01-26 | End: 2021-01-28 | Stop reason: HOSPADM

## 2021-01-26 RX ORDER — ROCURONIUM BROMIDE 10 MG/ML
INJECTION, SOLUTION INTRAVENOUS PRN
Status: DISCONTINUED | OUTPATIENT
Start: 2021-01-26 | End: 2021-01-26 | Stop reason: HOSPADM

## 2021-01-26 RX ORDER — SODIUM CHLORIDE, SODIUM LACTATE, POTASSIUM CHLORIDE, CALCIUM CHLORIDE 600; 310; 30; 20 MG/100ML; MG/100ML; MG/100ML; MG/100ML
INJECTION, SOLUTION INTRAVENOUS CONTINUOUS
Status: ACTIVE | OUTPATIENT
Start: 2021-01-26 | End: 2021-01-26

## 2021-01-26 RX ORDER — ONDANSETRON 2 MG/ML
4 INJECTION INTRAMUSCULAR; INTRAVENOUS EVERY 4 HOURS PRN
Status: DISCONTINUED | OUTPATIENT
Start: 2021-01-26 | End: 2021-01-28 | Stop reason: HOSPADM

## 2021-01-26 RX ORDER — DIPHENHYDRAMINE HYDROCHLORIDE 50 MG/ML
12.5 INJECTION INTRAMUSCULAR; INTRAVENOUS
Status: DISCONTINUED | OUTPATIENT
Start: 2021-01-26 | End: 2021-01-26 | Stop reason: HOSPADM

## 2021-01-26 RX ORDER — SUMATRIPTAN 50 MG/1
100 TABLET, FILM COATED ORAL
Status: COMPLETED | OUTPATIENT
Start: 2021-01-26 | End: 2021-01-27

## 2021-01-26 RX ORDER — ALBUTEROL SULFATE 90 UG/1
2 AEROSOL, METERED RESPIRATORY (INHALATION) EVERY 6 HOURS PRN
Status: DISCONTINUED | OUTPATIENT
Start: 2021-01-26 | End: 2021-01-28 | Stop reason: HOSPADM

## 2021-01-26 RX ORDER — MIDAZOLAM HYDROCHLORIDE 1 MG/ML
INJECTION INTRAMUSCULAR; INTRAVENOUS PRN
Status: DISCONTINUED | OUTPATIENT
Start: 2021-01-26 | End: 2021-01-26 | Stop reason: SURG

## 2021-01-26 RX ORDER — OXYCODONE HYDROCHLORIDE 5 MG/1
5 TABLET ORAL EVERY 4 HOURS PRN
Status: DISCONTINUED | OUTPATIENT
Start: 2021-01-26 | End: 2021-01-28 | Stop reason: HOSPADM

## 2021-01-26 RX ORDER — CEFOTETAN DISODIUM 2 G/20ML
INJECTION, POWDER, FOR SOLUTION INTRAMUSCULAR; INTRAVENOUS PRN
Status: DISCONTINUED | OUTPATIENT
Start: 2021-01-26 | End: 2021-01-26 | Stop reason: SURG

## 2021-01-26 RX ORDER — CALCIUM CARBONATE 500 MG/1
500 TABLET, CHEWABLE ORAL
Status: DISCONTINUED | OUTPATIENT
Start: 2021-01-26 | End: 2021-01-28 | Stop reason: HOSPADM

## 2021-01-26 RX ORDER — HYDROMORPHONE HYDROCHLORIDE 1 MG/ML
0.1 INJECTION, SOLUTION INTRAMUSCULAR; INTRAVENOUS; SUBCUTANEOUS
Status: DISCONTINUED | OUTPATIENT
Start: 2021-01-26 | End: 2021-01-26 | Stop reason: HOSPADM

## 2021-01-26 RX ORDER — HYDROMORPHONE HYDROCHLORIDE 2 MG/ML
INJECTION, SOLUTION INTRAMUSCULAR; INTRAVENOUS; SUBCUTANEOUS PRN
Status: DISCONTINUED | OUTPATIENT
Start: 2021-01-26 | End: 2021-01-26 | Stop reason: SURG

## 2021-01-26 RX ORDER — KETAMINE HYDROCHLORIDE 50 MG/ML
INJECTION, SOLUTION INTRAMUSCULAR; INTRAVENOUS PRN
Status: DISCONTINUED | OUTPATIENT
Start: 2021-01-26 | End: 2021-01-26 | Stop reason: SURG

## 2021-01-26 RX ORDER — SCOLOPAMINE TRANSDERMAL SYSTEM 1 MG/1
1 PATCH, EXTENDED RELEASE TRANSDERMAL
Status: DISCONTINUED | OUTPATIENT
Start: 2021-01-26 | End: 2021-01-28 | Stop reason: HOSPADM

## 2021-01-26 RX ORDER — RIZATRIPTAN BENZOATE 10 MG/1
10 TABLET ORAL
Status: DISCONTINUED | OUTPATIENT
Start: 2021-01-26 | End: 2021-01-26

## 2021-01-26 RX ORDER — ONDANSETRON 2 MG/ML
INJECTION INTRAMUSCULAR; INTRAVENOUS PRN
Status: DISCONTINUED | OUTPATIENT
Start: 2021-01-26 | End: 2021-01-26 | Stop reason: SURG

## 2021-01-26 RX ORDER — HYDROMORPHONE HYDROCHLORIDE 1 MG/ML
0.4 INJECTION, SOLUTION INTRAMUSCULAR; INTRAVENOUS; SUBCUTANEOUS
Status: DISCONTINUED | OUTPATIENT
Start: 2021-01-26 | End: 2021-01-26 | Stop reason: HOSPADM

## 2021-01-26 RX ORDER — ACETAMINOPHEN 500 MG
1000 TABLET ORAL 3 TIMES DAILY PRN
COMMUNITY

## 2021-01-26 RX ORDER — OMEPRAZOLE 20 MG/1
20 CAPSULE, DELAYED RELEASE ORAL DAILY
Status: DISCONTINUED | OUTPATIENT
Start: 2021-01-26 | End: 2021-01-28 | Stop reason: HOSPADM

## 2021-01-26 RX ORDER — FLUTICASONE PROPIONATE 50 MCG
2 SPRAY, SUSPENSION (ML) NASAL DAILY
Status: DISCONTINUED | OUTPATIENT
Start: 2021-01-26 | End: 2021-01-28 | Stop reason: HOSPADM

## 2021-01-26 RX ORDER — TRAZODONE HYDROCHLORIDE 150 MG/1
300 TABLET ORAL NIGHTLY PRN
Status: DISCONTINUED | OUTPATIENT
Start: 2021-01-26 | End: 2021-01-28 | Stop reason: HOSPADM

## 2021-01-26 RX ORDER — ACETAMINOPHEN 500 MG
1000 TABLET ORAL EVERY 6 HOURS
Status: DISCONTINUED | OUTPATIENT
Start: 2021-01-26 | End: 2021-01-28 | Stop reason: HOSPADM

## 2021-01-26 RX ORDER — KETOROLAC TROMETHAMINE 30 MG/ML
INJECTION, SOLUTION INTRAMUSCULAR; INTRAVENOUS PRN
Status: DISCONTINUED | OUTPATIENT
Start: 2021-01-26 | End: 2021-01-26 | Stop reason: SURG

## 2021-01-26 RX ORDER — DEXAMETHASONE SODIUM PHOSPHATE 4 MG/ML
4 INJECTION, SOLUTION INTRA-ARTICULAR; INTRALESIONAL; INTRAMUSCULAR; INTRAVENOUS; SOFT TISSUE
Status: DISCONTINUED | OUTPATIENT
Start: 2021-01-26 | End: 2021-01-28 | Stop reason: HOSPADM

## 2021-01-26 RX ORDER — METOCLOPRAMIDE HYDROCHLORIDE 5 MG/ML
INJECTION INTRAMUSCULAR; INTRAVENOUS PRN
Status: DISCONTINUED | OUTPATIENT
Start: 2021-01-26 | End: 2021-01-26 | Stop reason: SURG

## 2021-01-26 RX ORDER — HYDROMORPHONE HYDROCHLORIDE 1 MG/ML
0.2 INJECTION, SOLUTION INTRAMUSCULAR; INTRAVENOUS; SUBCUTANEOUS
Status: DISCONTINUED | OUTPATIENT
Start: 2021-01-26 | End: 2021-01-26 | Stop reason: HOSPADM

## 2021-01-26 RX ORDER — DIPHENHYDRAMINE HYDROCHLORIDE 50 MG/ML
25 INJECTION INTRAMUSCULAR; INTRAVENOUS EVERY 6 HOURS PRN
Status: DISCONTINUED | OUTPATIENT
Start: 2021-01-26 | End: 2021-01-28 | Stop reason: HOSPADM

## 2021-01-26 RX ORDER — SODIUM CHLORIDE 9 MG/ML
INJECTION, SOLUTION INTRAVENOUS CONTINUOUS
Status: ACTIVE | OUTPATIENT
Start: 2021-01-26 | End: 2021-01-26

## 2021-01-26 RX ADMIN — PHENYLEPHRINE HYDROCHLORIDE 50 MCG: 10 INJECTION INTRAVENOUS at 10:22

## 2021-01-26 RX ADMIN — AMPICILLIN AND SULBACTAM 3 G: 1; 2 INJECTION, POWDER, FOR SOLUTION INTRAMUSCULAR; INTRAVENOUS at 17:14

## 2021-01-26 RX ADMIN — LIDOCAINE HYDROCHLORIDE 50 MG: 10 INJECTION, SOLUTION INFILTRATION; PERINEURAL at 10:05

## 2021-01-26 RX ADMIN — MONTELUKAST 10 MG: 10 TABLET, FILM COATED ORAL at 14:24

## 2021-01-26 RX ADMIN — PHENYLEPHRINE HYDROCHLORIDE 100 MCG: 10 INJECTION INTRAVENOUS at 10:28

## 2021-01-26 RX ADMIN — ONDANSETRON 4 MG: 2 INJECTION INTRAMUSCULAR; INTRAVENOUS at 11:17

## 2021-01-26 RX ADMIN — AMPICILLIN AND SULBACTAM 3 G: 1; 2 INJECTION, POWDER, FOR SOLUTION INTRAMUSCULAR; INTRAVENOUS at 23:23

## 2021-01-26 RX ADMIN — HYDROCODONE BITARTRATE AND ACETAMINOPHEN 7.5 MG: 7.5; 325 SOLUTION ORAL at 12:26

## 2021-01-26 RX ADMIN — HYDROMORPHONE HYDROCHLORIDE 0.2 MG: 1 INJECTION, SOLUTION INTRAMUSCULAR; INTRAVENOUS; SUBCUTANEOUS at 12:50

## 2021-01-26 RX ADMIN — HYDROMORPHONE HYDROCHLORIDE 1 MG: 2 INJECTION, SOLUTION INTRAMUSCULAR; INTRAVENOUS; SUBCUTANEOUS at 11:21

## 2021-01-26 RX ADMIN — HYDROMORPHONE HYDROCHLORIDE 0.4 MG: 1 INJECTION, SOLUTION INTRAMUSCULAR; INTRAVENOUS; SUBCUTANEOUS at 12:40

## 2021-01-26 RX ADMIN — HYDROMORPHONE HYDROCHLORIDE 0.4 MG: 1 INJECTION, SOLUTION INTRAMUSCULAR; INTRAVENOUS; SUBCUTANEOUS at 12:30

## 2021-01-26 RX ADMIN — KETOROLAC TROMETHAMINE 30 MG: 30 INJECTION, SOLUTION INTRAMUSCULAR at 11:19

## 2021-01-26 RX ADMIN — ROCURONIUM BROMIDE 50 MG: 10 INJECTION, SOLUTION INTRAVENOUS at 10:05

## 2021-01-26 RX ADMIN — ACETAMINOPHEN 1000 MG: 500 TABLET ORAL at 17:15

## 2021-01-26 RX ADMIN — PHENYLEPHRINE HYDROCHLORIDE 100 MCG: 10 INJECTION INTRAVENOUS at 10:41

## 2021-01-26 RX ADMIN — SUGAMMADEX 200 MG: 100 INJECTION, SOLUTION INTRAVENOUS at 11:22

## 2021-01-26 RX ADMIN — HYDROMORPHONE HYDROCHLORIDE 1 MG: 2 INJECTION, SOLUTION INTRAMUSCULAR; INTRAVENOUS; SUBCUTANEOUS at 11:26

## 2021-01-26 RX ADMIN — MIDAZOLAM HYDROCHLORIDE 2 MG: 1 INJECTION, SOLUTION INTRAMUSCULAR; INTRAVENOUS at 09:57

## 2021-01-26 RX ADMIN — SODIUM CHLORIDE, POTASSIUM CHLORIDE, SODIUM LACTATE AND CALCIUM CHLORIDE: 600; 310; 30; 20 INJECTION, SOLUTION INTRAVENOUS at 08:41

## 2021-01-26 RX ADMIN — FENTANYL CITRATE 50 MCG: 50 INJECTION, SOLUTION INTRAMUSCULAR; INTRAVENOUS at 12:14

## 2021-01-26 RX ADMIN — SODIUM CHLORIDE, POTASSIUM CHLORIDE, SODIUM LACTATE AND CALCIUM CHLORIDE: 600; 310; 30; 20 INJECTION, SOLUTION INTRAVENOUS at 11:12

## 2021-01-26 RX ADMIN — SODIUM CHLORIDE: 9 INJECTION, SOLUTION INTRAVENOUS at 14:24

## 2021-01-26 RX ADMIN — CEFOTETAN DISODIUM 2 G: 2 INJECTION, POWDER, FOR SOLUTION INTRAMUSCULAR; INTRAVENOUS at 09:58

## 2021-01-26 RX ADMIN — SODIUM CHLORIDE, POTASSIUM CHLORIDE, SODIUM LACTATE AND CALCIUM CHLORIDE: 600; 310; 30; 20 INJECTION, SOLUTION INTRAVENOUS at 09:57

## 2021-01-26 RX ADMIN — ENOXAPARIN SODIUM 40 MG: 40 INJECTION SUBCUTANEOUS at 14:24

## 2021-01-26 RX ADMIN — PROPOFOL 200 MG: 10 INJECTION, EMULSION INTRAVENOUS at 10:05

## 2021-01-26 RX ADMIN — OXYCODONE 5 MG: 5 TABLET ORAL at 16:42

## 2021-01-26 RX ADMIN — PHENYLEPHRINE HYDROCHLORIDE 100 MCG: 10 INJECTION INTRAVENOUS at 10:25

## 2021-01-26 RX ADMIN — OMEPRAZOLE 20 MG: 20 CAPSULE, DELAYED RELEASE ORAL at 14:24

## 2021-01-26 RX ADMIN — METFORMIN HYDROCHLORIDE 750 MG: 750 TABLET ORAL at 14:29

## 2021-01-26 RX ADMIN — PHENYLEPHRINE HYDROCHLORIDE 100 MCG: 10 INJECTION INTRAVENOUS at 10:39

## 2021-01-26 RX ADMIN — KETAMINE HYDROCHLORIDE 50 MG: 50 INJECTION INTRAMUSCULAR; INTRAVENOUS at 11:16

## 2021-01-26 RX ADMIN — FENTANYL CITRATE 50 MCG: 50 INJECTION, SOLUTION INTRAMUSCULAR; INTRAVENOUS at 12:15

## 2021-01-26 RX ADMIN — METOCLOPRAMIDE 10 MG: 5 INJECTION, SOLUTION INTRAMUSCULAR; INTRAVENOUS at 11:17

## 2021-01-26 RX ADMIN — AMPICILLIN AND SULBACTAM 3 G: 1; 2 INJECTION, POWDER, FOR SOLUTION INTRAMUSCULAR; INTRAVENOUS at 14:22

## 2021-01-26 RX ADMIN — OXYCODONE 5 MG: 5 TABLET ORAL at 20:46

## 2021-01-26 RX ADMIN — ACETAMINOPHEN 1000 MG: 500 TABLET ORAL at 23:23

## 2021-01-26 RX ADMIN — DIPHENHYDRAMINE HYDROCHLORIDE 12.5 MG: 50 INJECTION INTRAMUSCULAR; INTRAVENOUS at 12:37

## 2021-01-26 RX ADMIN — POVIDONE IODINE 15 ML: 100 SOLUTION TOPICAL at 08:24

## 2021-01-26 RX ADMIN — ACETAMINOPHEN 1000 MG: 500 TABLET ORAL at 14:24

## 2021-01-26 RX ADMIN — TRAZODONE HYDROCHLORIDE 300 MG: 150 TABLET ORAL at 22:01

## 2021-01-26 RX ADMIN — LIDOCAINE HYDROCHLORIDE 0.5 ML: 10 INJECTION, SOLUTION EPIDURAL; INFILTRATION; INTRACAUDAL at 08:40

## 2021-01-26 SDOH — HEALTH STABILITY: MENTAL HEALTH: HOW MANY STANDARD DRINKS CONTAINING ALCOHOL DO YOU HAVE ON A TYPICAL DAY?: 1 OR 2

## 2021-01-26 SDOH — HEALTH STABILITY: MENTAL HEALTH: HOW OFTEN DO YOU HAVE A DRINK CONTAINING ALCOHOL?: MONTHLY OR LESS

## 2021-01-26 SDOH — HEALTH STABILITY: MENTAL HEALTH: HOW OFTEN DO YOU HAVE 6 OR MORE DRINKS ON ONE OCCASION?: NEVER

## 2021-01-26 ASSESSMENT — LIFESTYLE VARIABLES
TOTAL SCORE: 0
CONSUMPTION TOTAL: NEGATIVE
HAVE YOU EVER FELT YOU SHOULD CUT DOWN ON YOUR DRINKING: NO
TOTAL SCORE: 0
HOW MANY TIMES IN THE PAST YEAR HAVE YOU HAD 5 OR MORE DRINKS IN A DAY: 0
ON A TYPICAL DAY WHEN YOU DRINK ALCOHOL HOW MANY DRINKS DO YOU HAVE: 0
TOTAL SCORE: 0
DOES PATIENT WANT TO STOP DRINKING: NO
EVER HAD A DRINK FIRST THING IN THE MORNING TO STEADY YOUR NERVES TO GET RID OF A HANGOVER: NO
HAVE PEOPLE ANNOYED YOU BY CRITICIZING YOUR DRINKING: NO
ALCOHOL_USE: NO
EVER FELT BAD OR GUILTY ABOUT YOUR DRINKING: NO
AVERAGE NUMBER OF DAYS PER WEEK YOU HAVE A DRINK CONTAINING ALCOHOL: 0

## 2021-01-26 ASSESSMENT — PAIN DESCRIPTION - PAIN TYPE
TYPE: SURGICAL PAIN
TYPE: ACUTE PAIN;SURGICAL PAIN
TYPE: SURGICAL PAIN

## 2021-01-26 ASSESSMENT — FIBROSIS 4 INDEX
FIB4 SCORE: 0.71
FIB4 SCORE: 0.71

## 2021-01-26 ASSESSMENT — COGNITIVE AND FUNCTIONAL STATUS - GENERAL
SUGGESTED CMS G CODE MODIFIER MOBILITY: CH
DAILY ACTIVITIY SCORE: 24
MOBILITY SCORE: 24
SUGGESTED CMS G CODE MODIFIER DAILY ACTIVITY: CH

## 2021-01-26 ASSESSMENT — PATIENT HEALTH QUESTIONNAIRE - PHQ9
SUM OF ALL RESPONSES TO PHQ9 QUESTIONS 1 AND 2: 0
2. FEELING DOWN, DEPRESSED, IRRITABLE, OR HOPELESS: NOT AT ALL
1. LITTLE INTEREST OR PLEASURE IN DOING THINGS: NOT AT ALL

## 2021-01-26 NOTE — ANESTHESIA PROCEDURE NOTES
Airway    Date/Time: 1/26/2021 10:05 AM  Performed by: Navin Patterson M.D.  Authorized by: Navin Patterson M.D.     Location:  OR  Urgency:  Elective  Indications for Airway Management:  Anesthesia      Spontaneous Ventilation: absent    Sedation Level:  Deep  Preoxygenated: Yes    Patient Position:  Sniffing  Final Airway Type:  Endotracheal airway  Final Endotracheal Airway:  ETT  Cuffed: Yes    Technique Used for Successful ETT Placement:  Direct laryngoscopy    Insertion Site:  Oral  Blade Type:  Solorio  Laryngoscope Blade/Videolaryngoscope Blade Size:  2  ETT Size (mm):  7.5  Measured from:  Teeth  ETT to Teeth (cm):  22  Placement Verified by: auscultation and capnometry    Cormack-Lehane Classification:  Grade IIa - partial view of glottis  Number of Attempts at Approach:  1

## 2021-01-26 NOTE — ANESTHESIA TIME REPORT
Anesthesia Start and Stop Event Times     Date Time Event    1/26/2021 0955 Ready for Procedure     0957 Anesthesia Start     1132 Anesthesia Stop        Responsible Staff  01/26/21    Name Role Begin End    Navin Patterson M.D. Anesth 0957 1132        Preop Diagnosis (Free Text):  Pre-op Diagnosis     RECURRENT VENTRAL HERNIA, ABDOMINAL WALL INFECTION DUE TO MESH PROTHESIS        Preop Diagnosis (Codes):    Post op Diagnosis  Recurrent ventral hernia      Premium Reason  Non-Premium    Comments:

## 2021-01-26 NOTE — CARE PLAN
Problem: Pain Management  Goal: Pain level will decrease to patient's comfort goal  Outcome: PROGRESSING AS EXPECTED  Note: Medicating per MAR. Pain controlled at this time. Positions for comfort and support. Ice pack to abdomen.     Problem: Respiratory:  Goal: Respiratory status will improve  Outcome: PROGRESSING AS EXPECTED  Note: 10L oxymask - ERAS.

## 2021-01-26 NOTE — OR SURGEON
Immediate Post OP Note    PreOp Diagnosis: ***    PostOp Diagnosis: ***    Procedure(s):  IRRIGATION AND DEBRIDEMENT, WOUND - ABDOMINAL WALL WITH REMOVAL OF MESH - Wound Class: Dirty or Infected  APPLICATION OR REPLACEMENT,WOUND VAC - Wound Class: Dirty or Infected  LAPAROTOMY, EXPLORATORY - LYSIS OF ADHESIONS - Wound Class: Dirty or Infected    Surgeon(s):  SUNITA Choudhury M.D.    Anesthesiologist/Type of Anesthesia:  Anesthesiologist: Navin Patterson M.D./General    Surgical Staff:  Circulator: Pavan Kimball R.N.  Relief Circulator: Lakeisha Metcalf R.N.  Scrub Person: Es Younger    Specimens removed if any:  ID Type Source Tests Collected by Time Destination   1 : Abdominal culture Body Fluid Abdominal AEROBIC/ANAEROBIC CULTURE (SURGERY) Fede Russ M.D. 1/26/2021 1036    A : Abdominal mesh Other Abdominal PATHOLOGY SPECIMEN Fede Russ M.D. 1/26/2021 1033        Estimated Blood Loss: ***    Findings: ***    Complications: ***        1/26/2021 1:25 PM Iliana Veloz R.N.

## 2021-01-26 NOTE — OR NURSING
"Transported to her room by CNA via  wheelchair/ERAS protocol.Pt. verbalized\"manageable\" pain level.Tolerated water and apple juice well.  Wound vac.remains patent.  Pt's.  was updated.  "

## 2021-01-26 NOTE — PROGRESS NOTES
Patient in pre-op, assessment completed, patient and  Ben updated on plan of care, all questions answered, no further needs at this time, call light within reach.

## 2021-01-26 NOTE — ANESTHESIA POSTPROCEDURE EVALUATION
Patient: Mikayla Knight    Procedure Summary     Date: 01/26/21 Room / Location: [place] [address] / SURGERY [facility]    Anesthesia Start: 0957 Anesthesia Stop: 1132    Procedures:       IRRIGATION AND DEBRIDEMENT, WOUND - ABDOMINAL WALL WITH REMOVAL OF MESH (Abdomen)      APPLICATION OR REPLACEMENT,WOUND VAC (Abdomen)      LAPAROTOMY, EXPLORATORY - LYSIS OF ADHESIONS (Abdomen) Diagnosis: (RECURRENT VENTRAL HERNIA, ABDOMINAL WALL INFECTION DUE TO MESH PROTHESIS)    Surgeons: Fede Russ M.D. Responsible Provider: Navin Patterson M.D.    Anesthesia Type: general ASA Status: 3          Final Anesthesia Type: general  Last vitals  BP   Blood Pressure: 113/77, NIBP: 103/64    Temp   36.5 °C (97.7 °F)    Pulse   Pulse: (!) 102   Resp   13    SpO2   98 %      Anesthesia Post Evaluation    Patient location during evaluation: PACU  Patient participation: complete - patient participated  Level of consciousness: awake and alert    Airway patency: patent  Anesthetic complications: no  Cardiovascular status: hemodynamically stable  Respiratory status: acceptable  Hydration status: euvolemic    PONV: none           Nurse Pain Score: 5 (NPRS)

## 2021-01-26 NOTE — ANESTHESIA PREPROCEDURE EVALUATION
Relevant Problems   PULMONARY   (+) Dyspnea on exertion   (+) Shortness of breath      CARDIAC   (+) Dyspnea on exertion   (+) Pulmonary hypertension (HCC)      ENDO   (+) Type 2 diabetes mellitus without complication, without long-term current use of insulin (HCC)       Physical Exam    Airway   Mallampati: III  TM distance: <3 FB  Neck ROM: full       Cardiovascular - normal exam  Rhythm: regular  Rate: normal  (-) murmur     Dental - normal exam           Pulmonary - normal exam  Breath sounds clear to auscultation     Abdominal    Neurological - normal exam                 Anesthesia Plan    ASA 3   ASA physical status 3 criteria: diabetes - poorly controlled    Plan - general             Induction: intravenous      Pertinent diagnostic labs and testing reviewed    Informed Consent:    Anesthetic plan and risks discussed with patient.

## 2021-01-26 NOTE — PROGRESS NOTES
2RN skin check with RICH Gil    Inspected all bony prominences with no abnormalities found.    10L oxymask (ERAS), skin intact under device.    MLI with woundvac to top portion and KRISH w/staples to lower portion.    All other skin intact.

## 2021-01-26 NOTE — PROGRESS NOTES
Received report from PAC-U.  Pt arrived to T403 bed 1 via wheelchair  No immediate distress.  A&Ox4  Medicating per MAR, Pain 6/10  Denies n/v  MLI with wound vac to top and staples to bottom KRISH  Oriented to room, educated on welcome packet, white board, TV, call light, call before fall, plan of care, oral care expectations, ambulation goals, and up to chair for all meals goal.  Call light and personal belongings within reach.  Fall precautions and hourly rounding in place

## 2021-01-26 NOTE — OP REPORT
POST-OPERATIVE NOTE    PREOPERATIVE DIAGNOSIS: Complex postoperative wound infection    POSTOPERATIVE DIAGNOSIS: Same    PROCEDURE PERFORMED: 1.  Incision and drainage of complex postoperative wound infection 2.  Mesh removal, and permanent suture intact removal 3.  Layered closure of the abdominal wall 4.  Negative pressure dressing placement greater than 50 cm²    SURGEON: Fede Russ M.D., MD    ASSISTANT: MD Eric    ANESTHESIOLOGIST:  Anesthesiologist: Navin Patterson M.D.     ANESTHESIA: general     FINDINGS: Abscess cavity extending through the abdominal wall and to a prior piece of what appeared to be Mooers-Don mesh.  Multiple suture abscesses.  Attacks from a prior surgery.     WOUND CLASS: Dirty infected    SPECIMEN: Mesh.  Cultures.    ESTIMATED BLOOD LOSS: 100 mL    Indications: Patient is a 60-year-old female status post abdominal wall reconstruction and mesh removal approximately 10 years ago.  I spoke with her plastic surgeon who confirmed that he performed a rectus flap at that time.  He also removed all of the mesh that he was able to at the time.  She presented several months ago with a abdominal wall wound infection.  This was drained and VAC.  Unfortunately a sinus developed and it never healed.  I was concerned about an enterocutaneous fistula as well as retained mesh infection.  Patient was counseled extensively as of the risk first benefits of surgery and agreed to proceed fully informed.    Description: The patient was prepped and draped in the standard sterile surgical fashion after induction of general anesthesia.  An appropriate timeout was performed and antibiotics delivered.  I began with an elliptical incision to include the sinus tract and the patient's old scar.  I extended this the length of the abdomen.  I then dissected carefully down to the anterior fascia.  I did follow the sinus tract beneath the fascia.  I carefully entered the abdomen with care to avoid any enteric injury.   I debrided the abdominal wall down through the fascia to the peritoneum using sharp dissection and Bovie electrocautery.  The wound was approximately 30 cm x 10 cm wide by 4 cm deep.    During the course of the debridement of the abdominal wall I found several Ethibond and Prolene sutures which I removed.  I also found several tacks from a prior hernia repair.  I then progressed down to the base of the sinus tract which had a small amount of Wood-Don mesh as the nidus of infection.  This was passed off the field as a specimen.    I then performed an extensive lysis of adhesions.  There is no evidence of a fistula to the intestinal tract.  She did have large hernias laterally on both sides of her abdominal wall.  Once I had removed the infection and completed the lysis of adhesions I elected not to place any mesh or perform a definitive hernia repair given the amount of infection encountered during the course of the operation.  She had also already had a rectus flap so I did not feel abdominal wall reconstruction at this time would be appropriate.  I elected to close the abdomen in layers and leave the hernia for definitive repair at some point down the road once she is cleared the infection.    I closed the abdomen in layers.  I began with running 2-0 Vicryl sutures to close the peritoneal layer.  I then used interrupted #1 Vicryl pop-off sutures to close the fascia over this layer.  Once that was accomplished I used 2-0 Vicryl sutures to reapproximate the subcutaneous tissues on the inferior portion of the wound as the infection was at the umbilicus and above.  I then used staples to reapproximate the skin at the base of the wound.  I then used a medium sponge to perform wound VAC placement, negative pressure dressing placement.  This was approximately 15 cm x 10 cm wide by 4 cm deep.  I then applied the suction device which held suction without significant leak.    The patient was then extubated, to recovery in  satisfactory condition.    The surgical assistant in this surgery was indicated due to complexity of the procedure. Their role included aiding in incision, retraction, holding devices including camera for laparoscopic procedure, and closure of the wound.           ____________________________________     Fede Russ M.D., MD    DT: 1/26/2021  11:24 AM      Cc: YOSEPH eWlch

## 2021-01-27 LAB
ANION GAP SERPL CALC-SCNC: 10 MMOL/L (ref 7–16)
BUN SERPL-MCNC: 12 MG/DL (ref 8–22)
CALCIUM SERPL-MCNC: 7.8 MG/DL (ref 8.5–10.5)
CHLORIDE SERPL-SCNC: 104 MMOL/L (ref 96–112)
CO2 SERPL-SCNC: 21 MMOL/L (ref 20–33)
CREAT SERPL-MCNC: 0.76 MG/DL (ref 0.5–1.4)
ERYTHROCYTE [DISTWIDTH] IN BLOOD BY AUTOMATED COUNT: 47.7 FL (ref 35.9–50)
GLUCOSE SERPL-MCNC: 162 MG/DL (ref 65–99)
HCT VFR BLD AUTO: 36.2 % (ref 37–47)
HGB BLD-MCNC: 11.3 G/DL (ref 12–16)
MCH RBC QN AUTO: 29.7 PG (ref 27–33)
MCHC RBC AUTO-ENTMCNC: 31.2 G/DL (ref 33.6–35)
MCV RBC AUTO: 95 FL (ref 81.4–97.8)
PLATELET # BLD AUTO: 217 K/UL (ref 164–446)
PMV BLD AUTO: 9.2 FL (ref 9–12.9)
POTASSIUM SERPL-SCNC: 3.8 MMOL/L (ref 3.6–5.5)
RBC # BLD AUTO: 3.81 M/UL (ref 4.2–5.4)
SODIUM SERPL-SCNC: 135 MMOL/L (ref 135–145)
WBC # BLD AUTO: 6.2 K/UL (ref 4.8–10.8)

## 2021-01-27 PROCEDURE — 700102 HCHG RX REV CODE 250 W/ 637 OVERRIDE(OP): Performed by: SURGERY

## 2021-01-27 PROCEDURE — A9270 NON-COVERED ITEM OR SERVICE: HCPCS | Performed by: SURGERY

## 2021-01-27 PROCEDURE — 700111 HCHG RX REV CODE 636 W/ 250 OVERRIDE (IP): Performed by: SURGERY

## 2021-01-27 PROCEDURE — 770006 HCHG ROOM/CARE - MED/SURG/GYN SEMI*

## 2021-01-27 PROCEDURE — 36415 COLL VENOUS BLD VENIPUNCTURE: CPT

## 2021-01-27 PROCEDURE — 700105 HCHG RX REV CODE 258: Performed by: SURGERY

## 2021-01-27 PROCEDURE — 80048 BASIC METABOLIC PNL TOTAL CA: CPT

## 2021-01-27 PROCEDURE — 85027 COMPLETE CBC AUTOMATED: CPT

## 2021-01-27 RX ADMIN — AMPICILLIN AND SULBACTAM 3 G: 1; 2 INJECTION, POWDER, FOR SOLUTION INTRAMUSCULAR; INTRAVENOUS at 17:26

## 2021-01-27 RX ADMIN — FENTANYL CITRATE 50 MCG: 50 INJECTION, SOLUTION INTRAMUSCULAR; INTRAVENOUS at 21:37

## 2021-01-27 RX ADMIN — ACETAMINOPHEN 1000 MG: 500 TABLET ORAL at 06:14

## 2021-01-27 RX ADMIN — AMPICILLIN AND SULBACTAM 3 G: 1; 2 INJECTION, POWDER, FOR SOLUTION INTRAMUSCULAR; INTRAVENOUS at 12:23

## 2021-01-27 RX ADMIN — METFORMIN HYDROCHLORIDE 750 MG: 750 TABLET ORAL at 07:32

## 2021-01-27 RX ADMIN — OMEPRAZOLE 20 MG: 20 CAPSULE, DELAYED RELEASE ORAL at 06:14

## 2021-01-27 RX ADMIN — MONTELUKAST 10 MG: 10 TABLET, FILM COATED ORAL at 06:14

## 2021-01-27 RX ADMIN — AMPICILLIN AND SULBACTAM 3 G: 1; 2 INJECTION, POWDER, FOR SOLUTION INTRAMUSCULAR; INTRAVENOUS at 06:14

## 2021-01-27 RX ADMIN — FENTANYL CITRATE 50 MCG: 50 INJECTION, SOLUTION INTRAMUSCULAR; INTRAVENOUS at 13:35

## 2021-01-27 RX ADMIN — TRAZODONE HYDROCHLORIDE 300 MG: 150 TABLET ORAL at 22:35

## 2021-01-27 RX ADMIN — SUMATRIPTAN SUCCINATE 100 MG: 50 TABLET ORAL at 12:26

## 2021-01-27 RX ADMIN — OXYCODONE 5 MG: 5 TABLET ORAL at 10:21

## 2021-01-27 RX ADMIN — OXYCODONE 5 MG: 5 TABLET ORAL at 06:14

## 2021-01-27 RX ADMIN — OXYCODONE 5 MG: 5 TABLET ORAL at 17:26

## 2021-01-27 RX ADMIN — OXYCODONE 5 MG: 5 TABLET ORAL at 01:50

## 2021-01-27 RX ADMIN — ACETAMINOPHEN 1000 MG: 500 TABLET ORAL at 17:26

## 2021-01-27 RX ADMIN — ENOXAPARIN SODIUM 40 MG: 40 INJECTION SUBCUTANEOUS at 06:14

## 2021-01-27 RX ADMIN — ONDANSETRON 4 MG: 2 INJECTION INTRAMUSCULAR; INTRAVENOUS at 21:37

## 2021-01-27 ASSESSMENT — PAIN DESCRIPTION - PAIN TYPE
TYPE: SURGICAL PAIN

## 2021-01-27 NOTE — PROGRESS NOTES
Surgery  POD#1  C/o pain  Tolerating diet  Wound vac in place  Add prn fentanyl  Mobilize  Possible d/c tomorrow after wound vac change

## 2021-01-27 NOTE — DIETARY
NUTRITION SERVICES: BMI - Pt with BMI >40 (=Body mass index is 45.34 kg/m².), morbid obesity. Weight loss counseling not appropriate in acute care setting.    RECOMMEND - Referral to outpatient nutrition services for weight management after D/C as appropriate.

## 2021-01-27 NOTE — CARE PLAN
Problem: Venous Thromboembolism (VTW)/Deep Vein Thrombosis (DVT) Prevention:  Goal: Patient will participate in Venous Thrombosis (VTE)/Deep Vein Thrombosis (DVT)Prevention Measures  Outcome: PROGRESSING AS EXPECTED  Flowsheets (Taken 1/27/2021 0837)  Pharmacologic Prophylaxis Used: LMWH: Enoxaparin(Lovenox)     Problem: Pain Management  Goal: Pain level will decrease to patient's comfort goal  Outcome: PROGRESSING SLOWER THAN EXPECTED  Note: Pain uncontrolled with current regimen. MD aware. Ice packs and pillows used for comfort.

## 2021-01-27 NOTE — PROGRESS NOTES
Bedside report received. Assessment completed.  Pt is A&O x4. Pt on room air.   Medicating for pain PRN per MAR Pain 7/10  Denies nausea.  Bilateral feet numbness/tingling.  MLI with wound vac and staples    Last BM PTA . -flatus,   +void.  Regular diet. Tolerates well.   Pt up self.  Call light and belongings within reach. All needs met at this time. Fall Precautions and hourly rounding in place.

## 2021-01-27 NOTE — WOUND TEAM
Wound consult received for NPWT to abdomen. Wound team to begin first dressing change at bedside on 1/28/21

## 2021-01-27 NOTE — PROGRESS NOTES
Report received from day shift RN, assumed Care.   Patient is AOx4, responds appropriately.      Pain controlled at this time.  Patient is tolerating regular diet, denies nausea/vomiting. + flatus.   Up self with steady gait.    Plan of care discussed, all questions answered.    Educated on use of call light and importance of calling when assistance is required. Pt verbalizes understanding.    Call light and belongings within reach, treaded slipper socks on, bed in lowest locked position.  All needs met at this time.

## 2021-01-27 NOTE — CARE PLAN
Problem: Fluid Volume:  Goal: Will maintain balanced intake and output  Outcome: PROGRESSING AS EXPECTED     Problem: Safety  Goal: Will remain free from falls  Outcome: PROGRESSING AS EXPECTED

## 2021-01-28 VITALS
RESPIRATION RATE: 17 BRPM | WEIGHT: 272.49 LBS | BODY MASS INDEX: 45.4 KG/M2 | OXYGEN SATURATION: 91 % | SYSTOLIC BLOOD PRESSURE: 111 MMHG | DIASTOLIC BLOOD PRESSURE: 69 MMHG | HEIGHT: 65 IN | TEMPERATURE: 97.9 F | HEART RATE: 98 BPM

## 2021-01-28 PROCEDURE — 700102 HCHG RX REV CODE 250 W/ 637 OVERRIDE(OP): Performed by: SURGERY

## 2021-01-28 PROCEDURE — 700105 HCHG RX REV CODE 258: Performed by: SURGERY

## 2021-01-28 PROCEDURE — A9270 NON-COVERED ITEM OR SERVICE: HCPCS | Performed by: SURGERY

## 2021-01-28 PROCEDURE — 700111 HCHG RX REV CODE 636 W/ 250 OVERRIDE (IP): Performed by: SURGERY

## 2021-01-28 PROCEDURE — 97606 NEG PRS WND THER DME>50 SQCM: CPT

## 2021-01-28 RX ORDER — OXYCODONE HYDROCHLORIDE 5 MG/1
5 TABLET ORAL EVERY 4 HOURS PRN
Qty: 20 TAB | Refills: 0 | Status: SHIPPED | OUTPATIENT
Start: 2021-01-28 | End: 2021-02-02

## 2021-01-28 RX ADMIN — OXYCODONE 5 MG: 5 TABLET ORAL at 03:07

## 2021-01-28 RX ADMIN — FENTANYL CITRATE 50 MCG: 50 INJECTION, SOLUTION INTRAMUSCULAR; INTRAVENOUS at 10:23

## 2021-01-28 RX ADMIN — ACETAMINOPHEN 1000 MG: 500 TABLET ORAL at 05:44

## 2021-01-28 RX ADMIN — ACETAMINOPHEN 1000 MG: 500 TABLET ORAL at 01:23

## 2021-01-28 RX ADMIN — AMPICILLIN AND SULBACTAM 3 G: 1; 2 INJECTION, POWDER, FOR SOLUTION INTRAMUSCULAR; INTRAVENOUS at 05:45

## 2021-01-28 RX ADMIN — METFORMIN HYDROCHLORIDE 750 MG: 750 TABLET ORAL at 08:53

## 2021-01-28 RX ADMIN — OXYCODONE 5 MG: 5 TABLET ORAL at 09:52

## 2021-01-28 RX ADMIN — AMPICILLIN AND SULBACTAM 3 G: 1; 2 INJECTION, POWDER, FOR SOLUTION INTRAMUSCULAR; INTRAVENOUS at 01:23

## 2021-01-28 RX ADMIN — ENOXAPARIN SODIUM 40 MG: 40 INJECTION SUBCUTANEOUS at 05:43

## 2021-01-28 RX ADMIN — MONTELUKAST 10 MG: 10 TABLET, FILM COATED ORAL at 05:44

## 2021-01-28 RX ADMIN — OMEPRAZOLE 20 MG: 20 CAPSULE, DELAYED RELEASE ORAL at 05:44

## 2021-01-28 ASSESSMENT — PAIN DESCRIPTION - PAIN TYPE
TYPE: SURGICAL PAIN

## 2021-01-28 NOTE — DISCHARGE SUMMARY
Discharge Summary           Date of Admission:1/26/2021     Date of Discharge: 1/28/2021    Admission Diagnosis: Postoperative complex abdominal wound infection     [id number]     Discharge Diagnosis: Same    Procedures performed: Exploratory laparotomy with mesh extirpation and layered closure of the abdominal wall    Hospital Course: Patient underwent a difficult but uncomplicated mesh extirpation and negative pressure dressing placement.  She tolerated this well.  On postoperative day #1 she was still having significant pain.  On postoperative day #2 her pain was significantly improved.  She underwent a wound VAC change.  She was discharged home with home health.    Condition on discharge: Good    Follow up:   Dr. Russ in 14 days    Activity:  As tolerated    Discharge Medications:    Mikayla Knight   Home Medication Instructions BARBIE:[id number]    Printed on:01/28/21 3659   Medication Information                      acetaminophen (TYLENOL) 500 MG Tab  Take 1,000 mg by mouth 3 times a day as needed (HA).             albuterol 108 (90 Base) MCG/ACT Aero Soln inhalation aerosol  INHALE 2 PUFFS BY MOUTH EVERY 6 HOURS AS NEEDED FOR SHORTNESS OF BREATH             Diclofenac Sodium 1 % Gel  APPLY 2 GM UP TO 4 TIMES PER DAY IF NEEDED FOR BACK PAIN             fluticasone (FLONASE) 50 MCG/ACT nasal spray  Spray 2 Sprays in nose every day.             hydrOXYzine pamoate (VISTARIL) 25 MG Cap  Take 1 to 3 tablets by mouth once a day as need for anxiety or insomnia             metFORMIN ER (GLUCOPHAGE XR) 750 MG TABLET SR 24 HR  Take 1 Tab by mouth every day.             montelukast (SINGULAIR) 10 MG Tab  TAKE 1 TABLET BY MOUTH EVERY DAY             omeprazole (PRILOSEC) 20 MG delayed-release capsule  TAKE 1 CAP BY MOUTH EVERY DAY 30 MINUTES BEFORE FOOD OR DRINK, ON AN EMPTY STOMACH.             oxyCODONE immediate-release (ROXICODONE) 5 MG Tab  Take 1 Tab by mouth every four hours as needed for up to 5 days.              rizatriptan (MAXALT) 10 MG tablet  TAKE 1 TAB BY MOUTH ONCE AS NEEDED FOR MIGRAINE FOR UP TO 1 DOSE. MAY REPEAT IN 2 HOURS IF NEEDED             topiramate (TOPAMAX) 50 MG tablet  TAKE 1 TABLET BY MOUTH TWICE A DAY             traZODone (DESYREL) 100 MG Tab  TAKE 1 TO 3 TABLETS BY MOUTH AT BEDTIME AS NEEDED FOR INSOMNIA

## 2021-01-28 NOTE — DISCHARGE INSTRUCTIONS
Discharge Instructions    Discharged to home by car with relative. Discharged via wheelchair, hospital escort: Yes.  Special equipment needed: Wound VAC    Be sure to schedule a follow-up appointment with your primary care doctor or any specialists as instructed.     Discharge Plan:   Influenza Vaccine Indication: Not indicated: Previously immunized this influenza season and > 8 years of age    I understand that a diet low in cholesterol, fat, and sodium is recommended for good health. Unless I have been given specific instructions below for another diet, I accept this instruction as my diet prescription.   Other diet: regular    Special Instructions: None    · Is patient discharged on Warfarin / Coumadin?   No     Depression / Suicide Risk    As you are discharged from this [facility] facility, it is important to learn how to keep safe from harming yourself.    Recognize the warning signs:  · Abrupt changes in personality, positive or negative- including increase in energy   · Giving away possessions  · Change in eating patterns- significant weight changes-  positive or negative  · Change in sleeping patterns- unable to sleep or sleeping all the time   · Unwillingness or inability to communicate  · Depression  · Unusual sadness, discouragement and loneliness  · Talk of wanting to die  · Neglect of personal appearance   · Rebelliousness- reckless behavior  · Withdrawal from people/activities they love  · Confusion- inability to concentrate     If you or a loved one observes any of these behaviors or has concerns about self-harm, here's what you can do:  · Talk about it- your feelings and reasons for harming yourself  · Remove any means that you might use to hurt yourself (examples: pills, rope, extension cords, firearm)  · Get professional help from the community (Mental Health, Substance Abuse, psychological counseling)  · Do not be alone:Call your Safe Contact- someone whom you trust who will be there for  you.  · Call your local CRISIS HOTLINE 385-2678 or 698-982-9977  · Call your local Children's Mobile Crisis Response Team [place] (033) 205-0933 or www.Emailage  · Call the toll free National Suicide Prevention Hotlines   · National Suicide Prevention Lifeline 482-893-XRPD (1171)  · National Hope Line Network 800-SUICIDE (982-9514)      Negative Pressure Wound Therapy Home Guide  Negative pressure wound therapy (NPWT) uses a sponge or foam-like material (dressing) placed on or inside the wound. The wound is then covered and sealed with a cover dressing that sticks to your skin (is adhesive). This keeps air out. A tube is attached to the cover dressing, and this tube connects to a small pump. The pump sucks fluid and germs from the wound. NPWT helps to increase blood flow to the wound and heal it from the inside.  What are the risks?  NPWT is usually safe to use. However, problems can occur, including:  · Skin irritation from the dressing adhesive.  · Bleeding.  · Infection.  · Dehydration. Wounds with large amounts of drainage can cause excessive fluid loss.  · Pain.  Supplies needed:  · A disposable garbage bag.  · Soap and water, or hand .  · Wound cleanser or salt-water solution (saline).  · New sponge and cover dressing.  · Protective clothing.  · Gauze pad.  · Vinyl gloves.  · Tape.  · Skin protectant. This may be a wipe, film, or spray.  · Clean or germ-free (sterile) scissors.  · Eye protection.  How to change your dressing  Prepare to change your dressing    1. If told by your health care provider, take pain medicine 30 minutes before changing the dressing.  2. Wash your hands with soap and water. Dry your hands with a clean towel. If soap and water are not available, use hand .  3. Set up a clean station for wound care.  4. Open the dressing package so that the sponge dressing remains on the inside of the package.  5. Wear gloves, protective clothing, and eye  protection.  Remove old dressing    1. Turn off the pump and disconnect the tubing from the dressing.  2. Carefully remove the adhesive cover dressing in the direction of your hair growth.  3. Remove the sponge dressing that is inside the wound. If the sponge sticks, use a wound cleanser or saline solution to wet the sponge and help it come off more easily.  4. Throw the old sponge and cover dressing supplies into the garbage bag.  5. Remove your gloves by grabbing the cuff and turning the glove inside out. Place the gloves in the trash immediately.  6. Wash your hands with soap and water. Dry your hands with a clean towel. If soap and water are not available, use hand .  Clean your wound  · Wear gloves, protective clothing, and eye protection.  Follow your health care provider's instructions on how to clean your wound. You may be told to:  1. Clean the wound using a saline solution or a wound cleanser and a clean gauze pad.  2. Pat the wound dry with a gauze pad. Do not rub the wound.  3. Throw the gauze pad into the garbage bag.  4. Remove your gloves by grabbing the cuff and turning the glove inside out. Place the gloves in the trash immediately.  5. Wash your hands with soap and water. Dry your hands with a clean towel. If soap and water are not available, use hand .  Apply new dressing  · Wear gloves, protective clothing, and eye protection.  1. If told by your health care provider, apply a skin protectant to any skin that will be exposed to adhesive. Let the skin protectant dry.  2. Cut a piece of new sponge dressing and put it on or in the wound.  3. Using clean scissors, cut a nickel-sized hole in the new cover dressing.  4. Apply the cover dressing.  5. Attach the suction tube over the hole in the cover dressing.  6. Take off your gloves. Put them in the plastic bag with the old dressing. Tie the bag shut and throw it away.  7. Wash your hands with soap and water. Dry your hands with a  clean towel. If soap and water are not available, use hand .  8. Turn the pump back on. The sponge dressing should collapse. Do not change the settings on the machine without talking to a health care provider.  9. Replace the container in the pump that collects fluid if it is full. Replace the container per the 's instructions or at least once a week, even if it is not full.  General tips and recommendations  If the alarm sounds:  · Stay calm.  · Do not turn off the pump or do anything with the dressing.  · Reasons the alarm may go off:  ? The battery is low. Change the battery or plug the device into electrical power.  ? The dressing has a leak. Find the leak and put tape over the leak.  ? The fluid collection container is full. Change the fluid container.  · Call your health care provider right away if you cannot fix the problem.  · Explain to your health care provider what is happening. Follow his or her instructions.  General instructions  · Do not turn off the pump unless told to do so by your health care provider.  · Do not turn off the pump for more than 2 hours. If the pump is off for more than 2 hours, the dressing will need to be changed.  · If your health care provider says it is okay to shower:  ? Do not take the pump into the shower.  ? Make sure the wound dressing is protected and sealed. The wound dressing must stay dry.  · Check frequently that the machine indicates that therapy is on and that all clamps are open.  · Do not use over-the-counter medicated or antiseptic creams, sprays, liquids, or dressings unless your health care provider approves.  Contact a health care provider if:  · You have new pain.  · You develop irritation, a rash, or itching around the wound or dressing.  · You see new black or yellow tissue in your wound.  · The dressing changes are painful or cause bleeding.  · The pump has been off for more than 2 hours, and you do not know how to change the  dressing.  · The pump alarm goes off, and you do not know what to do.  Get help right away if:  · You have a lot of bleeding.  · The wound breaks open.  · You have severe pain.  · You have signs of infection, such as:  ? More redness, swelling, or pain.  ? More fluid or blood.  ? Warmth.  ? Pus or a bad smell.  ? Red streaks leading from the wound.  ? A fever.  · You see a sudden change in the color or texture of the drainage.  · You have signs of dehydration, such as:  ? Little or no tears, urine, or sweat.  ? Muscle cramps.  ? Very dry mouth.  ? Headache.  ? Dizziness.  Summary  · Negative pressure wound therapy (NPWT) is a device that helps your wound heal.  · Set up a clean station for wound care. Your health care provider will tell you what supplies to use.  · Follow your health care provider's instructions on how to clean your wound and how to change the dressing.  · Contact a health care provider if you have new pain, an irritation, or a rash, or if the alarm goes off and you do not know what to do.  · Get help right away if you have a lot of bleeding, your wound breaks open, or you have severe pain. Also, get help if you have signs of infection.  This information is not intended to replace advice given to you by your health care provider. Make sure you discuss any questions you have with your health care provider.  Document Released: 03/11/2013 Document Revised: 04/10/2020 Document Reviewed: 03/06/2020  [employer] Patient Education © 2020 [employer]

## 2021-01-28 NOTE — CARE PLAN
Problem: Pain Management  Goal: Pain level will decrease to patient's comfort goal  Outcome: PROGRESSING AS EXPECTED     Problem: Safety  Goal: Will remain free from injury  Outcome: PROGRESSING AS EXPECTED     Problem: Knowledge Deficit  Goal: Knowledge of disease process/condition, treatment plan, diagnostic tests, and medications will improve  Outcome: PROGRESSING AS EXPECTED

## 2021-01-28 NOTE — DISCHARGE PLANNING
Anticipated Discharge Disposition:   Home    Action:   This RN CM is following the case and plan is to discharge Pt to Home without Psychosocial needs    Barriers to Discharge:   None    Plan:   Will assist as needed

## 2021-01-28 NOTE — PROGRESS NOTES
Bedside report received.  Assessment complete.  A&O x 4. Patient calls appropriately.  Patient ambulates with standby assist.    Patient has 4/10 pain. Pain managed with prescribed medications. Currently declines any interventions.  Denies N&V. Tolerating diet.  Surgical midline with staples and vac in place. CDI, no leaks noted at this time.  + void, + flatus, - BM.  Patient denies SOB.  Patient pleasant with staff and resting in bed.  Review plan with of care with patient. Call light and personal belongings with in reach. Hourly rounding in place. All needs met at this time.   O-Z Plasty Text: The defect edges were debeveled with a #15 scalpel blade.  Given the location of the defect, shape of the defect and the proximity to free margins an O-Z plasty (double transposition flap) was deemed most appropriate.  Using a sterile surgical marker, the appropriate transposition flaps were drawn incorporating the defect and placing the expected incisions within the relaxed skin tension lines where possible.    The area thus outlined was incised deep to adipose tissue with a #15 scalpel blade.  The skin margins were undermined to an appropriate distance in all directions utilizing iris scissors.  Hemostasis was achieved with electrocautery.  The flaps were then transposed into place, one clockwise and the other counterclockwise, and anchored with interrupted buried subcutaneous sutures.

## 2021-01-28 NOTE — PROGRESS NOTES
Discharging Patient home per physician order.  Discharged with .  Demonstrated understanding of discharge instructions, follow up appointments, home medications, prescriptions, home care for surgical wound, and nursing care instructions for wound vac.  Ambulating with no assistance, voiding without difficulty, pain well controlled, tolerating oral medications, oxygen saturation greater than 90% , tolerating diet.   Educational handouts for wound vacs given and discussed.  Verbalized understanding of discharge instructions and educational handouts.  All questions answered.  Belongings with patient at time of discharge.

## 2021-01-28 NOTE — WOUND TEAM
[facility]  Inpatient Services  Initial Wound and Skin Care Evaluation    Admission Date: 1/26/2021     Last order of IP CONSULT TO WOUND CARE was found on 1/26/2021 from Hospital Encounter on 1/15/2021     HPI, PMH, SH: Reviewed    Past Surgical History:   Procedure Laterality Date   • PB EXPLORATORY OF ABDOMEN  1/26/2021    Procedure: LAPAROTOMY, EXPLORATORY - LYSIS OF ADHESIONS;  Surgeon: Param Russ M.D.;  Location: SURGERY [facility];  Service: General   • IRRIGATION & DEBRIDEMENT GENERAL  1/26/2021    Procedure: IRRIGATION AND DEBRIDEMENT, WOUND - ABDOMINAL WALL WITH REMOVAL OF MESH;  Surgeon: Param Russ M.D.;  Location: SURGERY [facility];  Service: General   • APPLICATION OR REPLACEMENT, WOUND VAC  1/26/2021    Procedure: APPLICATION OR REPLACEMENT,WOUND VAC;  Surgeon: Param Russ M.D.;  Location: SURGERY [facility];  Service: General   • IRRIGATION & DEBRIDEMENT GENERAL N/A 9/30/2020    Procedure: IRRIGATION AND DEBRIDEMENT, WOUND-ABD WALL, WOUND VAC PLACEMENT;  Surgeon: Param Russ M.D.;  Location: SURGERY [facility];  Service: General   • CATH PLACEMENT  1/25/2013    Performed by Lizeth Ferreira M.D. at SURGERY SAME DAY [facility]   • BREAST BIOPSY  1/9/2013    Performed by Lizeth Ferreira M.D. at SURGERY SAME DAY [facility]   • AXILLARY NODE DISSECTION  1/9/2013    Performed by Lizeth Ferreira M.D. at SURGERY SAME DAY [facility]   • NODE BIOPSY  1/9/2013    Performed by Lizeth Ferreira M.D. at SURGERY SAME DAY [facility]   • BREAST BIOPSY  12/11/2012    Performed by Lizeth Ferreira M.D. at SURGERY SAME DAY [facility]   • COLONOSCOPY  6/21/2012    Performed by PARAM VALDEZ at SURGERY [facility]   • GASTROSCOPY  6/21/2012    Performed by PARAM VALDEZ at [facility]   • ABDOMINAL EXPLORATION  10/7/2010    Performed by MARIA G KHAN JR at [facility]   • IRRIGATION & DEBRIDEMENT GENERAL  4/12/2010    Performed by  OZZIE WEINSTEIN at SURGERY [facility]   • HERNIA REPAIR  3/15/2010    Performed by MARIA G KHAN JR at SURGERY [facility]   • FLAP GRAFT  3/15/2010    Performed by MARIA G KHAN JR at SURGERY [facility]   • PANNICULECTOMY  3/15/2010    Performed by MARIA G KHAN JR at SURGERY [facility]   • ABDOMINAL EXPLORATION  3/11/2010    Performed by MARIA G KHAN JR at SURGERY SAME DAY [facility] OR[facility]   • OTHER      pulled mesh out of hernia   • OTHER      abscess removed abd.   • OTHER      bowel obstruction   • OTHER  2009    hernia repair,mesh removal after in aug.09   • GASTRIC BYPASS LAPAROSCOPIC     • OTHER      bariatric surgery gastric bypass   • GYN SURGERY      tubal     Social History     Tobacco Use   • Smoking status: Former Smoker     Packs/day: 0.10     Years: 8.00     Pack years: 0.80     Types: Cigarettes     Quit date: 2016     Years since quittin.7   • Smokeless tobacco: Never Used   • Tobacco comment: 1/2 pk a day on and off 10 yrs, 1 cigarette daily   Substance Use Topics   • Alcohol use: Yes     Frequency: Monthly or less     Drinks per session: 1 or 2     Binge frequency: Never     Comment: 1-2 times a year     No chief complaint on file.    Diagnosis: Recurrent ventral hernia [K43.2]  Status post repair of complex wound [Z98.890]    Unit where seen by Wound Team: T403/01     WOUND CONSULT/FOLLOW UP RELATED TO: First NPWT change post surgery 21     WOUND HISTORY:  Patient is a 60-year-old female status post abdominal wall reconstruction and mesh removal approximately 10 years ago. She presented in 2020 with a abdominal wall wound infection. This was drained and a VAC was placed. A sinus developed and it never healed.    WOUND ASSESSMENT/LDA  Negative Pressure Wound Therapy 20 Surgical Abdomen Medial (Active)   NPWT Pump Mode / Pressure Setting Ulta;Continuous;125 mmHg 21 1030   Dressing Type Black Foam  (Regular);Medium 01/28/21 1030   Number of Foam Pieces Used 1 01/28/21 1030   Canister Changed No 01/28/21 1030   Output (mL) 0 mL 10/02/20 1703   NEXT Dressing Change/Treatment Date 01/30/21 01/28/21 1030     Wound 01/26/21 Incision Abdomen WOUND VAC (Active)   Wound Image   01/28/21 1030   Site Assessment Red;Yellow;Pink 01/28/21 1030   Periwound Assessment Red 01/28/21 1030   Margins Attached edges 01/28/21 1030   Closure Other (Comment) 01/27/21 1936   Drainage Amount Moderate 01/28/21 1030   Drainage Description Serosanguineous 01/28/21 1030   Treatments Cleansed;Site care 01/28/21 1030   Wound Cleansing Approved Wound Cleanser 01/28/21 1030   Periwound Protectant Skin Protectant Wipes to Periwound;Mepitel;Drape 01/28/21 1030   Dressing Cleansing/Solutions Not Applicable 01/28/21 1030   Dressing Options Mepitel One;Wound Vac 01/28/21 1030   Dressing Changed Changed 01/28/21 1030   Dressing Status Clean;Dry;Intact 01/28/21 1030   Dressing Change/Treatment Frequency By Wound Team Only 01/28/21 1030   Non-staged Wound Description Full thickness 01/28/21 1030   Wound Length (cm) 12.6 cm 01/28/21 1030   Wound Width (cm) 8 cm 01/28/21 1030   Wound Depth (cm) 1.2 cm 01/28/21 1030   Wound Surface Area (cm^2) 100.8 cm^2 01/28/21 1030   Wound Volume (cm^3) 120.96 cm^3 01/28/21 1030   Tunneling (cm) 0 cm 01/28/21 1030   Undermining (cm) 0 cm 01/28/21 1030   Wound Odor None 01/28/21 1030   Pulses N/A 01/28/21 1030   Exposed Structures Sutures 01/28/21 1030   WOUND NURSE ONLY - Time Spent with Patient (mins) 60 01/28/21 1030     Vascular:    PETE:   No results found.    Lab Values:    Lab Results   Component Value Date/Time    WBC 6.2 01/27/2021 03:59 AM    RBC 3.81 (L) 01/27/2021 03:59 AM    HEMOGLOBIN 11.3 (L) 01/27/2021 03:59 AM    HEMATOCRIT 36.2 (L) 01/27/2021 03:59 AM    CREACTPROT 0.70 03/17/2016 01:31 PM    YOEL 12 12/19/2016 11:23 AM    HBA1C 6.8 (H) 01/20/2021 12:13 PM      Culture Results show:  No results  found for this or any previous visit (from the past 720 hour(s)).    Pain Level/Medicated: Pt very anxious prior to dressing change. Was medicated by primary RN with IV med.        INTERVENTIONS BY WOUND TEAM:  Chart and images reviewed. Discussed with bedside RN. This RN in to assess patient. Performed standard wound care which includes appropriate positioning, dressing removal and non-selective debridement.     Preparation for Dressing removal: Dressing soaked with 4 syringes of normal saline.   Cleansed with:  Wound cleanser and gauze.  Sharp debridement: None  Demi wound: Cleansed with wound cleanser and gauze, Prepped with no sting skin prep, mepitel one over staples and along right side of wound as patient reports this is a tender place when vac removed.  Primary Dressing: Wound vac   Secondary (Outer) Dressing: Drape    Interdisciplinary consultation: Patient, Bedside RN (Yajaira), Lou Wound care tech    EVALUATION / RATIONALE FOR TREATMENT:  Most Recent Date:  1/28/21: Wound with no s/s of infection. Several sutures visible in wound bed.      Goals: Steady decrease in wound area and depth weekly.    WOUND TEAM PLAN OF CARE ([X] for frequency of wound follow up,): Patient is discharging today so no follow up from us; she will have [facility] home care for vac changes as she lives in [place].     Dressing changes by wound team:                   Follow up 3 times weekly:                NPWT change 3 times weekly:     Follow up 1-2 times weekly:      Follow up Bi-Monthly:                   Follow up as needed:     Other (explain): X    Anticipated discharge plans:   LTACH:        SNF/Rehab:                  Home Health Care:  X         Outpatient Wound Center:            Self/Family Care:        Other:

## 2021-01-29 ENCOUNTER — TELEPHONE (OUTPATIENT)
Dept: URGENT CARE | Facility: PHYSICIAN GROUP | Age: 61
End: 2021-01-29

## 2021-01-29 NOTE — TELEPHONE ENCOUNTER
1. Caller Name: [facility]                        Call Back Number: [phone]        How would the patient prefer to be contacted with a response: Phone call OK to leave a detailed message    Elena called from [facility] stating that Mikayla was discharged from the hospital with no resumption orders for . She was wondering if she could get a verbal order or if a new order could be sent to [facility] .     Thank you!

## 2021-01-31 LAB
BACTERIA SPEC ANAEROBE CULT: NORMAL
SIGNIFICANT IND 70042: NORMAL
SITE SITE: NORMAL
SOURCE SOURCE: NORMAL

## 2021-02-08 RX ORDER — TRAZODONE HYDROCHLORIDE 100 MG/1
TABLET ORAL
Qty: 90 TAB | Refills: 2 | Status: SHIPPED | OUTPATIENT
Start: 2021-02-08 | End: 2021-05-28 | Stop reason: SDUPTHER

## 2021-02-11 ENCOUNTER — PATIENT MESSAGE (OUTPATIENT)
Dept: MEDICAL GROUP | Facility: PHYSICIAN GROUP | Age: 61
End: 2021-02-11

## 2021-02-11 DIAGNOSIS — E11.9 TYPE 2 DIABETES MELLITUS WITHOUT COMPLICATION, WITHOUT LONG-TERM CURRENT USE OF INSULIN (HCC): ICD-10-CM

## 2021-02-11 RX ORDER — METFORMIN HYDROCHLORIDE 750 MG/1
750 TABLET, EXTENDED RELEASE ORAL
Qty: 90 TABLET | Refills: 1 | Status: SHIPPED | OUTPATIENT
Start: 2021-02-11 | End: 2021-02-22 | Stop reason: RX

## 2021-02-11 NOTE — PATIENT COMMUNICATION
Received request via: Patient    Was the patient seen in the last year in this department? Yes    Does the patient have an active prescription (recently filled or refills available) for medication(s) requested? No      Patient needs her diclofenac gel refilled also

## 2021-02-20 ENCOUNTER — PATIENT MESSAGE (OUTPATIENT)
Dept: MEDICAL GROUP | Facility: PHYSICIAN GROUP | Age: 61
End: 2021-02-20

## 2021-02-20 DIAGNOSIS — E11.9 TYPE 2 DIABETES MELLITUS WITHOUT COMPLICATION, WITHOUT LONG-TERM CURRENT USE OF INSULIN (HCC): ICD-10-CM

## 2021-02-22 NOTE — PROGRESS NOTES
Metformin ER recalled.  Will change to immediate release Metformin 500 mg twice a day.  Patient notified via [employer]. Recent a1c 6.8

## 2021-02-22 NOTE — TELEPHONE ENCOUNTER
From: Mikayla Knight  To: Nurse Practicioner Sharonda Esquivel  Sent: 2/20/2021 2:59 PM PST  Subject: Prescription Question    Hi!   I recieved two of the three scripts, but I didn't receive my Metformin. I'm running low on it at this time. Would you mind resending the script refill for it?  Thank You.      ----- Message -----   From:Nurse Practicionejersey Esquivel   Sent:2/11/2021 5:12 PM PST   To:Mikayla Knight   Subject:RE: Prescription Question    Hi Mikayla,  Hope you're feeling better!  Refill prescriptions have been sent.  Cherie Hagan      ----- Message -----   From:Mikayla Knight   Sent:2/11/2021 4:22 PM PST   To:Medical Assistant Leona FRIAS   Subject:RE: Prescription Question      Thank You.       ----- Message -----   From:Medical Assistant Leona FRIAS   Sent:2/11/2021 3:21 PM PST   To:Mikayla Knight   Subject:RE: Prescription Question    A request has been submitted.       ----- Message -----   From:Mikayla Knight   Sent:2/11/2021 1:52 PM PST   To:Nurse Cecilyionejersey Esquivel   Subject:Prescription Question    Hi.  I'm needing a refill on my Metformin and Diclofenac, please.   Thank You.

## 2021-03-29 ENCOUNTER — PRE-ADMISSION TESTING (OUTPATIENT)
Dept: ADMISSIONS | Facility: MEDICAL CENTER | Age: 61
End: 2021-03-29
Attending: SURGERY
Payer: COMMERCIAL

## 2021-03-29 DIAGNOSIS — Z01.812 PRE-OPERATIVE LABORATORY EXAMINATION: ICD-10-CM

## 2021-03-29 LAB
ANION GAP SERPL CALC-SCNC: 9 MMOL/L (ref 7–16)
BUN SERPL-MCNC: 12 MG/DL (ref 8–22)
CALCIUM SERPL-MCNC: 9 MG/DL (ref 8.4–10.2)
CHLORIDE SERPL-SCNC: 106 MMOL/L (ref 96–112)
CO2 SERPL-SCNC: 22 MMOL/L (ref 20–33)
CREAT SERPL-MCNC: 0.73 MG/DL (ref 0.5–1.4)
ERYTHROCYTE [DISTWIDTH] IN BLOOD BY AUTOMATED COUNT: 47.1 FL (ref 35.9–50)
GLUCOSE SERPL-MCNC: 132 MG/DL (ref 65–99)
HCT VFR BLD AUTO: 39.4 % (ref 37–47)
HGB BLD-MCNC: 12.1 G/DL (ref 12–16)
MCH RBC QN AUTO: 28.1 PG (ref 27–33)
MCHC RBC AUTO-ENTMCNC: 30.7 G/DL (ref 33.6–35)
MCV RBC AUTO: 91.4 FL (ref 81.4–97.8)
PLATELET # BLD AUTO: 327 K/UL (ref 164–446)
PMV BLD AUTO: 9.2 FL (ref 9–12.9)
POTASSIUM SERPL-SCNC: 4.1 MMOL/L (ref 3.6–5.5)
RBC # BLD AUTO: 4.31 M/UL (ref 4.2–5.4)
SODIUM SERPL-SCNC: 137 MMOL/L (ref 135–145)
WBC # BLD AUTO: 6.6 K/UL (ref 4.8–10.8)

## 2021-03-29 PROCEDURE — U0003 INFECTIOUS AGENT DETECTION BY NUCLEIC ACID (DNA OR RNA); SEVERE ACUTE RESPIRATORY SYNDROME CORONAVIRUS 2 (SARS-COV-2) (CORONAVIRUS DISEASE [COVID-19]), AMPLIFIED PROBE TECHNIQUE, MAKING USE OF HIGH THROUGHPUT TECHNOLOGIES AS DESCRIBED BY CMS-2020-01-R: HCPCS

## 2021-03-29 PROCEDURE — C9803 HOPD COVID-19 SPEC COLLECT: HCPCS

## 2021-03-29 PROCEDURE — 80048 BASIC METABOLIC PNL TOTAL CA: CPT

## 2021-03-29 PROCEDURE — 85027 COMPLETE CBC AUTOMATED: CPT

## 2021-03-29 PROCEDURE — 36415 COLL VENOUS BLD VENIPUNCTURE: CPT

## 2021-03-29 PROCEDURE — U0005 INFEC AGEN DETEC AMPLI PROBE: HCPCS

## 2021-03-29 RX ORDER — METFORMIN HYDROCHLORIDE 500 MG/1
500 TABLET, EXTENDED RELEASE ORAL 2 TIMES DAILY
COMMUNITY
End: 2021-04-07

## 2021-03-29 RX ORDER — FLUTICASONE PROPIONATE 50 MCG
1 SPRAY, SUSPENSION (ML) NASAL PRN
COMMUNITY
End: 2022-07-29

## 2021-03-29 ASSESSMENT — FIBROSIS 4 INDEX: FIB4 SCORE: 0.95

## 2021-03-29 NOTE — PREPROCEDURE INSTRUCTIONS
Pre admit apt: Pt. Instructed to continue regularly prescribed medications through day before surgery.  Instructed to take the following medications, the day of surgery, with a sip of water per anesthesia protocol:tylenol, flonase omeprazole    Covid test 3/29 pt given written and verbal instructions to self isolate and to notify surgeon if develops any new sxs of covid/ illness

## 2021-03-30 LAB
SARS-COV-2 RNA RESP QL NAA+PROBE: NOTDETECTED
SPECIMEN SOURCE: NORMAL

## 2021-03-30 NOTE — OR NURSING
OK to proceed. Thank you.  Petty Ray M.D.  [facility]      On Mar 29, 2021, at 17:22, SMPreadmit <[email or web address]> wrote:  ?   Alexandra GARCIA, sorry, forgot to add pt BMI 42.23, also she is having a delayed primary closure of abdominal wall with advancement flap with Dr. Russ     From: Amparo Mack <[email or web address]>   Sent: Monday, March 29, 2021 3:23 PM  To: Petty Ray <[email or web address]>  Cc: [email or web address]>  Subject: 3/31, Dr. Russ, Delayed primary closure of abdominal wall with advancement flap BMI-42.23       Alexandra GARCIA, pt has diabetes and hx of pulmonary hypertension from 2012.  When I questioned the pt about this, she said she has not had any pulmonary function test, unless it was related to her pneumonia in 2012.  She has had a sleep apnea test, but states she was not diagnosed with DARIANA.  I tried to look for some pulmonary notes, but unsuccessful.  Anesthesia Summary Report           Patient Name: Mikayla Knight MRN: [id number] Admission Date: Patient not admitted   Allergies: Shrimp (Diagnostic)

## 2021-03-30 NOTE — OR NURSING
Alexandra GARCIA, pt has diabetes and hx of pulmonary hypertension from 2012.  When I questioned the pt about this, she said she has not had any pulmonary function test, unless it was related to her pneumonia in 2012.  She has had a sleep apnea test, but states she was not diagnosed with DARIANA.  I tried to look for some pulmonary notes, but unsuccessful.  Anesthesia Summary Report           Patient Name: Mikayla Knight MRN: [id number] Admission Date: Patient not admitted   Allergies: Shrimp (Diagnostic)      60 y.o. / Female (1960)  Mikayla Knight MRN:[id number] (CSN:[id number]) (60 y.o. F)

## 2021-03-31 ENCOUNTER — HOSPITAL ENCOUNTER (OUTPATIENT)
Facility: MEDICAL CENTER | Age: 61
End: 2021-03-31
Attending: SURGERY | Admitting: SURGERY
Payer: COMMERCIAL

## 2021-03-31 ENCOUNTER — ANESTHESIA EVENT (OUTPATIENT)
Dept: SURGERY | Facility: MEDICAL CENTER | Age: 61
End: 2021-03-31
Payer: COMMERCIAL

## 2021-03-31 ENCOUNTER — ANESTHESIA (OUTPATIENT)
Dept: SURGERY | Facility: MEDICAL CENTER | Age: 61
End: 2021-03-31
Payer: COMMERCIAL

## 2021-03-31 VITALS
OXYGEN SATURATION: 92 % | WEIGHT: 255.07 LBS | BODY MASS INDEX: 42.5 KG/M2 | SYSTOLIC BLOOD PRESSURE: 110 MMHG | HEIGHT: 65 IN | RESPIRATION RATE: 18 BRPM | HEART RATE: 65 BPM | TEMPERATURE: 97.7 F | DIASTOLIC BLOOD PRESSURE: 57 MMHG

## 2021-03-31 DIAGNOSIS — G89.18 POST-OPERATIVE PAIN: ICD-10-CM

## 2021-03-31 LAB — GLUCOSE BLD-MCNC: 112 MG/DL (ref 65–99)

## 2021-03-31 PROCEDURE — 700105 HCHG RX REV CODE 258: Performed by: SURGERY

## 2021-03-31 PROCEDURE — A6402 STERILE GAUZE <= 16 SQ IN: HCPCS | Performed by: SURGERY

## 2021-03-31 PROCEDURE — 500368 HCHG DRAIN, 7MM FLAT-FLUTED: Performed by: SURGERY

## 2021-03-31 PROCEDURE — 160031 HCHG SURGERY MINUTES - 1ST 30 MINS LEVEL 5: Performed by: SURGERY

## 2021-03-31 PROCEDURE — 160035 HCHG PACU - 1ST 60 MINS PHASE I: Performed by: SURGERY

## 2021-03-31 PROCEDURE — 160036 HCHG PACU - EA ADDL 30 MINS PHASE I: Performed by: SURGERY

## 2021-03-31 PROCEDURE — 700101 HCHG RX REV CODE 250: Performed by: SURGERY

## 2021-03-31 PROCEDURE — 160046 HCHG PACU - 1ST 60 MINS PHASE II: Performed by: SURGERY

## 2021-03-31 PROCEDURE — 700105 HCHG RX REV CODE 258: Performed by: ANESTHESIOLOGY

## 2021-03-31 PROCEDURE — 700111 HCHG RX REV CODE 636 W/ 250 OVERRIDE (IP): Performed by: ANESTHESIOLOGY

## 2021-03-31 PROCEDURE — 700101 HCHG RX REV CODE 250: Performed by: ANESTHESIOLOGY

## 2021-03-31 PROCEDURE — 160025 RECOVERY II MINUTES (STATS): Performed by: SURGERY

## 2021-03-31 PROCEDURE — 160042 HCHG SURGERY MINUTES - EA ADDL 1 MIN LEVEL 5: Performed by: SURGERY

## 2021-03-31 PROCEDURE — 700102 HCHG RX REV CODE 250 W/ 637 OVERRIDE(OP): Performed by: ANESTHESIOLOGY

## 2021-03-31 PROCEDURE — A9270 NON-COVERED ITEM OR SERVICE: HCPCS | Performed by: ANESTHESIOLOGY

## 2021-03-31 PROCEDURE — A6223 GAUZE >16<=48 NO W/SAL W/O B: HCPCS | Performed by: SURGERY

## 2021-03-31 PROCEDURE — 160009 HCHG ANES TIME/MIN: Performed by: SURGERY

## 2021-03-31 PROCEDURE — 160048 HCHG OR STATISTICAL LEVEL 1-5: Performed by: SURGERY

## 2021-03-31 PROCEDURE — 160002 HCHG RECOVERY MINUTES (STAT): Performed by: SURGERY

## 2021-03-31 PROCEDURE — 82962 GLUCOSE BLOOD TEST: CPT

## 2021-03-31 RX ORDER — OXYCODONE HCL 5 MG/5 ML
10 SOLUTION, ORAL ORAL
Status: COMPLETED | OUTPATIENT
Start: 2021-03-31 | End: 2021-03-31

## 2021-03-31 RX ORDER — HYDROMORPHONE HYDROCHLORIDE 1 MG/ML
0.4 INJECTION, SOLUTION INTRAMUSCULAR; INTRAVENOUS; SUBCUTANEOUS
Status: DISCONTINUED | OUTPATIENT
Start: 2021-03-31 | End: 2021-03-31 | Stop reason: HOSPADM

## 2021-03-31 RX ORDER — GLYCOPYRROLATE 0.2 MG/ML
INJECTION INTRAMUSCULAR; INTRAVENOUS PRN
Status: DISCONTINUED | OUTPATIENT
Start: 2021-03-31 | End: 2021-03-31 | Stop reason: SURG

## 2021-03-31 RX ORDER — CELECOXIB 200 MG/1
200 CAPSULE ORAL ONCE
Status: COMPLETED | OUTPATIENT
Start: 2021-03-31 | End: 2021-03-31

## 2021-03-31 RX ORDER — ONDANSETRON 2 MG/ML
INJECTION INTRAMUSCULAR; INTRAVENOUS PRN
Status: DISCONTINUED | OUTPATIENT
Start: 2021-03-31 | End: 2021-03-31 | Stop reason: SURG

## 2021-03-31 RX ORDER — DIPHENHYDRAMINE HYDROCHLORIDE 50 MG/ML
12.5 INJECTION INTRAMUSCULAR; INTRAVENOUS
Status: DISCONTINUED | OUTPATIENT
Start: 2021-03-31 | End: 2021-03-31 | Stop reason: HOSPADM

## 2021-03-31 RX ORDER — ONDANSETRON 2 MG/ML
4 INJECTION INTRAMUSCULAR; INTRAVENOUS
Status: COMPLETED | OUTPATIENT
Start: 2021-03-31 | End: 2021-03-31

## 2021-03-31 RX ORDER — PHENYLEPHRINE HCL IN 0.9% NACL 0.5 MG/5ML
SYRINGE (ML) INTRAVENOUS PRN
Status: DISCONTINUED | OUTPATIENT
Start: 2021-03-31 | End: 2021-03-31 | Stop reason: SURG

## 2021-03-31 RX ORDER — OXYCODONE HYDROCHLORIDE 5 MG/1
5 TABLET ORAL EVERY 4 HOURS PRN
Qty: 20 TABLET | Refills: 0 | Status: SHIPPED | OUTPATIENT
Start: 2021-03-31 | End: 2021-04-06

## 2021-03-31 RX ORDER — HYDROMORPHONE HYDROCHLORIDE 1 MG/ML
0.2 INJECTION, SOLUTION INTRAMUSCULAR; INTRAVENOUS; SUBCUTANEOUS
Status: DISCONTINUED | OUTPATIENT
Start: 2021-03-31 | End: 2021-03-31 | Stop reason: HOSPADM

## 2021-03-31 RX ORDER — SUCCINYLCHOLINE/SOD CL,ISO/PF 200MG/10ML
SYRINGE (ML) INTRAVENOUS PRN
Status: DISCONTINUED | OUTPATIENT
Start: 2021-03-31 | End: 2021-03-31 | Stop reason: SURG

## 2021-03-31 RX ORDER — SODIUM CHLORIDE, SODIUM LACTATE, POTASSIUM CHLORIDE, CALCIUM CHLORIDE 600; 310; 30; 20 MG/100ML; MG/100ML; MG/100ML; MG/100ML
INJECTION, SOLUTION INTRAVENOUS CONTINUOUS
Status: ACTIVE | OUTPATIENT
Start: 2021-03-31 | End: 2021-03-31

## 2021-03-31 RX ORDER — CEFAZOLIN SODIUM 1 G/3ML
INJECTION, POWDER, FOR SOLUTION INTRAMUSCULAR; INTRAVENOUS PRN
Status: DISCONTINUED | OUTPATIENT
Start: 2021-03-31 | End: 2021-03-31 | Stop reason: SURG

## 2021-03-31 RX ORDER — HALOPERIDOL 5 MG/ML
1 INJECTION INTRAMUSCULAR
Status: DISCONTINUED | OUTPATIENT
Start: 2021-03-31 | End: 2021-03-31 | Stop reason: HOSPADM

## 2021-03-31 RX ORDER — LIDOCAINE HYDROCHLORIDE 20 MG/ML
INJECTION, SOLUTION EPIDURAL; INFILTRATION; INTRACAUDAL; PERINEURAL PRN
Status: DISCONTINUED | OUTPATIENT
Start: 2021-03-31 | End: 2021-04-01 | Stop reason: HOSPADM

## 2021-03-31 RX ORDER — SODIUM CHLORIDE, SODIUM LACTATE, POTASSIUM CHLORIDE, CALCIUM CHLORIDE 600; 310; 30; 20 MG/100ML; MG/100ML; MG/100ML; MG/100ML
INJECTION, SOLUTION INTRAVENOUS CONTINUOUS
Status: DISCONTINUED | OUTPATIENT
Start: 2021-03-31 | End: 2021-03-31 | Stop reason: HOSPADM

## 2021-03-31 RX ORDER — HYDROMORPHONE HYDROCHLORIDE 1 MG/ML
0.1 INJECTION, SOLUTION INTRAMUSCULAR; INTRAVENOUS; SUBCUTANEOUS
Status: DISCONTINUED | OUTPATIENT
Start: 2021-03-31 | End: 2021-03-31 | Stop reason: HOSPADM

## 2021-03-31 RX ORDER — SODIUM CHLORIDE, SODIUM LACTATE, POTASSIUM CHLORIDE, CALCIUM CHLORIDE 600; 310; 30; 20 MG/100ML; MG/100ML; MG/100ML; MG/100ML
INJECTION, SOLUTION INTRAVENOUS
Status: DISCONTINUED | OUTPATIENT
Start: 2021-03-31 | End: 2021-03-31 | Stop reason: SURG

## 2021-03-31 RX ORDER — ACETAMINOPHEN 500 MG
1000 TABLET ORAL ONCE
Status: COMPLETED | OUTPATIENT
Start: 2021-03-31 | End: 2021-03-31

## 2021-03-31 RX ORDER — OXYCODONE HCL 5 MG/5 ML
5 SOLUTION, ORAL ORAL
Status: COMPLETED | OUTPATIENT
Start: 2021-03-31 | End: 2021-03-31

## 2021-03-31 RX ORDER — ROCURONIUM BROMIDE 10 MG/ML
INJECTION, SOLUTION INTRAVENOUS PRN
Status: DISCONTINUED | OUTPATIENT
Start: 2021-03-31 | End: 2021-03-31 | Stop reason: SURG

## 2021-03-31 RX ORDER — NEOSTIGMINE METHYLSULFATE 1 MG/ML
INJECTION, SOLUTION INTRAVENOUS PRN
Status: DISCONTINUED | OUTPATIENT
Start: 2021-03-31 | End: 2021-03-31 | Stop reason: SURG

## 2021-03-31 RX ADMIN — ROCURONIUM BROMIDE 20 MG: 10 INJECTION, SOLUTION INTRAVENOUS at 13:46

## 2021-03-31 RX ADMIN — SODIUM CHLORIDE, POTASSIUM CHLORIDE, SODIUM LACTATE AND CALCIUM CHLORIDE: 600; 310; 30; 20 INJECTION, SOLUTION INTRAVENOUS at 10:44

## 2021-03-31 RX ADMIN — WATER 15 ML: 100 IRRIGANT IRRIGATION at 10:45

## 2021-03-31 RX ADMIN — LIDOCAINE HYDROCHLORIDE 0.5 ML: 10 INJECTION, SOLUTION INFILTRATION; PERINEURAL at 10:45

## 2021-03-31 RX ADMIN — FENTANYL CITRATE 100 MCG: 50 INJECTION, SOLUTION INTRAMUSCULAR; INTRAVENOUS at 13:41

## 2021-03-31 RX ADMIN — ONDANSETRON 4 MG: 2 INJECTION INTRAMUSCULAR; INTRAVENOUS at 14:38

## 2021-03-31 RX ADMIN — LIDOCAINE HYDROCHLORIDE 100 MG: 20 INJECTION, SOLUTION EPIDURAL; INFILTRATION; INTRACAUDAL; PERINEURAL at 13:41

## 2021-03-31 RX ADMIN — PROPOFOL 30 MG: 10 INJECTION, EMULSION INTRAVENOUS at 14:24

## 2021-03-31 RX ADMIN — OXYCODONE HYDROCHLORIDE 10 MG: 5 SOLUTION ORAL at 14:55

## 2021-03-31 RX ADMIN — FENTANYL CITRATE 50 MCG: 50 INJECTION, SOLUTION INTRAMUSCULAR; INTRAVENOUS at 14:58

## 2021-03-31 RX ADMIN — FENTANYL CITRATE 50 MCG: 50 INJECTION, SOLUTION INTRAMUSCULAR; INTRAVENOUS at 15:22

## 2021-03-31 RX ADMIN — SODIUM CHLORIDE, POTASSIUM CHLORIDE, SODIUM LACTATE AND CALCIUM CHLORIDE: 600; 310; 30; 20 INJECTION, SOLUTION INTRAVENOUS at 13:35

## 2021-03-31 RX ADMIN — PROPOFOL 50 MG: 10 INJECTION, EMULSION INTRAVENOUS at 14:11

## 2021-03-31 RX ADMIN — NEOSTIGMINE METHYLSULFATE 1 MG: 1 INJECTION INTRAVENOUS at 14:14

## 2021-03-31 RX ADMIN — ACETAMINOPHEN 1000 MG: 500 TABLET, FILM COATED ORAL at 11:07

## 2021-03-31 RX ADMIN — FENTANYL CITRATE 50 MCG: 50 INJECTION, SOLUTION INTRAMUSCULAR; INTRAVENOUS at 14:48

## 2021-03-31 RX ADMIN — CEFAZOLIN 2 G: 1 INJECTION, POWDER, FOR SOLUTION INTRAVENOUS at 13:54

## 2021-03-31 RX ADMIN — ONDANSETRON 4 MG: 2 INJECTION INTRAMUSCULAR; INTRAVENOUS at 14:08

## 2021-03-31 RX ADMIN — GLYCOPYRROLATE 0.2 MG: 0.2 INJECTION, SOLUTION INTRAMUSCULAR; INTRAVENOUS at 14:14

## 2021-03-31 RX ADMIN — Medication 200 MCG: at 13:57

## 2021-03-31 RX ADMIN — CELECOXIB 200 MG: 200 CAPSULE ORAL at 11:07

## 2021-03-31 RX ADMIN — Medication 140 MG: at 13:41

## 2021-03-31 RX ADMIN — PROPOFOL 150 MG: 10 INJECTION, EMULSION INTRAVENOUS at 13:41

## 2021-03-31 ASSESSMENT — FIBROSIS 4 INDEX: FIB4 SCORE: 0.63

## 2021-03-31 NOTE — OR NURSING
1541: To stage ll. Up to chair and dressed. Pain is tolerable, no nausea.  1553: Pt walked to bathroom w/ standby assist. Able to void 100 ml.  1602: Home care instructions, including drain care, reviewed w/ pt and . Questions, concerns addressed. Meets criteria for discharge.

## 2021-03-31 NOTE — OR NURSING
1430 To PACU from OR via gurney s/p closure of abdominal wound. OPA removed by anesthesiologist upon arrival. Surgical dressing to mid abdomen, compressed NELSON drain to site observed. Abdominal binder in place. Pt reports 7/10 pain to her abdomen. Pt reports nausea. Emesis bag provided and antiemetic administered.     1445 Pt reports that her nausea has subsided. Dose of IV pain medication administered.     1500 Pt reports 5/10 tolerable pain. Pt denies nausea.     1515 Pt reports 6/10 tolerable pain. Pt denies nausea. Message left for pt's .    1522 Pt medicated for 7/10 pain. Pt denies nausea.     1530 Pt reports 5/10 tolerable pain. Pt denies nausea.     1535 Report to discharge RICH Marie.

## 2021-03-31 NOTE — ANESTHESIA PROCEDURE NOTES
Airway    Date/Time: 3/31/2021 1:41 PM  Performed by: Dc Johnson M.D.  Authorized by: Dc Johnson M.D.     Location:  OR  Urgency:  Elective  Indications for Airway Management:  Anesthesia      Spontaneous Ventilation: absent    Sedation Level:  Deep  Preoxygenated: Yes    Patient Position:  Sniffing  Mask Difficulty Assessment:  0 - not attempted  Final Airway Type:  Endotracheal airway  Final Endotracheal Airway:  ETT  Cuffed: Yes    Technique Used for Successful ETT Placement:  Direct laryngoscopy    Insertion Site:  Oral  Blade Type:  Santino  Laryngoscope Blade/Videolaryngoscope Blade Size:  3  ETT Size (mm):  7.0  Measured from:  Teeth  ETT to Teeth (cm):  22  Placement Verified by: auscultation and capnometry    Cormack-Lehane Classification:  Grade I - full view of glottis  Number of Attempts at Approach:  1

## 2021-03-31 NOTE — OP REPORT
POST-OPERATIVE NOTE  Better than  PREOPERATIVE DIAGNOSIS: Nonhealing wound    POSTOPERATIVE DIAGNOSIS: Same    PROCEDURE PERFORMED: Delayed primary closure    SURGEON: Fede Russ M.D., MD    ASSISTANT: None    ANESTHESIOLOGIST:  Anesthesiologist: Dc Johnson M.D.     ANESTHESIA: general     FINDINGS: Excellent granulation tissue.     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  I partially closed the wound and wound VAC to the apex.  After several months of wound VAC it was well granulated however it was approximately 6 cm in width x 10 cm in length.  It had flattened but would not heal in from the sides.  We discussed wound graft versus delayed primary closure.  Due to her excess skin we elected to do a delayed primary closure with drain placement.    Description:    The patient was prepped and draped in the standard sterile surgical fashion after induction of general anesthesia.  An appropriate timeout was performed and antibiotics delivered.  I began with an elliptical incision to include the granulation tissue.  I carried this down to normal-appearing subcutaneous fat.  I excised the granulation tissue.    I then undermined the edges of the wound in order to perform a tension-free anastomosis.  I performed fasciocutaneous flaps (4 cm x 10 cm).  Once this was accomplished I used interrupted 2-0 nylon sutures in a vertical mattress fashion to reapproximate the skin edges.  This was done without tension.  I did place a 7 Bhutanese flat fluted drain in the subcutaneous tissues.    Appropriate dry sterile dressings were applied.  The patient was then extubated, to recovery in satisfactory condition.        ____________________________________     Fede Russ M.D., MD    DT: 3/31/2021  2:21 PM      Cc: YOSEPH Welch

## 2021-03-31 NOTE — DISCHARGE INSTRUCTIONS
ACTIVITY: Rest and take it easy for the first 24 hours.  A responsible adult is recommended to remain with you during that time.  It is normal to feel sleepy.  We encourage you to not do anything that requires balance, judgment or coordination.    MILD FLU-LIKE SYMPTOMS ARE NORMAL. YOU MAY EXPERIENCE GENERALIZED MUSCLE ACHES, THROAT IRRITATION, HEADACHE AND/OR SOME NAUSEA.    FOR 24 HOURS DO NOT:  Drive, operate machinery or run household appliances.  Drink beer or alcoholic beverages.   Make important decisions or sign legal documents.    SPECIAL INSTRUCTIONS:     1. The pain medication is extremely constipating. Take a stool softener and drink lots of water. It also can be addicting. Please try to transition to an over the counter pain remedy as soon as possible.     2. Alternating ice and heat for 30 minutes can greatly reduce your pain.     3. It's ok to shower on the day after your surgery. Do not scrub the incisions.     4. After laparoscopy, it's common to have shoulder pain or pain when you take a deep breath. If the pain radiates down your arm, call or present to the ER.     5. Please call for redness or drainage from the wound sites that persists.     6. Feel free to call with questions at [phone]. If you have paperwork that needs filling out, please contact us at this number.     7. Follow up with me in 1-2 weeks for your postoperative exam.    8. Activity restrictions vary according to the surgery. If it hurts, don't do it. You may begin light exercise at 7-10 days.     Fede Russ MD FACS [facility]    DIET: To avoid nausea, slowly advance diet as tolerated, avoiding spicy or greasy foods for the first day. Add more substantial food to your diet according to your physician's instructions. INCREASE FLUIDS AND FIBER TO AVOID CONSTIPATION.    FOLLOW-UP APPOINTMENT:  A follow-up appointment should be arranged with your doctor; call to schedule.    You should CALL YOUR PHYSICIAN if you  develop:  Fever greater than 101 degrees F.  Pain not relieved by medication, or persistent nausea or vomiting.  Excessive bleeding (blood soaking through dressing) or unexpected drainage from the wound.  Extreme redness or swelling around the incision site, drainage of pus or foul smelling drainage.  Inability to urinate or empty your bladder within 8 hours.  Problems with breathing or chest pain.    You should call 911 if you develop problems with breathing or chest pain.  If you are unable to contact your doctor or surgical center, you should go to the nearest emergency room or urgent care center.       Physician's telephone #: [phone]    If any questions arise, call your doctor.  If your doctor is not available, please feel free to call the Surgical Center at [phone]. The Center is open Monday through Friday from 7AM to 7PM. You can also call the [facility] HOTLINE open 24 hours/day, 7 days/week and speak to a nurse at [phone], or toll free at [phone].    A registered nurse may call you a few days after your surgery to see how you are doing after your procedure.    MEDICATIONS: Resume taking daily medication.  Take prescribed pain medication with food.  If no medication is prescribed, you may take non-aspirin pain medication if needed. PAIN MEDICATION CAN BE VERY CONSTIPATING. Take a stool softener or laxative such as senokot, pericolace, or milk of magnesia if needed.    Prescription electronically sent to pharmacy. Last pain medication given at 2:55 pm (10 mg oxycodone).    If your physician has prescribed pain medication that includes Acetaminophen (Tylenol), do not take additional Acetaminophen (Tylenol) while taking the prescribed medication.    Depression / Suicide Risk    As you are discharged from this [facility] facility, it is important to learn how to keep safe from harming yourself.    Recognize the warning signs:  · Abrupt changes in personality, positive or negative-  including increase in energy   · Giving away possessions  · Change in eating patterns- significant weight changes-  positive or negative  · Change in sleeping patterns- unable to sleep or sleeping all the time   · Unwillingness or inability to communicate  · Depression  · Unusual sadness, discouragement and loneliness  · Talk of wanting to die  · Neglect of personal appearance   · Rebelliousness- reckless behavior  · Withdrawal from people/activities they love  · Confusion- inability to concentrate     If you or a loved one observes any of these behaviors or has concerns about self-harm, here's what you can do:  · Talk about it- your feelings and reasons for harming yourself  · Remove any means that you might use to hurt yourself (examples: pills, rope, extension cords, firearm)  · Get professional help from the community (Mental Health, Substance Abuse, psychological counseling)  · Do not be alone:Call your Safe Contact- someone whom you trust who will be there for you.  · Call your local CRISIS HOTLINE 239-1722 or 371-363-7983  · Call your local Children's Mobile Crisis Response Team [place] (012) 935-6982 or www.[employer].com  · Call the toll free National Suicide Prevention Hotlines   · National Suicide Prevention Lifeline 380-806-SROG (7725)  · National Hope Line Network 800-SUICIDE (819-0699)

## 2021-03-31 NOTE — ANESTHESIA TIME REPORT
Anesthesia Start and Stop Event Times     Date Time Event    3/31/2021 1140 Ready for Procedure     1335 Anesthesia Start     1433 Anesthesia Stop        Responsible Staff  03/31/21    Name Role Begin End    Dc Johnson M.D. Anesth 1335 1433        Preop Diagnosis (Free Text):  Pre-op Diagnosis     NON-HEALING SURGICAL WOUND        Preop Diagnosis (Codes):    Post op Diagnosis  Non-healing surgical wound      Premium Reason  Non-Premium    Comments:                                                                       
no

## 2021-03-31 NOTE — ANESTHESIA PREPROCEDURE EVALUATION
Relevant Problems   PULMONARY   (+) Dyspnea on exertion   (+) Shortness of breath      CARDIAC   (+) Dyspnea on exertion   (+) Pulmonary hypertension (HCC)      ENDO   (+) Type 2 diabetes mellitus without complication, without long-term current use of insulin (HCC)       Physical Exam    Airway   Mallampati: II  TM distance: >3 FB  Neck ROM: full       Cardiovascular - normal exam  Rhythm: regular  Rate: normal  (-) murmur     Dental - normal exam           Pulmonary - normal exam  Breath sounds clear to auscultation     Abdominal    Neurological - normal exam                 Anesthesia Plan    ASA 3   ASA physical status 3 criteria: morbid obesity - BMI greater than or equal to 40    Plan - general       Airway plan will be ETT          Induction: intravenous    Postoperative Plan: Postoperative administration of opioids is intended.    Pertinent diagnostic labs and testing reviewed    Informed Consent:    Anesthetic plan and risks discussed with patient.    Use of blood products discussed with: patient whom consented to blood products.

## 2021-04-01 ASSESSMENT — PAIN SCALES - GENERAL: PAIN_LEVEL: 5

## 2021-04-01 NOTE — ANESTHESIA POSTPROCEDURE EVALUATION
Patient: Mikayla Knight    Procedure Summary     Date: 03/31/21 Room / Location:  OR  / SURGERY [facility]    Anesthesia Start: 1335 Anesthesia Stop: 1433    Procedures:       CLOSURE, WOUND, SECONDARY - FOR DELAYED PRIMARY CLOSURE OF ABDOMINAL WALL (Abdomen)      FLAP GRAFT - ADVANCEMENT (Abdomen) Diagnosis: (NON-HEALING SURGICAL WOUND)    Surgeons: Fede Russ M.D. Responsible Provider: Dc Johnson M.D.    Anesthesia Type: general ASA Status: 3          Final Anesthesia Type: general  Last vitals  BP   Blood Pressure: 110/57    Temp   36.5 °C (97.7 °F)    Pulse   65   Resp   18    SpO2   92 %      Anesthesia Post Evaluation    Patient location during evaluation: PACU  Patient participation: complete - patient participated  Level of consciousness: awake and alert  Pain score: 5    Airway patency: patent  Anesthetic complications: no  Cardiovascular status: hemodynamically stable  Respiratory status: acceptable  Hydration status: euvolemic    PONV: none          No complications documented.     Nurse Pain Score: 5 (NPRS)

## 2021-04-07 ENCOUNTER — TELEMEDICINE (OUTPATIENT)
Dept: MEDICAL GROUP | Facility: PHYSICIAN GROUP | Age: 61
End: 2021-04-07
Payer: COMMERCIAL

## 2021-04-07 VITALS — WEIGHT: 253 LBS | BODY MASS INDEX: 42.15 KG/M2 | HEIGHT: 65 IN

## 2021-04-07 DIAGNOSIS — E11.9 TYPE 2 DIABETES MELLITUS WITHOUT COMPLICATION, WITHOUT LONG-TERM CURRENT USE OF INSULIN (HCC): ICD-10-CM

## 2021-04-07 DIAGNOSIS — E66.01 MORBID OBESITY WITH BMI OF 40.0-44.9, ADULT (HCC): ICD-10-CM

## 2021-04-07 DIAGNOSIS — E78.1 HYPERTRIGLYCERIDEMIA: ICD-10-CM

## 2021-04-07 DIAGNOSIS — M54.50 CHRONIC MIDLINE LOW BACK PAIN WITHOUT SCIATICA: ICD-10-CM

## 2021-04-07 DIAGNOSIS — D50.9 IRON DEFICIENCY ANEMIA, UNSPECIFIED IRON DEFICIENCY ANEMIA TYPE: ICD-10-CM

## 2021-04-07 DIAGNOSIS — R79.0 LOW FERRITIN: ICD-10-CM

## 2021-04-07 DIAGNOSIS — L02.211 ABDOMINAL WALL ABSCESS: ICD-10-CM

## 2021-04-07 DIAGNOSIS — G89.29 CHRONIC MIDLINE LOW BACK PAIN WITHOUT SCIATICA: ICD-10-CM

## 2021-04-07 DIAGNOSIS — F41.1 GENERALIZED ANXIETY DISORDER: ICD-10-CM

## 2021-04-07 PROBLEM — R10.13 EPIGASTRIC PAIN: Status: RESOLVED | Noted: 2020-09-08 | Resolved: 2021-04-07

## 2021-04-07 PROBLEM — R06.09 DYSPNEA ON EXERTION: Status: RESOLVED | Noted: 2020-07-14 | Resolved: 2021-04-07

## 2021-04-07 PROBLEM — D72.829 LEUKOCYTOSIS: Status: RESOLVED | Noted: 2020-10-01 | Resolved: 2021-04-07

## 2021-04-07 PROCEDURE — 99214 OFFICE O/P EST MOD 30 MIN: CPT | Mod: 95,CR | Performed by: NURSE PRACTITIONER

## 2021-04-07 RX ORDER — METFORMIN HYDROCHLORIDE 750 MG/1
750 TABLET, EXTENDED RELEASE ORAL
Qty: 90 TABLET | Refills: 1 | Status: SHIPPED | OUTPATIENT
Start: 2021-04-07 | End: 2021-05-28

## 2021-04-07 ASSESSMENT — FIBROSIS 4 INDEX: FIB4 SCORE: 0.63

## 2021-04-07 NOTE — ASSESSMENT & PLAN NOTE
As a means of avoiding spread of COVID-19, this visit is being conducted by video.  Patient has now had 3 surgeries on her abdomen.  Surgeon is Dr. Russ.  Drainage of abscess in 9/2020 with wound VAC for several months.  Reportedly wound was not healing, and had abdominal flap graft last week.  Patient has follow-up appointment surgeon later this week.  Reports incision is healing well.  Denies erythema or drainage.  Does have tenderness.

## 2021-04-07 NOTE — ASSESSMENT & PLAN NOTE
As a means of avoiding spread of COVID-19, this visit is being conducted by video.  This is a chronic health problem that is well controlled with current medications and lifestyle measures.  Taking Metformin  mg daily.  Denies adverse effects.  Hemoglobin A1c 3 months ago showed good control.  Patient inquiring about switching to Ozempic.  She heard it also helps with the weight loss.  Component      Latest Ref Rng & Units 1/20/2021          12:13 PM   Glycohemoglobin      0.0 - 5.6 % 6.8 (H)

## 2021-04-07 NOTE — ASSESSMENT & PLAN NOTE
This is a chronic health problem that is well controlled with current medications and lifestyle measures.  Using Voltaren gel topically as needed.  Denies any concerns.

## 2021-04-07 NOTE — ASSESSMENT & PLAN NOTE
As a means of avoiding spread of COVID-19, this visit is being conducted by video.  Chronic health problem.  Was established with psychiatry.  Patient reports she slowly tapered herself off of alprazolam, escitalopram, buspirone.  Feels she is managing well with lifestyle measures.  Though does express some anxiety with her current medical problems regarding her abdominal wound.  Still has issues with sleeping.

## 2021-04-07 NOTE — ASSESSMENT & PLAN NOTE
As a means of avoiding spread of COVID-19, this visit is being conducted by video.    Most recent blood count normal.    Component      Latest Ref Rng & Units 7/7/2020 7/7/2020 3/29/2021           8:39 AM  8:39 AM  3:20 PM   WBC      4.8 - 10.8 K/uL 8.1  6.6   RBC      4.20 - 5.40 M/uL 4.47  4.31   Hemoglobin      12.0 - 16.0 g/dL 10.4 (L)  12.1   Hematocrit      37.0 - 47.0 % 36.1 (L)  39.4   MCV      81.4 - 97.8 fL 80.8 (L)  91.4   MCH      27.0 - 33.0 pg 23.3 (L)  28.1   MCHC      33.6 - 35.0 g/dL 28.8 (L)  30.7 (L)   RDW      35.9 - 50.0 fL 48.5  47.1   Platelet Count      164 - 446 K/uL 400  327   MPV      9.0 - 12.9 fL 9.4  9.2   Neutrophils-Polys      44.00 - 72.00 % 61.60     Lymphocytes      22.00 - 41.00 % 27.80     Monocytes      0.00 - 13.40 % 7.40     Eosinophils      0.00 - 6.90 % 1.50     Basophils      0.00 - 1.80 % 1.20     Immature Granulocytes      0.00 - 0.90 % 0.50     Nucleated RBC      /100 WBC 0.00     Neutrophils (Absolute)      2.00 - 7.15 K/uL 4.96     Lymphs (Absolute)      1.00 - 4.80 K/uL 2.24     Monos (Absolute)      0.00 - 0.85 K/uL 0.60     Eos (Absolute)      0.00 - 0.51 K/uL 0.12     Baso (Absolute)      0.00 - 0.12 K/uL 0.10     Immature Granulocytes (abs)      0.00 - 0.11 K/uL 0.04     NRBC (Absolute)      K/uL 0.00     Anisocytosis       1+     Microcytosis       1+     Ferritin      10.0 - 291.0 ng/mL  12.3

## 2021-04-07 NOTE — PROGRESS NOTES
Virtual Visit: Established Patient   This visit was conducted via Zoom using secure and encrypted videoconferencing technology. The patient was in a private location in the state of [place].    The patient's identity was confirmed and verbal consent was obtained for this virtual visit.    Subjective:   CC:   Chief Complaint   Patient presents with   • Lab Follow-up       Mikayla Knight is a 60 y.o. female presenting for evaluation and management of:    Abdominal wall abscess  As a means of avoiding spread of COVID-19, this visit is being conducted by video.  Patient has now had 3 surgeries on her abdomen.  Surgeon is Dr. Russ.  Drainage of abscess in 9/2020 with wound VAC for several months.  Reportedly wound was not healing, and had abdominal flap graft last week.  Patient has follow-up appointment surgeon later this week.  Reports incision is healing well.  Denies erythema or drainage.  Does have tenderness.    Iron deficiency anemia  As a means of avoiding spread of COVID-19, this visit is being conducted by video.    Most recent blood count normal.    Component      Latest Ref Rng & Units 7/7/2020 7/7/2020 3/29/2021           8:39 AM  8:39 AM  3:20 PM   WBC      4.8 - 10.8 K/uL 8.1  6.6   RBC      4.20 - 5.40 M/uL 4.47  4.31   Hemoglobin      12.0 - 16.0 g/dL 10.4 (L)  12.1   Hematocrit      37.0 - 47.0 % 36.1 (L)  39.4   MCV      81.4 - 97.8 fL 80.8 (L)  91.4   MCH      27.0 - 33.0 pg 23.3 (L)  28.1   MCHC      33.6 - 35.0 g/dL 28.8 (L)  30.7 (L)   RDW      35.9 - 50.0 fL 48.5  47.1   Platelet Count      164 - 446 K/uL 400  327   MPV      9.0 - 12.9 fL 9.4  9.2   Neutrophils-Polys      44.00 - 72.00 % 61.60     Lymphocytes      22.00 - 41.00 % 27.80     Monocytes      0.00 - 13.40 % 7.40     Eosinophils      0.00 - 6.90 % 1.50     Basophils      0.00 - 1.80 % 1.20     Immature Granulocytes      0.00 - 0.90 % 0.50     Nucleated RBC      /100 WBC 0.00     Neutrophils (Absolute)      2.00 - 7.15 K/uL 4.96      Lymphs (Absolute)      1.00 - 4.80 K/uL 2.24     Monos (Absolute)      0.00 - 0.85 K/uL 0.60     Eos (Absolute)      0.00 - 0.51 K/uL 0.12     Baso (Absolute)      0.00 - 0.12 K/uL 0.10     Immature Granulocytes (abs)      0.00 - 0.11 K/uL 0.04     NRBC (Absolute)      K/uL 0.00     Anisocytosis       1+     Microcytosis       1+     Ferritin      10.0 - 291.0 ng/mL  12.3        CODY (generalized anxiety disorder)  As a means of avoiding spread of COVID-19, this visit is being conducted by video.  Chronic health problem.  Was established with psychiatry.  Patient reports she slowly tapered herself off of alprazolam, escitalopram, buspirone.  Feels she is managing well with lifestyle measures.  Though does express some anxiety with her current medical problems regarding her abdominal wound.  Still has issues with sleeping.    Chronic low back pain without sciatica  This is a chronic health problem that is well controlled with current medications and lifestyle measures.  Using Voltaren gel topically as needed.  Denies any concerns.    Type 2 diabetes mellitus without complication, without long-term current use of insulin (HCC)  As a means of avoiding spread of COVID-19, this visit is being conducted by video.  This is a chronic health problem that is well controlled with current medications and lifestyle measures.  Taking Metformin  mg daily.  Denies adverse effects.  Hemoglobin A1c 3 months ago showed good control.  Patient inquiring about switching to Ozempic.  She heard it also helps with the weight loss.  Component      Latest Ref Rng & Units 1/20/2021          12:13 PM   Glycohemoglobin      0.0 - 5.6 % 6.8 (H)       ROS   Denies any recent fevers or chills. No nausea or vomiting. No chest pains or shortness of breath.     No Active Allergies    Current medicines (including changes today)  Current Outpatient Medications   Medication Sig Dispense Refill   • diclofenac sodium 1 % Gel Apply 2 g topically 4  times a day as needed. 100 g 1   • metFORMIN ER (GLUCOPHAGE XR) 750 MG TABLET SR 24 HR Take 1 tablet by mouth every day. 90 tablet 1   • fluticasone (FLONASE) 50 MCG/ACT nasal spray Administer 1 Spray into affected nostril(S) every day.     • traZODone (DESYREL) 100 MG Tab Take 1 to 3 tablets by mouth at bedtime as needed for insomnia. APPOINTMENT REQUIRED FOR ADDITIONAL REFILLS.  THANK YOU. 90 Tab 2   • acetaminophen (TYLENOL) 500 MG Tab Take 1,000 mg by mouth 3 times a day as needed (HA).     • omeprazole (PRILOSEC) 20 MG delayed-release capsule TAKE 1 CAP BY MOUTH EVERY DAY 30 MINUTES BEFORE FOOD OR DRINK, ON AN EMPTY STOMACH. (Patient not taking: Reported on 1/26/2021) 90 Cap 3     No current facility-administered medications for this visit.       Patient Active Problem List    Diagnosis Date Noted   • Abdominal wall abscess 09/30/2020     Priority: High   • Type 2 diabetes mellitus without complication, without long-term current use of insulin ([facility]) 12/18/2017     Priority: Low   • Shortness of breath 02/20/2014     Priority: Low   • Insomnia 11/18/2011     Priority: Low   • Environmental and seasonal allergies 04/21/2020   • Bilateral swelling of feet 12/18/2019   • CODY (generalized anxiety disorder) 12/09/2019   • PTSD (post-traumatic stress disorder) 12/09/2019   • Left hip pain 09/19/2019   • Morbid obesity with BMI of 40.0-44.9, adult ([facility]) 05/16/2017   • Hypertriglyceridemia 12/20/2016   • Bilateral hand pain 05/24/2016   • Bilateral knee pain 01/27/2016   • Vitamin D deficiency disease 06/10/2015   • Chronic low back pain without sciatica 12/05/2013   • Chronic headache 05/09/2013   • Breast cancer, left breast ([facility]) 12/31/2012   • Pulmonary hypertension ([facility]) 03/20/2012   • Iron deficiency anemia 03/20/2012   • Fibromyalgia 11/18/2011   • Neuropathy 11/18/2011   • Depression 11/18/2011       Family History   Problem Relation Age of Onset   • Heart Attack Father    • Anxiety disorder Father    •  "Depression Father    • Schizophrenia Father    • Hypertension Mother    • Cancer Maternal Grandfather         leukemia   • Cancer Other         leukemia- cousin   • Diabetes Maternal Uncle    • Anxiety disorder Maternal Uncle    • Diabetes Maternal Uncle    • Anxiety disorder Maternal Uncle        She  has a past medical history of Anesthesia, Anxiety (11/18/2011), Arthritis, Breast mass, left, Breath shortness, Bursitis of knee, Cancer (HCC) (2005), Cancer ([facility]) (2021), Chronic pain (11/18/2011), Depression (11/18/2011), Diabetes (HCC), Fibromyalgia (11/18/2011), Gastro-esophageal reflux (3/20/2012), Heart burn, Heart murmur, Iron deficiency anemia (3/20/2012), Migraine, Neuropathy (11/18/2011), Pain (6/14/12), Pneumonia (01/2012), Pulmonary hypertension ([facility]) (03/20/2012), and Syncope and collapse (3/20/2012).  She  has a past surgical history that includes abdominal exploration (3/11/2010); hernia repair (3/15/2010); flap graft (3/15/2010); panniculectomy (3/15/2010); gyn surgery (1984); irrigation & debridement general (4/12/2010); abdominal exploration (10/7/2010); gastric bypass laparoscopic (2005); colonoscopy (6/21/2012); gastroscopy (6/21/2012); axillary node dissection (1/9/2013); node biopsy (1/9/2013); cath placement (1/25/2013); irrigation & debridement general (N/A, 9/30/2020); other (04/2009); other (2005); other (06/09); other (07/09); other (08/09); pr exploratory of abdomen (1/26/2021); irrigation & debridement general (1/26/2021); application or replacement, wound vac (1/26/2021); breast biopsy (12/11/2012); breast biopsy (1/9/2013); lumpectomy (Left, 2013); pr secd clos surg wnd exten/complic (3/31/2021); and flap graft (3/31/2021).       Objective:   Ht 1.651 m (5' 5\") Comment: per pt  Wt 115 kg (253 lb) Comment: per pt  LMP 03/18/2010   BMI 42.10 kg/m²     Physical Exam:  Constitutional: Alert, no distress, well-groomed.  Skin: No rashes in visible areas.  Eye: Round. Conjunctiva clear, lids " normal. No icterus.   ENMT: Lips pink without lesions, good dentition, moist mucous membranes. Phonation normal.  Neck: No masses, no thyromegaly. Moves freely without pain.  Respiratory: Unlabored respiratory effort, no cough or audible wheeze  Psych: Alert and oriented x3, normal affect and mood.       Assessment and Plan:   The following treatment plan was discussed:     1. Type 2 diabetes mellitus without complication, without long-term current use of insulin ([facility])  Well-controlled.  Patient would like to trial Ozempic.  This may be beneficial as she is also obese.  Will start off with low-dose weekly.  Patient is comfortable giving herself subcutaneous injection.  Continue with Metformin at current dose.  Will follow up in 4 weeks.  - Comp Metabolic Panel; Future  - ESTIMATED GFR; Future  - HEMOGLOBIN A1C; Future  - metFORMIN ER (GLUCOPHAGE XR) 750 MG TABLET SR 24 HR; Take 1 tablet by mouth every day.  Dispense: 90 tablet; Refill: 1  - Lipid Profile; Future  - Semaglutide,0.25 or 0.5MG/DOS, 2 MG/1.5ML Solution Pen-injector; Inject 0.25 mg under the skin every 7 days.  Dispense: 1.5 mL; Refill: 0    2. Chronic midline low back pain without sciatica  Patient requesting refill.  - diclofenac sodium 1 % Gel; Apply 2 g topically 4 times a day as needed.  Dispense: 100 g; Refill: 1    3. Low ferritin  We will review labs at next appointment.  - FERRITIN; Future    4. Abdominal wall abscess  Continue follow-up with surgeon.    5. Iron deficiency anemia, unspecified iron deficiency anemia type  We will review lab results at next appointment.    6. Hypertriglyceridemia  We will review lab results at next appointment.  - Lipid Profile; Future    7. CODY (generalized anxiety disorder)  Managing with lifestyle measures.  We will continue to monitor.    8. Morbid obesity with BMI of 40.0-44.9, adult ([facility])  Congratulated patient on her weight loss.  She will also start Ozempic for both diabetes and obesity.  -  Semaglutide,0.25 or 0.5MG/DOS, 2 MG/1.5ML Solution Pen-injector; Inject 0.25 mg under the skin every 7 days.  Dispense: 1.5 mL; Refill: 0    Follow-up: Patient will return to clinic in 4 weeks or sooner if needed.

## 2021-04-08 ENCOUNTER — TELEPHONE (OUTPATIENT)
Dept: URGENT CARE | Facility: PHYSICIAN GROUP | Age: 61
End: 2021-04-08

## 2021-05-03 DIAGNOSIS — E11.9 TYPE 2 DIABETES MELLITUS WITHOUT COMPLICATION, WITHOUT LONG-TERM CURRENT USE OF INSULIN (HCC): ICD-10-CM

## 2021-05-03 DIAGNOSIS — E66.01 MORBID OBESITY WITH BMI OF 40.0-44.9, ADULT (HCC): ICD-10-CM

## 2021-05-04 RX ORDER — SEMAGLUTIDE 1.34 MG/ML
INJECTION, SOLUTION SUBCUTANEOUS
Qty: 1.5 ML | Refills: 3 | Status: SHIPPED | OUTPATIENT
Start: 2021-05-04 | End: 2021-05-28 | Stop reason: SDUPTHER

## 2021-05-04 NOTE — TELEPHONE ENCOUNTER
[id number]  Last OV      Received request via: Pharmacy    Was the patient seen in the last year in this department? Yes    Does the patient have an active prescription (recently filled or refills available) for medication(s) requested? No

## 2021-05-19 ENCOUNTER — HOSPITAL ENCOUNTER (OUTPATIENT)
Dept: LAB | Facility: MEDICAL CENTER | Age: 61
End: 2021-05-19
Attending: NURSE PRACTITIONER
Payer: COMMERCIAL

## 2021-05-19 DIAGNOSIS — R79.0 LOW FERRITIN: ICD-10-CM

## 2021-05-19 DIAGNOSIS — E11.9 TYPE 2 DIABETES MELLITUS WITHOUT COMPLICATION, WITHOUT LONG-TERM CURRENT USE OF INSULIN (HCC): ICD-10-CM

## 2021-05-19 DIAGNOSIS — R10.13 EPIGASTRIC PAIN: ICD-10-CM

## 2021-05-19 DIAGNOSIS — E78.1 HYPERTRIGLYCERIDEMIA: ICD-10-CM

## 2021-05-19 DIAGNOSIS — R10.12 LUQ PAIN: ICD-10-CM

## 2021-05-19 LAB
ALBUMIN SERPL BCP-MCNC: 4 G/DL (ref 3.2–4.9)
ALBUMIN/GLOB SERPL: 1.4 G/DL
ALP SERPL-CCNC: 114 U/L (ref 30–99)
ALT SERPL-CCNC: 36 U/L (ref 2–50)
AMYLASE SERPL-CCNC: 40 U/L (ref 20–103)
ANION GAP SERPL CALC-SCNC: 11 MMOL/L (ref 7–16)
AST SERPL-CCNC: 35 U/L (ref 12–45)
BASOPHILS # BLD AUTO: 1.6 % (ref 0–1.8)
BASOPHILS # BLD: 0.11 K/UL (ref 0–0.12)
BILIRUB SERPL-MCNC: 0.2 MG/DL (ref 0.1–1.5)
BUN SERPL-MCNC: 13 MG/DL (ref 8–22)
CALCIUM SERPL-MCNC: 9.1 MG/DL (ref 8.5–10.5)
CHLORIDE SERPL-SCNC: 104 MMOL/L (ref 96–112)
CHOLEST SERPL-MCNC: 159 MG/DL (ref 100–199)
CO2 SERPL-SCNC: 22 MMOL/L (ref 20–33)
CREAT SERPL-MCNC: 0.72 MG/DL (ref 0.5–1.4)
EOSINOPHIL # BLD AUTO: 0.29 K/UL (ref 0–0.51)
EOSINOPHIL NFR BLD: 4.2 % (ref 0–6.9)
ERYTHROCYTE [DISTWIDTH] IN BLOOD BY AUTOMATED COUNT: 48.7 FL (ref 35.9–50)
FASTING STATUS PATIENT QL REPORTED: NORMAL
FERRITIN SERPL-MCNC: 32.8 NG/ML (ref 10–291)
GLOBULIN SER CALC-MCNC: 2.8 G/DL (ref 1.9–3.5)
GLUCOSE SERPL-MCNC: 128 MG/DL (ref 65–99)
HCT VFR BLD AUTO: 42.6 % (ref 37–47)
HDLC SERPL-MCNC: 41 MG/DL
HGB BLD-MCNC: 13.2 G/DL (ref 12–16)
IMM GRANULOCYTES # BLD AUTO: 0.02 K/UL (ref 0–0.11)
IMM GRANULOCYTES NFR BLD AUTO: 0.3 % (ref 0–0.9)
LDLC SERPL CALC-MCNC: 77 MG/DL
LIPASE SERPL-CCNC: 20 U/L (ref 11–82)
LYMPHOCYTES # BLD AUTO: 2.2 K/UL (ref 1–4.8)
LYMPHOCYTES NFR BLD: 31.7 % (ref 22–41)
MCH RBC QN AUTO: 27.8 PG (ref 27–33)
MCHC RBC AUTO-ENTMCNC: 31 G/DL (ref 33.6–35)
MCV RBC AUTO: 89.9 FL (ref 81.4–97.8)
MONOCYTES # BLD AUTO: 0.46 K/UL (ref 0–0.85)
MONOCYTES NFR BLD AUTO: 6.6 % (ref 0–13.4)
NEUTROPHILS # BLD AUTO: 3.86 K/UL (ref 2–7.15)
NEUTROPHILS NFR BLD: 55.6 % (ref 44–72)
NRBC # BLD AUTO: 0 K/UL
NRBC BLD-RTO: 0 /100 WBC
PLATELET # BLD AUTO: 309 K/UL (ref 164–446)
PMV BLD AUTO: 9.1 FL (ref 9–12.9)
POTASSIUM SERPL-SCNC: 4.2 MMOL/L (ref 3.6–5.5)
PROT SERPL-MCNC: 6.8 G/DL (ref 6–8.2)
RBC # BLD AUTO: 4.74 M/UL (ref 4.2–5.4)
SODIUM SERPL-SCNC: 137 MMOL/L (ref 135–145)
TRIGL SERPL-MCNC: 206 MG/DL (ref 0–149)
TSH SERPL DL<=0.005 MIU/L-ACNC: 3.08 UIU/ML (ref 0.38–5.33)
WBC # BLD AUTO: 6.9 K/UL (ref 4.8–10.8)

## 2021-05-19 PROCEDURE — 82150 ASSAY OF AMYLASE: CPT

## 2021-05-19 PROCEDURE — 82728 ASSAY OF FERRITIN: CPT

## 2021-05-19 PROCEDURE — 83690 ASSAY OF LIPASE: CPT

## 2021-05-19 PROCEDURE — 36415 COLL VENOUS BLD VENIPUNCTURE: CPT

## 2021-05-19 PROCEDURE — 84443 ASSAY THYROID STIM HORMONE: CPT

## 2021-05-19 PROCEDURE — 80053 COMPREHEN METABOLIC PANEL: CPT

## 2021-05-19 PROCEDURE — 85025 COMPLETE CBC W/AUTO DIFF WBC: CPT

## 2021-05-19 PROCEDURE — 83036 HEMOGLOBIN GLYCOSYLATED A1C: CPT

## 2021-05-19 PROCEDURE — 80061 LIPID PANEL: CPT

## 2021-05-20 LAB
EST. AVERAGE GLUCOSE BLD GHB EST-MCNC: 137 MG/DL
HBA1C MFR BLD: 6.4 % (ref 4–5.6)

## 2021-05-28 ENCOUNTER — OFFICE VISIT (OUTPATIENT)
Dept: MEDICAL GROUP | Facility: PHYSICIAN GROUP | Age: 61
End: 2021-05-28
Payer: COMMERCIAL

## 2021-05-28 VITALS
TEMPERATURE: 98 F | BODY MASS INDEX: 42.32 KG/M2 | HEIGHT: 65 IN | WEIGHT: 254 LBS | DIASTOLIC BLOOD PRESSURE: 78 MMHG | HEART RATE: 77 BPM | SYSTOLIC BLOOD PRESSURE: 118 MMHG | RESPIRATION RATE: 16 BRPM | OXYGEN SATURATION: 96 %

## 2021-05-28 DIAGNOSIS — Z11.59 NEED FOR HEPATITIS C SCREENING TEST: ICD-10-CM

## 2021-05-28 DIAGNOSIS — L29.9 ITCHING: ICD-10-CM

## 2021-05-28 DIAGNOSIS — R20.2 TINGLING: ICD-10-CM

## 2021-05-28 DIAGNOSIS — E78.1 HYPERTRIGLYCERIDEMIA: ICD-10-CM

## 2021-05-28 DIAGNOSIS — F51.01 PRIMARY INSOMNIA: ICD-10-CM

## 2021-05-28 DIAGNOSIS — E66.01 MORBID OBESITY WITH BMI OF 40.0-44.9, ADULT (HCC): ICD-10-CM

## 2021-05-28 DIAGNOSIS — Z12.31 ENCOUNTER FOR SCREENING MAMMOGRAM FOR BREAST CANCER: ICD-10-CM

## 2021-05-28 DIAGNOSIS — E11.9 TYPE 2 DIABETES MELLITUS WITHOUT COMPLICATION, WITHOUT LONG-TERM CURRENT USE OF INSULIN (HCC): ICD-10-CM

## 2021-05-28 PROCEDURE — 99214 OFFICE O/P EST MOD 30 MIN: CPT | Performed by: NURSE PRACTITIONER

## 2021-05-28 RX ORDER — HYDROXYZINE HYDROCHLORIDE 25 MG/1
25 TABLET, FILM COATED ORAL 3 TIMES DAILY PRN
Qty: 90 TABLET | Refills: 1 | Status: SHIPPED | OUTPATIENT
Start: 2021-05-28 | End: 2021-06-21

## 2021-05-28 RX ORDER — TRAZODONE HYDROCHLORIDE 100 MG/1
TABLET ORAL
Qty: 90 TABLET | Refills: 2 | Status: ON HOLD | OUTPATIENT
Start: 2021-05-28 | End: 2021-07-07

## 2021-05-28 RX ORDER — SEMAGLUTIDE 1.34 MG/ML
0.5 INJECTION, SOLUTION SUBCUTANEOUS
Qty: 3 ML | Refills: 3 | Status: SHIPPED | OUTPATIENT
Start: 2021-05-28 | End: 2021-10-27 | Stop reason: SDUPTHER

## 2021-05-28 ASSESSMENT — FIBROSIS 4 INDEX: FIB4 SCORE: 1.13

## 2021-05-28 NOTE — PROGRESS NOTES
CC: Lab review, follow-up on diabetes, medication refill    HISTORY OF THE PRESENT ILLNESS: Patient is a 61 y.o. female. This pleasant patient is here today for evaluation and management of the following health problems.    Health Maintenance: due      Type 2 diabetes mellitus without complication, without long-term current use of insulin (HCC)  This is a chronic health problem that is well controlled with current medications and lifestyle measures.  Has been out of Metformin for 6 months because pharmacy stopped carrying 750 mg ER.  Started Ozempic about 6 weeks ago.  Tolerating medication, denies adverse effects.  Not on ACE or statin.  Due for retinal scan.    Component      Latest Ref Rng & Units 1/20/2021 5/19/2021          12:13 PM  9:32 AM   Glycohemoglobin      4.0 - 5.6 % 6.8 (H) 6.4 (H)       Insomnia  Chronic health problem, usually well controlled with trazodone 150 mg at bedtime.  Has been running out of trazodone and only taking 50 mg at night.  Not sleeping through the night.  Trouble with sleep initiation also.  Thinks it may be related to some anxiety.  Having daytime fatigue.    Hypertriglyceridemia  Chronic health problem, stable.  Managing with lifestyle measures. The 10-year ASCVD risk score (No DC Jr., et al., 2013) is: 5.5%  Component      Latest Ref Rng & Units 10/7/2019 5/19/2021          10:19 AM  9:32 AM   Cholesterol,Tot      100 - 199 mg/dL 177 159   Triglycerides      0 - 149 mg/dL 259 (H) 206 (H)   HDL      >=40 mg/dL 45 41   LDL      <100 mg/dL 80 77       Tingling  Patient reports 2-month onset of upper body itching and some sudden tingling sensations in bilateral arms.  She did discontinue her Escitalopram and Wellbutrin suddenly a few months ago.  Does have history of neuropathy.      Allergies: Patient has no active allergies.    Current Outpatient Medications Ordered in Epic   Medication Sig Dispense Refill   • traZODone (DESYREL) 100 MG Tab Take 1 to 3 tablets by mouth at  "bedtime as needed for insomnia. APPOINTMENT REQUIRED FOR ADDITIONAL REFILLS.  THANK YOU. 90 tablet 2   • Semaglutide,0.25 or 0.5MG/DOS, (OZEMPIC, 0.25 OR 0.5 MG/DOSE,) 2 MG/1.5ML Solution Pen-injector Inject 0.5 mg under the skin every 7 days. 3 mL 3   • hydrOXYzine HCl (ATARAX) 25 MG Tab Take 1 tablet by mouth 3 times a day as needed for Itching. 90 tablet 1   • diclofenac sodium 1 % Gel Apply 2 g topically 4 times a day as needed. 100 g 1   • fluticasone (FLONASE) 50 MCG/ACT nasal spray Administer 1 Spray into affected nostril(S) every day.     • acetaminophen (TYLENOL) 500 MG Tab Take 1,000 mg by mouth 3 times a day as needed (HA).       No current Epic-ordered facility-administered medications on file.       Past Medical History:   Diagnosis Date   • Anesthesia     low bp coming out of anesthesia \"one time\"   • Anxiety 11/18/2011   • Arthritis     fibromyalgia   • Breast mass, left    • Breath shortness     occasionally, inhalers a few times a year   • Bursitis of knee     left   • Cancer ([facility]) 2005    squamous cell on nose   • Cancer ([facility]) 2021    Stage 2 breast cancer   • Chronic pain 11/18/2011   • Depression 11/18/2011   • Diabetes ([facility])    • Fibromyalgia 11/18/2011   • Gastro-esophageal reflux 3/20/2012   • Heart burn    • Heart murmur     no cardiolgist   • Iron deficiency anemia 3/20/2012   • Migraine    • Neuropathy 11/18/2011   • Pain 6/14/12    3/10 stomach   • Pneumonia 01/2012   • Pulmonary hypertension (HCC) 03/20/2012   • Syncope and collapse 3/20/2012       Past Surgical History:   Procedure Laterality Date   • PB SECD CLOS SURG WND EXTEN/COMPLIC  3/31/2021    Procedure: CLOSURE, WOUND, SECONDARY - FOR DELAYED PRIMARY CLOSURE OF ABDOMINAL WALL;  Surgeon: Fede Russ M.D.;  Location: SURGERY [facility];  Service: General   • FLAP GRAFT  3/31/2021    Procedure: FLAP GRAFT - ADVANCEMENT;  Surgeon: Fede Russ M.D.;  Location: SURGERY [facility];  Service: General   • PB EXPLORATORY OF " ABDOMEN  1/26/2021    Procedure: LAPAROTOMY, EXPLORATORY - LYSIS OF ADHESIONS;  Surgeon: Param Russ M.D.;  Location: SURGERY [facility];  Service: General   • IRRIGATION & DEBRIDEMENT GENERAL  1/26/2021    Procedure: IRRIGATION AND DEBRIDEMENT, WOUND - ABDOMINAL WALL WITH REMOVAL OF MESH;  Surgeon: Param Russ M.D.;  Location: SURGERY [facility];  Service: General   • APPLICATION OR REPLACEMENT, WOUND VAC  1/26/2021    Procedure: APPLICATION OR REPLACEMENT,WOUND VAC;  Surgeon: Param Russ M.D.;  Location: SURGERY [facility];  Service: General   • IRRIGATION & DEBRIDEMENT GENERAL N/A 9/30/2020    Procedure: IRRIGATION AND DEBRIDEMENT, WOUND-ABD WALL, WOUND VAC PLACEMENT;  Surgeon: Param Russ M.D.;  Location: SURGERY [facility];  Service: General   • CATH PLACEMENT  1/25/2013    Performed by Lizeth Ferreira M.D. at SURGERY SAME DAY [facility]   • AXILLARY NODE DISSECTION  1/9/2013    Performed by Lizeth Ferreira M.D. at SURGERY SAME DAY [facility]   • NODE BIOPSY  1/9/2013    Performed by Lizeth Ferreira M.D. at SURGERY SAME DAY [facility]   • BREAST BIOPSY  1/9/2013    Performed by Lizeth Ferreira M.D. at SURGERY SAME DAY [facility]   • LUMPECTOMY Left 2013 9 lymph note removal   • BREAST BIOPSY  12/11/2012    Performed by Lizeth Ferreira M.D. at SURGERY SAME DAY [facility]   • COLONOSCOPY  6/21/2012    Performed by PARAM VALDEZ at SURGERY [facility]   • GASTROSCOPY  6/21/2012    Performed by PARAM VALDEZ at SURGERY [facility]   • ABDOMINAL EXPLORATION  10/7/2010    Performed by MARIA G KHAN JR at SURGERY [facility]   • IRRIGATION & DEBRIDEMENT GENERAL  4/12/2010    Performed by OZZIE WEINSTEIN at SURGERY [facility]   • HERNIA REPAIR  3/15/2010    Performed by MARIA G KHAN JR at SURGERY [facility]   • FLAP GRAFT  3/15/2010    Performed by MARIA G KHAN JR at SURGERY [facility]   • PANNICULECTOMY  3/15/2010    Performed by  "MARIA G KHAN JR at SURGERY [facility]   • ABDOMINAL EXPLORATION  3/11/2010    Performed by MARIA G KHAN JR at SURGERY SAME DAY [facility]   • OTHER      pulled mesh out of hernia   • OTHER      abscess removed abd.   • OTHER      bowel obstruction   • OTHER  2009    hernia repair,mesh removal after in aug.09   • GASTRIC BYPASS LAPAROSCOPIC     • OTHER      bariatric surgery gastric bypass   • GYN SURGERY      tubal       Social History     Tobacco Use   • Smoking status: Former Smoker     Packs/day: 0.10     Years: 8.00     Pack years: 0.80     Types: Cigarettes     Quit date: 2016     Years since quittin.0   • Smokeless tobacco: Never Used   • Tobacco comment: 1/2 pk a day on and off 10 yrs, 1 cigarette daily   Vaping Use   • Vaping Use: Never used   Substance Use Topics   • Alcohol use: Yes     Comment: 1-2 times a year   • Drug use: No       Family History   Problem Relation Age of Onset   • Heart Attack Father    • Anxiety disorder Father    • Depression Father    • Schizophrenia Father    • Hypertension Mother    • Cancer Maternal Grandfather         leukemia   • Cancer Other         leukemia- cousin   • Diabetes Maternal Uncle    • Anxiety disorder Maternal Uncle    • Diabetes Maternal Uncle    • Anxiety disorder Maternal Uncle        ROS:   As in HPI, otherwise negative for chest pain, dyspnea, abdominal pain, dysuria, blood in stool, fever         Exam: /78 (BP Location: Right arm, Patient Position: Sitting, BP Cuff Size: Adult long)   Pulse 77   Temp 36.7 °C (98 °F) (Temporal)   Resp 16   Ht 1.651 m (5' 5\")   Wt 115 kg (254 lb)   SpO2 96%  Body mass index is 42.27 kg/m².    General: Alert, pleasant, obese habitus, well nourished, well developed female in NAD  HEENT: Normocephalic. Eyes conjunctiva clear lids without ptosis, pupils equal and reactive to light, ears normal shape and contour, canals are clear bilaterally, tympanic " membranes are pearly gray with good light reflex, nasal mucosa without erythema and drainage, oropharynx is without erythema, edema or exudates.   Neck: Supple without bruit. Thyroid is not enlarged.  Pulmonary: Clear to ausculation.  Normal effort. No rales, ronchi, or wheezing.  Cardiovascular: Normal rate and rhythm without murmur. Carotid and radial pulses are intact and equal bilaterally.  No lower extremity edema.  Neurologic: Grossly nonfocal  Lymph: No cervical or supraclavicular lymph nodes are palpable  Skin: Warm and dry.  Abdominal dressing dry and intact.  Musculoskeletal: Normal gait.   Psych: Normal mood and affect. Alert and oriented. Judgment and insight is normal.    Diabetic Foot Exam: No ulcers or skin lesions present, patient tested with a 10 g force and is sensitive bilaterally throughout the ball of the foot, great toe and heel.  Dorsalis pedis and posterior tibial pulses are present and intact bilaterally    Component      Latest Ref Rng & Units 5/19/2021   WBC      4.8 - 10.8 K/uL 6.9   RBC      4.20 - 5.40 M/uL 4.74   Hemoglobin      12.0 - 16.0 g/dL 13.2   Hematocrit      37.0 - 47.0 % 42.6   MCV      81.4 - 97.8 fL 89.9   MCH      27.0 - 33.0 pg 27.8   MCHC      33.6 - 35.0 g/dL 31.0 (L)   RDW      35.9 - 50.0 fL 48.7   Platelet Count      164 - 446 K/uL 309   MPV      9.0 - 12.9 fL 9.1   Neutrophils-Polys      44.00 - 72.00 % 55.60   Lymphocytes      22.00 - 41.00 % 31.70   Monocytes      0.00 - 13.40 % 6.60   Eosinophils      0.00 - 6.90 % 4.20   Basophils      0.00 - 1.80 % 1.60   Immature Granulocytes      0.00 - 0.90 % 0.30   Nucleated RBC      /100 WBC 0.00   Neutrophils (Absolute)      2.00 - 7.15 K/uL 3.86   Lymphs (Absolute)      1.00 - 4.80 K/uL 2.20   Monos (Absolute)      0.00 - 0.85 K/uL 0.46   Eos (Absolute)      0.00 - 0.51 K/uL 0.29   Baso (Absolute)      0.00 - 0.12 K/uL 0.11   Immature Granulocytes (abs)      0.00 - 0.11 K/uL 0.02   NRBC (Absolute)      K/uL 0.00    Sodium      135 - 145 mmol/L 137   Potassium      3.6 - 5.5 mmol/L 4.2   Chloride      96 - 112 mmol/L 104   Co2      20 - 33 mmol/L 22   Anion Gap      7.0 - 16.0 11.0   Glucose      65 - 99 mg/dL 128 (H)   Bun      8 - 22 mg/dL 13   Creatinine      0.50 - 1.40 mg/dL 0.72   Calcium      8.5 - 10.5 mg/dL 9.1   AST(SGOT)      12 - 45 U/L 35   ALT(SGPT)      2 - 50 U/L 36   Alkaline Phosphatase      30 - 99 U/L 114 (H)   Total Bilirubin      0.1 - 1.5 mg/dL 0.2   Albumin      3.2 - 4.9 g/dL 4.0   Total Protein      6.0 - 8.2 g/dL 6.8   Globulin      1.9 - 3.5 g/dL 2.8   A-G Ratio      g/dL 1.4   GFR If African American      >60 mL/min/1.73 m 2 >60   GFR If Non African American      >60 mL/min/1.73 m 2 >60   TSH      0.380 - 5.330 uIU/mL 3.080   Amylase      20 - 103 U/L 40   Lipase      11 - 82 U/L 20   Ferritin      10.0 - 291.0 ng/mL 32.8         Please note that this dictation was created using voice recognition software. I have made every reasonable attempt to correct obvious errors, but I expect that there are errors of grammar and possibly content that I did not discover before finalizing the note.      Assessment/Plan  1. Type 2 diabetes mellitus without complication, without long-term current use of insulin ([facility])  Well-controlled.  Continue with Ozempic.  Has not been taking Metformin for 6 months, discontinued.  Continue to monitor.  - Semaglutide,0.25 or 0.5MG/DOS, (OZEMPIC, 0.25 OR 0.5 MG/DOSE,) 2 MG/1.5ML Solution Pen-injector; Inject 0.5 mg under the skin every 7 days.  Dispense: 3 mL; Refill: 3  - VITAMIN B12; Future  - ESTIMATED GFR; Future  - MICROALBUMIN CREAT RATIO URINE; Future  - HEMOGLOBIN A1C; Future  - Basic Metabolic Panel; Future  - MICROALBUMIN CREAT RATIO URINE; Future    2. Morbid obesity with BMI of 40.0-44.9, adult ([facility])  Reviewed lifestyle measures.  Continue with Ozempic.  - Semaglutide,0.25 or 0.5MG/DOS, (OZEMPIC, 0.25 OR 0.5 MG/DOSE,) 2 MG/1.5ML Solution Pen-injector; Inject 0.5 mg  under the skin every 7 days.  Dispense: 3 mL; Refill: 3    3. Primary insomnia  We will restart trazodone at 1 to 3 tablets at bedtime.  Reviewed good sleep hygiene.  Did discuss referral back to psychiatry.  Patient declines at this time.  - traZODone (DESYREL) 100 MG Tab; Take 1 to 3 tablets by mouth at bedtime as needed for insomnia. APPOINTMENT REQUIRED FOR ADDITIONAL REFILLS.  THANK YOU.  Dispense: 90 tablet; Refill: 2    4. Encounter for screening mammogram for breast cancer  Ordered.  - MA-SCREENING MAMMO BILAT W/CAD; Future    5. Tingling  Tingling and itching etiology differentials include withdrawal symptom from the citalopram or Wellbutrin or trazodone, allergy neuropathy, vitamin B12 deficiency.  Liver enzymes normal.  Patient to start second-generation antihistamine, will hydroxyzine as needed.  Continue to monitor.  - VITAMIN B12; Future    6. Need for hepatitis C screening test  Reviewed rationale.  - HCV Scrn ( [id number] [employer]); Future    7. Itching  As in #5  - hydrOXYzine HCl (ATARAX) 25 MG Tab; Take 1 tablet by mouth 3 times a day as needed for Itching.  Dispense: 90 tablet; Refill: 1    8. Hypertriglyceridemia  Well-controlled.  Reviewed lifestyle measures including weight loss, routine aerobic exercise as tolerated, low-fat diet.    Patient will return to clinic in 3 months for diabetes or sooner if needed

## 2021-05-29 NOTE — ASSESSMENT & PLAN NOTE
Chronic health problem, usually well controlled with trazodone 150 mg at bedtime.  Has been running out of trazodone and only taking 50 mg at night.  Not sleeping through the night.  Trouble with sleep initiation also.  Thinks it may be related to some anxiety.  Having daytime fatigue.

## 2021-05-29 NOTE — ASSESSMENT & PLAN NOTE
Chronic health problem, stable.  Managing with lifestyle measures. The 10-year ASCVD risk score (Nospring COLON Jr., et al., 2013) is: 5.5%  Component      Latest Ref Rng & Units 10/7/2019 5/19/2021          10:19 AM  9:32 AM   Cholesterol,Tot      100 - 199 mg/dL 177 159   Triglycerides      0 - 149 mg/dL 259 (H) 206 (H)   HDL      >=40 mg/dL 45 41   LDL      <100 mg/dL 80 77

## 2021-05-29 NOTE — ASSESSMENT & PLAN NOTE
This is a chronic health problem that is well controlled with current medications and lifestyle measures.  Has been out of Metformin for 6 months because pharmacy stopped carrying 750 mg ER.  Started Ozempic about 6 weeks ago.  Tolerating medication, denies adverse effects.  Not on ACE or statin.  Due for retinal scan.    Component      Latest Ref Rng & Units 1/20/2021 5/19/2021          12:13 PM  9:32 AM   Glycohemoglobin      4.0 - 5.6 % 6.8 (H) 6.4 (H)

## 2021-06-19 DIAGNOSIS — L29.9 ITCHING: ICD-10-CM

## 2021-06-21 RX ORDER — HYDROXYZINE HYDROCHLORIDE 25 MG/1
25 TABLET, FILM COATED ORAL 3 TIMES DAILY PRN
Qty: 90 TABLET | Refills: 1 | Status: ON HOLD | OUTPATIENT
Start: 2021-06-21 | End: 2021-07-07

## 2021-07-01 NOTE — DISCHARGE PLANNING
WILFRIDO Pineda for Dr. Mota, notified this RN CM that they made a referral to [facility] yesterday for patient's wound vac dressing changes.

## 2021-07-01 NOTE — DISCHARGE PLANNING
RICH ESPOSITO spoke with Yajaira with [facility] and they have order for wound vac and will have it delivered at 1200 on 7/7/21. RN CM left voicemail for Dr. Mota's MA regarding wound vac dressing changes.

## 2021-07-06 ENCOUNTER — TELEPHONE (OUTPATIENT)
Dept: MEDICAL GROUP | Facility: PHYSICIAN GROUP | Age: 61
End: 2021-07-06

## 2021-07-06 ENCOUNTER — TELEMEDICINE (OUTPATIENT)
Dept: ADMISSIONS | Facility: MEDICAL CENTER | Age: 61
End: 2021-07-06
Attending: SURGERY
Payer: COMMERCIAL

## 2021-07-06 NOTE — TELEPHONE ENCOUNTER
Zaina from [place] called stating Mikayla didn't do her  Home health due to pre-op and having surgery on her BD wound. Zaina stated they will continue Home health after her surgery.    If you have any question for zaina please call [phone]

## 2021-07-07 ENCOUNTER — HOSPITAL ENCOUNTER (OUTPATIENT)
Facility: MEDICAL CENTER | Age: 61
End: 2021-07-07
Attending: SURGERY | Admitting: SURGERY
Payer: COMMERCIAL

## 2021-07-07 ENCOUNTER — ANESTHESIA (OUTPATIENT)
Dept: SURGERY | Facility: MEDICAL CENTER | Age: 61
End: 2021-07-07
Payer: COMMERCIAL

## 2021-07-07 ENCOUNTER — ANESTHESIA EVENT (OUTPATIENT)
Dept: SURGERY | Facility: MEDICAL CENTER | Age: 61
End: 2021-07-07
Payer: COMMERCIAL

## 2021-07-07 VITALS
SYSTOLIC BLOOD PRESSURE: 121 MMHG | TEMPERATURE: 97.2 F | DIASTOLIC BLOOD PRESSURE: 62 MMHG | RESPIRATION RATE: 16 BRPM | OXYGEN SATURATION: 95 % | WEIGHT: 252.65 LBS | BODY MASS INDEX: 42.09 KG/M2 | HEART RATE: 62 BPM | HEIGHT: 65 IN

## 2021-07-07 DIAGNOSIS — G89.18 POST-OPERATIVE PAIN: ICD-10-CM

## 2021-07-07 LAB
ANION GAP SERPL CALC-SCNC: 10 MMOL/L (ref 7–16)
BUN SERPL-MCNC: 17 MG/DL (ref 8–22)
CALCIUM SERPL-MCNC: 9 MG/DL (ref 8.5–10.5)
CHLORIDE SERPL-SCNC: 105 MMOL/L (ref 96–112)
CO2 SERPL-SCNC: 20 MMOL/L (ref 20–33)
CREAT SERPL-MCNC: 0.8 MG/DL (ref 0.5–1.4)
ERYTHROCYTE [DISTWIDTH] IN BLOOD BY AUTOMATED COUNT: 53.8 FL (ref 35.9–50)
GLUCOSE BLD-MCNC: 112 MG/DL (ref 65–99)
GLUCOSE SERPL-MCNC: 107 MG/DL (ref 65–99)
HCT VFR BLD AUTO: 39.7 % (ref 37–47)
HGB BLD-MCNC: 12.8 G/DL (ref 12–16)
MCH RBC QN AUTO: 28.9 PG (ref 27–33)
MCHC RBC AUTO-ENTMCNC: 32.2 G/DL (ref 33.6–35)
MCV RBC AUTO: 89.6 FL (ref 81.4–97.8)
PLATELET # BLD AUTO: 299 K/UL (ref 164–446)
PMV BLD AUTO: 9.2 FL (ref 9–12.9)
POTASSIUM SERPL-SCNC: 4.3 MMOL/L (ref 3.6–5.5)
RBC # BLD AUTO: 4.43 M/UL (ref 4.2–5.4)
SARS-COV+SARS-COV-2 AG RESP QL IA.RAPID: NOTDETECTED
SODIUM SERPL-SCNC: 135 MMOL/L (ref 135–145)
SPECIMEN SOURCE: NORMAL
WBC # BLD AUTO: 7.1 K/UL (ref 4.8–10.8)

## 2021-07-07 PROCEDURE — 160046 HCHG PACU - 1ST 60 MINS PHASE II: Performed by: SURGERY

## 2021-07-07 PROCEDURE — 160002 HCHG RECOVERY MINUTES (STAT): Performed by: SURGERY

## 2021-07-07 PROCEDURE — A9270 NON-COVERED ITEM OR SERVICE: HCPCS | Performed by: SURGERY

## 2021-07-07 PROCEDURE — 85027 COMPLETE CBC AUTOMATED: CPT

## 2021-07-07 PROCEDURE — 700101 HCHG RX REV CODE 250: Performed by: SURGERY

## 2021-07-07 PROCEDURE — 160035 HCHG PACU - 1ST 60 MINS PHASE I: Performed by: SURGERY

## 2021-07-07 PROCEDURE — 160025 RECOVERY II MINUTES (STATS): Performed by: SURGERY

## 2021-07-07 PROCEDURE — 87426 SARSCOV CORONAVIRUS AG IA: CPT

## 2021-07-07 PROCEDURE — 82962 GLUCOSE BLOOD TEST: CPT

## 2021-07-07 PROCEDURE — 700102 HCHG RX REV CODE 250 W/ 637 OVERRIDE(OP): Performed by: ANESTHESIOLOGY

## 2021-07-07 PROCEDURE — 700105 HCHG RX REV CODE 258: Performed by: SURGERY

## 2021-07-07 PROCEDURE — 160027 HCHG SURGERY MINUTES - 1ST 30 MINS LEVEL 2: Performed by: SURGERY

## 2021-07-07 PROCEDURE — 700111 HCHG RX REV CODE 636 W/ 250 OVERRIDE (IP): Performed by: ANESTHESIOLOGY

## 2021-07-07 PROCEDURE — 160009 HCHG ANES TIME/MIN: Performed by: SURGERY

## 2021-07-07 PROCEDURE — 160038 HCHG SURGERY MINUTES - EA ADDL 1 MIN LEVEL 2: Performed by: SURGERY

## 2021-07-07 PROCEDURE — 80048 BASIC METABOLIC PNL TOTAL CA: CPT

## 2021-07-07 PROCEDURE — 700101 HCHG RX REV CODE 250: Performed by: ANESTHESIOLOGY

## 2021-07-07 PROCEDURE — 160048 HCHG OR STATISTICAL LEVEL 1-5: Performed by: SURGERY

## 2021-07-07 PROCEDURE — 160036 HCHG PACU - EA ADDL 30 MINS PHASE I: Performed by: SURGERY

## 2021-07-07 PROCEDURE — A9270 NON-COVERED ITEM OR SERVICE: HCPCS | Performed by: ANESTHESIOLOGY

## 2021-07-07 RX ORDER — ONDANSETRON 2 MG/ML
4 INJECTION INTRAMUSCULAR; INTRAVENOUS
Status: DISCONTINUED | OUTPATIENT
Start: 2021-07-07 | End: 2021-07-07 | Stop reason: HOSPADM

## 2021-07-07 RX ORDER — HYDROMORPHONE HYDROCHLORIDE 1 MG/ML
0.2 INJECTION, SOLUTION INTRAMUSCULAR; INTRAVENOUS; SUBCUTANEOUS
Status: DISCONTINUED | OUTPATIENT
Start: 2021-07-07 | End: 2021-07-07 | Stop reason: HOSPADM

## 2021-07-07 RX ORDER — TRAZODONE HYDROCHLORIDE 100 MG/1
300 TABLET ORAL NIGHTLY
COMMUNITY
End: 2021-08-10 | Stop reason: SDUPTHER

## 2021-07-07 RX ORDER — HYDROMORPHONE HYDROCHLORIDE 1 MG/ML
0.1 INJECTION, SOLUTION INTRAMUSCULAR; INTRAVENOUS; SUBCUTANEOUS
Status: DISCONTINUED | OUTPATIENT
Start: 2021-07-07 | End: 2021-07-07 | Stop reason: HOSPADM

## 2021-07-07 RX ORDER — OXYCODONE HYDROCHLORIDE 5 MG/1
5 TABLET ORAL EVERY 4 HOURS PRN
Qty: 30 TABLET | Refills: 0 | Status: SHIPPED | OUTPATIENT
Start: 2021-07-07 | End: 2021-07-14

## 2021-07-07 RX ORDER — MEPERIDINE HYDROCHLORIDE 25 MG/ML
12.5 INJECTION INTRAMUSCULAR; INTRAVENOUS; SUBCUTANEOUS
Status: DISCONTINUED | OUTPATIENT
Start: 2021-07-07 | End: 2021-07-07 | Stop reason: HOSPADM

## 2021-07-07 RX ORDER — OXYCODONE HCL 5 MG/5 ML
5 SOLUTION, ORAL ORAL
Status: COMPLETED | OUTPATIENT
Start: 2021-07-07 | End: 2021-07-07

## 2021-07-07 RX ORDER — HYDROMORPHONE HYDROCHLORIDE 1 MG/ML
0.4 INJECTION, SOLUTION INTRAMUSCULAR; INTRAVENOUS; SUBCUTANEOUS
Status: DISCONTINUED | OUTPATIENT
Start: 2021-07-07 | End: 2021-07-07 | Stop reason: HOSPADM

## 2021-07-07 RX ORDER — DEXAMETHASONE SODIUM PHOSPHATE 4 MG/ML
INJECTION, SOLUTION INTRA-ARTICULAR; INTRALESIONAL; INTRAMUSCULAR; INTRAVENOUS; SOFT TISSUE PRN
Status: DISCONTINUED | OUTPATIENT
Start: 2021-07-07 | End: 2021-07-07 | Stop reason: SURG

## 2021-07-07 RX ORDER — SODIUM CHLORIDE, SODIUM LACTATE, POTASSIUM CHLORIDE, CALCIUM CHLORIDE 600; 310; 30; 20 MG/100ML; MG/100ML; MG/100ML; MG/100ML
INJECTION, SOLUTION INTRAVENOUS CONTINUOUS
Status: DISCONTINUED | OUTPATIENT
Start: 2021-07-07 | End: 2021-07-07 | Stop reason: HOSPADM

## 2021-07-07 RX ORDER — LIDOCAINE HYDROCHLORIDE 20 MG/ML
INJECTION, SOLUTION EPIDURAL; INFILTRATION; INTRACAUDAL; PERINEURAL PRN
Status: DISCONTINUED | OUTPATIENT
Start: 2021-07-07 | End: 2021-07-07 | Stop reason: SURG

## 2021-07-07 RX ORDER — OXYCODONE HCL 5 MG/5 ML
10 SOLUTION, ORAL ORAL
Status: COMPLETED | OUTPATIENT
Start: 2021-07-07 | End: 2021-07-07

## 2021-07-07 RX ORDER — LIDOCAINE 50 MG/G
1 OINTMENT TOPICAL
COMMUNITY
Start: 2021-06-28 | End: 2022-03-31

## 2021-07-07 RX ORDER — CEFAZOLIN SODIUM 1 G/3ML
INJECTION, POWDER, FOR SOLUTION INTRAMUSCULAR; INTRAVENOUS PRN
Status: DISCONTINUED | OUTPATIENT
Start: 2021-07-07 | End: 2021-07-07 | Stop reason: SURG

## 2021-07-07 RX ORDER — HALOPERIDOL 5 MG/ML
1 INJECTION INTRAMUSCULAR
Status: DISCONTINUED | OUTPATIENT
Start: 2021-07-07 | End: 2021-07-07 | Stop reason: HOSPADM

## 2021-07-07 RX ORDER — ONDANSETRON 2 MG/ML
INJECTION INTRAMUSCULAR; INTRAVENOUS PRN
Status: DISCONTINUED | OUTPATIENT
Start: 2021-07-07 | End: 2021-07-07 | Stop reason: SURG

## 2021-07-07 RX ORDER — SODIUM CHLORIDE, SODIUM LACTATE, POTASSIUM CHLORIDE, CALCIUM CHLORIDE 600; 310; 30; 20 MG/100ML; MG/100ML; MG/100ML; MG/100ML
INJECTION, SOLUTION INTRAVENOUS CONTINUOUS
Status: ACTIVE | OUTPATIENT
Start: 2021-07-07 | End: 2021-07-07

## 2021-07-07 RX ORDER — IPRATROPIUM BROMIDE AND ALBUTEROL SULFATE 2.5; .5 MG/3ML; MG/3ML
3 SOLUTION RESPIRATORY (INHALATION)
Status: DISCONTINUED | OUTPATIENT
Start: 2021-07-07 | End: 2021-07-07 | Stop reason: HOSPADM

## 2021-07-07 RX ADMIN — FENTANYL CITRATE 50 MCG: 50 INJECTION, SOLUTION INTRAMUSCULAR; INTRAVENOUS at 12:57

## 2021-07-07 RX ADMIN — POVIDONE IODINE 15 ML: 100 SOLUTION TOPICAL at 09:19

## 2021-07-07 RX ADMIN — ONDANSETRON 4 MG: 2 INJECTION INTRAMUSCULAR; INTRAVENOUS at 12:02

## 2021-07-07 RX ADMIN — FENTANYL CITRATE 100 MCG: 50 INJECTION, SOLUTION INTRAMUSCULAR; INTRAVENOUS at 11:59

## 2021-07-07 RX ADMIN — HYDROMORPHONE HYDROCHLORIDE 0.4 MG: 1 INJECTION, SOLUTION INTRAMUSCULAR; INTRAVENOUS; SUBCUTANEOUS at 13:34

## 2021-07-07 RX ADMIN — FENTANYL CITRATE 50 MCG: 50 INJECTION, SOLUTION INTRAMUSCULAR; INTRAVENOUS at 12:42

## 2021-07-07 RX ADMIN — SODIUM CHLORIDE, POTASSIUM CHLORIDE, SODIUM LACTATE AND CALCIUM CHLORIDE: 600; 310; 30; 20 INJECTION, SOLUTION INTRAVENOUS at 09:16

## 2021-07-07 RX ADMIN — DEXAMETHASONE SODIUM PHOSPHATE 4 MG: 4 INJECTION, SOLUTION INTRA-ARTICULAR; INTRALESIONAL; INTRAMUSCULAR; INTRAVENOUS; SOFT TISSUE at 12:02

## 2021-07-07 RX ADMIN — PROPOFOL 200 MG: 10 INJECTION, EMULSION INTRAVENOUS at 12:02

## 2021-07-07 RX ADMIN — HYDROMORPHONE HYDROCHLORIDE 0.4 MG: 1 INJECTION, SOLUTION INTRAMUSCULAR; INTRAVENOUS; SUBCUTANEOUS at 13:20

## 2021-07-07 RX ADMIN — OXYCODONE HYDROCHLORIDE 10 MG: 5 SOLUTION ORAL at 12:42

## 2021-07-07 RX ADMIN — LIDOCAINE HYDROCHLORIDE 100 MG: 20 INJECTION, SOLUTION EPIDURAL; INFILTRATION; INTRACAUDAL at 12:02

## 2021-07-07 RX ADMIN — CEFAZOLIN 3 G: 330 INJECTION, POWDER, FOR SOLUTION INTRAMUSCULAR; INTRAVENOUS at 11:59

## 2021-07-07 ASSESSMENT — PAIN DESCRIPTION - PAIN TYPE
TYPE: SURGICAL PAIN

## 2021-07-07 ASSESSMENT — FIBROSIS 4 INDEX: FIB4 SCORE: 1.13

## 2021-07-07 ASSESSMENT — PAIN SCALES - GENERAL: PAIN_LEVEL: 0

## 2021-07-07 NOTE — OR NURSING
1238- Pt arrives to PACU from OR on 6L of oxygen via mask. Report received. Wound vac in place and working. Pt states pain 5/10, medicated per MAR. Dr. Mota at bedside,  updated on pt status and POC.     1350- Pt states pain 3/10, tolerating sips of water, report called to Franchesca VILLANUEVA.

## 2021-07-07 NOTE — ANESTHESIA PROCEDURE NOTES
Airway    Date/Time: 7/7/2021 12:05 PM  Performed by: Blair Harding M.D.  Authorized by: Blair Harding M.D.     Location:  OR  Urgency:  Elective  Indications for Airway Management:  Anesthesia      Spontaneous Ventilation: absent    Sedation Level:  Deep  Preoxygenated: Yes    Mask Difficulty Assessment:  0 - not attempted  Final Airway Type:  Supraglottic airway  Final Supraglottic Airway:  Standard LMA    SGA Size:  4  Number of Attempts at Approach:  1

## 2021-07-07 NOTE — OP REPORT
OP Note    PreOp Diagnosis: chronic wound midline anterior abdominal wall      PostOp Diagnosis: same      Procedure(s):  1. Debridement of skin and subcutaneous tissue anterior abdominal wall wound 10cm x 5cm  2. Application of negative pressure wound VAC    Wound Class: Dirty or Infected    Surgeon(s):  Thong Mota M.D.    Assistant(s):  none    Anesthesiologist/Type of Anesthesia:  Anesthesiologist: Blair Harding M.D./General    Surgical Staff:  Circulator: Pavan Kimball R.N.  Relief Scrub: Supa Manzano  Scrub Person: Romulo Ny    Specimens removed if any:  none    Estimated Blood Loss: 20cc    Findings: no bowel of obvious fascia identified    Complications: none observed    The patient was met in the preoperative holding area where all of the potential risks and benefits of the procedure were discussed in detail with the patient. All of her questions were answered to her satisfaction and informed consent was signed. The patient was taken back to the operating room and placed supine on the operating room table. General endotracheal anesthesia was induced and all of the patients pressure points were padded appropriately. The patients abdomen was prepped and draped in the usual sterile fashion.  A timeout was performed which correctly identified the patient and the procedure being performed and preoperative antibiotics were given according to SCIP guidelines.     The operation was begun with inspection of the wound.  There was copious fibrinous exudate and biofilm within the wound bed.  The wound bed margins were irregular and poorly defined.  There is no obvious purulence.  Minimal nearby erythema and no surrounding induration or fluctuance.  Recent CT scan did not demonstrate any nearby fluid collections.  Began by gently debriding the wound bed with a lap pad.  After this was complete a curette was used to debride any the granulomatous and necrotic tissue within the wound bed.   This was taken down to nice healthy bleeding tissue.  A scrub brush soaked in Betadine was then used to debride and sterilized the remaining wound bed.  3 L of warm sterile saline was then irrigated through the wound using a pulse lavage device.  Hemostasis was then achieved with electrocautery.  The wound was then measured at 10 x 5 cm and a black sponge cut in fit to the wound bed.  The VAC dressing was applied and sponge suction down to the wound bed nicely.    The patient tolerated the procedure well and was extubated at the conclusion of the case without incident. All of the sponge, needle and instrument counts were correct at the conclusion of the case. After she was awoken from anesthesia she was taken to the recovery room in stable condition.      7/7/2021 12:39 PM Thong Mota M.D.

## 2021-07-07 NOTE — ANESTHESIA POSTPROCEDURE EVALUATION
Patient: Mikayla Kngiht    Procedure Summary     Date: 07/07/21 Room / Location: [place] [address] / SURGERY [facility]    Anesthesia Start: 1159 Anesthesia Stop: 1238    Procedures:       DEBRIDEMENT - ABDOMINAL WALL WOUND (Abdomen)      APPLICATION OR REPLACEMENT,WOUND VAC - FOR APPILCATION. (Abdomen) Diagnosis: (POST OPERATIVE WOUND INFECTION, NON-HEALING SURGICAL WOUND)    Surgeons: Thong Mota M.D. Responsible Provider: Blair Harding M.D.    Anesthesia Type: general ASA Status: 3          Final Anesthesia Type: general  Last vitals  BP   Blood Pressure: 119/82    Temp   36.2 °C (97.2 °F)    Pulse   78   Resp   17    SpO2   94 %      Anesthesia Post Evaluation    Patient location during evaluation: PACU  Patient participation: complete - patient participated  Level of consciousness: awake and alert  Pain score: 0    Airway patency: patent  Anesthetic complications: no  Cardiovascular status: hemodynamically stable  Respiratory status: acceptable  Hydration status: euvolemic    PONV: none          No complications documented.     Nurse Pain Score: 5 (NPRS)

## 2021-07-07 NOTE — PROGRESS NOTES
Pt arrived to phase II, pain controlled, VSS, pt denies nausea, [place] po intake. Wound vac in place, wnl. Call light in reach. Will continue to monitor.  at bedside.

## 2021-07-07 NOTE — ANESTHESIA PREPROCEDURE EVALUATION
Abdominal wound.     Denies angina, dyspnea, GERD.     No problems with anesthesia.     Relevant Problems   CARDIAC   (positive) Pulmonary hypertension (HCC)      ENDO   (positive) Type 2 diabetes mellitus without complication, without long-term current use of insulin (HCC)       Physical Exam    Airway   Mallampati: II  TM distance: >3 FB  Neck ROM: full       Cardiovascular - normal exam  Rhythm: regular  Rate: normal  (-) murmur     Dental - normal exam           Pulmonary - normal exam  Breath sounds clear to auscultation     Abdominal    Neurological - normal exam                 Anesthesia Plan    ASA 3   ASA physical status 3 criteria: other (comment)    Plan - general       Airway plan will be LMA          Induction: intravenous    Postoperative Plan: Postoperative administration of opioids is intended.    Pertinent diagnostic labs and testing reviewed    Informed Consent:    Anesthetic plan and risks discussed with patient.    Use of blood products discussed with: patient whom consented to blood products.

## 2021-07-07 NOTE — PROGRESS NOTES
Pt VSS, pain controlled, tolerating po intake. Pt and family given discharge education/instructions, all questions answered. IV discontinued, site wnl. Surgical site/wound vac in place, wnl. Pt discharged home in stable condition with all belongings, script, medical CD and with preordered box of wound vac supplies.

## 2021-07-07 NOTE — ANESTHESIA TIME REPORT
Anesthesia Start and Stop Event Times     Date Time Event    7/7/2021 1148 Ready for Procedure     1159 Anesthesia Start     1238 Anesthesia Stop        Responsible Staff  07/07/21    Name Role Begin End    Blair Harding M.D. Anesth 1159 1238        Preop Diagnosis (Free Text):  Pre-op Diagnosis     POST OPERATIVE WOUND INFECTION, NON-HEALING SURGICAL WOUND        Preop Diagnosis (Codes):    Post op Diagnosis  Chronic wound infection of abdomen      Premium Reason  Non-Premium    Comments:

## 2021-07-07 NOTE — DISCHARGE INSTRUCTIONS
ACTIVITY: Rest and take it easy for the first 24 hours.  A responsible adult is recommended to remain with you during that time.  It is normal to feel sleepy.  We encourage you to not do anything that requires balance, judgment or coordination.    MILD FLU-LIKE SYMPTOMS ARE NORMAL. YOU MAY EXPERIENCE GENERALIZED MUSCLE ACHES, THROAT IRRITATION, HEADACHE AND/OR SOME NAUSEA.    FOR 24 HOURS DO NOT:  Drive, operate machinery or run household appliances.  Drink beer or alcoholic beverages.   Make important decisions or sign legal documents.    SPECIAL INSTRUCTIONS:     DIET: To avoid nausea, slowly advance diet as tolerated, avoiding spicy or greasy foods for the first day.  Add more substantial food to your diet according to your physician's instructions.   INCREASE FLUIDS AND FIBER TO AVOID CONSTIPATION.    SURGICAL DRESSING/BATHING:     Sponge bath only, keep wound clean and dry    Do not shower    Have VAC sponge changed x3 weekly, do not allow adaptic to be placed under VAC sponge when dressings are performed    FOLLOW-UP APPOINTMENT:  A follow-up appointment should be arranged with your doctor in 1-2 weeks; call to schedule.    You should CALL YOUR PHYSICIAN if you develop:  Fever greater than 101 degrees F.  Pain not relieved by medication, or persistent nausea or vomiting.  Excessive bleeding (blood soaking through dressing) or unexpected drainage from the wound.  Extreme redness or swelling around the incision site, drainage of pus or foul smelling drainage.  Inability to urinate or empty your bladder within 8 hours.  Problems with breathing or chest pain.    You should call 911 if you develop problems with breathing or chest pain.  If you are unable to contact your doctor or surgical center, you should go to the nearest emergency room or urgent care center.    Physician's telephone #: [phone] Dr Mota    If any questions arise, call your doctor.  If your doctor is not available, please feel free to  call the Surgical Center at [phone]. The Contact Center is open Monday through Friday 7AM to 5PM and may speak to a nurse at [phone], or toll free at [phone].     A registered nurse may call you a few days after your surgery to see how you are doing after your procedure.    MEDICATIONS: Resume taking daily medication.  Take prescribed pain medication with food.  If no medication is prescribed, you may take non-aspirin pain medication if needed.  PAIN MEDICATION CAN BE VERY CONSTIPATING.  Take a stool softener or laxative such as senokot, pericolace, or milk of magnesia if needed.    Prescription given for Oxycodone sent to your [facility] in [place].  Last pain medication given at Oxycodone 10mg at 1242.    If your physician has prescribed pain medication that includes Acetaminophen (Tylenol), do not take additional Acetaminophen (Tylenol) while taking the prescribed medication.    Depression / Suicide Risk    As you are discharged from this [facility] facility, it is important to learn how to keep safe from harming yourself.    Recognize the warning signs:  · Abrupt changes in personality, positive or negative- including increase in energy   · Giving away possessions  · Change in eating patterns- significant weight changes-  positive or negative  · Change in sleeping patterns- unable to sleep or sleeping all the time   · Unwillingness or inability to communicate  · Depression  · Unusual sadness, discouragement and loneliness  · Talk of wanting to die  · Neglect of personal appearance   · Rebelliousness- reckless behavior  · Withdrawal from people/activities they love  · Confusion- inability to concentrate     If you or a loved one observes any of these behaviors or has concerns about self-harm, here's what you can do:  · Talk about it- your feelings and reasons for harming yourself  · Remove any means that you might use to hurt yourself (examples: pills, rope, extension cords, firearm)  · Get  professional help from the community (Mental Health, Substance Abuse, psychological counseling)  · Do not be alone:Call your Safe Contact- someone whom you trust who will be there for you.  · Call your local CRISIS HOTLINE 345-3000 or 799-885-4530  · Call your local Children's Mobile Crisis Response Team [place] (360) 484-7704 or www.[employer]  · Call the toll free National Suicide Prevention Hotlines   · National Suicide Prevention Lifeline 952-352-YAAY (1231)  · National Hope Line Network 800-SUICIDE (814-6953)

## 2021-07-07 NOTE — PROGRESS NOTES
Med rec complete per pt at bedside  Interviewed pt with family at bedside with permission from pt  Allergies reviewed and updated.

## 2021-07-15 NOTE — DOCUMENTATION QUERY
[facility]                                                                       Query Response Note      PATIENT:               TIMMY VIVAS  ACCT #:                  [id number]  MRN:                     [id number]  :                      1960  ADMIT DATE:       2021 8:26 AM  DISCH DATE:        2021 2:29 PM  RESPONDING  PROVIDER #:        [id number]           QUERY TEXT:    Excisional debridement (or documentation describing an excision)  has been documented in the medical record.  Please document the deepest level of debridement treated.      NOTE:  If an appropriate response is not listed below, please respond with a new note.        The patient's Clinical Indicators include:  Clinical indicators: Non healing surgical wound to abdomen    Treatment or Monitoring: Pt was taken to the OR on 21 for debridement         [email or web address]  Options provided:   -- Deepest level of debridement treated - skin   -- Deepest level of debridement treated - subcutaneous tissue or fascia   -- Deepest level of debridement treated - muscle   -- Deepest level of debridement treated - bone   -- Unable to determine      Query created by: Tim Pulido on 2021 9:57 AM    RESPONSE TEXT:    Deepest level of debridement treated - subcutaneous tissue or fascia          Electronically signed by:  ODELL HEARN MD 7/15/2021 9:14 AM

## 2021-07-29 ENCOUNTER — APPOINTMENT (OUTPATIENT)
Dept: ADMISSIONS | Facility: MEDICAL CENTER | Age: 61
End: 2021-07-29
Attending: SURGERY
Payer: COMMERCIAL

## 2021-07-30 ENCOUNTER — PRE-ADMISSION TESTING (OUTPATIENT)
Dept: ADMISSIONS | Facility: MEDICAL CENTER | Age: 61
End: 2021-07-30
Attending: SURGERY
Payer: COMMERCIAL

## 2021-07-30 RX ORDER — TRAMADOL HYDROCHLORIDE 50 MG/1
TABLET ORAL
COMMUNITY
Start: 2021-07-15 | End: 2021-09-29

## 2021-07-30 NOTE — PREPROCEDURE INSTRUCTIONS
"Pre-admit appointment completed. \"Preparing for your Procedure\" instructions given to Pt via phone with verbal, emailed written instructions, along with video content. Pt states all instructions given are understood and to call pre-admit or Dr's office for additional questions or any symptoms of illness/covid develop prior to DOS. Medications the patient will take the morning of surgery per anesthesia protocol: tylenol    Pt had a Covid Test on 7/30 at [facility] [place] in [place]. Pt given 2 fax numbers to send results. [phone] and [phone].  "

## 2021-08-02 NOTE — OR NURSING
Phone call to [facility] in [place], spoke to medical records for Covid test results, they stated they did not have a record of any test results.     Phone call to patient, she states she did a Covid test at [facility] in [place].   She is following up for results and states she will fax us a copy.

## 2021-08-03 ENCOUNTER — APPOINTMENT (OUTPATIENT)
Dept: ADMISSIONS | Facility: MEDICAL CENTER | Age: 61
End: 2021-08-03
Attending: SURGERY
Payer: COMMERCIAL

## 2021-08-03 DIAGNOSIS — Z01.812 PRE-OPERATIVE LABORATORY EXAMINATION: ICD-10-CM

## 2021-08-03 LAB
SARS-COV+SARS-COV-2 AG RESP QL IA.RAPID: NOTDETECTED
SPECIMEN SOURCE: NORMAL

## 2021-08-03 PROCEDURE — 87426 SARSCOV CORONAVIRUS AG IA: CPT

## 2021-08-04 ENCOUNTER — HOSPITAL ENCOUNTER (OUTPATIENT)
Facility: MEDICAL CENTER | Age: 61
End: 2021-08-04
Attending: SURGERY | Admitting: SURGERY
Payer: COMMERCIAL

## 2021-08-04 ENCOUNTER — ANESTHESIA EVENT (OUTPATIENT)
Dept: SURGERY | Facility: MEDICAL CENTER | Age: 61
End: 2021-08-04
Payer: COMMERCIAL

## 2021-08-04 ENCOUNTER — ANESTHESIA (OUTPATIENT)
Dept: SURGERY | Facility: MEDICAL CENTER | Age: 61
End: 2021-08-04
Payer: COMMERCIAL

## 2021-08-04 VITALS
BODY MASS INDEX: 42.19 KG/M2 | HEART RATE: 84 BPM | TEMPERATURE: 97.2 F | DIASTOLIC BLOOD PRESSURE: 39 MMHG | RESPIRATION RATE: 16 BRPM | WEIGHT: 253.53 LBS | OXYGEN SATURATION: 90 % | SYSTOLIC BLOOD PRESSURE: 116 MMHG

## 2021-08-04 DIAGNOSIS — G89.18 POST-OPERATIVE PAIN: ICD-10-CM

## 2021-08-04 LAB — GLUCOSE BLD-MCNC: 110 MG/DL (ref 65–99)

## 2021-08-04 PROCEDURE — 160041 HCHG SURGERY MINUTES - EA ADDL 1 MIN LEVEL 4: Performed by: SURGERY

## 2021-08-04 PROCEDURE — 160009 HCHG ANES TIME/MIN: Performed by: SURGERY

## 2021-08-04 PROCEDURE — 700105 HCHG RX REV CODE 258: Performed by: ANESTHESIOLOGY

## 2021-08-04 PROCEDURE — 700101 HCHG RX REV CODE 250: Performed by: ANESTHESIOLOGY

## 2021-08-04 PROCEDURE — 160035 HCHG PACU - 1ST 60 MINS PHASE I: Performed by: SURGERY

## 2021-08-04 PROCEDURE — 160036 HCHG PACU - EA ADDL 30 MINS PHASE I: Performed by: SURGERY

## 2021-08-04 PROCEDURE — 700101 HCHG RX REV CODE 250: Performed by: SURGERY

## 2021-08-04 PROCEDURE — 501838 HCHG SUTURE GENERAL: Performed by: SURGERY

## 2021-08-04 PROCEDURE — 160025 RECOVERY II MINUTES (STATS): Performed by: SURGERY

## 2021-08-04 PROCEDURE — 160048 HCHG OR STATISTICAL LEVEL 1-5: Performed by: SURGERY

## 2021-08-04 PROCEDURE — 700102 HCHG RX REV CODE 250 W/ 637 OVERRIDE(OP): Performed by: ANESTHESIOLOGY

## 2021-08-04 PROCEDURE — A9270 NON-COVERED ITEM OR SERVICE: HCPCS | Performed by: ANESTHESIOLOGY

## 2021-08-04 PROCEDURE — 500002 HCHG ADHESIVE, DERMABOND: Performed by: SURGERY

## 2021-08-04 PROCEDURE — 82962 GLUCOSE BLOOD TEST: CPT

## 2021-08-04 PROCEDURE — 160029 HCHG SURGERY MINUTES - 1ST 30 MINS LEVEL 4: Performed by: SURGERY

## 2021-08-04 PROCEDURE — 160046 HCHG PACU - 1ST 60 MINS PHASE II: Performed by: SURGERY

## 2021-08-04 PROCEDURE — 700111 HCHG RX REV CODE 636 W/ 250 OVERRIDE (IP): Performed by: ANESTHESIOLOGY

## 2021-08-04 PROCEDURE — 160002 HCHG RECOVERY MINUTES (STAT): Performed by: SURGERY

## 2021-08-04 RX ORDER — OXYCODONE HCL 5 MG/5 ML
5 SOLUTION, ORAL ORAL
Status: COMPLETED | OUTPATIENT
Start: 2021-08-04 | End: 2021-08-04

## 2021-08-04 RX ORDER — SODIUM CHLORIDE, SODIUM LACTATE, POTASSIUM CHLORIDE, CALCIUM CHLORIDE 600; 310; 30; 20 MG/100ML; MG/100ML; MG/100ML; MG/100ML
INJECTION, SOLUTION INTRAVENOUS
Status: DISCONTINUED | OUTPATIENT
Start: 2021-08-04 | End: 2021-08-04 | Stop reason: SURG

## 2021-08-04 RX ORDER — MIDAZOLAM HYDROCHLORIDE 1 MG/ML
INJECTION INTRAMUSCULAR; INTRAVENOUS PRN
Status: DISCONTINUED | OUTPATIENT
Start: 2021-08-04 | End: 2021-08-04 | Stop reason: SURG

## 2021-08-04 RX ORDER — HYDROMORPHONE HYDROCHLORIDE 1 MG/ML
0.5 INJECTION, SOLUTION INTRAMUSCULAR; INTRAVENOUS; SUBCUTANEOUS
Status: DISCONTINUED | OUTPATIENT
Start: 2021-08-04 | End: 2021-08-04 | Stop reason: HOSPADM

## 2021-08-04 RX ORDER — DEXAMETHASONE SODIUM PHOSPHATE 4 MG/ML
INJECTION, SOLUTION INTRA-ARTICULAR; INTRALESIONAL; INTRAMUSCULAR; INTRAVENOUS; SOFT TISSUE PRN
Status: DISCONTINUED | OUTPATIENT
Start: 2021-08-04 | End: 2021-08-04 | Stop reason: SURG

## 2021-08-04 RX ORDER — HALOPERIDOL 5 MG/ML
1 INJECTION INTRAMUSCULAR
Status: DISCONTINUED | OUTPATIENT
Start: 2021-08-04 | End: 2021-08-04 | Stop reason: HOSPADM

## 2021-08-04 RX ORDER — LABETALOL HYDROCHLORIDE 5 MG/ML
5 INJECTION, SOLUTION INTRAVENOUS
Status: DISCONTINUED | OUTPATIENT
Start: 2021-08-04 | End: 2021-08-04 | Stop reason: HOSPADM

## 2021-08-04 RX ORDER — HYDROMORPHONE HYDROCHLORIDE 1 MG/ML
0.4 INJECTION, SOLUTION INTRAMUSCULAR; INTRAVENOUS; SUBCUTANEOUS
Status: DISCONTINUED | OUTPATIENT
Start: 2021-08-04 | End: 2021-08-04 | Stop reason: HOSPADM

## 2021-08-04 RX ORDER — DIPHENHYDRAMINE HYDROCHLORIDE 50 MG/ML
12.5 INJECTION INTRAMUSCULAR; INTRAVENOUS
Status: DISCONTINUED | OUTPATIENT
Start: 2021-08-04 | End: 2021-08-04 | Stop reason: HOSPADM

## 2021-08-04 RX ORDER — OXYCODONE HYDROCHLORIDE 5 MG/1
5 TABLET ORAL EVERY 4 HOURS PRN
Qty: 30 TABLET | Refills: 0 | Status: SHIPPED | OUTPATIENT
Start: 2021-08-04 | End: 2021-08-11

## 2021-08-04 RX ORDER — ONDANSETRON 2 MG/ML
INJECTION INTRAMUSCULAR; INTRAVENOUS PRN
Status: DISCONTINUED | OUTPATIENT
Start: 2021-08-04 | End: 2021-08-04 | Stop reason: SURG

## 2021-08-04 RX ORDER — ROCURONIUM BROMIDE 10 MG/ML
INJECTION, SOLUTION INTRAVENOUS PRN
Status: DISCONTINUED | OUTPATIENT
Start: 2021-08-04 | End: 2021-08-04 | Stop reason: SURG

## 2021-08-04 RX ORDER — CEFAZOLIN SODIUM 1 G/3ML
INJECTION, POWDER, FOR SOLUTION INTRAMUSCULAR; INTRAVENOUS PRN
Status: DISCONTINUED | OUTPATIENT
Start: 2021-08-04 | End: 2021-08-04 | Stop reason: SURG

## 2021-08-04 RX ORDER — MEPERIDINE HYDROCHLORIDE 25 MG/ML
12.5 INJECTION INTRAMUSCULAR; INTRAVENOUS; SUBCUTANEOUS
Status: DISCONTINUED | OUTPATIENT
Start: 2021-08-04 | End: 2021-08-04 | Stop reason: HOSPADM

## 2021-08-04 RX ORDER — BUPIVACAINE HYDROCHLORIDE 5 MG/ML
INJECTION, SOLUTION EPIDURAL; INTRACAUDAL
Status: DISCONTINUED | OUTPATIENT
Start: 2021-08-04 | End: 2021-08-04 | Stop reason: HOSPADM

## 2021-08-04 RX ORDER — SODIUM CHLORIDE, SODIUM LACTATE, POTASSIUM CHLORIDE, CALCIUM CHLORIDE 600; 310; 30; 20 MG/100ML; MG/100ML; MG/100ML; MG/100ML
INJECTION, SOLUTION INTRAVENOUS CONTINUOUS
Status: DISCONTINUED | OUTPATIENT
Start: 2021-08-04 | End: 2021-08-04 | Stop reason: HOSPADM

## 2021-08-04 RX ORDER — OXYCODONE HCL 5 MG/5 ML
10 SOLUTION, ORAL ORAL
Status: COMPLETED | OUTPATIENT
Start: 2021-08-04 | End: 2021-08-04

## 2021-08-04 RX ORDER — HYDRALAZINE HYDROCHLORIDE 20 MG/ML
5 INJECTION INTRAMUSCULAR; INTRAVENOUS
Status: DISCONTINUED | OUTPATIENT
Start: 2021-08-04 | End: 2021-08-04 | Stop reason: HOSPADM

## 2021-08-04 RX ORDER — LIDOCAINE HYDROCHLORIDE 20 MG/ML
INJECTION, SOLUTION EPIDURAL; INFILTRATION; INTRACAUDAL; PERINEURAL PRN
Status: DISCONTINUED | OUTPATIENT
Start: 2021-08-04 | End: 2021-08-04 | Stop reason: SURG

## 2021-08-04 RX ORDER — HYDROMORPHONE HYDROCHLORIDE 1 MG/ML
0.2 INJECTION, SOLUTION INTRAMUSCULAR; INTRAVENOUS; SUBCUTANEOUS
Status: DISCONTINUED | OUTPATIENT
Start: 2021-08-04 | End: 2021-08-04 | Stop reason: HOSPADM

## 2021-08-04 RX ADMIN — ROCURONIUM BROMIDE 50 MG: 10 INJECTION, SOLUTION INTRAVENOUS at 09:10

## 2021-08-04 RX ADMIN — FENTANYL CITRATE 50 MCG: 50 INJECTION, SOLUTION INTRAMUSCULAR; INTRAVENOUS at 09:09

## 2021-08-04 RX ADMIN — FENTANYL CITRATE 25 MCG: 50 INJECTION, SOLUTION INTRAMUSCULAR; INTRAVENOUS at 10:35

## 2021-08-04 RX ADMIN — PROPOFOL 150 MG: 10 INJECTION, EMULSION INTRAVENOUS at 09:09

## 2021-08-04 RX ADMIN — DEXAMETHASONE SODIUM PHOSPHATE 4 MG: 4 INJECTION, SOLUTION INTRAMUSCULAR; INTRAVENOUS at 09:12

## 2021-08-04 RX ADMIN — SODIUM CHLORIDE, POTASSIUM CHLORIDE, SODIUM LACTATE AND CALCIUM CHLORIDE: 600; 310; 30; 20 INJECTION, SOLUTION INTRAVENOUS at 09:50

## 2021-08-04 RX ADMIN — ONDANSETRON 4 MG: 2 INJECTION INTRAMUSCULAR; INTRAVENOUS at 09:48

## 2021-08-04 RX ADMIN — CEFAZOLIN 2 G: 330 INJECTION, POWDER, FOR SOLUTION INTRAMUSCULAR; INTRAVENOUS at 09:12

## 2021-08-04 RX ADMIN — MIDAZOLAM HYDROCHLORIDE 2 MG: 1 INJECTION, SOLUTION INTRAMUSCULAR; INTRAVENOUS at 09:03

## 2021-08-04 RX ADMIN — SODIUM CHLORIDE, POTASSIUM CHLORIDE, SODIUM LACTATE AND CALCIUM CHLORIDE: 600; 310; 30; 20 INJECTION, SOLUTION INTRAVENOUS at 08:44

## 2021-08-04 RX ADMIN — SUGAMMADEX 200 MG: 100 INJECTION, SOLUTION INTRAVENOUS at 09:57

## 2021-08-04 RX ADMIN — FENTANYL CITRATE 25 MCG: 50 INJECTION, SOLUTION INTRAMUSCULAR; INTRAVENOUS at 10:46

## 2021-08-04 RX ADMIN — OXYCODONE HYDROCHLORIDE 5 MG: 5 SOLUTION ORAL at 10:33

## 2021-08-04 RX ADMIN — LIDOCAINE HYDROCHLORIDE 100 MG: 20 INJECTION, SOLUTION EPIDURAL; INFILTRATION; INTRACAUDAL; PERINEURAL at 09:09

## 2021-08-04 ASSESSMENT — PAIN DESCRIPTION - PAIN TYPE
TYPE: SURGICAL PAIN
TYPE: ACUTE PAIN
TYPE: SURGICAL PAIN

## 2021-08-04 ASSESSMENT — FIBROSIS 4 INDEX: FIB4 SCORE: 1.17

## 2021-08-04 NOTE — OR NURSING
1025: Report rec'd. Pt states pain is 6/10.   1031: Pt requesting pain medication.  1056: Pain is tolerable at 41/2-5.  1106: Meets criteria for stage ll.

## 2021-08-04 NOTE — ANESTHESIA TIME REPORT
Anesthesia Start and Stop Event Times     Date Time Event    8/4/2021 0848 Ready for Procedure     0901 Anesthesia Start     1009 Anesthesia Stop        Responsible Staff  08/04/21    Name Role Begin End    Jorge Luis Sotelo M.D. Anesth 0901 1009        Preop Diagnosis (Free Text):  Pre-op Diagnosis     NON PRESSURE CHRONIC ULCER OF ABDOMINAL WALL WITH NECROSIS OF MUSCLE        Preop Diagnosis (Codes):    Post op Diagnosis  Chronic abdominal wound infection      Premium Reason  Non-Premium    Comments:

## 2021-08-04 NOTE — OP REPORT
OP Note    PreOp Diagnosis: chronic wound anterior abdominal wall      PostOp Diagnosis: chronic wound anterior abdominal wall      Procedure(s):  1. [place] of full thickness skin graft from right lower abdomen  2. Preparation of wound bed for graft 5x7cm  3. Application of full thickness skin graft anterior abdominal wall (5x7cm)    Surgeon(s):  Thong Mota M.D.    Anesthesiologist/Type of Anesthesia:  Anesthesiologist: Jorge Luis Sotelo M.D./General    Surgical Staff:  Circulator: Sandip Childers R.N.  Relief Circulator: Mary Dumont R.N.  Scrub Person: Frank Presley    Specimens removed if any:  * No specimens in log *    Estimated Blood Loss: 10cc    Findings: good wound bed with beefy red granulation tissue throughout    Complications: none observed    The patient was met in the preoperative holding area where all of the potential risks and benefits of the procedure were discussed in detail with the patient. All of her questions were answered to her satisfaction and informed consent was signed. The patient was taken back to the operating room and placed supine on the operating room table. General endotracheal anesthesia was induced and all of the patients pressure points were padded appropriately. The patients abdomen was prepped and draped in the usual sterile fashion.  A timeout was performed which correctly identified the patient and the procedure being performed and preoperative antibiotics were given according to SCIP guidelines.     The operation was begun with gentle superficial debridement of the skin graft recipient site until healthy bleeding granulation tissue was present throughout. The dermis of the right lower abdomen was then infiltrated with 0.5% marcaine without epinephrine. The wound bed was measured at 5x7cm. The donor site was chosen at a spot of abdominal laxity and fullness. A 5x14cm elliptical excision was then marked and created using a scalpel. The skin graft was then  gently elevated from the underlying subcutaneous tissue using electrocautery. I then took the graft to the back table and defatted it using Metzenbaum scissors and a #10 blade scalpel. I then pie crusted the graft with a #11 blade scalpel blade. I then tented this over the wound bed and secured it with circumferential 3-0 vicryl sutures. I then placed an adaptic dressing over the graft and applied a wound VAC with white sponge. I then closed the donor site with 3-0 vicryl sutures and a 4-0 monocryl. Dermabond was applied as a dressing.     The patient tolerated the procedure well and was extubated at the conclusion of the case without incident. All of the sponge, needle and instrument counts were correct at the conclusion of the case. After she was awoken from anesthesia she was taken to the recovery room in stable condition.        8/4/2021 9:58 AM Thong Mota M.D.

## 2021-08-04 NOTE — ANESTHESIA POSTPROCEDURE EVALUATION
Patient: Mikayla Knight    Procedure Summary     Date: 08/04/21 Room / Location:  OR  / SURGERY [facility]    Anesthesia Start: 0901 Anesthesia Stop: 1009    Procedure: APPLICATION, GRAFT, SKIN, FULL-THICKNESS - TO ABDOMEN (N/A Abdomen) Diagnosis: (NON PRESSURE CHRONIC ULCER OF ABDOMINAL WALL WITH NECROSIS OF MUSCLE)    Surgeons: Thong Mota M.D. Responsible Provider: Jorge Luis Sotelo M.D.    Anesthesia Type: general ASA Status: 3          Final Anesthesia Type: general  Last vitals  BP   Blood Pressure: 116/39    Temp   36.2 °C (97.2 °F)    Pulse   84   Resp   16    SpO2   90 %      Anesthesia Post Evaluation    Patient location during evaluation: PACU  Patient participation: complete - patient participated  Level of consciousness: sleepy but conscious    Airway patency: patent  Anesthetic complications: no  Cardiovascular status: hemodynamically stable  Respiratory status: acceptable  Hydration status: balanced    PONV: none          No complications documented.     Nurse Pain Score: 5 (NPRS)

## 2021-08-04 NOTE — OR NURSING
1004- To PACU from OR via gurney. Sleeping, respirations spontaneous and non-labored via OPA with 6L O2 via mask. Dermabond to abdomen. Wound vac in place and functioning properly. SCDs on. LR infusing via PIV. Plan to keep pt in PACU for full hour per [facility] protocol.  1019- Rouses to voice. Reports pain 6/10. O2 dc'd. PO fluids offered.  1025- Report given to RICH Marie.

## 2021-08-04 NOTE — OR NURSING
1113: Patient arrives in phase II with wound vac and is pain tolerable.    1132: D/Eric to care of family post uneventful stay in PACU 2.

## 2021-08-04 NOTE — DISCHARGE INSTRUCTIONS
ACTIVITY: Rest and take it easy for the first 24 hours.  A responsible adult is recommended to remain with you during that time.  It is normal to feel sleepy.  We encourage you to not do anything that requires balance, judgment or coordination.    MILD FLU-LIKE SYMPTOMS ARE NORMAL. YOU MAY EXPERIENCE GENERALIZED MUSCLE ACHES, THROAT IRRITATION, HEADACHE AND/OR SOME NAUSEA.    FOR 24 HOURS DO NOT:  Drive, operate machinery or run household appliances.  Drink beer or alcoholic beverages.   Make important decisions or sign legal documents.    SPECIAL INSTRUCTIONS:  Keep wound VAC in place, do not remove. Only reinforce if necessary.     DIET: To avoid nausea, slowly advance diet as tolerated, avoiding spicy or greasy foods for the first day.  Add more substantial food to your diet according to your physician's instructions. INCREASE FLUIDS AND FIBER TO AVOID CONSTIPATION.    FOLLOW-UP APPOINTMENT:  A follow-up appointment should be arranged with your doctor; call to schedule.    You should CALL YOUR PHYSICIAN if you develop:  Fever greater than 101 degrees F.  Pain not relieved by medication, or persistent nausea or vomiting.  Excessive bleeding (blood soaking through dressing) or unexpected drainage from the wound.  Extreme redness or swelling around the incision site, drainage of pus or foul smelling drainage.  Inability to urinate or empty your bladder within 8 hours.    You should call 911 if you develop problems with breathing or chest pain.    If you are unable to contact your doctor or surgical center, you should go to the nearest emergency room or urgent care center.      Dr. Mota's telephone #: [phone]    If any questions arise, call your doctor.  If your doctor is not available, please feel free to call the Surgical Center at [phone]. The Contact Center is open Monday through Friday 7AM to 5PM and may speak to a nurse at [phone], or toll free at [phone].     A registered nurse may  call you a few days after your surgery to see how you are doing after your procedure.    MEDICATIONS: Resume taking daily medication.  Take prescribed pain medication with food.  If no medication is prescribed, you may take non-aspirin pain medication if needed.  PAIN MEDICATION CAN BE VERY CONSTIPATING.  Take a stool softener or laxative such as senokot, pericolace, or milk of magnesia if needed.    Prescription given for Oxycodone.      Last pain medication (Oxycodone 5mg) given at 10:30 am.    If your physician has prescribed pain medication that includes Acetaminophen (Tylenol), do not take additional Acetaminophen (Tylenol) while taking the prescribed medication.    Depression / Suicide Risk    As you are discharged from this [facility] facility, it is important to learn how to keep safe from harming yourself.    Recognize the warning signs:  · Abrupt changes in personality, positive or negative- including increase in energy   · Giving away possessions  · Change in eating patterns- significant weight changes-  positive or negative  · Change in sleeping patterns- unable to sleep or sleeping all the time   · Unwillingness or inability to communicate  · Depression  · Unusual sadness, discouragement and loneliness  · Talk of wanting to die  · Neglect of personal appearance   · Rebelliousness- reckless behavior  · Withdrawal from people/activities they love  · Confusion- inability to concentrate     If you or a loved one observes any of these behaviors or has concerns about self-harm, here's what you can do:  · Talk about it- your feelings and reasons for harming yourself  · Remove any means that you might use to hurt yourself (examples: pills, rope, extension cords, firearm)  · Get professional help from the community (Mental Health, Substance Abuse, psychological counseling)  · Do not be alone:Call your Safe Contact- someone whom you trust who will be there for you.  · Call your local CRISIS HOTLINE 152-6817 or  855-690-0784  · Call your local Children's Mobile Crisis Response Team [place] (326) 946-4487 or www.[employer]  · Call the toll free National Suicide Prevention Hotlines   · National Suicide Prevention Lifeline 848-796-XVEE (8707)  · National Wireless Safety Line Network 800-SUICIDE (144-3271)

## 2021-08-04 NOTE — ANESTHESIA PREPROCEDURE EVALUATION
"    Relevant Problems   CARDIAC   (positive) Pulmonary hypertension (HCC)      ENDO   (positive) Type 2 diabetes mellitus without complication, without long-term current use of insulin (HCC)      Other   (positive) Breast cancer, left breast (HCC)   (positive) Fibromyalgia   (positive) Morbid obesity with BMI of 40.0-44.9, adult (HCC)   (positive) Neuropathy     Anes H&P:  PAST MEDICAL HISTORY:   61 y.o. female who presents for Procedure(s):  APPLICATION, GRAFT, SKIN, FULL-THICKNESS - TO ABDOMEN.  She has current and past medical problems significant for:    Past Medical History:   Diagnosis Date   • Anesthesia     low bp coming out of anesthesia \"one time\"   • Anxiety 2011   • Arthritis     fibromyalgia   • Breast mass, left    • Breath shortness     occasionally, inhalers a few times a year   • Bursitis of knee     left   • Cancer (HCC)     squamous cell on nose   • Cancer (HCC)     Stage 2 breast cancer   • Chronic pain 2011   • Depression 2011   • Diabetes (HCC)    • Fibromyalgia 2011   • Gastro-esophageal reflux 3/20/2012   • Heart burn    • Heart murmur     no cardiolgist   • Iron deficiency anemia 3/20/2012   • Migraine    • Neuropathy 2011   • Pain 6/14/12    3/10 stomach   • Pneumonia 2012   • Pulmonary hypertension (HCC) 2012   • Syncope and collapse 3/20/2012       SMOKING/ALCOHOL/RECREATIONAL DRUG USE:  Social History     Tobacco Use   • Smoking status: Former Smoker     Packs/day: 0.10     Years: 8.00     Pack years: 0.80     Types: Cigarettes     Quit date: 2016     Years since quittin.2   • Smokeless tobacco: Never Used   • Tobacco comment: 1/2 pk a day on and off 10 yrs, 1 cigarette daily   Vaping Use   • Vaping Use: Never used   Substance Use Topics   • Alcohol use: Yes     Comment: 1-2 times a year   • Drug use: No     Social History     Substance and Sexual Activity   Drug Use No       PAST SURGICAL HISTORY:  Past Surgical History: "   Procedure Laterality Date   • DEBRIDEMENT  7/7/2021    Procedure: DEBRIDEMENT - ABDOMINAL WALL WOUND;  Surgeon: Thong Mota M.D.;  Location: SURGERY [facility];  Service: Gastroenterology   • APPLICATION OR REPLACEMENT, WOUND VAC  7/7/2021    Procedure: APPLICATION OR REPLACEMENT,WOUND VAC - FOR APPILCATION.;  Surgeon: Thong Mota M.D.;  Location: [facility];  Service: Gastroenterology   • PB SECD CLOS SURG WND EXTEN/COMPLIC  3/31/2021    Procedure: CLOSURE, WOUND, SECONDARY - FOR DELAYED PRIMARY CLOSURE OF ABDOMINAL WALL;  Surgeon: Fede Russ M.D.;  Location: [facility];  Service: General   • FLAP GRAFT  3/31/2021    Procedure: FLAP GRAFT - ADVANCEMENT;  Surgeon: Fede Russ M.D.;  Location: [facility];  Service: General   • PB EXPLORATORY OF ABDOMEN  1/26/2021    Procedure: LAPAROTOMY, EXPLORATORY - LYSIS OF ADHESIONS;  Surgeon: Fede Russ M.D.;  Location: [facility];  Service: General   • IRRIGATION & DEBRIDEMENT GENERAL  1/26/2021    Procedure: IRRIGATION AND DEBRIDEMENT, WOUND - ABDOMINAL WALL WITH REMOVAL OF MESH;  Surgeon: Fede Russ M.D.;  Location: [facility];  Service: General   • APPLICATION OR REPLACEMENT, WOUND VAC  1/26/2021    Procedure: APPLICATION OR REPLACEMENT,WOUND VAC;  Surgeon: Fdee Russ M.D.;  Location: [facility];  Service: General   • IRRIGATION & DEBRIDEMENT GENERAL N/A 9/30/2020    Procedure: IRRIGATION AND DEBRIDEMENT, WOUND-ABD WALL, WOUND VAC PLACEMENT;  Surgeon: Fede Russ M.D.;  Location: [facility];  Service: General   • CATH PLACEMENT  1/25/2013    Performed by Lizeth Ferreira M.D. at SURGERY SAME DAY [facility] ORS   • AXILLARY NODE DISSECTION  1/9/2013    Performed by Lizeth Ferreira M.D. at SURGERY SAME DAY [facility] ORS   • NODE BIOPSY  1/9/2013    Performed by Lizeth Ferreira M.D. at SURGERY SAME DAY [facility] ORS   • BREAST BIOPSY  1/9/2013    Performed by  Lizeth Ferreira M.D. at SURGERY SAME DAY [facility] ORS   • LUMPECTOMY Left 2013    9 lymph note removal   • BREAST BIOPSY  12/11/2012    Performed by Lizeth Ferreira M.D. at SURGERY SAME DAY [facility] ORS   • COLONOSCOPY  6/21/2012    Performed by PARAM VALDEZ at SURGERY [facility] ORS   • GASTROSCOPY  6/21/2012    Performed by PARAM VALDEZ at SURGERY [facility] ORS   • ABDOMINAL EXPLORATION  10/7/2010    Performed by MARIA G KHAN JR at SURGERY [facility] ORS   • IRRIGATION & DEBRIDEMENT GENERAL  4/12/2010    Performed by OZZIE WEINSTEIN at SURGERY [facility] ORS   • HERNIA REPAIR  3/15/2010    Performed by MARIA G KHAN JR at SURGERY [facility] ORS   • FLAP GRAFT  3/15/2010    Performed by MARIA G KHAN JR at SURGERY [facility] ORS   • PANNICULECTOMY  3/15/2010    Performed by MARIA G KHAN JR at SURGERY [facility] ORS   • ABDOMINAL EXPLORATION  3/11/2010    Performed by MARIA G KHAN JR at SURGERY SAME DAY [facility] ORS   • OTHER  08/09    pulled mesh out of hernia   • OTHER  07/09    abscess removed abd.   • OTHER  06/09    bowel obstruction   • OTHER  04/2009    hernia repair,mesh removal after in aug.09   • GASTRIC BYPASS LAPAROSCOPIC  2005   • OTHER  2005    bariatric surgery gastric bypass   • GYN SURGERY  1984    tubal       ALLERGIES:   No Known Allergies    MEDICATIONS:  No current facility-administered medications on file prior to encounter.     Current Outpatient Medications on File Prior to Encounter   Medication Sig Dispense Refill   • lidocaine (XYLOCAINE) 5 % Ointment Apply 1 Each topically 1 time a day as needed.     • traZODone (DESYREL) 100 MG Tab Take 300 mg by mouth every evening.     • ferrous fum/vit C/B12 IF FA (TRINSICON) Cap Take 1 capsule by mouth every day.     • Semaglutide,0.25 or 0.5MG/DOS, (OZEMPIC, 0.25 OR 0.5 MG/DOSE,) 2 MG/1.5ML Solution Pen-injector Inject 0.5 mg under the skin every 7 days. 3 mL 3   • diclofenac sodium 1 % Gel Apply 2  g topically 4 times a day as needed. 100 g 1   • fluticasone (FLONASE) 50 MCG/ACT nasal spray Administer 1 Spray into affected nostril(S) as needed.     • acetaminophen (TYLENOL) 500 MG Tab Take 1,000 mg by mouth 3 times a day as needed (HA).         LABS:  Lab Results   Component Value Date/Time    HEMOGLOBIN 12.8 07/07/2021 0900    HEMATOCRIT 39.7 07/07/2021 0900    WBC 7.1 07/07/2021 0900     Lab Results   Component Value Date/Time    SODIUM 135 07/07/2021 0900    POTASSIUM 4.3 07/07/2021 0900    CHLORIDE 105 07/07/2021 0900    CO2 20 07/07/2021 0900    GLUCOSE 107 (H) 07/07/2021 0900    BUN 17 07/07/2021 0900    CALCIUM 9.0 07/07/2021 0900       COVID-19 Status: Negative      PREVIOUS ANESTHETICS: See EMR  __________________________________________      Physical Exam    Airway   Mallampati: II  TM distance: >3 FB  Neck ROM: full       Cardiovascular - normal exam  Rhythm: regular  Rate: normal  (-) murmur     Dental - normal exam           Pulmonary - normal exam  Breath sounds clear to auscultation     Abdominal   (+) obese     Neurological - normal exam                 Anesthesia Plan    ASA 3   ASA physical status 3 criteria: morbid obesity - BMI greater than or equal to 40 and respiratory insufficiency or compromise    Plan - general       Airway plan will be ETT          Induction: intravenous    Postoperative Plan: Postoperative administration of opioids is intended.    Pertinent diagnostic labs and testing reviewed    Informed Consent:    Anesthetic plan and risks discussed with patient and spouse.    Use of blood products discussed with: patient and spouse whom consented to blood products.

## 2021-08-04 NOTE — ANESTHESIA PROCEDURE NOTES
Airway    Date/Time: 8/4/2021 9:11 AM  Performed by: Jorge Luis Sotelo M.D.  Authorized by: Jorge Luis Sotelo M.D.     Location:  OR  Urgency:  Elective  Difficult Airway: No    Indications for Airway Management:  Anesthesia      Spontaneous Ventilation: absent    Sedation Level:  Deep  Preoxygenated: Yes    Patient Position:  Sniffing  Mask Difficulty Assessment:  1 - vent by mask  Final Airway Type:  Endotracheal airway  Final Endotracheal Airway:  ETT  Cuffed: Yes    Technique Used for Successful ETT Placement:  Direct laryngoscopy    Insertion Site:  Oral  Blade Type:  Santino  Laryngoscope Blade/Videolaryngoscope Blade Size:  3  ETT Size (mm):  7.5  Measured from:  Teeth  ETT to Teeth (cm):  21  Placement Verified by: auscultation and capnometry    Cormack-Lehane Classification:  Grade IIa - partial view of glottis  Number of Attempts at Approach:  1   Performed by AA student under direct supervision

## 2021-08-10 ENCOUNTER — PATIENT MESSAGE (OUTPATIENT)
Dept: MEDICAL GROUP | Facility: PHYSICIAN GROUP | Age: 61
End: 2021-08-10

## 2021-08-10 DIAGNOSIS — L29.9 ITCHING: ICD-10-CM

## 2021-08-10 DIAGNOSIS — F51.01 PRIMARY INSOMNIA: ICD-10-CM

## 2021-08-10 RX ORDER — TRAZODONE HYDROCHLORIDE 100 MG/1
TABLET ORAL
Qty: 90 TABLET | Refills: 2 | Status: SHIPPED | OUTPATIENT
Start: 2021-08-10 | End: 2021-10-27 | Stop reason: SDUPTHER

## 2021-08-10 RX ORDER — HYDROXYZINE HYDROCHLORIDE 25 MG/1
25 TABLET, FILM COATED ORAL 3 TIMES DAILY PRN
Qty: 90 TABLET | Refills: 1 | Status: SHIPPED | OUTPATIENT
Start: 2021-08-10 | End: 2021-09-07

## 2021-09-05 DIAGNOSIS — L29.9 ITCHING: ICD-10-CM

## 2021-09-07 RX ORDER — HYDROXYZINE HYDROCHLORIDE 25 MG/1
25 TABLET, FILM COATED ORAL 3 TIMES DAILY PRN
Qty: 90 TABLET | Refills: 1 | Status: SHIPPED | OUTPATIENT
Start: 2021-09-07 | End: 2021-12-09 | Stop reason: SDUPTHER

## 2021-09-29 ENCOUNTER — OFFICE VISIT (OUTPATIENT)
Dept: MEDICAL GROUP | Facility: PHYSICIAN GROUP | Age: 61
End: 2021-09-29
Payer: COMMERCIAL

## 2021-09-29 ENCOUNTER — HOSPITAL ENCOUNTER (OUTPATIENT)
Dept: LAB | Facility: MEDICAL CENTER | Age: 61
End: 2021-09-29
Attending: NURSE PRACTITIONER
Payer: COMMERCIAL

## 2021-09-29 VITALS
WEIGHT: 256.6 LBS | OXYGEN SATURATION: 97 % | BODY MASS INDEX: 42.75 KG/M2 | RESPIRATION RATE: 16 BRPM | HEIGHT: 65 IN | DIASTOLIC BLOOD PRESSURE: 70 MMHG | TEMPERATURE: 99.2 F | HEART RATE: 92 BPM | SYSTOLIC BLOOD PRESSURE: 126 MMHG

## 2021-09-29 DIAGNOSIS — E66.01 MORBID OBESITY WITH BMI OF 40.0-44.9, ADULT (HCC): ICD-10-CM

## 2021-09-29 DIAGNOSIS — L98.499 SKIN ULCER, UNSPECIFIED ULCER STAGE (HCC): ICD-10-CM

## 2021-09-29 DIAGNOSIS — M79.642 BILATERAL HAND PAIN: ICD-10-CM

## 2021-09-29 DIAGNOSIS — F33.2 SEVERE EPISODE OF RECURRENT MAJOR DEPRESSIVE DISORDER, WITHOUT PSYCHOTIC FEATURES (HCC): ICD-10-CM

## 2021-09-29 DIAGNOSIS — M25.562 CHRONIC PAIN OF BOTH KNEES: ICD-10-CM

## 2021-09-29 DIAGNOSIS — R20.2 TINGLING: ICD-10-CM

## 2021-09-29 DIAGNOSIS — M79.89 BILATERAL SWELLING OF FEET: ICD-10-CM

## 2021-09-29 DIAGNOSIS — Z85.3 HISTORY OF LEFT BREAST CANCER: ICD-10-CM

## 2021-09-29 DIAGNOSIS — E11.9 TYPE 2 DIABETES MELLITUS WITHOUT COMPLICATION, WITHOUT LONG-TERM CURRENT USE OF INSULIN (HCC): ICD-10-CM

## 2021-09-29 DIAGNOSIS — Z11.59 NEED FOR HEPATITIS C SCREENING TEST: ICD-10-CM

## 2021-09-29 DIAGNOSIS — M25.561 CHRONIC PAIN OF BOTH KNEES: ICD-10-CM

## 2021-09-29 DIAGNOSIS — Z23 INFLUENZA VACCINE NEEDED: ICD-10-CM

## 2021-09-29 DIAGNOSIS — M79.641 BILATERAL HAND PAIN: ICD-10-CM

## 2021-09-29 DIAGNOSIS — G89.29 CHRONIC PAIN OF BOTH KNEES: ICD-10-CM

## 2021-09-29 DIAGNOSIS — F51.01 PRIMARY INSOMNIA: ICD-10-CM

## 2021-09-29 LAB
ANION GAP SERPL CALC-SCNC: 11 MMOL/L (ref 7–16)
BUN SERPL-MCNC: 13 MG/DL (ref 8–22)
CALCIUM SERPL-MCNC: 8 MG/DL (ref 8.5–10.5)
CHLORIDE SERPL-SCNC: 103 MMOL/L (ref 96–112)
CO2 SERPL-SCNC: 23 MMOL/L (ref 20–33)
CREAT SERPL-MCNC: 0.78 MG/DL (ref 0.5–1.4)
CREAT UR-MCNC: 289.21 MG/DL
GLUCOSE SERPL-MCNC: 109 MG/DL (ref 65–99)
HBA1C MFR BLD: 5.6 % (ref 0–5.6)
HCV AB SER QL: NORMAL
INT CON NEG: NORMAL
INT CON POS: NORMAL
MICROALBUMIN UR-MCNC: 9.4 MG/DL
MICROALBUMIN/CREAT UR: 33 MG/G (ref 0–30)
POTASSIUM SERPL-SCNC: 4.4 MMOL/L (ref 3.6–5.5)
SODIUM SERPL-SCNC: 137 MMOL/L (ref 135–145)
VIT B12 SERPL-MCNC: 425 PG/ML (ref 211–911)

## 2021-09-29 PROCEDURE — 82570 ASSAY OF URINE CREATININE: CPT

## 2021-09-29 PROCEDURE — 99213 OFFICE O/P EST LOW 20 MIN: CPT | Mod: 25 | Performed by: NURSE PRACTITIONER

## 2021-09-29 PROCEDURE — G0472 HEP C SCREEN HIGH RISK/OTHER: HCPCS

## 2021-09-29 PROCEDURE — 83036 HEMOGLOBIN GLYCOSYLATED A1C: CPT | Performed by: NURSE PRACTITIONER

## 2021-09-29 PROCEDURE — 36415 COLL VENOUS BLD VENIPUNCTURE: CPT

## 2021-09-29 PROCEDURE — 90471 IMMUNIZATION ADMIN: CPT | Performed by: NURSE PRACTITIONER

## 2021-09-29 PROCEDURE — 82607 VITAMIN B-12: CPT

## 2021-09-29 PROCEDURE — 90686 IIV4 VACC NO PRSV 0.5 ML IM: CPT | Performed by: NURSE PRACTITIONER

## 2021-09-29 PROCEDURE — 80048 BASIC METABOLIC PNL TOTAL CA: CPT

## 2021-09-29 PROCEDURE — 82043 UR ALBUMIN QUANTITATIVE: CPT

## 2021-09-29 ASSESSMENT — PATIENT HEALTH QUESTIONNAIRE - PHQ9: CLINICAL INTERPRETATION OF PHQ2 SCORE: 0

## 2021-09-29 ASSESSMENT — FIBROSIS 4 INDEX: FIB4 SCORE: 1.17

## 2021-09-29 NOTE — ASSESSMENT & PLAN NOTE
Patient continues with open wound left upper quadrant.  Had recent muscle graft from right lower quadrant.  Managed by surgery and wound care, Dr. Mota.  Doing daily dressing changes.  Wounds still partially open and continue to heal very slowly.  Follows with Dr. Mota weekly.

## 2021-09-29 NOTE — ASSESSMENT & PLAN NOTE
History of left breast lumpectomy in 12/2013.  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  Was followed by oncology and took tamoxifen for about 3 years.  Was supposed to take it for 7 years.  Reports she was tired of taking so much medication.  She discontinued it on her own and has not followed up with oncology.  Due for updated mammogram.  Does self breast exams.

## 2021-09-29 NOTE — PROGRESS NOTES
CC: Follow-up on chronic health problems    HISTORY OF THE PRESENT ILLNESS: Patient is a 61 y.o. female. This pleasant patient is here today for evaluation and management of the following health problems.    Health Maintenance: due      Type 2 diabetes mellitus without complication, without long-term current use of insulin (HCC)  This is a chronic health problem that is well controlled with current medications and lifestyle measures.  Administering Ozempic 0.5 mg weekly.  Point-of-care hemoglobin A1c is 5.6%.  Patient does notice that she has been having diarrhea stools since starting Ozempic, which can be one of the side effects.  Not super bothersome at this time.  Not on ACE or statin.    History of left breast cancer  History of left breast lumpectomy in 12/2013.  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  Was followed by oncology and took tamoxifen for about 3 years.  Was supposed to take it for 7 years.  Reports she was tired of taking so much medication.  She discontinued it on her own and has not followed up with oncology.  Due for updated mammogram.  Does self breast exams.      Bilateral swelling of feet  No longer having lower extremity edema.  No longer taking hydrochlorothiazide.    Bilateral knee pain  intermittent    Bilateral hand pain  Patient reports this is resolved.    Depression  History of depression.  Reports symptoms are well controlled with lifestyle measures.  No longer taking Cymbalta or seeing psychiatry.  Denies SI/HI.    Cutaneous ulcer (HCC)  Patient continues with open wound left upper quadrant.  Had recent muscle graft from right lower quadrant.  Managed by surgery and wound care, Dr. Mota.  Doing daily dressing changes.  Wounds still partially open and continue to heal very slowly.  Follows with Dr. Mota weekly.    Insomnia  Chronic problem, relatively well managed with lifestyle measures and medications.  Takes hydroxyzine couple hours before  "bedtime, then melatonin, followed by trazodone shortly before bedtime.  Sleeping 6 to 8 hours.  Feels rested the next day.      Allergies: Patient has no known allergies.    Current Outpatient Medications Ordered in Epic   Medication Sig Dispense Refill   • Turmeric (QC TUMERIC COMPLEX PO) Take  by mouth.     • Omega-3 Fatty Acids (FISH OIL PO) Take  by mouth.     • hydrOXYzine HCl (ATARAX) 25 MG Tab TAKE 1 TABLET BY MOUTH 3 TIMES A DAY AS NEEDED FOR ITCHING. 90 Tablet 1   • traZODone (DESYREL) 100 MG Tab Take 1 to 3 tabs at bedtime as needed for sleep. 90 tablet 2   • lidocaine (XYLOCAINE) 5 % Ointment Apply 1 Each topically 1 time a day as needed.     • ferrous fum/vit C/B12 IF FA (TRINSICON) Cap Take 1 capsule by mouth every day.     • Semaglutide,0.25 or 0.5MG/DOS, (OZEMPIC, 0.25 OR 0.5 MG/DOSE,) 2 MG/1.5ML Solution Pen-injector Inject 0.5 mg under the skin every 7 days. 3 mL 3   • diclofenac sodium 1 % Gel Apply 2 g topically 4 times a day as needed. 100 g 1   • fluticasone (FLONASE) 50 MCG/ACT nasal spray Administer 1 Spray into affected nostril(S) as needed.     • acetaminophen (TYLENOL) 500 MG Tab Take 1,000 mg by mouth 3 times a day as needed (HA).     • traMADol (ULTRAM) 50 MG Tab TAKE 1 TABLET BY MOUTH EVERY 4 TO 6 HOURS AS NEEDED FOR PAIN (Patient not taking: Reported on 9/29/2021)       No current [facility]-ordered facility-administered medications on file.       Past Medical History:   Diagnosis Date   • Anesthesia     low bp coming out of anesthesia \"one time\"   • Anxiety 11/18/2011   • Arthritis     fibromyalgia   • Breast mass, left    • Breath shortness     occasionally, inhalers a few times a year   • Bursitis of knee     left   • Cancer ([facility]) 2005    squamous cell on nose   • Cancer ([facility]) 2021    Stage 2 breast cancer   • Chronic pain 11/18/2011   • Depression 11/18/2011   • Diabetes ([facility])    • Fibromyalgia 11/18/2011   • Gastro-esophageal reflux 3/20/2012   • Heart burn    • Heart murmur     no " cardiolgist   • Iron deficiency anemia 3/20/2012   • Migraine    • Neuropathy 11/18/2011   • Pain 6/14/12    3/10 stomach   • Pneumonia 01/2012   • Pulmonary hypertension (HCC) 03/20/2012   • Syncope and collapse 3/20/2012       Past Surgical History:   Procedure Laterality Date   • FULL THICKNESS SKIN GRAFT N/A 8/4/2021    Procedure: APPLICATION, GRAFT, SKIN, FULL-THICKNESS - TO ABDOMEN;  Surgeon: Thong Mota M.D.;  Location: [facility];  Service: General   • DEBRIDEMENT  7/7/2021    Procedure: DEBRIDEMENT - ABDOMINAL WALL WOUND;  Surgeon: Thong Mota M.D.;  Location: [facility];  Service: Gastroenterology   • APPLICATION OR REPLACEMENT, WOUND VAC  7/7/2021    Procedure: APPLICATION OR REPLACEMENT,WOUND VAC - FOR APPILCATION.;  Surgeon: Thong Mota M.D.;  Location: [facility];  Service: Gastroenterology   • PB SECD CLOS SURG WND EXTEN/COMPLIC  3/31/2021    Procedure: CLOSURE, WOUND, SECONDARY - FOR DELAYED PRIMARY CLOSURE OF ABDOMINAL WALL;  Surgeon: Fede Russ M.D.;  Location: [facility];  Service: General   • FLAP GRAFT  3/31/2021    Procedure: FLAP GRAFT - ADVANCEMENT;  Surgeon: Fede Russ M.D.;  Location: [facility];  Service: General   • PB EXPLORATORY OF ABDOMEN  1/26/2021    Procedure: LAPAROTOMY, EXPLORATORY - LYSIS OF ADHESIONS;  Surgeon: Fede Russ M.D.;  Location: [facility];  Service: General   • IRRIGATION & DEBRIDEMENT GENERAL  1/26/2021    Procedure: IRRIGATION AND DEBRIDEMENT, WOUND - ABDOMINAL WALL WITH REMOVAL OF MESH;  Surgeon: Fede Russ M.D.;  Location: [facility];  Service: General   • APPLICATION OR REPLACEMENT, WOUND VAC  1/26/2021    Procedure: APPLICATION OR REPLACEMENT,WOUND VAC;  Surgeon: Fede Russ M.D.;  Location: [facility];  Service: General   • IRRIGATION & DEBRIDEMENT GENERAL N/A 9/30/2020    Procedure: IRRIGATION AND DEBRIDEMENT, WOUND-ABD WALL,  WOUND VAC PLACEMENT;  Surgeon: Param Russ M.D.;  Location: [facility];  Service: General   • CATH PLACEMENT  2013    Performed by Lizeth Ferreira M.D. at SURGERY SAME DAY [facility]   • AXILLARY NODE DISSECTION  2013    Performed by Lizeth Ferreira M.D. at [facility]   • NODE BIOPSY  2013    Performed by Lizeth Ferreira M.D. at [facility]   • BREAST BIOPSY  2013    Performed by Lizeth Ferreira M.D. at [facility]   • LUMPECTOMY Left 2013 lymph note removal   • BREAST BIOPSY  2012    Performed by Lizeth Ferreira M.D. at [facility]   • COLONOSCOPY  2012    Performed by PARAM VALDEZ at SURGERY [facility]   • GASTROSCOPY  2012    Performed by PARAM VALDEZ at SURGERY [facility]   • ABDOMINAL EXPLORATION  10/7/2010    Performed by MARIA G KHAN JR at SURGERY [facility]   • IRRIGATION & DEBRIDEMENT GENERAL  2010    Performed by OZZIE WEINSTEIN at SURGERY [facility]   • HERNIA REPAIR  3/15/2010    Performed by MARIA G KHAN JR at SURGERY [facility]   • FLAP GRAFT  3/15/2010    Performed by MARIA G KHAN JR at SURGERY [facility]   • PANNICULECTOMY  3/15/2010    Performed by MARIA G KHAN JR at SURGERY [facility]   • ABDOMINAL EXPLORATION  3/11/2010    Performed by MARIA G KHAN JR at [facility]ERY [facility]   • OTHER      pulled mesh out of hernia   • OTHER      abscess removed abd.   • OTHER      bowel obstruction   • OTHER  2009    hernia repair,mesh removal after in aug.09   • GASTRIC BYPASS LAPAROSCOPIC     • OTHER      bariatric surgery gastric bypass   • GYN SURGERY      tubal       Social History     Tobacco Use   • Smoking status: Former Smoker     Packs/day: 0.10     Years: 8.00     Pack years: 0.80     Types: Cigarettes     Quit date: 2016     Years since quittin.4  "  • Smokeless tobacco: Never Used   • Tobacco comment: 1/2 pk a day on and off 10 yrs, 1 cigarette daily   Vaping Use   • Vaping Use: Never used   Substance Use Topics   • Alcohol use: Yes     Comment: 1-2 times a year   • Drug use: No       Family History   Problem Relation Age of Onset   • Heart Attack Father    • Anxiety disorder Father    • Depression Father    • Schizophrenia Father    • Hypertension Mother    • Cancer Maternal Grandfather         leukemia   • Cancer Other         leukemia- cousin   • Diabetes Maternal Uncle    • Anxiety disorder Maternal Uncle    • Diabetes Maternal Uncle    • Anxiety disorder Maternal Uncle        ROS:  As in HPI, otherwise negative for chest pain, dyspnea, abdominal pain, dysuria, blood in stool, fever           Exam: /70 (BP Location: Left arm, Patient Position: Sitting, BP Cuff Size: Adult)   Pulse 92   Temp 37.3 °C (99.2 °F) (Temporal)   Resp 16   Ht 1.651 m (5' 5\")   Wt 116 kg (256 lb 9.6 oz)   SpO2 97%  Body mass index is 42.7 kg/m².    General: Alert, pleasant, well nourished, well developed female in NAD  HEENT: Normocephalic. Eyes conjunctiva clear lids without ptosis, pupils equal and reactive to light, ears normal shape and contour, canals are clear bilaterally, tympanic membranes are pearly gray with good light reflex, nasal mucosa without erythema and drainage, oropharynx is without erythema, edema or exudates.   Neck: Supple without bruit. Thyroid is not enlarged.  Pulmonary: Clear to ausculation.  Normal effort. No rales, ronchi, or wheezing.  Cardiovascular: Normal rate and rhythm without murmur. Carotid and radial pulses are intact and equal bilaterally.  No lower extremity edema.  Neurologic: Grossly nonfocal  Lymph: No cervical or supraclavicular lymph nodes are palpable  Skin: Warm and dry.    Psych: Normal mood and affect. Alert and oriented. Judgment and insight is normal.    Please note that this dictation was created using voice recognition " software. I have made every reasonable attempt to correct obvious errors, but I expect that there are errors of grammar and possibly content that I did not discover before finalizing the note.      Assessment/Plan  1. Type 2 diabetes mellitus without complication, without long-term current use of insulin (HCC)  Well-controlled.  Continue with Ozempic.  If she continues to have diarrhea, consider changing to another medication.  In the meantime she will add Metamucil.  - POCT  A1C  - ESTIMATED GFR; Future  - Basic Metabolic Panel; Future  - MICROALBUMIN CREAT RATIO URINE; Future    2. Need for hepatitis C screening test  Rationale reviewed with patient.  - HCV Scrn ( [id number] 1xLife); Future    3. Bilateral swelling of feet  Resolved.    4. Chronic pain of both knees  Continue with lifestyle measures.    5. Bilateral hand pain  Resolved.    6. Tingling  Tingling improving, but still present.  Will get updated vitamin B12 level.  - VITAMIN B12; Future    7. Influenza vaccine needed  Given.  - INFLUENZA VACCINE QUAD INJ (PF)    8. Morbid obesity with BMI of 40.0-44.9, adult (HCC)    - Patient identified as having weight management issue.  Appropriate orders and counseling given.    9. Severe episode of recurrent major depressive disorder, without psychotic features (HCC)  Well-controlled with lifestyle measures.  Continue to monitor.    10. History of left breast cancer  Due for mammogram, previously ordered patient will get this done.  Denies concerns at this time.    11. Skin ulcer, unspecified ulcer stage (HCC)  Status post muscle graft on abdomen.  Continue follow-up with surgeon.    Patient will return to clinic in 6 months or sooner if needed and pending lab results.

## 2021-09-29 NOTE — ASSESSMENT & PLAN NOTE
This is a chronic health problem that is well controlled with current medications and lifestyle measures.  Administering Ozempic 0.5 mg weekly.  Point-of-care hemoglobin A1c is 5.6%.  Patient does notice that she has been having diarrhea stools since starting Ozempic, which can be one of the side effects.  Not super bothersome at this time.  Not on ACE or statin.

## 2021-09-29 NOTE — ASSESSMENT & PLAN NOTE
History of depression.  Reports symptoms are well controlled with lifestyle measures.  No longer taking Cymbalta or seeing psychiatry.  Denies SI/HI.

## 2021-09-30 NOTE — ASSESSMENT & PLAN NOTE
Chronic problem, relatively well managed with lifestyle measures and medications.  Takes hydroxyzine couple hours before bedtime, then melatonin, followed by trazodone shortly before bedtime.  Sleeping 6 to 8 hours.  Feels rested the next day.

## 2021-10-04 ENCOUNTER — TELEPHONE (OUTPATIENT)
Dept: MEDICAL GROUP | Facility: PHYSICIAN GROUP | Age: 61
End: 2021-10-04

## 2021-10-04 DIAGNOSIS — E83.51 HYPOCALCEMIA: ICD-10-CM

## 2021-10-27 DIAGNOSIS — E66.01 MORBID OBESITY WITH BMI OF 40.0-44.9, ADULT (HCC): ICD-10-CM

## 2021-10-27 DIAGNOSIS — E11.9 TYPE 2 DIABETES MELLITUS WITHOUT COMPLICATION, WITHOUT LONG-TERM CURRENT USE OF INSULIN (HCC): ICD-10-CM

## 2021-10-27 DIAGNOSIS — F51.01 PRIMARY INSOMNIA: ICD-10-CM

## 2021-10-28 RX ORDER — TRAZODONE HYDROCHLORIDE 100 MG/1
TABLET ORAL
Qty: 90 TABLET | Refills: 2 | Status: SHIPPED | OUTPATIENT
Start: 2021-10-28 | End: 2022-01-05

## 2021-10-28 RX ORDER — SEMAGLUTIDE 1.34 MG/ML
0.5 INJECTION, SOLUTION SUBCUTANEOUS
Qty: 3 ML | Refills: 3 | Status: SHIPPED | OUTPATIENT
Start: 2021-10-28 | End: 2022-05-18

## 2021-11-01 ENCOUNTER — PATIENT MESSAGE (OUTPATIENT)
Dept: MEDICAL GROUP | Facility: PHYSICIAN GROUP | Age: 61
End: 2021-11-01

## 2021-11-01 DIAGNOSIS — F51.01 PRIMARY INSOMNIA: ICD-10-CM

## 2021-11-08 RX ORDER — TRAZODONE HYDROCHLORIDE 100 MG/1
TABLET ORAL
Qty: 90 TABLET | Refills: 2 | OUTPATIENT
Start: 2021-11-08

## 2021-11-12 RX ORDER — TRAZODONE HYDROCHLORIDE 100 MG/1
TABLET ORAL
Qty: 90 TABLET | Refills: 2
Start: 2021-11-12

## 2021-12-09 DIAGNOSIS — L29.9 ITCHING: ICD-10-CM

## 2021-12-09 RX ORDER — HYDROXYZINE HYDROCHLORIDE 25 MG/1
25 TABLET, FILM COATED ORAL 3 TIMES DAILY PRN
Qty: 90 TABLET | Refills: 1 | Status: SHIPPED | OUTPATIENT
Start: 2021-12-09 | End: 2022-03-22 | Stop reason: SDUPTHER

## 2022-01-04 ENCOUNTER — PATIENT MESSAGE (OUTPATIENT)
Dept: MEDICAL GROUP | Facility: PHYSICIAN GROUP | Age: 62
End: 2022-01-04

## 2022-01-04 DIAGNOSIS — E11.9 TYPE 2 DIABETES MELLITUS WITHOUT COMPLICATION, WITHOUT LONG-TERM CURRENT USE OF INSULIN (HCC): ICD-10-CM

## 2022-01-05 DIAGNOSIS — F51.01 PRIMARY INSOMNIA: ICD-10-CM

## 2022-01-05 RX ORDER — METFORMIN HYDROCHLORIDE 500 MG/1
500 TABLET, EXTENDED RELEASE ORAL DAILY
Qty: 90 TABLET | Refills: 1 | Status: SHIPPED | OUTPATIENT
Start: 2022-01-05 | End: 2022-03-31

## 2022-01-05 RX ORDER — TRAZODONE HYDROCHLORIDE 100 MG/1
TABLET ORAL
Qty: 270 TABLET | Refills: 1 | Status: SHIPPED | OUTPATIENT
Start: 2022-01-05 | End: 2022-07-29 | Stop reason: SDUPTHER

## 2022-01-12 ENCOUNTER — PATIENT MESSAGE (OUTPATIENT)
Dept: MEDICAL GROUP | Facility: PHYSICIAN GROUP | Age: 62
End: 2022-01-12

## 2022-01-12 DIAGNOSIS — G89.29 CHRONIC MIDLINE LOW BACK PAIN WITHOUT SCIATICA: ICD-10-CM

## 2022-01-12 DIAGNOSIS — M54.50 CHRONIC MIDLINE LOW BACK PAIN WITHOUT SCIATICA: ICD-10-CM

## 2022-01-12 DIAGNOSIS — G44.229 CHRONIC TENSION-TYPE HEADACHE, NOT INTRACTABLE: ICD-10-CM

## 2022-01-13 RX ORDER — RIZATRIPTAN BENZOATE 10 MG/1
TABLET ORAL
Qty: 12 TABLET | Refills: 2 | Status: CANCELLED
Start: 2022-01-13

## 2022-01-13 RX ORDER — RIZATRIPTAN BENZOATE 10 MG/1
TABLET ORAL
Qty: 10 TABLET | Refills: 2 | Status: SHIPPED | OUTPATIENT
Start: 2022-01-13 | End: 2022-03-09

## 2022-01-13 NOTE — TELEPHONE ENCOUNTER
From: Mikayla Knight  To: Nurse Practitioner Sharonda Esquivel  Sent: 1/12/2022 2:30 PM PST  Subject: Script     Hi.  I just requested a refill request for the Diclofenac and was going to ask for the Riziptran for my migraines, but didn't see it on my list of meds. My migraines are coming back full force and nothing seems to be helping them. Can I please get a refill for it or if there is something that would work better I'd greatly appreciate it.  Thank You.

## 2022-03-08 DIAGNOSIS — G44.229 CHRONIC TENSION-TYPE HEADACHE, NOT INTRACTABLE: ICD-10-CM

## 2022-03-09 RX ORDER — RIZATRIPTAN BENZOATE 10 MG/1
TABLET ORAL
Qty: 12 TABLET | Refills: 2 | Status: SHIPPED | OUTPATIENT
Start: 2022-03-09 | End: 2022-06-08

## 2022-03-25 ENCOUNTER — HOSPITAL ENCOUNTER (OUTPATIENT)
Dept: LAB | Facility: MEDICAL CENTER | Age: 62
End: 2022-03-25
Attending: NURSE PRACTITIONER
Payer: COMMERCIAL

## 2022-03-25 DIAGNOSIS — E83.51 HYPOCALCEMIA: ICD-10-CM

## 2022-03-25 DIAGNOSIS — E11.9 TYPE 2 DIABETES MELLITUS WITHOUT COMPLICATION, WITHOUT LONG-TERM CURRENT USE OF INSULIN (HCC): ICD-10-CM

## 2022-03-25 DIAGNOSIS — R20.2 TINGLING: ICD-10-CM

## 2022-03-25 LAB
ALBUMIN SERPL BCP-MCNC: 4.6 G/DL (ref 3.2–4.9)
ALBUMIN/GLOB SERPL: 2.2 G/DL
ALP SERPL-CCNC: 94 U/L (ref 30–99)
ALT SERPL-CCNC: 34 U/L (ref 2–50)
ANION GAP SERPL CALC-SCNC: 15 MMOL/L (ref 7–16)
AST SERPL-CCNC: 33 U/L (ref 12–45)
BILIRUB SERPL-MCNC: 0.3 MG/DL (ref 0.1–1.5)
BUN SERPL-MCNC: 14 MG/DL (ref 8–22)
CALCIUM SERPL-MCNC: 9.5 MG/DL (ref 8.5–10.5)
CHLORIDE SERPL-SCNC: 104 MMOL/L (ref 96–112)
CO2 SERPL-SCNC: 20 MMOL/L (ref 20–33)
CREAT SERPL-MCNC: 0.87 MG/DL (ref 0.5–1.4)
CREAT UR-MCNC: 318.63 MG/DL
EST. AVERAGE GLUCOSE BLD GHB EST-MCNC: 120 MG/DL
FASTING STATUS PATIENT QL REPORTED: NORMAL
GFR SERPLBLD CREATININE-BSD FMLA CKD-EPI: 76 ML/MIN/1.73 M 2
GLOBULIN SER CALC-MCNC: 2.1 G/DL (ref 1.9–3.5)
GLUCOSE SERPL-MCNC: 126 MG/DL (ref 65–99)
HBA1C MFR BLD: 5.8 % (ref 4–5.6)
MICROALBUMIN UR-MCNC: 4.2 MG/DL
MICROALBUMIN/CREAT UR: 13 MG/G (ref 0–30)
POTASSIUM SERPL-SCNC: 4.2 MMOL/L (ref 3.6–5.5)
PROT SERPL-MCNC: 6.7 G/DL (ref 6–8.2)
PTH-INTACT SERPL-MCNC: 61 PG/ML (ref 14–72)
SODIUM SERPL-SCNC: 139 MMOL/L (ref 135–145)
VIT B12 SERPL-MCNC: 373 PG/ML (ref 211–911)

## 2022-03-25 PROCEDURE — 82043 UR ALBUMIN QUANTITATIVE: CPT

## 2022-03-25 PROCEDURE — 83036 HEMOGLOBIN GLYCOSYLATED A1C: CPT

## 2022-03-25 PROCEDURE — 80053 COMPREHEN METABOLIC PANEL: CPT

## 2022-03-25 PROCEDURE — 83970 ASSAY OF PARATHORMONE: CPT

## 2022-03-25 PROCEDURE — 82607 VITAMIN B-12: CPT

## 2022-03-25 PROCEDURE — 82570 ASSAY OF URINE CREATININE: CPT

## 2022-03-25 PROCEDURE — 36415 COLL VENOUS BLD VENIPUNCTURE: CPT

## 2022-03-31 ENCOUNTER — HOSPITAL ENCOUNTER (OUTPATIENT)
Dept: LAB | Facility: MEDICAL CENTER | Age: 62
End: 2022-03-31
Attending: NURSE PRACTITIONER
Payer: COMMERCIAL

## 2022-03-31 ENCOUNTER — OFFICE VISIT (OUTPATIENT)
Dept: MEDICAL GROUP | Facility: PHYSICIAN GROUP | Age: 62
End: 2022-03-31
Payer: COMMERCIAL

## 2022-03-31 VITALS
HEART RATE: 62 BPM | SYSTOLIC BLOOD PRESSURE: 122 MMHG | DIASTOLIC BLOOD PRESSURE: 74 MMHG | HEIGHT: 65 IN | BODY MASS INDEX: 43.8 KG/M2 | TEMPERATURE: 97.8 F | OXYGEN SATURATION: 96 % | WEIGHT: 262.9 LBS | RESPIRATION RATE: 18 BRPM

## 2022-03-31 DIAGNOSIS — G44.229 CHRONIC TENSION-TYPE HEADACHE, NOT INTRACTABLE: ICD-10-CM

## 2022-03-31 DIAGNOSIS — R27.0 ATAXIA: ICD-10-CM

## 2022-03-31 DIAGNOSIS — R25.2 MUSCLE CRAMPS: ICD-10-CM

## 2022-03-31 DIAGNOSIS — R42 LIGHTHEADED: ICD-10-CM

## 2022-03-31 DIAGNOSIS — F51.01 PRIMARY INSOMNIA: ICD-10-CM

## 2022-03-31 DIAGNOSIS — L98.499 SKIN ULCER, UNSPECIFIED ULCER STAGE (HCC): ICD-10-CM

## 2022-03-31 DIAGNOSIS — R20.0 LEFT FACIAL NUMBNESS: ICD-10-CM

## 2022-03-31 DIAGNOSIS — K29.00 ACUTE GASTRITIS WITHOUT HEMORRHAGE, UNSPECIFIED GASTRITIS TYPE: ICD-10-CM

## 2022-03-31 DIAGNOSIS — G89.29 CHRONIC MIDLINE LOW BACK PAIN WITHOUT SCIATICA: ICD-10-CM

## 2022-03-31 DIAGNOSIS — E11.9 TYPE 2 DIABETES MELLITUS WITHOUT COMPLICATION, WITHOUT LONG-TERM CURRENT USE OF INSULIN (HCC): ICD-10-CM

## 2022-03-31 DIAGNOSIS — M54.50 CHRONIC MIDLINE LOW BACK PAIN WITHOUT SCIATICA: ICD-10-CM

## 2022-03-31 LAB
BASOPHILS # BLD AUTO: 1.5 % (ref 0–1.8)
BASOPHILS # BLD: 0.11 K/UL (ref 0–0.12)
EOSINOPHIL # BLD AUTO: 0.1 K/UL (ref 0–0.51)
EOSINOPHIL NFR BLD: 1.4 % (ref 0–6.9)
ERYTHROCYTE [DISTWIDTH] IN BLOOD BY AUTOMATED COUNT: 45.6 FL (ref 35.9–50)
FERRITIN SERPL-MCNC: 31.5 NG/ML (ref 10–291)
HCT VFR BLD AUTO: 47.5 % (ref 37–47)
HGB BLD-MCNC: 15.3 G/DL (ref 12–16)
IMM GRANULOCYTES # BLD AUTO: 0.02 K/UL (ref 0–0.11)
IMM GRANULOCYTES NFR BLD AUTO: 0.3 % (ref 0–0.9)
IRON SATN MFR SERPL: 27 % (ref 15–55)
IRON SERPL-MCNC: 114 UG/DL (ref 40–170)
LYMPHOCYTES # BLD AUTO: 2.1 K/UL (ref 1–4.8)
LYMPHOCYTES NFR BLD: 28.6 % (ref 22–41)
MAGNESIUM SERPL-MCNC: 2 MG/DL (ref 1.5–2.5)
MCH RBC QN AUTO: 31.6 PG (ref 27–33)
MCHC RBC AUTO-ENTMCNC: 32.2 G/DL (ref 33.6–35)
MCV RBC AUTO: 98.1 FL (ref 81.4–97.8)
MONOCYTES # BLD AUTO: 0.44 K/UL (ref 0–0.85)
MONOCYTES NFR BLD AUTO: 6 % (ref 0–13.4)
NEUTROPHILS # BLD AUTO: 4.56 K/UL (ref 2–7.15)
NEUTROPHILS NFR BLD: 62.2 % (ref 44–72)
NRBC # BLD AUTO: 0 K/UL
NRBC BLD-RTO: 0 /100 WBC
PLATELET # BLD AUTO: 258 K/UL (ref 164–446)
PMV BLD AUTO: 9.7 FL (ref 9–12.9)
RBC # BLD AUTO: 4.84 M/UL (ref 4.2–5.4)
TIBC SERPL-MCNC: 424 UG/DL (ref 250–450)
TSH SERPL DL<=0.005 MIU/L-ACNC: 2.42 UIU/ML (ref 0.38–5.33)
UIBC SERPL-MCNC: 310 UG/DL (ref 110–370)
WBC # BLD AUTO: 7.3 K/UL (ref 4.8–10.8)

## 2022-03-31 PROCEDURE — 84443 ASSAY THYROID STIM HORMONE: CPT

## 2022-03-31 PROCEDURE — 83550 IRON BINDING TEST: CPT | Mod: GA

## 2022-03-31 PROCEDURE — 83540 ASSAY OF IRON: CPT | Mod: GA

## 2022-03-31 PROCEDURE — 85025 COMPLETE CBC W/AUTO DIFF WBC: CPT

## 2022-03-31 PROCEDURE — 82728 ASSAY OF FERRITIN: CPT | Mod: GA

## 2022-03-31 PROCEDURE — 36415 COLL VENOUS BLD VENIPUNCTURE: CPT

## 2022-03-31 PROCEDURE — 83735 ASSAY OF MAGNESIUM: CPT

## 2022-03-31 PROCEDURE — 99214 OFFICE O/P EST MOD 30 MIN: CPT | Performed by: NURSE PRACTITIONER

## 2022-03-31 RX ORDER — PROPRANOLOL HYDROCHLORIDE 20 MG/1
20 TABLET ORAL 2 TIMES DAILY
Qty: 60 TABLET | Refills: 2 | Status: SHIPPED | OUTPATIENT
Start: 2022-03-31 | End: 2022-04-25

## 2022-03-31 RX ORDER — OMEPRAZOLE 20 MG/1
20 CAPSULE, DELAYED RELEASE ORAL DAILY
Qty: 30 CAPSULE | Refills: 0 | Status: SHIPPED | OUTPATIENT
Start: 2022-03-31 | End: 2022-04-25

## 2022-03-31 ASSESSMENT — PATIENT HEALTH QUESTIONNAIRE - PHQ9: CLINICAL INTERPRETATION OF PHQ2 SCORE: 0

## 2022-03-31 ASSESSMENT — FIBROSIS 4 INDEX: FIB4 SCORE: 1.14

## 2022-03-31 NOTE — ASSESSMENT & PLAN NOTE
This is a chronic health problem that is well controlled with current medications and lifestyle measures.  Administering Ozempic 0.5 mg weekly.  Hemoglobin A1c is 5.8%.  Not on ACE or statin.  For retinal scan.

## 2022-03-31 NOTE — PROGRESS NOTES
"CC: Lab review, stomach issues, balance, face numb    HISTORY OF THE PRESENT ILLNESS: Patient is a 61 y.o. female. This pleasant patient is here today for evaluation and management of the following health problems.        Type 2 diabetes mellitus without complication, without long-term current use of insulin (HCC)  This is a chronic health problem that is well controlled with current medications and lifestyle measures.  Administering Ozempic 0.5 mg weekly.  Hemoglobin A1c is 5.8%.  Not on ACE or statin.  For retinal scan.      Ataxia  Patient reports 4-week onset of intermittent left facial numbness, feeling off balance (will fall backwards randomly), return of chronic headache and frontal lobe, left side of body feels \"different\" than right side.  Also reports new onset of fleeting sensation of \"bone chilling ache\" which occurs most often at night.  Does report some blurry vision.   Denies head injury, recent shingles, viral illness, dizziness.    Left facial numbness  See additional notes under ataxia.    Insomnia  This is a chronic health problem that is well controlled with current medications and lifestyle measures.  Taking trazodone 100 mg and hydroxyzine with good results.    Cutaneous ulcer (HCC)  Patient reports wound is resolved.    Chronic headache  Patient reports that headaches have been starting up again.  Occur in the frontal lobe.  Has had headaches since early adulthood.  Currently managing with Tylenol and Excedrin Migraine.  Recently started co-Q10 for prevention.  Maxalt has not been that helpful.  Botox injections in the past without relief.  Took topiramate in the past.  Quit for unclear reasons.  Asking to try a beta-blocker for prevention.    Chronic low back pain without sciatica  Chronic health problem, worsening.  Managing with Voltaren gel, bareback and body, TENS unit, ice pack, heat.  Has been established with [facility] in the past for medication management.  Patient stopped " "going because she did not want to continue with narcotics.  Was receiving injections at [facility], but not helpful.  Has had PT in the past.  Denies saddle paresthesia, foot drop, fever, fecal or urinary incontinence.      Acute gastritis without hemorrhage  Patient reports 2-month onset of epigastric cramping and burning after eating.  Denies nausea, vomiting, blood in stool, dark black stool.  Has not tried anything over-the-counter.      Allergies: Patient has no known allergies.    Current Outpatient Medications Ordered in Epic   Medication Sig Dispense Refill   • omeprazole (PRILOSEC) 20 MG delayed-release capsule Take 1 Capsule by mouth every day. 30 Capsule 0   • propranolol (INDERAL) 20 MG Tab Take 1 Tablet by mouth 2 times a day. 60 Tablet 2   • hydrOXYzine HCl (ATARAX) 25 MG Tab Take 1 Tablet by mouth 3 times a day as needed for Itching. 30 Tablet 0   • rizatriptan (MAXALT) 10 MG tablet TAKE 1 TAB BY MOUTH ONCE AS NEEDED FOR MIGRAINE FOR UP TO 1 DOSE. MAY REPEAT IN 2 HOURS IF NEEDED 12 Tablet 2   • diclofenac sodium (VOLTAREN) 1 % Gel Apply 2 g topically 4 times a day as needed. 100 g 1   • traZODone (DESYREL) 100 MG Tab TAKE 1 TO 3 TABS AT BEDTIME AS NEEDED FOR SLEEP. 270 Tablet 1   • Semaglutide,0.25 or 0.5MG/DOS, (OZEMPIC, 0.25 OR 0.5 MG/DOSE,) 2 MG/1.5ML Solution Pen-injector Inject 0.5 mg under the skin every 7 days. 3 mL 3   • Turmeric (QC TUMERIC COMPLEX PO) Take  by mouth.     • Omega-3 Fatty Acids (FISH OIL PO) Take  by mouth.     • ferrous fum/vit C/B12 IF FA (TRINSICON) Cap Take 1 capsule by mouth every day.     • fluticasone (FLONASE) 50 MCG/ACT nasal spray Administer 1 Spray into affected nostril(S) as needed.     • acetaminophen (TYLENOL) 500 MG Tab Take 1,000 mg by mouth 3 times a day as needed (HA).       No current Epic-ordered facility-administered medications on file.       Past Medical History:   Diagnosis Date   • Anesthesia     low bp coming out of anesthesia \"one time\"   • Anxiety " 11/18/2011   • Arthritis     fibromyalgia   • Breast mass, left    • Breath shortness     occasionally, inhalers a few times a year   • Bursitis of knee     left   • Cancer (HCC) 2005    squamous cell on nose   • Cancer (HCC) 2021    Stage 2 breast cancer   • Chronic pain 11/18/2011   • Depression 11/18/2011   • Diabetes (HCC)    • Fibromyalgia 11/18/2011   • Gastro-esophageal reflux 3/20/2012   • Heart burn    • Heart murmur     no cardiolgist   • Iron deficiency anemia 3/20/2012   • Migraine    • Neuropathy 11/18/2011   • Pain 6/14/12    3/10 stomach   • Pneumonia 01/2012   • Pulmonary hypertension (HCC) 03/20/2012   • Syncope and collapse 3/20/2012       Past Surgical History:   Procedure Laterality Date   • FULL THICKNESS SKIN GRAFT N/A 8/4/2021    Procedure: APPLICATION, GRAFT, SKIN, FULL-THICKNESS - TO ABDOMEN;  Surgeon: Thong Mota M.D.;  Location: [facility];  Service: General   • DEBRIDEMENT  7/7/2021    Procedure: DEBRIDEMENT - ABDOMINAL WALL WOUND;  Surgeon: Thong Mota M.D.;  Location: [facility];  Service: Gastroenterology   • APPLICATION OR REPLACEMENT, WOUND VAC  7/7/2021    Procedure: APPLICATION OR REPLACEMENT,WOUND VAC - FOR APPILCATION.;  Surgeon: Thong Mota M.D.;  Location: [facility];  Service: Gastroenterology   • PB SECD CLOS SURG WND EXTEN/COMPLIC  3/31/2021    Procedure: CLOSURE, WOUND, SECONDARY - FOR DELAYED PRIMARY CLOSURE OF ABDOMINAL WALL;  Surgeon: Fede Russ M.D.;  Location: [facility];  Service: General   • FLAP GRAFT  3/31/2021    Procedure: FLAP GRAFT - ADVANCEMENT;  Surgeon: Fede Russ M.D.;  Location: [facility];  Service: General   • SC EXPLORATORY OF ABDOMEN  1/26/2021    Procedure: LAPAROTOMY, EXPLORATORY - LYSIS OF ADHESIONS;  Surgeon: Fede Russ M.D.;  Location: [facility];  Service: General   • IRRIGATION & DEBRIDEMENT GENERAL  1/26/2021    Procedure: IRRIGATION AND  DEBRIDEMENT, WOUND - ABDOMINAL WALL WITH REMOVAL OF MESH;  Surgeon: Param Russ M.D.;  Location: [facility];  Service: General   • APPLICATION OR REPLACEMENT, WOUND VAC  1/26/2021    Procedure: APPLICATION OR REPLACEMENT,WOUND VAC;  Surgeon: Param Russ M.D.;  Location: SUR[facility];  Service: General   • IRRIGATION & DEBRIDEMENT GENERAL N/A 9/30/2020    Procedure: IRRIGATION AND DEBRIDEMENT, WOUND-ABD WALL, WOUND VAC PLACEMENT;  Surgeon: Param Russ M.D.;  Location: [facility];  Service: General   • CATH PLACEMENT  1/25/2013    Performed by Lizeth Ferreira M.D. at SURGERY SAME DAY [facility]S   • AXILLARY NODE DISSECTION  1/9/2013    Performed by Lizeth Ferreira M.D. at SUR[facility]ERY SAME DAY [facility]   • NODE BIOPSY  1/9/2013    Performed by Lizeth Ferreira M.D. at SUR[facility]ERY SAME DAY [facility]   • BREAST BIOPSY  1/9/2013    Performed by Lizeth Ferreira M.D. at SUR[facility]ERY SAME DAY [facility]   • LUMPECTOMY Left 2013 9 lymph note removal   • BREAST BIOPSY  12/11/2012    Performed by Lizeth Ferreira M.D. at SURGERY SAME DAY [facility]   • COLONOSCOPY  6/21/2012    Performed by PARAM VALDEZ at SURGERY [facility] ORS   • GASTROSCOPY  6/21/2012    Performed by PARAM VALDEZ at SURGERY [facility] ORS   • ABDOMINAL EXPLORATION  10/7/2010    Performed by MARIA G KHAN JR at SURGERY [facility] ORS   • IRRIGATION & DEBRIDEMENT GENERAL  4/12/2010    Performed by OZZIE WEINSTEIN at SURGERY [facility] ORS   • HERNIA REPAIR  3/15/2010    Performed by MARIA G KHAN JR at SURGERY [facility]S   • FLAP GRAFT  3/15/2010    Performed by MARIA G KHAN JR at SURGERY [facility] ORS   • PANNICULECTOMY  3/15/2010    Performed by MARIA G KHAN JR at SURGERY [facility]S   • ABDOMINAL EXPLORATION  3/11/2010    Performed by MARIA G KHAN JR at SURGERY SAME DAY [facility]   • OTHER  08/09    pulled mesh out of hernia   • OTHER  07/09    abscess  "removed abd.   • OTHER      bowel obstruction   • OTHER  2009    hernia repair,mesh removal after in aug.09   • GASTRIC BYPASS LAPAROSCOPIC     • OTHER      bariatric surgery gastric bypass   • GYN SURGERY      tubal       Social History     Tobacco Use   • Smoking status: Former Smoker     Packs/day: 0.10     Years: 8.00     Pack years: 0.80     Types: Cigarettes     Quit date: 2016     Years since quittin.9   • Smokeless tobacco: Never Used   • Tobacco comment: 1/2 pk a day on and off 10 yrs, 1 cigarette daily   Vaping Use   • Vaping Use: Never used   Substance Use Topics   • Alcohol use: Yes     Comment: 1-2 times a year   • Drug use: No       Family History   Problem Relation Age of Onset   • Heart Attack Father    • Anxiety disorder Father    • Depression Father    • Schizophrenia Father    • Hypertension Mother    • Cancer Maternal Grandfather         leukemia   • Cancer Other         leukemia- cousin   • Diabetes Maternal Uncle    • Anxiety disorder Maternal Uncle    • Diabetes Maternal Uncle    • Anxiety disorder Maternal Uncle        ROS:   As in HPI, otherwise negative for chest pain, dyspnea, abdominal pain, dysuria, blood in stool, fever         Exam: /74 (BP Location: Right arm, Patient Position: Sitting, BP Cuff Size: Adult)   Pulse 62   Temp 36.6 °C (97.8 °F) (Temporal)   Resp 18   Ht 1.651 m (5' 5\")   Wt 119 kg (262 lb 14.4 oz)   SpO2 96%  Body mass index is 43.75 kg/m².    General: Alert, pleasant, well nourished, well developed female in NAD  HEENT: Normocephalic. Eyes conjunctiva clear lids without ptosis, pupils equal and reactive to light, ears normal shape and contour, canals are clear bilaterally, tympanic membranes are pearly gray with good light reflex, nasal mucosa without erythema and drainage, oropharynx is without erythema, edema or exudates.   Neck: Supple without bruit. Thyroid is not enlarged.  Pulmonary: Clear to ausculation.  Normal effort. " No rales, ronchi, or wheezing.  Cardiovascular: Normal rate and rhythm without murmur. Carotid and radial pulses are intact and equal bilaterally.  No lower extremity edema.  Abdomen: Soft, mild tenderness epigastrium with palpation, nondistended. Normal bowel sounds. Liver and spleen are not palpable  Neuro: CN 2-4,6, 8-12 tested and grossly intact.  Mild left eyelid and facial droop, strength testing in upper and lower extremities is 5/5 and equal bilaterally, positive Romberg, Gross sensation intact to extremities and trunk, Gait grossly normal and mild antalgic  Lymph: No cervical or supraclavicular lymph nodes are palpable  Skin: Warm and dry.   Musculoskeletal: mild antalgic gait.   Psych: Normal mood and affect. Alert and oriented. Judgment and insight is normal.    Component      Latest Ref Rng & Units 3/25/2022   Sodium      135 - 145 mmol/L 139   Potassium      3.6 - 5.5 mmol/L 4.2   Chloride      96 - 112 mmol/L 104   Co2      20 - 33 mmol/L 20   Anion Gap      7.0 - 16.0 15.0   Glucose      65 - 99 mg/dL 126 (H)   Bun      8 - 22 mg/dL 14   Creatinine      0.50 - 1.40 mg/dL 0.87   Calcium      8.5 - 10.5 mg/dL 9.5   AST(SGOT)      12 - 45 U/L 33   ALT(SGPT)      2 - 50 U/L 34   Alkaline Phosphatase      30 - 99 U/L 94   Total Bilirubin      0.1 - 1.5 mg/dL 0.3   Albumin      3.2 - 4.9 g/dL 4.6   Total Protein      6.0 - 8.2 g/dL 6.7   Globulin      1.9 - 3.5 g/dL 2.1   A-G Ratio      g/dL 2.2   Creatinine, Urine      mg/dL 318.63   Microalbumin, Urine Random      mg/dL 4.2   Micro Alb Creat Ratio      0 - 30 mg/g 13   Glycohemoglobin      4.0 - 5.6 % 5.8 (H)   Estim. Avg Glu      mg/dL 120   GFR (CKD-EPI)      >60 mL/min/1.73 m 2 76   Vitamin B12 -True Cobalamin      211 - 911 pg/mL 373   Pth, Intact      14.0 - 72.0 pg/mL 61.0       Please note that this dictation was created using voice recognition software. I have made every reasonable attempt to correct obvious errors, but I expect that there are  errors of grammar and possibly content that I did not discover before finalizing the note.      Assessment/Plan  1. Type 2 diabetes mellitus without complication, without long-term current use of insulin (HCC)  Well-controlled.  Continue with Ozempic and lifestyle measures.  Consider decreasing dose of Ozempic in the future.    2. Muscle cramps    - MAGNESIUM; Future    3. Acute gastritis without hemorrhage, unspecified gastritis type  Take omeprazole once a day for 2 weeks.  If symptoms return, restart omeprazole  Consider referral to gastroenterology if symptoms do not resolve.  - omeprazole (PRILOSEC) 20 MG delayed-release capsule; Take 1 Capsule by mouth every day.  Dispense: 30 Capsule; Refill: 0    4. Chronic tension-type headache, not intractable  We will start propranolol for headache prevention.  Instructions side effects reviewed with patient.  Consider referral to neurology.  - propranolol (INDERAL) 20 MG Tab; Take 1 Tablet by mouth 2 times a day.  Dispense: 60 Tablet; Refill: 2    5. Lightheaded    - CBC WITH DIFFERENTIAL; Future  - IRON/TOTAL IRON BIND; Future  - FERRITIN; Future  - TSH WITH REFLEX TO FT4; Future    6. Left facial numbness  Differentials include Bell's palsy, trigeminal neuralgia, previous stroke, brain mass, MS.  Will get MRI of brain and refer to neurology urgently.  Strict ER precautions reviewed with patient including worsening symptoms, worse headache ever, aphasia, one-sided weakness.  - MR-BRAIN-WITH & W/O; Future  - Referral to Neurology    7. Ataxia  As in #6  - MR-BRAIN-WITH & W/O; Future  - Referral to Neurology    8. Primary insomnia  Well-controlled.  Continue trazodone and hydroxyzine.    9. Skin ulcer, unspecified ulcer stage (HCC)  Resolved.    10. Chronic midline low back pain without sciatica  Discussed options including referrals back to specialist, physical therapy, home exercise program, medication options.  Patient would like to continue to work on lifestyle  measures and will discuss further at next appointment.    Patient will return to clinic in 2 weeks or sooner if needed.

## 2022-03-31 NOTE — PATIENT INSTRUCTIONS
Or migraine prevention, start propranolol twice a day routinely    Take omeprazole once a day for 2 weeks.  If symptoms return, restart omeprazole

## 2022-04-01 NOTE — ASSESSMENT & PLAN NOTE
Patient reports 2-month onset of epigastric cramping and burning after eating.  Denies nausea, vomiting, blood in stool, dark black stool.  Has not tried anything over-the-counter.

## 2022-04-01 NOTE — ASSESSMENT & PLAN NOTE
"Patient reports 4-week onset of intermittent left facial numbness, feeling off balance (will fall backwards randomly), return of chronic headache and frontal lobe, left side of body feels \"different\" than right side.  Also reports new onset of fleeting sensation of \"bone chilling ache\" which occurs most often at night.  Does report some blurry vision.   Denies head injury, recent shingles, viral illness, dizziness.  "

## 2022-04-01 NOTE — ASSESSMENT & PLAN NOTE
Chronic health problem, worsening.  Managing with Voltaren gel, bareback and body, TENS unit, ice pack, heat.  Has been established with [facility] in the past for medication management.  Patient stopped going because she did not want to continue with narcotics.  Was receiving injections at [facility] [place], but not helpful.  Has had PT in the past.  Denies saddle paresthesia, foot drop, fever, fecal or urinary incontinence.

## 2022-04-01 NOTE — ASSESSMENT & PLAN NOTE
Patient reports that headaches have been starting up again.  Occur in the frontal lobe.  Has had headaches since early adulthood.  Currently managing with Tylenol and Excedrin Migraine.  Recently started co-Q10 for prevention.  Maxalt has not been that helpful.  Botox injections in the past without relief.  Took topiramate in the past.  Quit for unclear reasons.  Asking to try a beta-blocker for prevention.

## 2022-04-16 DIAGNOSIS — L29.9 ITCHING: ICD-10-CM

## 2022-04-18 RX ORDER — HYDROXYZINE HYDROCHLORIDE 25 MG/1
25 TABLET, FILM COATED ORAL 3 TIMES DAILY PRN
Qty: 90 TABLET | Refills: 1 | Status: SHIPPED | OUTPATIENT
Start: 2022-04-18 | End: 2022-08-09 | Stop reason: SDUPTHER

## 2022-04-21 ENCOUNTER — OFFICE VISIT (OUTPATIENT)
Dept: MEDICAL GROUP | Facility: PHYSICIAN GROUP | Age: 62
End: 2022-04-21
Payer: COMMERCIAL

## 2022-04-21 VITALS
HEIGHT: 65 IN | TEMPERATURE: 98.7 F | HEART RATE: 75 BPM | SYSTOLIC BLOOD PRESSURE: 122 MMHG | OXYGEN SATURATION: 95 % | BODY MASS INDEX: 43.72 KG/M2 | WEIGHT: 262.4 LBS | RESPIRATION RATE: 16 BRPM | DIASTOLIC BLOOD PRESSURE: 78 MMHG

## 2022-04-21 DIAGNOSIS — E11.9 TYPE 2 DIABETES MELLITUS WITHOUT COMPLICATION, WITHOUT LONG-TERM CURRENT USE OF INSULIN (HCC): ICD-10-CM

## 2022-04-21 DIAGNOSIS — F33.42 RECURRENT MAJOR DEPRESSIVE DISORDER, IN FULL REMISSION (HCC): ICD-10-CM

## 2022-04-21 DIAGNOSIS — G44.229 CHRONIC TENSION-TYPE HEADACHE, NOT INTRACTABLE: ICD-10-CM

## 2022-04-21 DIAGNOSIS — R27.0 ATAXIA: ICD-10-CM

## 2022-04-21 DIAGNOSIS — E66.01 MORBID OBESITY WITH BMI OF 40.0-44.9, ADULT (HCC): ICD-10-CM

## 2022-04-21 DIAGNOSIS — K29.00 ACUTE GASTRITIS WITHOUT HEMORRHAGE, UNSPECIFIED GASTRITIS TYPE: ICD-10-CM

## 2022-04-21 DIAGNOSIS — R20.0 LEFT FACIAL NUMBNESS: ICD-10-CM

## 2022-04-21 PROCEDURE — 99214 OFFICE O/P EST MOD 30 MIN: CPT | Performed by: NURSE PRACTITIONER

## 2022-04-21 RX ORDER — SUCRALFATE 1 G/1
1 TABLET ORAL
Qty: 120 TABLET | Refills: 0 | Status: SHIPPED | OUTPATIENT
Start: 2022-04-21 | End: 2022-07-29

## 2022-04-21 RX ORDER — AMITRIPTYLINE HYDROCHLORIDE 10 MG/1
TABLET, FILM COATED ORAL
Qty: 60 TABLET | Refills: 3 | Status: SHIPPED | OUTPATIENT
Start: 2022-04-21 | End: 2022-05-16

## 2022-04-21 RX ORDER — BUTALBITAL, ACETAMINOPHEN AND CAFFEINE 50; 325; 40 MG/1; MG/1; MG/1
1 TABLET ORAL EVERY 6 HOURS PRN
Qty: 15 TABLET | Refills: 2 | Status: SHIPPED | OUTPATIENT
Start: 2022-04-21 | End: 2022-07-29 | Stop reason: SDUPTHER

## 2022-04-21 ASSESSMENT — FIBROSIS 4 INDEX: FIB4 SCORE: 1.34

## 2022-04-21 NOTE — PROGRESS NOTES
"CC: Lab review, follow-up on stomach, headache, numbness    HISTORY OF THE PRESENT ILLNESS: Patient is a 62 y.o. female. This pleasant patient is here today for evaluation and management of the following health problems.        Acute gastritis without hemorrhage  Patient reports mild improvement with omeprazole.  Still having epigastric cramping and burning after eating.  We will add Carafate for 3 to 4 weeks.  We will also refer to general surgery for consideration of EGD.  Patient is established with [facility] in [place] and requesting referral go there.      Left facial numbness  Please see additional notes under ataxia.    Ataxia  HPI from 3/31/22: Patient reports 4-week onset of intermittent left facial numbness, feeling off balance (will fall backwards randomly), return of chronic headache and frontal lobe, left side of body feels \"different\" than right side.  Also reports new onset of fleeting sensation of \"bone chilling ache\" which occurs most often at night.  Does report some blurry vision.   Denies head injury, recent shingles, viral illness, dizziness.    Today's HPI:  Continuing with symptoms.  New onset of right  weakness.  MRI of brain scheduled for next week.  Has appointment to establish care with neurology in 1 month.    Chronic headache  No change with propranolol.  Continues with chronic daily headache with intermittent severe headache.  She will wake in the morning with severe headache.  Maxalt not helping.  Propranolol did not help with prevention.  Has tried topiramate in the past, unclear on why stopped.      Morbid obesity with BMI of 40.0-44.9, adult (HCC)  Continues to struggle with obesity.  Administering Ozempic for diabetes, which has also helped with some weight loss.    Depression  Chronic in nature, managed by lifestyle measures.  No longer taking Cymbalta or seeing psychiatry.      Allergies: Patient has no known allergies.    Current Outpatient Medications Ordered " "in Epic   Medication Sig Dispense Refill   • sucralfate (CARAFATE) 1 GM Tab Take 1 Tablet by mouth 4 Times a Day,Before Meals and at Bedtime. 120 Tablet 0   • amitriptyline (ELAVIL) 10 MG Tab Take 1 tab at bedtime for 1 week, then increase to 2 tabs at bedtime thereafter. 60 Tablet 3   • butalbital/apap/caffeine (FIORICET) -40 mg Tab Take 1 Tablet by mouth every 6 hours as needed for Headache for up to 30 days. 15 Tablet 2   • hydrOXYzine HCl (ATARAX) 25 MG Tab Take 1 Tablet by mouth 3 times a day as needed for Itching. 90 Tablet 1   • omeprazole (PRILOSEC) 20 MG delayed-release capsule Take 1 Capsule by mouth every day. 30 Capsule 0   • propranolol (INDERAL) 20 MG Tab Take 1 Tablet by mouth 2 times a day. 60 Tablet 2   • rizatriptan (MAXALT) 10 MG tablet TAKE 1 TAB BY MOUTH ONCE AS NEEDED FOR MIGRAINE FOR UP TO 1 DOSE. MAY REPEAT IN 2 HOURS IF NEEDED 12 Tablet 2   • diclofenac sodium (VOLTAREN) 1 % Gel Apply 2 g topically 4 times a day as needed. 100 g 1   • traZODone (DESYREL) 100 MG Tab TAKE 1 TO 3 TABS AT BEDTIME AS NEEDED FOR SLEEP. 270 Tablet 1   • Semaglutide,0.25 or 0.5MG/DOS, (OZEMPIC, 0.25 OR 0.5 MG/DOSE,) 2 MG/1.5ML Solution Pen-injector Inject 0.5 mg under the skin every 7 days. 3 mL 3   • Turmeric (QC TUMERIC COMPLEX PO) Take  by mouth.     • Omega-3 Fatty Acids (FISH OIL PO) Take  by mouth.     • ferrous fum/vit C/B12 IF FA (TRINSICON) Cap Take 1 capsule by mouth every day.     • fluticasone (FLONASE) 50 MCG/ACT nasal spray Administer 1 Spray into affected nostril(S) as needed.     • acetaminophen (TYLENOL) 500 MG Tab Take 1,000 mg by mouth 3 times a day as needed (HA).       No current Epic-ordered facility-administered medications on file.       Past Medical History:   Diagnosis Date   • Anesthesia     low bp coming out of anesthesia \"one time\"   • Anxiety 11/18/2011   • Arthritis     fibromyalgia   • Breast mass, left    • Breath shortness     occasionally, inhalers a few times a year   • " Bursitis of knee     left   • Cancer ([facility]) 2005    squamous cell on nose   • Cancer ([facility]) 2021    Stage 2 breast cancer   • Chronic pain 11/18/2011   • Depression 11/18/2011   • Diabetes ([facility])    • Fibromyalgia 11/18/2011   • Gastro-esophageal reflux 3/20/2012   • Heart burn    • Heart murmur     no cardiolgist   • Iron deficiency anemia 3/20/2012   • Migraine    • Neuropathy 11/18/2011   • Pain 6/14/12    3/10 stomach   • Pneumonia 01/2012   • Pulmonary hypertension ([facility]) 03/20/2012   • Syncope and collapse 3/20/2012       Past Surgical History:   Procedure Laterality Date   • FULL THICKNESS SKIN GRAFT N/A 8/4/2021    Procedure: APPLICATION, GRAFT, SKIN, FULL-THICKNESS - TO ABDOMEN;  Surgeon: hTong Mota M.D.;  Location: [facility];  Service: General   • DEBRIDEMENT  7/7/2021    Procedure: DEBRIDEMENT - ABDOMINAL WALL WOUND;  Surgeon: Thong Mota M.D.;  Location: [facility];  Service: Gastroenterology   • APPLICATION OR REPLACEMENT, WOUND VAC  7/7/2021    Procedure: APPLICATION OR REPLACEMENT,WOUND VAC - FOR APPILCATION.;  Surgeon: Thong Mota M.D.;  Location: [facility];  Service: Gastroenterology   • PB SECD CLOS SURG WND EXTEN/COMPLIC  3/31/2021    Procedure: CLOSURE, WOUND, SECONDARY - FOR DELAYED PRIMARY CLOSURE OF ABDOMINAL WALL;  Surgeon: Fede Russ M.D.;  Location: [facility];  Service: General   • FLAP GRAFT  3/31/2021    Procedure: FLAP GRAFT - ADVANCEMENT;  Surgeon: Fede Russ M.D.;  Location: [facility];  Service: General   • SC EXPLORATORY OF ABDOMEN  1/26/2021    Procedure: LAPAROTOMY, EXPLORATORY - LYSIS OF ADHESIONS;  Surgeon: Fede Russ M.D.;  Location: [facility];  Service: General   • IRRIGATION & DEBRIDEMENT GENERAL  1/26/2021    Procedure: IRRIGATION AND DEBRIDEMENT, WOUND - ABDOMINAL WALL WITH REMOVAL OF MESH;  Surgeon: Fede Russ M.D.;  Location: [facility];  Service:  General   • APPLICATION OR REPLACEMENT, WOUND VAC  1/26/2021    Procedure: APPLICATION OR REPLACEMENT,WOUND VAC;  Surgeon: Param Russ M.D.;  Location: SURGERY [facility];  Service: General   • IRRIGATION & DEBRIDEMENT GENERAL N/A 9/30/2020    Procedure: IRRIGATION AND DEBRIDEMENT, WOUND-ABD WALL, WOUND VAC PLACEMENT;  Surgeon: Param Russ M.D.;  Location: SURGERY [facility];  Service: General   • CATH PLACEMENT  1/25/2013    Performed by Lizeth Ferreira M.D. at SURGERY SAME DAY [facility]   • AXILLARY NODE DISSECTION  1/9/2013    Performed by Lizeth Ferreira M.D. at SURGERY SAME DAY [facility]   • NODE BIOPSY  1/9/2013    Performed by Lizeth Ferreira M.D. at SURGERY SAME DAY [facility]   • BREAST BIOPSY  1/9/2013    Performed by Lizeth Ferreira M.D. at SURGERY SAME DAY [facility]   • LUMPECTOMY Left 2013 9 lymph note removal   • BREAST BIOPSY  12/11/2012    Performed by Lizeth Ferreira M.D. at SURGERY SAME DAY [facility]   • COLONOSCOPY  6/21/2012    Performed by PARAM VALDEZ at SURGERY [facility]   • GASTROSCOPY  6/21/2012    Performed by PARAM VALDEZ at SURGERY [facility]   • ABDOMINAL EXPLORATION  10/7/2010    Performed by MARIA G KHAN JR at SURGERY [facility]   • IRRIGATION & DEBRIDEMENT GENERAL  4/12/2010    Performed by OZZIE WEINSTEIN at SURGERY [facility]   • HERNIA REPAIR  3/15/2010    Performed by MARIA G KHAN JR at SURGERY [facility]   • FLAP GRAFT  3/15/2010    Performed by MARIA G KHAN JR at SURGERY [facility]   • PANNICULECTOMY  3/15/2010    Performed by MARIA G KHAN JR at SURGERY [facility]   • ABDOMINAL EXPLORATION  3/11/2010    Performed by MARIA G KHAN JR at SURGERY SAME DAY [facility]   • OTHER  08/09    pulled mesh out of hernia   • OTHER  07/09    abscess removed abd.   • OTHER  06/09    bowel obstruction   • OTHER  04/2009    hernia repair,mesh removal after in aug.09   • GASTRIC BYPASS  "LAPAROSCOPIC     • OTHER      bariatric surgery gastric bypass   • GYN SURGERY  1984    tubal       Social History     Tobacco Use   • Smoking status: Former Smoker     Packs/day: 0.10     Years: 8.00     Pack years: 0.80     Types: Cigarettes     Quit date: 2016     Years since quittin.9   • Smokeless tobacco: Never Used   • Tobacco comment: 1/2 pk a day on and off 10 yrs, 1 cigarette daily   Vaping Use   • Vaping Use: Never used   Substance Use Topics   • Alcohol use: Yes     Comment: 1-2 times a year   • Drug use: No       Family History   Problem Relation Age of Onset   • Heart Attack Father    • Anxiety disorder Father    • Depression Father    • Schizophrenia Father    • Hypertension Mother    • Cancer Maternal Grandfather         leukemia   • Cancer Other         leukemia- cousin   • Diabetes Maternal Uncle    • Anxiety disorder Maternal Uncle    • Diabetes Maternal Uncle    • Anxiety disorder Maternal Uncle        ROS:   As in HPI, otherwise negative for chest pain, dyspnea, abdominal pain, dysuria, blood in stool, fever          Exam: /78 (BP Location: Right arm, Patient Position: Sitting, BP Cuff Size: Adult)   Pulse 75   Temp 37.1 °C (98.7 °F) (Temporal)   Resp 16   Ht 1.651 m (5' 5\")   Wt 119 kg (262 lb 6.4 oz)   SpO2 95%  Body mass index is 43.67 kg/m².    General: Alert, pleasant, well nourished, well developed female in NAD  Neck: Supple   Pulmonary: Clear to ausculation.  Normal effort. No rales, ronchi, or wheezing.  Cardiovascular: Normal rate and rhythm without murmur.   Neurologic: Grossly nonfocal  Skin: Warm and dry.    Musculoskeletal: Normal gait.   Psych: Normal mood and affect. Alert and oriented. Judgment and insight is normal.    Component      Latest Ref Rng & Units 3/31/2022   WBC      4.8 - 10.8 K/uL 7.3   RBC      4.20 - 5.40 M/uL 4.84   Hemoglobin      12.0 - 16.0 g/dL 15.3   Hematocrit      37.0 - 47.0 % 47.5 (H)   MCV      81.4 - 97.8 fL 98.1 (H) "   MCH      27.0 - 33.0 pg 31.6   MCHC      33.6 - 35.0 g/dL 32.2 (L)   RDW      35.9 - 50.0 fL 45.6   Platelet Count      164 - 446 K/uL 258   MPV      9.0 - 12.9 fL 9.7   Neutrophils-Polys      44.00 - 72.00 % 62.20   Lymphocytes      22.00 - 41.00 % 28.60   Monocytes      0.00 - 13.40 % 6.00   Eosinophils      0.00 - 6.90 % 1.40   Basophils      0.00 - 1.80 % 1.50   Immature Granulocytes      0.00 - 0.90 % 0.30   Nucleated RBC      /100 WBC 0.00   Neutrophils (Absolute)      2.00 - 7.15 K/uL 4.56   Lymphs (Absolute)      1.00 - 4.80 K/uL 2.10   Monos (Absolute)      0.00 - 0.85 K/uL 0.44   Eos (Absolute)      0.00 - 0.51 K/uL 0.10   Baso (Absolute)      0.00 - 0.12 K/uL 0.11   Immature Granulocytes (abs)      0.00 - 0.11 K/uL 0.02   NRBC (Absolute)      K/uL 0.00   Iron      40 - 170 ug/dL 114   Total Iron Binding      250 - 450 ug/dL 424   Unsat Iron Binding      110 - 370 ug/dL 310   % Saturation      15 - 55 % 27   TSH      0.380 - 5.330 uIU/mL 2.420   Ferritin      10.0 - 291.0 ng/mL 31.5   Magnesium      1.5 - 2.5 mg/dL 2.0       Please note that this dictation was created using voice recognition software. I have made every reasonable attempt to correct obvious errors, but I expect that there are errors of grammar and possibly content that I did not discover before finalizing the note.      Assessment/Plan  1. Acute gastritis without hemorrhage, unspecified gastritis type  Only minor improvement with initiation of omeprazole.  We will add Carafate to take for 3 to 4 weeks for acute gastritis.  We will also refer to general surgery for consideration of EGD.  Patient requesting referral go to [facility].  - Referral to General Surgery  - sucralfate (CARAFATE) 1 GM Tab; Take 1 Tablet by mouth 4 Times a Day,Before Meals and at Bedtime.  Dispense: 120 Tablet; Refill: 0    2. Chronic tension-type headache, not intractable  Unrelieved with propranolol and Maxalt.  Will trial amitriptyline and  Fioricet.  Patient understands Fioricet is a controlled substance and can take very sparingly.  She reports she has taken in the past with good relief.   reviewed.  Has also taken amitriptyline in the past.  Patient has upcoming appointment with neurology for facial numbness and ataxia.  She will bring up headaches with them.  - amitriptyline (ELAVIL) 10 MG Tab; Take 1 tab at bedtime for 1 week, then increase to 2 tabs at bedtime thereafter.  Dispense: 60 Tablet; Refill: 3  - butalbital/apap/caffeine (FIORICET) -40 mg Tab; Take 1 Tablet by mouth every 6 hours as needed for Headache for up to 30 days.  Dispense: 15 Tablet; Refill: 2    3. Type 2 diabetes mellitus without complication, without long-term current use of insulin ([facility])  We will review lab results at next appointment  - Comp Metabolic Panel; Future  - ESTIMATED GFR; Future  - HEMOGLOBIN A1C; Future  - Lipid Profile; Future    4. Left facial numbness  Continues with same symptoms.  We will keep appointment to establish care with neurology next month and MRI appointment next week.    5. Ataxia  As in #4    6. Morbid obesity with BMI of 40.0-44.9, adult ([facility])  Advised on lifestyle measures, continue with Ozempic.    7. Recurrent major depressive disorder, in full remission ([facility])  Well-controlled.  Currently in remission.  Continue with lifestyle measures.    Patient will return to clinic in 3 months for diabetes and headache or sooner if needed

## 2022-04-21 NOTE — ASSESSMENT & PLAN NOTE
"HPI from 3/31/22: Patient reports 4-week onset of intermittent left facial numbness, feeling off balance (will fall backwards randomly), return of chronic headache and frontal lobe, left side of body feels \"different\" than right side.  Also reports new onset of fleeting sensation of \"bone chilling ache\" which occurs most often at night.  Does report some blurry vision.   Denies head injury, recent shingles, viral illness, dizziness.    Today's HPI:  Continuing with symptoms.  New onset of right  weakness.  MRI of brain scheduled for next week.  Has appointment to establish care with neurology in 1 month.  "

## 2022-04-21 NOTE — ASSESSMENT & PLAN NOTE
No change with propranolol.  Continues with chronic daily headache with intermittent severe headache.  She will wake in the morning with severe headache.  Maxalt not helping.  Propranolol did not help with prevention.  Has tried topiramate in the past, unclear on why stopped.

## 2022-04-21 NOTE — ASSESSMENT & PLAN NOTE
Patient reports mild improvement with omeprazole.  Still having epigastric cramping and burning after eating.  We will add Carafate for 3 to 4 weeks.  We will also refer to general surgery for consideration of EGD.  Patient is established with [facility] in [place] and requesting referral go there.

## 2022-04-22 NOTE — ASSESSMENT & PLAN NOTE
Continues to struggle with obesity.  Administering Ozempic for diabetes, which has also helped with some weight loss.

## 2022-04-22 NOTE — ASSESSMENT & PLAN NOTE
Chronic in nature, managed by lifestyle measures.  No longer taking Cymbalta or seeing psychiatry.

## 2022-04-23 DIAGNOSIS — K29.00 ACUTE GASTRITIS WITHOUT HEMORRHAGE, UNSPECIFIED GASTRITIS TYPE: ICD-10-CM

## 2022-04-23 DIAGNOSIS — G44.229 CHRONIC TENSION-TYPE HEADACHE, NOT INTRACTABLE: ICD-10-CM

## 2022-04-25 RX ORDER — PROPRANOLOL HYDROCHLORIDE 20 MG/1
TABLET ORAL
Qty: 60 TABLET | Refills: 2 | OUTPATIENT
Start: 2022-04-25

## 2022-04-25 RX ORDER — OMEPRAZOLE 20 MG/1
20 CAPSULE, DELAYED RELEASE ORAL DAILY
Qty: 30 CAPSULE | Refills: 2 | Status: SHIPPED | OUTPATIENT
Start: 2022-04-25 | End: 2022-07-22

## 2022-04-25 NOTE — TELEPHONE ENCOUNTER
Received request via: Pharmacy    Was the patient seen in the last year in this department? Yes    Does the patient have an active prescription (recently filled or refills available) for medication(s) requested? No     Last Office Visit:04/21/2022  Last Labs:03/31/2022

## 2022-05-17 DIAGNOSIS — E66.01 MORBID OBESITY WITH BMI OF 40.0-44.9, ADULT (HCC): ICD-10-CM

## 2022-05-17 DIAGNOSIS — E11.9 TYPE 2 DIABETES MELLITUS WITHOUT COMPLICATION, WITHOUT LONG-TERM CURRENT USE OF INSULIN (HCC): ICD-10-CM

## 2022-05-18 RX ORDER — SEMAGLUTIDE 1.34 MG/ML
0.5 INJECTION, SOLUTION SUBCUTANEOUS
Qty: 3 ML | Refills: 3 | Status: SHIPPED | OUTPATIENT
Start: 2022-05-18 | End: 2023-01-09

## 2022-05-20 DIAGNOSIS — K29.00 ACUTE GASTRITIS WITHOUT HEMORRHAGE, UNSPECIFIED GASTRITIS TYPE: ICD-10-CM

## 2022-05-20 RX ORDER — SUCRALFATE 1 G/1
1 TABLET ORAL
Qty: 120 TABLET | Refills: 0 | OUTPATIENT
Start: 2022-05-20

## 2022-05-20 NOTE — TELEPHONE ENCOUNTER
Last ov 4/21/22    Received request via: Pharmacy    Was the patient seen in the last year in this department? Yes    Does the patient have an active prescription (recently filled or refills available) for medication(s) requested? No

## 2022-05-23 ENCOUNTER — APPOINTMENT (OUTPATIENT)
Dept: NEUROLOGY | Facility: MEDICAL CENTER | Age: 62
End: 2022-05-23
Attending: PSYCHIATRY & NEUROLOGY
Payer: COMMERCIAL

## 2022-06-08 ENCOUNTER — PATIENT MESSAGE (OUTPATIENT)
Dept: MEDICAL GROUP | Facility: PHYSICIAN GROUP | Age: 62
End: 2022-06-08
Payer: COMMERCIAL

## 2022-06-08 DIAGNOSIS — G44.229 CHRONIC TENSION-TYPE HEADACHE, NOT INTRACTABLE: ICD-10-CM

## 2022-06-08 RX ORDER — RIZATRIPTAN BENZOATE 10 MG/1
TABLET ORAL
Qty: 12 TABLET | Refills: 2 | Status: SHIPPED | OUTPATIENT
Start: 2022-06-08 | End: 2022-12-16

## 2022-07-21 ENCOUNTER — HOSPITAL ENCOUNTER (OUTPATIENT)
Dept: LAB | Facility: MEDICAL CENTER | Age: 62
End: 2022-07-21
Attending: NURSE PRACTITIONER
Payer: COMMERCIAL

## 2022-07-21 DIAGNOSIS — K29.00 ACUTE GASTRITIS WITHOUT HEMORRHAGE, UNSPECIFIED GASTRITIS TYPE: ICD-10-CM

## 2022-07-21 DIAGNOSIS — E11.9 TYPE 2 DIABETES MELLITUS WITHOUT COMPLICATION, WITHOUT LONG-TERM CURRENT USE OF INSULIN (HCC): ICD-10-CM

## 2022-07-21 LAB
ALBUMIN SERPL BCP-MCNC: 4.2 G/DL (ref 3.2–4.9)
ALBUMIN/GLOB SERPL: 1.6 G/DL
ALP SERPL-CCNC: 93 U/L (ref 30–99)
ALT SERPL-CCNC: 31 U/L (ref 2–50)
ANION GAP SERPL CALC-SCNC: 14 MMOL/L (ref 7–16)
AST SERPL-CCNC: 27 U/L (ref 12–45)
BILIRUB SERPL-MCNC: 0.5 MG/DL (ref 0.1–1.5)
BUN SERPL-MCNC: 16 MG/DL (ref 8–22)
CALCIUM SERPL-MCNC: 9 MG/DL (ref 8.5–10.5)
CHLORIDE SERPL-SCNC: 106 MMOL/L (ref 96–112)
CHOLEST SERPL-MCNC: 167 MG/DL (ref 100–199)
CO2 SERPL-SCNC: 18 MMOL/L (ref 20–33)
CREAT SERPL-MCNC: 0.75 MG/DL (ref 0.5–1.4)
EST. AVERAGE GLUCOSE BLD GHB EST-MCNC: 114 MG/DL
FASTING STATUS PATIENT QL REPORTED: NORMAL
GFR SERPLBLD CREATININE-BSD FMLA CKD-EPI: 90 ML/MIN/1.73 M 2
GLOBULIN SER CALC-MCNC: 2.6 G/DL (ref 1.9–3.5)
GLUCOSE SERPL-MCNC: 160 MG/DL (ref 65–99)
HBA1C MFR BLD: 5.6 % (ref 4–5.6)
HDLC SERPL-MCNC: 41 MG/DL
LDLC SERPL CALC-MCNC: 73 MG/DL
POTASSIUM SERPL-SCNC: 4.2 MMOL/L (ref 3.6–5.5)
PROT SERPL-MCNC: 6.8 G/DL (ref 6–8.2)
SODIUM SERPL-SCNC: 138 MMOL/L (ref 135–145)
TRIGL SERPL-MCNC: 266 MG/DL (ref 0–149)

## 2022-07-21 PROCEDURE — 36415 COLL VENOUS BLD VENIPUNCTURE: CPT

## 2022-07-21 PROCEDURE — 83036 HEMOGLOBIN GLYCOSYLATED A1C: CPT

## 2022-07-21 PROCEDURE — 80053 COMPREHEN METABOLIC PANEL: CPT

## 2022-07-21 PROCEDURE — 80061 LIPID PANEL: CPT

## 2022-07-22 RX ORDER — OMEPRAZOLE 20 MG/1
20 CAPSULE, DELAYED RELEASE ORAL DAILY
Qty: 90 CAPSULE | Refills: 3 | Status: SHIPPED | OUTPATIENT
Start: 2022-07-22 | End: 2023-06-26

## 2022-07-29 ENCOUNTER — OFFICE VISIT (OUTPATIENT)
Dept: MEDICAL GROUP | Facility: PHYSICIAN GROUP | Age: 62
End: 2022-07-29
Payer: COMMERCIAL

## 2022-07-29 VITALS
RESPIRATION RATE: 20 BRPM | WEIGHT: 254.1 LBS | SYSTOLIC BLOOD PRESSURE: 130 MMHG | HEART RATE: 87 BPM | BODY MASS INDEX: 42.34 KG/M2 | OXYGEN SATURATION: 91 % | TEMPERATURE: 97.8 F | HEIGHT: 65 IN | DIASTOLIC BLOOD PRESSURE: 80 MMHG

## 2022-07-29 DIAGNOSIS — E78.1 HYPERTRIGLYCERIDEMIA: ICD-10-CM

## 2022-07-29 DIAGNOSIS — E11.9 TYPE 2 DIABETES MELLITUS WITHOUT COMPLICATION, WITHOUT LONG-TERM CURRENT USE OF INSULIN (HCC): ICD-10-CM

## 2022-07-29 DIAGNOSIS — K29.00 ACUTE GASTRITIS WITHOUT HEMORRHAGE, UNSPECIFIED GASTRITIS TYPE: ICD-10-CM

## 2022-07-29 DIAGNOSIS — G44.229 CHRONIC TENSION-TYPE HEADACHE, NOT INTRACTABLE: ICD-10-CM

## 2022-07-29 DIAGNOSIS — F33.42 RECURRENT MAJOR DEPRESSIVE DISORDER, IN FULL REMISSION (HCC): ICD-10-CM

## 2022-07-29 DIAGNOSIS — B96.89 ACUTE BACTERIAL RHINOSINUSITIS: ICD-10-CM

## 2022-07-29 DIAGNOSIS — F51.01 PRIMARY INSOMNIA: ICD-10-CM

## 2022-07-29 DIAGNOSIS — E66.01 MORBID OBESITY WITH BMI OF 40.0-44.9, ADULT (HCC): ICD-10-CM

## 2022-07-29 DIAGNOSIS — J01.90 ACUTE BACTERIAL RHINOSINUSITIS: ICD-10-CM

## 2022-07-29 PROBLEM — R20.2 TINGLING: Status: RESOLVED | Noted: 2021-05-28 | Resolved: 2022-07-29

## 2022-07-29 PROCEDURE — 99214 OFFICE O/P EST MOD 30 MIN: CPT | Performed by: NURSE PRACTITIONER

## 2022-07-29 RX ORDER — BUTALBITAL, ACETAMINOPHEN AND CAFFEINE 50; 325; 40 MG/1; MG/1; MG/1
1 TABLET ORAL EVERY 6 HOURS PRN
Qty: 15 TABLET | Refills: 2 | Status: SHIPPED | OUTPATIENT
Start: 2022-07-29 | End: 2022-08-31 | Stop reason: SDUPTHER

## 2022-07-29 RX ORDER — TRAZODONE HYDROCHLORIDE 100 MG/1
TABLET ORAL
Qty: 270 TABLET | Refills: 1 | Status: SHIPPED | OUTPATIENT
Start: 2022-07-29 | End: 2022-10-12 | Stop reason: SDUPTHER

## 2022-07-29 RX ORDER — FLUTICASONE PROPIONATE 50 MCG
1 SPRAY, SUSPENSION (ML) NASAL DAILY
Qty: 16 G | Refills: 3 | Status: SHIPPED | OUTPATIENT
Start: 2022-07-29 | End: 2022-08-23

## 2022-07-29 RX ORDER — AMOXICILLIN AND CLAVULANATE POTASSIUM 875; 125 MG/1; MG/1
1 TABLET, FILM COATED ORAL 2 TIMES DAILY
Qty: 14 TABLET | Refills: 0 | Status: SHIPPED | OUTPATIENT
Start: 2022-07-29 | End: 2023-01-31

## 2022-07-29 ASSESSMENT — FIBROSIS 4 INDEX: FIB4 SCORE: 1.15

## 2022-07-29 ASSESSMENT — PAIN SCALES - GENERAL: PAINLEVEL: 7=MODERATE-SEVERE PAIN

## 2022-07-29 NOTE — PROGRESS NOTES
CC: Lab review, diabetes, medication refill, sinus headache    HISTORY OF THE PRESENT ILLNESS: Patient is a 62 y.o. female. This pleasant patient is here today for evaluation and management of the following health problems.    Health Maintenance: Patient would like to discuss mammogram and colorectal cancer screening at next appointment.  Would like to wait until her  is done with his cancer treatments.      Type 2 diabetes mellitus without complication, without long-term current use of insulin (HCC)  This is a chronic health problem that is well controlled with current medications and lifestyle measures.  Administering Ozempic 0.5 mg weekly.  Not on ACE or statin.  Due for retinal scan.  Denies polydipsia, polyuria, vision changes.    Chronic headache  This is a chronic health problem that is well controlled with current medications and lifestyle measures.  Now taking amitriptyline at bedtime with good prevention.  Has had to take Fioricet a few times.  15 tabs lasted patient 3 months.  Propranolol not helpful.  Trial topiramate in the past, unclear on why stopped.    Depression  Managing with lifestyle measures.  Having increased stress and anxiety due to 's diagnosis of tonsillar cancer.  Now going through chemo and radiation.    Acute gastritis without hemorrhage  Patient consulted with general surgeon, Dr. Russ.  No abnormalities on ultrasound except for fatty liver.  Patient reports symptoms have improved.  HIDA scan is pending.    Acute bacterial rhinosinusitis  Patient reports viral illness 2 weeks ago where she was congested, weak, hoarse, fever, headache.  Did not test for COVID.  She reports sinus congestion and sinus pain or worsening in addition to malaise.  No longer has a fever.  Denies sore throat, otalgia.    Hypertriglyceridemia  Chronic health problem.  Managing with lifestyle measures.  The 10-year ASCVD risk score ([place] DC Jr., et al., 2013) is: 7.5%        Allergies: Patient has  "no known allergies.    Current Outpatient Medications Ordered in Epic   Medication Sig Dispense Refill   • traZODone (DESYREL) 100 MG Tab TAKE 1 TO 3 TABS AT BEDTIME AS NEEDED FOR SLEEP. 270 Tablet 1   • butalbital/apap/caffeine (FIORICET) -40 mg Tab Take 1 Tablet by mouth every 6 hours as needed for Headache for up to 30 days. 15 Tablet 2   • amoxicillin-clavulanate (AUGMENTIN) 875-125 MG Tab Take 1 Tablet by mouth 2 times a day. 14 Tablet 0   • fluticasone (FLONASE) 50 MCG/ACT nasal spray Administer 1 Spray into affected nostril(S) every day. 16 g 3   • omeprazole (PRILOSEC) 20 MG delayed-release capsule TAKE 1 CAPSULE BY MOUTH EVERY DAY 90 Capsule 3   • rizatriptan (MAXALT) 10 MG tablet TAKE 1 TAB BY MOUTH ONCE AS NEEDED FOR MIGRAINE FOR UP TO 1 DOSE. MAY REPEAT IN 2 HOURS IF NEEDED 12 Tablet 2   • OZEMPIC, 0.25 OR 0.5 MG/DOSE, 2 MG/1.5ML Solution Pen-injector INJECT 0.5 MG UNDER THE SKIN EVERY 7 DAYS. 3 mL 3   • amitriptyline (ELAVIL) 10 MG Tab TAKE 1 TAB AT BEDTIME FOR 1 WEEK, THEN INCREASE TO 2 TABS AT BEDTIME THEREAFTER. 60 Tablet 3   • hydrOXYzine HCl (ATARAX) 25 MG Tab Take 1 Tablet by mouth 3 times a day as needed for Itching. 90 Tablet 1   • diclofenac sodium (VOLTAREN) 1 % Gel Apply 2 g topically 4 times a day as needed. 100 g 1   • Turmeric (QC TUMERIC COMPLEX PO) Take  by mouth.     • Omega-3 Fatty Acids (FISH OIL PO) Take  by mouth.     • ferrous fum/vit C/B12 IF FA (TRINSICON) Cap Take 1 capsule by mouth every day.     • acetaminophen (TYLENOL) 500 MG Tab Take 1,000 mg by mouth 3 times a day as needed (HA).     • fluticasone (FLONASE) 50 MCG/ACT nasal spray Administer 1 Spray into affected nostril(S) as needed.       No current Epic-ordered facility-administered medications on file.       Past Medical History:   Diagnosis Date   • Anesthesia     low bp coming out of anesthesia \"one time\"   • Anxiety 11/18/2011   • Arthritis     fibromyalgia   • Breast mass, left    • Breath shortness     " occasionally, inhalers a few times a year   • Bursitis of knee     left   • Cancer (HCC) 2005    squamous cell on nose   • Cancer (HCC) 2021    Stage 2 breast cancer   • Chronic pain 11/18/2011   • Depression 11/18/2011   • Diabetes (HCC)    • Fibromyalgia 11/18/2011   • Gastro-esophageal reflux 3/20/2012   • Heart burn    • Heart murmur     no cardiolgist   • Iron deficiency anemia 3/20/2012   • Migraine    • Neuropathy 11/18/2011   • Pain 6/14/12    3/10 stomach   • Pneumonia 01/2012   • Pulmonary hypertension (HCC) 03/20/2012   • Syncope and collapse 3/20/2012       Past Surgical History:   Procedure Laterality Date   • FULL THICKNESS SKIN GRAFT N/A 8/4/2021    Procedure: APPLICATION, GRAFT, SKIN, FULL-THICKNESS - TO ABDOMEN;  Surgeon: Thong Mota M.D.;  Location: [facility];  Service: General   • DEBRIDEMENT  7/7/2021    Procedure: DEBRIDEMENT - ABDOMINAL WALL WOUND;  Surgeon: Thong Mota M.D.;  Location: [facility];  Service: Gastroenterology   • APPLICATION OR REPLACEMENT, WOUND VAC  7/7/2021    Procedure: APPLICATION OR REPLACEMENT,WOUND VAC - FOR APPILCATION.;  Surgeon: Thong Mota M.D.;  Location: [facility];  Service: Gastroenterology   • PB SECD CLOS SURG WND EXTEN/COMPLIC  3/31/2021    Procedure: CLOSURE, WOUND, SECONDARY - FOR DELAYED PRIMARY CLOSURE OF ABDOMINAL WALL;  Surgeon: Fede Russ M.D.;  Location: [facility];  Service: General   • FLAP GRAFT  3/31/2021    Procedure: FLAP GRAFT - ADVANCEMENT;  Surgeon: Fede Russ M.D.;  Location: [facility];  Service: General   • MI EXPLORATORY OF ABDOMEN  1/26/2021    Procedure: LAPAROTOMY, EXPLORATORY - LYSIS OF ADHESIONS;  Surgeon: Fede Russ M.D.;  Location: [facility];  Service: General   • IRRIGATION & DEBRIDEMENT GENERAL  1/26/2021    Procedure: IRRIGATION AND DEBRIDEMENT, WOUND - ABDOMINAL WALL WITH REMOVAL OF MESH;  Surgeon: Fede Russ M.D.;   Location: SURGERY [facility];  Service: General   • APPLICATION OR REPLACEMENT, WOUND VAC  1/26/2021    Procedure: APPLICATION OR REPLACEMENT,WOUND VAC;  Surgeon: Param Russ M.D.;  Location: SURGERY [facility];  Service: General   • IRRIGATION & DEBRIDEMENT GENERAL N/A 9/30/2020    Procedure: IRRIGATION AND DEBRIDEMENT, WOUND-ABD WALL, WOUND VAC PLACEMENT;  Surgeon: Param Russ M.D.;  Location: SURGERY [facility];  Service: General   • CATH PLACEMENT  1/25/2013    Performed by Lizeth Ferreira M.D. at SURGERY SAME DAY [facility]   • AXILLARY NODE DISSECTION  1/9/2013    Performed by Lizeth Ferreira M.D. at SURGERY SAME DAY [facility]   • NODE BIOPSY  1/9/2013    Performed by Lizeth Ferreira M.D. at SURGERY SAME DAY [facility]   • BREAST BIOPSY  1/9/2013    Performed by Lizeth Ferreira M.D. at SURGERY SAME DAY [facility]   • LUMPECTOMY Left 2013 9 lymph note removal   • BREAST BIOPSY  12/11/2012    Performed by Lizeth Ferreira M.D. at SURGERY SAME DAY [facility]   • COLONOSCOPY  6/21/2012    Performed by PARAM VALDEZ at SURGERY [facility]   • GASTROSCOPY  6/21/2012    Performed by PARAM VALDEZ at SURGERY [facility]   • ABDOMINAL EXPLORATION  10/7/2010    Performed by MARIA G KHAN JR at SURGERY [facility]   • IRRIGATION & DEBRIDEMENT GENERAL  4/12/2010    Performed by OZZIE WEINSTEIN at SURGERY [facility]   • HERNIA REPAIR  3/15/2010    Performed by MARIA G KHAN JR at SURGERY [facility]   • FLAP GRAFT  3/15/2010    Performed by MARIA G KHAN JR at SURGERY [facility]   • PANNICULECTOMY  3/15/2010    Performed by MARIA G KHAN JR at SURGERY [facility]   • ABDOMINAL EXPLORATION  3/11/2010    Performed by MARIA G KHAN JR at SURGERY SAME DAY [facility]   • OTHER  08/09    pulled mesh out of hernia   • OTHER  07/09    abscess removed abd.   • OTHER  06/09    bowel obstruction   • OTHER  04/2009    hernia repair,mesh  "removal after in aug.09   • GASTRIC BYPASS LAPAROSCOPIC     • OTHER  2005    bariatric surgery gastric bypass   • GYN SURGERY  1984    tubal       Social History     Tobacco Use   • Smoking status: Former Smoker     Packs/day: 0.10     Years: 8.00     Pack years: 0.80     Types: Cigarettes     Quit date: 2016     Years since quittin.2   • Smokeless tobacco: Never Used   • Tobacco comment: 1/2 pk a day on and off 10 yrs, 1 cigarette daily   Vaping Use   • Vaping Use: Never used   Substance Use Topics   • Alcohol use: Yes     Comment: 1-2 times a year   • Drug use: No       Family History   Problem Relation Age of Onset   • Heart Attack Father    • Anxiety disorder Father    • Depression Father    • Schizophrenia Father    • Hypertension Mother    • Cancer Maternal Grandfather         leukemia   • Cancer Other         leukemia- cousin   • Diabetes Maternal Uncle    • Anxiety disorder Maternal Uncle    • Diabetes Maternal Uncle    • Anxiety disorder Maternal Uncle        ROS:   As in HPI, otherwise negative for chest pain, dyspnea, abdominal pain, dysuria, blood in stool, fever         Exam: /80 (BP Location: Right arm, Patient Position: Sitting, BP Cuff Size: Large adult)   Pulse 87   Temp 36.6 °C (97.8 °F) (Temporal)   Resp 20   Ht 1.651 m (5' 5\")   Wt 115 kg (254 lb 1.6 oz)   SpO2 91%  Body mass index is 42.28 kg/m².    General: Alert, pleasant, well nourished, well developed female in NAD  HEENT: Normocephalic. Eyes conjunctiva clear lids without ptosis, pupils equal and reactive to light, ears normal shape and contour, canals are clear bilaterally, tympanic membranes with cloudy fluid posteriorly, pink and boggy, oropharynx is without erythema, edema or exudates.  Maxillary sinuses severely tender to palpation.  Neck: Supple without bruit. Thyroid is not enlarged.  Pulmonary: Clear to ausculation.  Normal effort. No rales, ronchi, or wheezing.  Cardiovascular: Normal rate and rhythm " without murmur. Carotid and radial pulses are intact and equal bilaterally.  No lower extremity edema.  Neurologic: Grossly nonfocal  Lymph: No cervical or supraclavicular lymph nodes are palpable  Skin: Warm and dry.    Musculoskeletal: Normal gait.   Psych: Normal mood and affect. Alert and oriented. Judgment and insight is normal.    Component      Latest Ref Rng & Units 7/21/2022   Sodium      135 - 145 mmol/L 138   Potassium      3.6 - 5.5 mmol/L 4.2   Chloride      96 - 112 mmol/L 106   Co2      20 - 33 mmol/L 18 (L)   Anion Gap      7.0 - 16.0 14.0   Glucose      65 - 99 mg/dL 160 (H)   Bun      8 - 22 mg/dL 16   Creatinine      0.50 - 1.40 mg/dL 0.75   Calcium      8.5 - 10.5 mg/dL 9.0   AST(SGOT)      12 - 45 U/L 27   ALT(SGPT)      2 - 50 U/L 31   Alkaline Phosphatase      30 - 99 U/L 93   Total Bilirubin      0.1 - 1.5 mg/dL 0.5   Albumin      3.2 - 4.9 g/dL 4.2   Total Protein      6.0 - 8.2 g/dL 6.8   Globulin      1.9 - 3.5 g/dL 2.6   A-G Ratio      g/dL 1.6   Cholesterol,Tot      100 - 199 mg/dL 167   Triglycerides      0 - 149 mg/dL 266 (H)   HDL      >=40 mg/dL 41   LDL      <100 mg/dL 73   Glycohemoglobin      4.0 - 5.6 % 5.6   Estim. Avg Glu      mg/dL 114   GFR (CKD-EPI)      >60 mL/min/1.73 m 2 90       Diabetic Foot Exam: No ulcers or skin lesions present, patient tested with a 10 g force and is sensitive bilaterally throughout the ball of the foot, great toe and heel.  Dorsalis pedis and posterior tibial pulses are present and intact bilaterally      Please note that this dictation was created using voice recognition software. I have made every reasonable attempt to correct obvious errors, but I expect that there are errors of grammar and possibly content that I did not discover before finalizing the note.      Assessment/Plan  1. Primary insomnia  Patient requesting refill  - traZODone (DESYREL) 100 MG Tab; TAKE 1 TO 3 TABS AT BEDTIME AS NEEDED FOR SLEEP.  Dispense: 270 Tablet; Refill:  1    2. Chronic tension-type headache, not intractable  Continue with amitriptyline.  Continue Fioricet as needed.  Patient has used very sparingly.   reviewed.  Refill prescription sent to pharmacy.  - butalbital/apap/caffeine (FIORICET) -40 mg Tab; Take 1 Tablet by mouth every 6 hours as needed for Headache for up to 30 days.  Dispense: 15 Tablet; Refill: 2    3. Type 2 diabetes mellitus without complication, without long-term current use of insulin ([facility])  Well-controlled.  Continue with Ozempic.  Reviewed lifestyle measures.  No abnormalities on foot exam today.  - Diabetic Monofilament LE Exam  - POCT Retinal Eye Exam  - Comp Metabolic Panel; Future  - ESTIMATED GFR; Future  - HEMOGLOBIN A1C; Future    4. Acute bacterial rhinosinusitis  Will treat with Augmentin.  Advised on saline nasal irrigation, Flonase nasal spray, warm packs to sinuses, acetaminophen for comfort.  - amoxicillin-clavulanate (AUGMENTIN) 875-125 MG Tab; Take 1 Tablet by mouth 2 times a day.  Dispense: 14 Tablet; Refill: 0  - fluticasone (FLONASE) 50 MCG/ACT nasal spray; Administer 1 Spray into affected nostril(S) every day.  Dispense: 16 g; Refill: 3    5. Recurrent major depressive disorder, in full remission ([facility])  Provided support.  Has family support.  Patient is looking forward to  being done with his treatments.    6. Acute gastritis without hemorrhage, unspecified gastritis type  Continue follow-up with Dr. Russ.    7. Hypertriglyceridemia  Reviewed ASCVD risk.  Reviewed lifestyle measures including weight loss, routine aerobic exercise as tolerated, low-fat diet, keeping blood sugar under good control.    8. Morbid obesity with BMI of 40.0-44.9, adult ([facility])    - Patient identified as having weight management issue.  Appropriate orders and counseling given.    Patient will return to clinic in 6 months or sooner if needed.

## 2022-07-29 NOTE — ASSESSMENT & PLAN NOTE
This is a chronic health problem that is well controlled with current medications and lifestyle measures.  Administering Ozempic 0.5 mg weekly.  Not on ACE or statin.  Due for retinal scan.  Denies polydipsia, polyuria, vision changes.

## 2022-07-30 NOTE — ASSESSMENT & PLAN NOTE
Chronic health problem.  Managing with lifestyle measures.  The 10-year ASCVD risk score ([place] COLON Jr., et al., 2013) is: 7.5%

## 2022-07-30 NOTE — ASSESSMENT & PLAN NOTE
Patient reports viral illness 2 weeks ago where she was congested, weak, hoarse, fever, headache.  Did not test for COVID.  She reports sinus congestion and sinus pain or worsening in addition to malaise.  No longer has a fever.  Denies sore throat, otalgia.

## 2022-07-30 NOTE — ASSESSMENT & PLAN NOTE
Managing with lifestyle measures.  Having increased stress and anxiety due to 's diagnosis of tonsillar cancer.  Now going through chemo and radiation.

## 2022-07-30 NOTE — ASSESSMENT & PLAN NOTE
This is a chronic health problem that is well controlled with current medications and lifestyle measures.  Now taking amitriptyline at bedtime with good prevention.  Has had to take Fioricet a few times.  15 tabs lasted patient 3 months.  Propranolol not helpful.  Trial topiramate in the past, unclear on why stopped.

## 2022-07-30 NOTE — ASSESSMENT & PLAN NOTE
Patient consulted with general surgeon, Dr. Russ.  No abnormalities on ultrasound except for fatty liver.  Patient reports symptoms have improved.  HIDA scan is pending.

## 2022-08-09 DIAGNOSIS — L29.9 ITCHING: ICD-10-CM

## 2022-08-10 NOTE — TELEPHONE ENCOUNTER
Received request via: Patient    Was the patient seen in the last year in this department? Yes    Does the patient have an active prescription (recently filled or refills available) for medication(s) requested? No      Last Office Visit:07/29/2022  Last Labs:07/21/2022

## 2022-08-11 RX ORDER — HYDROXYZINE HYDROCHLORIDE 25 MG/1
25 TABLET, FILM COATED ORAL 3 TIMES DAILY PRN
Qty: 90 TABLET | Refills: 1 | Status: SHIPPED | OUTPATIENT
Start: 2022-08-11 | End: 2022-09-12

## 2022-08-14 DIAGNOSIS — G44.229 CHRONIC TENSION-TYPE HEADACHE, NOT INTRACTABLE: ICD-10-CM

## 2022-08-15 RX ORDER — AMITRIPTYLINE HYDROCHLORIDE 10 MG/1
TABLET, FILM COATED ORAL
Qty: 180 TABLET | Refills: 1 | Status: SHIPPED | OUTPATIENT
Start: 2022-08-15 | End: 2022-12-29

## 2022-08-15 NOTE — TELEPHONE ENCOUNTER
Received request via: Pharmacy    Was the patient seen in the last year in this department? Yes    Does the patient have an active prescription (recently filled or refills available) for medication(s) requested? No    Last Office Visit:07/29/2022  Last Labs:07/21/2022

## 2022-08-17 NOTE — ADDENDUM NOTE
Addended by: JOAQUIN DWYER on: 8/13/2019 02:56 PM     Modules accepted: SmartSet    
Unknown if ever smoked

## 2022-08-20 DIAGNOSIS — B96.89 ACUTE BACTERIAL RHINOSINUSITIS: ICD-10-CM

## 2022-08-20 DIAGNOSIS — J01.90 ACUTE BACTERIAL RHINOSINUSITIS: ICD-10-CM

## 2022-08-23 RX ORDER — FLUTICASONE PROPIONATE 50 MCG
1 SPRAY, SUSPENSION (ML) NASAL DAILY
Qty: 16 ML | Refills: 3 | Status: SHIPPED | OUTPATIENT
Start: 2022-08-23 | End: 2022-11-21

## 2022-08-31 DIAGNOSIS — G44.229 CHRONIC TENSION-TYPE HEADACHE, NOT INTRACTABLE: ICD-10-CM

## 2022-09-01 RX ORDER — BUTALBITAL, ACETAMINOPHEN AND CAFFEINE 50; 325; 40 MG/1; MG/1; MG/1
1 TABLET ORAL EVERY 6 HOURS PRN
Qty: 15 TABLET | Refills: 2 | Status: SHIPPED | OUTPATIENT
Start: 2022-09-01 | End: 2022-11-04 | Stop reason: SDUPTHER

## 2022-09-09 DIAGNOSIS — L29.9 ITCHING: ICD-10-CM

## 2022-09-12 RX ORDER — HYDROXYZINE HYDROCHLORIDE 25 MG/1
25 TABLET, FILM COATED ORAL 3 TIMES DAILY PRN
Qty: 90 TABLET | Refills: 1 | Status: SHIPPED | OUTPATIENT
Start: 2022-09-12 | End: 2022-10-12

## 2022-10-11 DIAGNOSIS — L29.9 ITCHING: ICD-10-CM

## 2022-10-12 DIAGNOSIS — F51.01 PRIMARY INSOMNIA: ICD-10-CM

## 2022-10-12 DIAGNOSIS — G89.29 CHRONIC MIDLINE LOW BACK PAIN WITHOUT SCIATICA: ICD-10-CM

## 2022-10-12 DIAGNOSIS — M54.50 CHRONIC MIDLINE LOW BACK PAIN WITHOUT SCIATICA: ICD-10-CM

## 2022-10-12 RX ORDER — TRAZODONE HYDROCHLORIDE 100 MG/1
TABLET ORAL
Qty: 270 TABLET | Refills: 1 | Status: SHIPPED | OUTPATIENT
Start: 2022-10-12 | End: 2023-03-15

## 2022-10-12 RX ORDER — HYDROXYZINE HYDROCHLORIDE 25 MG/1
25 TABLET, FILM COATED ORAL 3 TIMES DAILY PRN
Qty: 90 TABLET | Refills: 1 | Status: SHIPPED | OUTPATIENT
Start: 2022-10-12 | End: 2023-01-26

## 2022-10-12 NOTE — TELEPHONE ENCOUNTER
Received request via: Pharmacy     Was the patient seen in the last year in this department? Yes    Does the patient have an active prescription (recently filled or refills available) for medication(s) requested? No    Last OV 07/29/22  Last labs 07/21/22  Next OV 01/31/23

## 2022-10-13 RX ORDER — TRAZODONE HYDROCHLORIDE 100 MG/1
TABLET ORAL
Qty: 270 TABLET | Refills: 1 | OUTPATIENT
Start: 2022-10-13

## 2022-11-04 DIAGNOSIS — G44.229 CHRONIC TENSION-TYPE HEADACHE, NOT INTRACTABLE: ICD-10-CM

## 2022-11-04 RX ORDER — BUTALBITAL, ACETAMINOPHEN AND CAFFEINE 50; 325; 40 MG/1; MG/1; MG/1
1 TABLET ORAL EVERY 6 HOURS PRN
Qty: 15 TABLET | Refills: 2 | Status: SHIPPED | OUTPATIENT
Start: 2022-11-04 | End: 2023-01-31 | Stop reason: SDUPTHER

## 2022-11-04 NOTE — TELEPHONE ENCOUNTER
Requested Prescriptions     Pending Prescriptions Disp Refills    amitriptyline (ELAVIL) 10 MG Tab 180 Tablet 1     Sig: Take 1 to 2 tabs at bedtime.     Received request via: Patient    Was the patient seen in the last year in this department? Yes    Does the patient have an active prescription (recently filled or refills available) for medication(s) requested? No

## 2022-11-07 RX ORDER — AMITRIPTYLINE HYDROCHLORIDE 10 MG/1
TABLET, FILM COATED ORAL
Qty: 180 TABLET | Refills: 1 | OUTPATIENT
Start: 2022-11-07

## 2022-11-08 DIAGNOSIS — L29.9 ITCHING: ICD-10-CM

## 2022-11-08 NOTE — TELEPHONE ENCOUNTER
Received request via: Pharmacy    Was the patient seen in the last year in this department? Yes    Does the patient have an active prescription (recently filled or refills available) for medication(s) requested? No    Last OV 7/29/2022  Next OV 1/31/2023

## 2022-11-09 NOTE — TELEPHONE ENCOUNTER
Please check with patient if she is really needing refill of hydroxyzine.  It is supposed to be taken as needed.

## 2022-11-17 RX ORDER — HYDROXYZINE HYDROCHLORIDE 25 MG/1
25 TABLET, FILM COATED ORAL 3 TIMES DAILY PRN
Qty: 90 TABLET | Refills: 1 | OUTPATIENT
Start: 2022-11-17

## 2022-11-21 DIAGNOSIS — J01.90 ACUTE BACTERIAL RHINOSINUSITIS: ICD-10-CM

## 2022-11-21 DIAGNOSIS — B96.89 ACUTE BACTERIAL RHINOSINUSITIS: ICD-10-CM

## 2022-11-21 RX ORDER — FLUTICASONE PROPIONATE 50 MCG
1 SPRAY, SUSPENSION (ML) NASAL DAILY
Qty: 48 ML | Refills: 3 | Status: SHIPPED | OUTPATIENT
Start: 2022-11-21 | End: 2023-05-02

## 2022-12-16 DIAGNOSIS — G44.229 CHRONIC TENSION-TYPE HEADACHE, NOT INTRACTABLE: ICD-10-CM

## 2022-12-16 RX ORDER — RIZATRIPTAN BENZOATE 10 MG/1
TABLET ORAL
Qty: 12 TABLET | Refills: 2 | Status: SHIPPED | OUTPATIENT
Start: 2022-12-16 | End: 2023-03-14

## 2022-12-16 NOTE — TELEPHONE ENCOUNTER
Last ov 7/29/22    Received request via: Pharmacy    Was the patient seen in the last year in this department? Yes    Does the patient have an active prescription (recently filled or refills available) for medication(s) requested? No    Does the patient have long-term Plus and need 100 day supply (blood pressure, diabetes and cholesterol meds only)? Patient does not have SCP

## 2022-12-24 DIAGNOSIS — G44.229 CHRONIC TENSION-TYPE HEADACHE, NOT INTRACTABLE: ICD-10-CM

## 2022-12-28 NOTE — TELEPHONE ENCOUNTER
Last ov 7/29/22     Received request via: Patient    Was the patient seen in the last year in this department? Yes    Does the patient have an active prescription (recently filled or refills available) for medication(s) requested? No    Does the patient have [facility] Plus and need 100 day supply (blood pressure, diabetes and cholesterol meds only)? Patient does not have SCP

## 2022-12-29 DIAGNOSIS — M54.50 CHRONIC MIDLINE LOW BACK PAIN WITHOUT SCIATICA: ICD-10-CM

## 2022-12-29 DIAGNOSIS — G44.229 CHRONIC TENSION-TYPE HEADACHE, NOT INTRACTABLE: ICD-10-CM

## 2022-12-29 DIAGNOSIS — G89.29 CHRONIC MIDLINE LOW BACK PAIN WITHOUT SCIATICA: ICD-10-CM

## 2022-12-29 RX ORDER — AMITRIPTYLINE HYDROCHLORIDE 10 MG/1
TABLET, FILM COATED ORAL
Qty: 180 TABLET | Refills: 1 | Status: SHIPPED | OUTPATIENT
Start: 2022-12-29 | End: 2023-07-24

## 2022-12-29 NOTE — TELEPHONE ENCOUNTER
Last ov 7/29/22    Received request via: Pharmacy    Was the patient seen in the last year in this department? Yes    Does the patient have an active prescription (recently filled or refills available) for medication(s) requested?  Early advanced refill    Does the patient have [facility] Plus and need 100 day supply (blood pressure, diabetes and cholesterol meds only)? Patient does not have SCP    Next appt 1/31/23

## 2023-01-03 RX ORDER — BUTALBITAL, ACETAMINOPHEN AND CAFFEINE 50; 325; 40 MG/1; MG/1; MG/1
1 TABLET ORAL EVERY 6 HOURS PRN
Qty: 15 TABLET | Refills: 2 | OUTPATIENT
Start: 2023-01-03 | End: 2023-04-04

## 2023-01-07 DIAGNOSIS — E11.9 TYPE 2 DIABETES MELLITUS WITHOUT COMPLICATION, WITHOUT LONG-TERM CURRENT USE OF INSULIN (HCC): ICD-10-CM

## 2023-01-07 DIAGNOSIS — E66.01 MORBID OBESITY WITH BMI OF 40.0-44.9, ADULT (HCC): ICD-10-CM

## 2023-01-09 RX ORDER — SEMAGLUTIDE 1.34 MG/ML
INJECTION, SOLUTION SUBCUTANEOUS
Qty: 3 ML | Refills: 3 | Status: SHIPPED | OUTPATIENT
Start: 2023-01-09 | End: 2023-11-09

## 2023-01-09 NOTE — TELEPHONE ENCOUNTER
Last ov 7/29/22    Received request via: Pharmacy    Was the patient seen in the last year in this department? Yes    Does the patient have an active prescription (recently filled or refills available) for medication(s) requested? No    Does the patient have [facility] Plus and need 100 day supply (blood pressure, diabetes and cholesterol meds only)? No scp

## 2023-01-24 ENCOUNTER — HOSPITAL ENCOUNTER (OUTPATIENT)
Dept: LAB | Facility: MEDICAL CENTER | Age: 63
End: 2023-01-24
Attending: NURSE PRACTITIONER
Payer: COMMERCIAL

## 2023-01-24 DIAGNOSIS — E11.9 TYPE 2 DIABETES MELLITUS WITHOUT COMPLICATION, WITHOUT LONG-TERM CURRENT USE OF INSULIN (HCC): ICD-10-CM

## 2023-01-24 LAB
ALBUMIN SERPL BCP-MCNC: 4.4 G/DL (ref 3.2–4.9)
ALBUMIN/GLOB SERPL: 1.8 G/DL
ALP SERPL-CCNC: 105 U/L (ref 30–99)
ALT SERPL-CCNC: 47 U/L (ref 2–50)
ANION GAP SERPL CALC-SCNC: 14 MMOL/L (ref 7–16)
AST SERPL-CCNC: 35 U/L (ref 12–45)
BILIRUB SERPL-MCNC: 0.4 MG/DL (ref 0.1–1.5)
BUN SERPL-MCNC: 15 MG/DL (ref 8–22)
CALCIUM ALBUM COR SERPL-MCNC: 8.8 MG/DL (ref 8.5–10.5)
CALCIUM SERPL-MCNC: 9.1 MG/DL (ref 8.5–10.5)
CHLORIDE SERPL-SCNC: 102 MMOL/L (ref 96–112)
CO2 SERPL-SCNC: 23 MMOL/L (ref 20–33)
CREAT SERPL-MCNC: 0.78 MG/DL (ref 0.5–1.4)
EST. AVERAGE GLUCOSE BLD GHB EST-MCNC: 117 MG/DL
FASTING STATUS PATIENT QL REPORTED: NORMAL
GFR SERPLBLD CREATININE-BSD FMLA CKD-EPI: 86 ML/MIN/1.73 M 2
GLOBULIN SER CALC-MCNC: 2.5 G/DL (ref 1.9–3.5)
GLUCOSE SERPL-MCNC: 152 MG/DL (ref 65–99)
HBA1C MFR BLD: 5.7 % (ref 4–5.6)
POTASSIUM SERPL-SCNC: 4.1 MMOL/L (ref 3.6–5.5)
PROT SERPL-MCNC: 6.9 G/DL (ref 6–8.2)
SODIUM SERPL-SCNC: 139 MMOL/L (ref 135–145)

## 2023-01-24 PROCEDURE — 83036 HEMOGLOBIN GLYCOSYLATED A1C: CPT

## 2023-01-24 PROCEDURE — 80053 COMPREHEN METABOLIC PANEL: CPT

## 2023-01-24 PROCEDURE — 36415 COLL VENOUS BLD VENIPUNCTURE: CPT

## 2023-01-26 DIAGNOSIS — L29.9 ITCHING: ICD-10-CM

## 2023-01-26 RX ORDER — HYDROXYZINE HYDROCHLORIDE 25 MG/1
25 TABLET, FILM COATED ORAL 3 TIMES DAILY PRN
Qty: 90 TABLET | Refills: 1 | Status: SHIPPED | OUTPATIENT
Start: 2023-01-26 | End: 2023-02-17

## 2023-01-26 NOTE — TELEPHONE ENCOUNTER
Last ov 7/29/22    Received request via: Pharmacy    Was the patient seen in the last year in this department? Yes    Does the patient have an active prescription (recently filled or refills available) for medication(s) requested? No    Does the patient have intermediate Plus and need 100 day supply (blood pressure, diabetes and cholesterol meds only)? Patient does not have SCP

## 2023-01-31 ENCOUNTER — OFFICE VISIT (OUTPATIENT)
Dept: MEDICAL GROUP | Facility: PHYSICIAN GROUP | Age: 63
End: 2023-01-31
Payer: COMMERCIAL

## 2023-01-31 VITALS
BODY MASS INDEX: 43.39 KG/M2 | DIASTOLIC BLOOD PRESSURE: 80 MMHG | TEMPERATURE: 97.4 F | WEIGHT: 260.4 LBS | HEART RATE: 84 BPM | HEIGHT: 65 IN | OXYGEN SATURATION: 94 % | SYSTOLIC BLOOD PRESSURE: 126 MMHG

## 2023-01-31 DIAGNOSIS — E66.01 MORBID (SEVERE) OBESITY DUE TO EXCESS CALORIES (HCC): ICD-10-CM

## 2023-01-31 DIAGNOSIS — Z12.12 SCREENING FOR COLORECTAL CANCER: ICD-10-CM

## 2023-01-31 DIAGNOSIS — Z12.11 SCREENING FOR COLORECTAL CANCER: ICD-10-CM

## 2023-01-31 DIAGNOSIS — R10.11 RUQ PAIN: ICD-10-CM

## 2023-01-31 DIAGNOSIS — G44.229 CHRONIC TENSION-TYPE HEADACHE, NOT INTRACTABLE: ICD-10-CM

## 2023-01-31 DIAGNOSIS — Z23 INFLUENZA VACCINE NEEDED: ICD-10-CM

## 2023-01-31 DIAGNOSIS — E11.40 TYPE 2 DIABETES MELLITUS WITH DIABETIC NEUROPATHY, WITHOUT LONG-TERM CURRENT USE OF INSULIN (HCC): ICD-10-CM

## 2023-01-31 DIAGNOSIS — Z12.31 ENCOUNTER FOR SCREENING MAMMOGRAM FOR BREAST CANCER: ICD-10-CM

## 2023-01-31 PROBLEM — B96.89 ACUTE BACTERIAL RHINOSINUSITIS: Status: RESOLVED | Noted: 2022-07-29 | Resolved: 2023-01-31

## 2023-01-31 PROBLEM — J01.90 ACUTE BACTERIAL RHINOSINUSITIS: Status: RESOLVED | Noted: 2022-07-29 | Resolved: 2023-01-31

## 2023-01-31 PROCEDURE — 90686 IIV4 VACC NO PRSV 0.5 ML IM: CPT | Performed by: NURSE PRACTITIONER

## 2023-01-31 PROCEDURE — 90471 IMMUNIZATION ADMIN: CPT | Performed by: NURSE PRACTITIONER

## 2023-01-31 PROCEDURE — 99214 OFFICE O/P EST MOD 30 MIN: CPT | Mod: 25 | Performed by: NURSE PRACTITIONER

## 2023-01-31 RX ORDER — PROPRANOLOL HCL 60 MG
60 CAPSULE, EXTENDED RELEASE 24HR ORAL DAILY
Qty: 30 CAPSULE | Refills: 11 | Status: SHIPPED | OUTPATIENT
Start: 2023-01-31 | End: 2023-08-02 | Stop reason: SDUPTHER

## 2023-01-31 RX ORDER — BUTALBITAL, ACETAMINOPHEN AND CAFFEINE 50; 325; 40 MG/1; MG/1; MG/1
1 TABLET ORAL EVERY 6 HOURS PRN
Qty: 15 TABLET | Refills: 2 | Status: SHIPPED | OUTPATIENT
Start: 2023-01-31 | End: 2023-08-09 | Stop reason: SDUPTHER

## 2023-01-31 ASSESSMENT — FIBROSIS 4 INDEX: FIB4 SCORE: 1.21

## 2023-01-31 ASSESSMENT — PATIENT HEALTH QUESTIONNAIRE - PHQ9: CLINICAL INTERPRETATION OF PHQ2 SCORE: 0

## 2023-01-31 NOTE — PROGRESS NOTES
CC: Lab review, medication refills, chronic headache    HISTORY OF THE PRESENT ILLNESS: Patient is a 62 y.o. female. This pleasant patient is here today for evaluation and management of the following health problems.    Health Maintenance: due      Type 2 diabetes mellitus with diabetic neuropathy, without long-term current use of insulin (HCC)  This is a chronic health problem that is well controlled with current medications and lifestyle measures.  A1c is 5.7%.  Taking Ozempic 0.5 mg once weekly.  However, pharmacy has not been able to get it for the past couple weeks.  We will trial Rybelsus in the meantime.  Up-to-date on retinal scan.  Not on ACE or statin.  Denies polydipsia, polyuria, vision changes.        Chronic headache  This is a chronic health problem that is uncontrolled with current medications and lifestyle measures.  Taking amitriptyline 30 mg nightly.  Having to take Fioricet often for breakthrough headache.  Would like to try propranolol again.  Has noticed that headache is worse after eating high salt meal.  Does not monitor blood pressure.        RUQ pain  Patient reports sensation of mass and right upper quadrant.  Tender.  Feels like seroma did in the past.  Is established with general surgeon, but would like imaging done prior to scheduling with Dr. Russ.      Allergies: Patient has no known allergies.    Current Outpatient Medications Ordered in Epic   Medication Sig Dispense Refill    Semaglutide 7 MG Tab Take 7 mg by mouth every day. 30 Tablet 2    propranolol LA (INDERAL LA) 60 MG CAPSULE SR 24 HR Take 1 Capsule by mouth every day. 30 Capsule 11    butalbital/apap/caffeine (FIORICET) -40 mg Tab Take 1 Tablet by mouth every 6 hours as needed for Headache for up to 91 days. 15 Tablet 2    hydrOXYzine HCl (ATARAX) 25 MG Tab TAKE 1 TABLET BY MOUTH 3 TIMES A DAY AS NEEDED FOR ITCHING 90 Tablet 1    Semaglutide,0.25 or 0.5MG/DOS, (OZEMPIC, 0.25 OR 0.5 MG/DOSE,) 2 MG/1.5ML Solution  "Pen-injector INJECT 0.5MG UNDER THE SKIN EVERY 7 DAYS 3 mL 3    diclofenac sodium (VOLTAREN) 1 % Gel APPLY 2 GRAMS TOPICALLY 4 TIMES A DAY AS NEEDED (JOINT PAIN). 100 g 1    amitriptyline (ELAVIL) 10 MG Tab TAKE 1 TO 2 TABS BY MOUTH AT BEDTIME. 180 Tablet 1    rizatriptan (MAXALT) 10 MG tablet TAKE 1 TAB BY MOUTH ONCE AS NEEDED FOR MIGRAINE FOR UP TO 1 DOSE. MAY REPEAT IN 2 HOURS IF NEEDED 12 Tablet 2    fluticasone (FLONASE) 50 MCG/ACT nasal spray ADMINISTER 1 SPRAY INTO AFFECTED NOSTRIL(S) EVERY DAY. 48 mL 3    traZODone (DESYREL) 100 MG Tab TAKE 1 TO 3 TABS AT BEDTIME AS NEEDED FOR SLEEP. 270 Tablet 1    omeprazole (PRILOSEC) 20 MG delayed-release capsule TAKE 1 CAPSULE BY MOUTH EVERY DAY 90 Capsule 3    Turmeric (QC TUMERIC COMPLEX PO) Take  by mouth.      Omega-3 Fatty Acids (FISH OIL PO) Take  by mouth.      acetaminophen (TYLENOL) 500 MG Tab Take 1,000 mg by mouth 3 times a day as needed (HA).      ferrous fum/vit C/B12 IF FA (TRINSICON) Cap Take 1 capsule by mouth every day.       No current [facility]-ordered facility-administered medications on file.       Past Medical History:   Diagnosis Date    Anesthesia     low bp coming out of anesthesia \"one time\"    Anxiety 11/18/2011    Arthritis     fibromyalgia    Breast mass, left     Breath shortness     occasionally, inhalers a few times a year    Bursitis of knee     left    Cancer ([facility]) 2005    squamous cell on nose    Cancer ([facility]) 2021    Stage 2 breast cancer    Chronic pain 11/18/2011    Depression 11/18/2011    Diabetes ([facility])     Fibromyalgia 11/18/2011    Gastro-esophageal reflux 3/20/2012    Heart burn     Heart murmur     no cardiolgist    Iron deficiency anemia 3/20/2012    Migraine     Neuropathy 11/18/2011    Pain 6/14/12    3/10 stomach    Pneumonia 01/2012    Pulmonary hypertension ([facility]) 03/20/2012    Syncope and collapse 3/20/2012       Past Surgical History:   Procedure Laterality Date    FULL THICKNESS SKIN GRAFT N/A 8/4/2021    Procedure: " APPLICATION, GRAFT, SKIN, FULL-THICKNESS - TO ABDOMEN;  Surgeon: Thong Mota M.D.;  Location: [facility];  Service: General    DEBRIDEMENT  7/7/2021    Procedure: DEBRIDEMENT - ABDOMINAL WALL WOUND;  Surgeon: Thong Mota M.D.;  Location: [facility];  Service: Gastroenterology    APPLICATION OR REPLACEMENT, WOUND VAC  7/7/2021    Procedure: APPLICATION OR REPLACEMENT,WOUND VAC - FOR APPILCATION.;  Surgeon: Thong Mota M.D.;  Location: [facility];  Service: Gastroenterology    PB SECD CLOS SURG WND EXTEN/COMPLIC  3/31/2021    Procedure: CLOSURE, WOUND, SECONDARY - FOR DELAYED PRIMARY CLOSURE OF ABDOMINAL WALL;  Surgeon: Fede Russ M.D.;  Location: [facility];  Service: General    FLAP GRAFT  3/31/2021    Procedure: FLAP GRAFT - ADVANCEMENT;  Surgeon: Fede Russ M.D.;  Location: [facility];  Service: General    VA EXPLORATORY OF ABDOMEN  1/26/2021    Procedure: LAPAROTOMY, EXPLORATORY - LYSIS OF ADHESIONS;  Surgeon: Fede Russ M.D.;  Location: [facility];  Service: General    IRRIGATION & DEBRIDEMENT GENERAL  1/26/2021    Procedure: IRRIGATION AND DEBRIDEMENT, WOUND - ABDOMINAL WALL WITH REMOVAL OF MESH;  Surgeon: Fede Russ M.D.;  Location: [facility];  Service: General    APPLICATION OR REPLACEMENT, WOUND VAC  1/26/2021    Procedure: APPLICATION OR REPLACEMENT,WOUND VAC;  Surgeon: Fede Russ M.D.;  Location: [facility];  Service: General    IRRIGATION & DEBRIDEMENT GENERAL N/A 9/30/2020    Procedure: IRRIGATION AND DEBRIDEMENT, WOUND-ABD WALL, WOUND VAC PLACEMENT;  Surgeon: Fede Russ M.D.;  Location: [facility];  Service: General    CATH PLACEMENT  1/25/2013    Performed by Lizeth Ferreira M.D. at SURGERY SAME DAY [facility]    AXILLARY NODE DISSECTION  1/9/2013    Performed by Lizeth Ferreira M.D. at SURGERY SAME DAY [facility]    NODE BIOPSY  1/9/2013     Performed by Lizeth Ferreira M.D. at SURGERY SAME DAY [facility]S    BREAST BIOPSY  2013    Performed by Lizeth Ferreira M.D. at SURGERY SAME DAY [facility]    LUMPECTOMY Left 2013 lymph note removal    BREAST BIOPSY  2012    Performed by Lizeth Ferreira M.D. at SURGERY SAME DAY [facility]    COLONOSCOPY  2012    Performed by PARAM VALDEZ at SURGERY [facility]S    GASTROSCOPY  2012    Performed by PARAM VALDEZ at SURGERY [facility]    ABDOMINAL EXPLORATION  10/7/2010    Performed by MARIA G KHAN JR at SURGERY [facility]    IRRIGATION & DEBRIDEMENT GENERAL  2010    Performed by OZZIE WEINSTEIN at SURGERY [facility]    HERNIA REPAIR  3/15/2010    Performed by MARIA G KHAN JR at SURGERY [facility]    FLAP GRAFT  3/15/2010    Performed by MARIA G KHAN JR at SURGERY [facility]    PANNICULECTOMY  3/15/2010    Performed by MARIA G KHAN JR at SURGERY [facility]    ABDOMINAL EXPLORATION  3/11/2010    Performed by MARIA G KHAN JR at [facility]ERY [facility]    OTHER      pulled mesh out of hernia    OTHER      abscess removed abd.    OTHER      bowel obstruction    OTHER  2009    hernia repair,mesh removal after in aug.09    GASTRIC BYPASS LAPAROSCOPIC      OTHER      bariatric surgery gastric bypass    GYN SURGERY  1984    tubal       Social History     Tobacco Use    Smoking status: Former     Packs/day: 0.10     Years: 8.00     Pack years: 0.80     Types: Cigarettes     Quit date: 2016     Years since quittin.7    Smokeless tobacco: Never    Tobacco comments:     1/2 pk a day on and off 10 yrs, 1 cigarette daily   Vaping Use    Vaping Use: Never used   Substance Use Topics    Alcohol use: Yes     Comment: 1-2 times a year    Drug use: No       Family History   Problem Relation Age of Onset    Heart Attack Father     Anxiety disorder Father     Depression Father      "Schizophrenia Father     Hypertension Mother     Cancer Maternal Grandfather         leukemia    Cancer Other         leukemia- cousin    Diabetes Maternal Uncle     Anxiety disorder Maternal Uncle     Diabetes Maternal Uncle     Anxiety disorder Maternal Uncle        ROS: As in HPI, otherwise negative for chest pain, dyspnea, abdominal pain, dysuria, blood in stool, fever           Exam: /80   Pulse 84   Temp 36.3 °C (97.4 °F) (Temporal)   Ht 1.651 m (5' 5\")   Wt 118 kg (260 lb 6.4 oz)   SpO2 94%  Body mass index is 43.33 kg/m².    General: Alert, pleasant, well nourished, well developed female in NAD  HEENT: Normocephalic. Eyes conjunctiva clear lids without ptosis  Neck: Supple without bruit. Thyroid is not enlarged.  Pulmonary: Clear to ausculation.  Normal effort. No rales, ronchi, or wheezing.  Cardiovascular: Normal rate and rhythm without murmur. Carotid and radial pulses are intact and equal bilaterally.  No lower extremity edema.  Abdomen: Soft, moderate tenderness right upper quadrant, nondistended. Normal bowel sounds. Liver and spleen are not palpable  Neurologic: Grossly nonfocal  Lymph: No cervical or supraclavicular lymph nodes are palpable  Skin: Warm and dry.   Musculoskeletal: Normal gait.   Psych: Normal mood and affect. Alert and oriented. Judgment and insight is normal.    Please note that this dictation was created using voice recognition software. I have made every reasonable attempt to correct obvious errors, but I expect that there are errors of grammar and possibly content that I did not discover before finalizing the note.      Assessment/Plan  1. Chronic tension-type headache, not intractable  We will add propranolol.  Continue with Fioricet as needed.   reviewed.  Would like patient to consult with neurology.  Advised on supplements.  Cove Beam powder (riboflavin, magnesium, co-Q10) daily for migraine prevention.    Added propranolol once a day for migraine " prevention    Referred to neurology.    - propranolol LA (INDERAL LA) 60 MG CAPSULE SR 24 HR; Take 1 Capsule by mouth every day.  Dispense: 30 Capsule; Refill: 11  - Referral to Neurology  - butalbital/apap/caffeine (FIORICET) -40 mg Tab; Take 1 Tablet by mouth every 6 hours as needed for Headache for up to 91 days.  Dispense: 15 Tablet; Refill: 2    2. RUQ pain  Will get CT and patient will schedule follow-up with her general surgeon.  - CT-ABDOMEN WITH & W/O; Future    3. Influenza vaccine needed  Given  - INFLUENZA VACCINE QUAD INJ (PF)    4. Encounter for screening mammogram for breast cancer  Ordered  - MA-SCREENING MAMMO BILAT W/TOMOSYNTHESIS W/CAD; Future    5. Screening for colorectal cancer  Options reviewed with patient.  - COLOGUARD (FIT DNA)    6. Type 2 diabetes mellitus with diabetic neuropathy, without long-term current use of insulin (HCC)  Well-controlled.  While waiting for Ozempic to come off backorder, will trial Rybelsus orally.  Patient understands not to take both Rybelsus and Ozempic at the same time.  - Semaglutide 7 MG Tab; Take 7 mg by mouth every day.  Dispense: 30 Tablet; Refill: 2  - Comp Metabolic Panel; Future  - ESTIMATED GFR; Future  - HEMOGLOBIN A1C; Future  - CBC WITH DIFFERENTIAL; Future      7. Morbid (severe) obesity due to excess calories (HCC)    - Patient identified as having weight management issue.  Appropriate orders and counseling given.    8. Body mass index (BMI) 40.0-44.9, adult (HCC)    - Patient identified as having weight management issue.  Appropriate orders and counseling given.     HCC Gap Form    Diagnosis: E66.01 - Morbid (severe) obesity due to excess calories (HCC)  Z68.41 - Body mass index (BMI) 40.0-44.9, adult (HCC)  The current BMI is 43.33 kg/m2 as of 01/31/23 18:22 PST  Assessment and plan: Chronic, stable. Encouraged healthy diet and physical activity changes with a goal of weight loss. Follow up at least annually.  Last edited 01/31/23 18:23  PST by LIS Welch.       Patient will return to clinic in 4 months for lab review or sooner if needed.

## 2023-01-31 NOTE — PATIENT INSTRUCTIONS
Cove Beam powder (riboflavin, magnesium, co-Q10) daily for migraine prevention.    Added propranolol once a day for migraine prevention    Referred to neurology.    CT abdomen ordered

## 2023-01-31 NOTE — ASSESSMENT & PLAN NOTE
This is a chronic health problem that is well controlled with current medications and lifestyle measures.  A1c is 5.7%.  Taking Ozempic 0.5 mg once weekly.  However, pharmacy has not been able to get it for the past couple weeks.  We will trial Rybelsus in the meantime.  Up-to-date on retinal scan.  Not on ACE or statin.  Denies polydipsia, polyuria, vision changes.

## 2023-01-31 NOTE — ASSESSMENT & PLAN NOTE
This is a chronic health problem that is uncontrolled with current medications and lifestyle measures.  Taking amitriptyline 30 mg nightly.  Having to take Fioricet often for breakthrough headache.  Would like to try propranolol again.  Has noticed that headache is worse after eating high salt meal.  Does not monitor blood pressure.

## 2023-02-01 NOTE — ASSESSMENT & PLAN NOTE
Patient reports sensation of mass and right upper quadrant.  Tender.  Feels like seroma did in the past.  Is established with general surgeon, but would like imaging done prior to scheduling with Dr. Russ.

## 2023-02-03 ENCOUNTER — TELEPHONE (OUTPATIENT)
Dept: HEALTH INFORMATION MANAGEMENT | Facility: OTHER | Age: 63
End: 2023-02-03
Payer: COMMERCIAL

## 2023-02-17 DIAGNOSIS — L29.9 ITCHING: ICD-10-CM

## 2023-02-17 RX ORDER — HYDROXYZINE HYDROCHLORIDE 25 MG/1
25 TABLET, FILM COATED ORAL 3 TIMES DAILY PRN
Qty: 90 TABLET | Refills: 1 | Status: SHIPPED | OUTPATIENT
Start: 2023-02-17 | End: 2023-08-02 | Stop reason: SDUPTHER

## 2023-02-17 NOTE — TELEPHONE ENCOUNTER
Last ov 1/31/23    Received request via: Pharmacy    Was the patient seen in the last year in this department? Yes    Does the patient have an active prescription (recently filled or refills available) for medication(s) requested? No    Does the patient have [facility] and need 100 day supply (blood pressure, diabetes and cholesterol meds only)? Patient does not have SCP

## 2023-03-14 DIAGNOSIS — G44.229 CHRONIC TENSION-TYPE HEADACHE, NOT INTRACTABLE: ICD-10-CM

## 2023-03-14 RX ORDER — RIZATRIPTAN BENZOATE 10 MG/1
TABLET ORAL
Qty: 12 TABLET | Refills: 2 | Status: SHIPPED | OUTPATIENT
Start: 2023-03-14 | End: 2023-08-02 | Stop reason: SDUPTHER

## 2023-03-14 NOTE — TELEPHONE ENCOUNTER
Last ov 1/31/23    Received request via: Pharmacy    Was the patient seen in the last year in this department? Yes    Does the patient have an active prescription (recently filled or refills available) for medication(s) requested? No    Does the patient have nursing home Plus and need 100 day supply (blood pressure, diabetes and cholesterol meds only)? Patient does not have SCP

## 2023-03-15 DIAGNOSIS — F51.01 PRIMARY INSOMNIA: ICD-10-CM

## 2023-03-15 RX ORDER — TRAZODONE HYDROCHLORIDE 100 MG/1
TABLET ORAL
Qty: 270 TABLET | Refills: 1 | Status: SHIPPED | OUTPATIENT
Start: 2023-03-15 | End: 2023-08-02 | Stop reason: SDUPTHER

## 2023-03-15 NOTE — TELEPHONE ENCOUNTER
Last ov 1/31/23    Received request via: Pharmacy    Was the patient seen in the last year in this department? Yes    Does the patient have an active prescription (recently filled or refills available) for medication(s) requested? No    Does the patient have [facility] Plus and need 100 day supply (blood pressure, diabetes and cholesterol meds only)? Patient does not have SCP

## 2023-04-20 DIAGNOSIS — G44.229 CHRONIC TENSION-TYPE HEADACHE, NOT INTRACTABLE: ICD-10-CM

## 2023-04-20 RX ORDER — BUTALBITAL, ACETAMINOPHEN AND CAFFEINE 50; 325; 40 MG/1; MG/1; MG/1
1 TABLET ORAL EVERY 6 HOURS PRN
Qty: 15 TABLET | Refills: 2 | Status: CANCELLED | OUTPATIENT
Start: 2023-04-20 | End: 2023-07-20

## 2023-04-20 NOTE — TELEPHONE ENCOUNTER
Last ov 1/31/23    Received request via: Patient    Was the patient seen in the last year in this department? Yes    Does the patient have an active prescription (recently filled or refills available) for medication(s) requested? No    Does the patient have assisted Plus and need 100 day supply (blood pressure, diabetes and cholesterol meds only)? Patient does not have SCP

## 2023-04-21 NOTE — TELEPHONE ENCOUNTER
Patient should have refills remaining on her current prescription.  Will you check with pharmacy and let patient know?

## 2023-04-27 ENCOUNTER — HOSPITAL ENCOUNTER (OUTPATIENT)
Dept: LAB | Facility: MEDICAL CENTER | Age: 63
End: 2023-04-27
Attending: NURSE PRACTITIONER
Payer: COMMERCIAL

## 2023-04-27 DIAGNOSIS — E11.40 TYPE 2 DIABETES MELLITUS WITH DIABETIC NEUROPATHY, WITHOUT LONG-TERM CURRENT USE OF INSULIN (HCC): ICD-10-CM

## 2023-04-27 LAB
ALBUMIN SERPL BCP-MCNC: 4.1 G/DL (ref 3.2–4.9)
ALBUMIN/GLOB SERPL: 1.8 G/DL
ALP SERPL-CCNC: 92 U/L (ref 30–99)
ALT SERPL-CCNC: 27 U/L (ref 2–50)
ANION GAP SERPL CALC-SCNC: 13 MMOL/L (ref 7–16)
AST SERPL-CCNC: 25 U/L (ref 12–45)
BASOPHILS # BLD AUTO: 1.5 % (ref 0–1.8)
BASOPHILS # BLD: 0.1 K/UL (ref 0–0.12)
BILIRUB SERPL-MCNC: 0.4 MG/DL (ref 0.1–1.5)
BUN SERPL-MCNC: 15 MG/DL (ref 8–22)
CALCIUM ALBUM COR SERPL-MCNC: 8.8 MG/DL (ref 8.5–10.5)
CALCIUM SERPL-MCNC: 8.9 MG/DL (ref 8.5–10.5)
CHLORIDE SERPL-SCNC: 105 MMOL/L (ref 96–112)
CO2 SERPL-SCNC: 20 MMOL/L (ref 20–33)
CREAT SERPL-MCNC: 0.77 MG/DL (ref 0.5–1.4)
EOSINOPHIL # BLD AUTO: 0.16 K/UL (ref 0–0.51)
EOSINOPHIL NFR BLD: 2.4 % (ref 0–6.9)
ERYTHROCYTE [DISTWIDTH] IN BLOOD BY AUTOMATED COUNT: 44.5 FL (ref 35.9–50)
EST. AVERAGE GLUCOSE BLD GHB EST-MCNC: 131 MG/DL
FASTING STATUS PATIENT QL REPORTED: NORMAL
GFR SERPLBLD CREATININE-BSD FMLA CKD-EPI: 87 ML/MIN/1.73 M 2
GLOBULIN SER CALC-MCNC: 2.3 G/DL (ref 1.9–3.5)
GLUCOSE SERPL-MCNC: 138 MG/DL (ref 65–99)
HBA1C MFR BLD: 6.2 % (ref 4–5.6)
HCT VFR BLD AUTO: 45.1 % (ref 37–47)
HGB BLD-MCNC: 15.1 G/DL (ref 12–16)
IMM GRANULOCYTES # BLD AUTO: 0.02 K/UL (ref 0–0.11)
IMM GRANULOCYTES NFR BLD AUTO: 0.3 % (ref 0–0.9)
LYMPHOCYTES # BLD AUTO: 2.04 K/UL (ref 1–4.8)
LYMPHOCYTES NFR BLD: 30.4 % (ref 22–41)
MCH RBC QN AUTO: 31.6 PG (ref 27–33)
MCHC RBC AUTO-ENTMCNC: 33.5 G/DL (ref 33.6–35)
MCV RBC AUTO: 94.4 FL (ref 81.4–97.8)
MONOCYTES # BLD AUTO: 0.45 K/UL (ref 0–0.85)
MONOCYTES NFR BLD AUTO: 6.7 % (ref 0–13.4)
NEUTROPHILS # BLD AUTO: 3.95 K/UL (ref 2–7.15)
NEUTROPHILS NFR BLD: 58.7 % (ref 44–72)
NRBC # BLD AUTO: 0 K/UL
NRBC BLD-RTO: 0 /100 WBC
PLATELET # BLD AUTO: 250 K/UL (ref 164–446)
PMV BLD AUTO: 9.4 FL (ref 9–12.9)
POTASSIUM SERPL-SCNC: 4.6 MMOL/L (ref 3.6–5.5)
PROT SERPL-MCNC: 6.4 G/DL (ref 6–8.2)
RBC # BLD AUTO: 4.78 M/UL (ref 4.2–5.4)
SODIUM SERPL-SCNC: 138 MMOL/L (ref 135–145)
WBC # BLD AUTO: 6.7 K/UL (ref 4.8–10.8)

## 2023-04-27 PROCEDURE — 36415 COLL VENOUS BLD VENIPUNCTURE: CPT

## 2023-04-27 PROCEDURE — 83036 HEMOGLOBIN GLYCOSYLATED A1C: CPT | Mod: GA

## 2023-04-27 PROCEDURE — 85025 COMPLETE CBC W/AUTO DIFF WBC: CPT

## 2023-04-27 PROCEDURE — 80053 COMPREHEN METABOLIC PANEL: CPT

## 2023-05-02 ENCOUNTER — OFFICE VISIT (OUTPATIENT)
Dept: MEDICAL GROUP | Facility: PHYSICIAN GROUP | Age: 63
End: 2023-05-02
Payer: COMMERCIAL

## 2023-05-02 ENCOUNTER — HOSPITAL ENCOUNTER (OUTPATIENT)
Dept: LAB | Facility: MEDICAL CENTER | Age: 63
End: 2023-05-02
Attending: NURSE PRACTITIONER
Payer: COMMERCIAL

## 2023-05-02 VITALS
HEART RATE: 80 BPM | BODY MASS INDEX: 43.05 KG/M2 | RESPIRATION RATE: 18 BRPM | OXYGEN SATURATION: 97 % | WEIGHT: 258.4 LBS | TEMPERATURE: 97.2 F | DIASTOLIC BLOOD PRESSURE: 80 MMHG | HEIGHT: 65 IN | SYSTOLIC BLOOD PRESSURE: 110 MMHG

## 2023-05-02 DIAGNOSIS — E11.40 TYPE 2 DIABETES MELLITUS WITH DIABETIC NEUROPATHY, WITHOUT LONG-TERM CURRENT USE OF INSULIN (HCC): ICD-10-CM

## 2023-05-02 DIAGNOSIS — R06.02 SHORT OF BREATH ON EXERTION: ICD-10-CM

## 2023-05-02 DIAGNOSIS — R21 RASH: ICD-10-CM

## 2023-05-02 DIAGNOSIS — R10.11 RUQ PAIN: ICD-10-CM

## 2023-05-02 DIAGNOSIS — R53.83 FATIGUE, UNSPECIFIED TYPE: ICD-10-CM

## 2023-05-02 LAB
CREAT UR-MCNC: 213.1 MG/DL
FERRITIN SERPL-MCNC: 21.7 NG/ML (ref 10–291)
MICROALBUMIN UR-MCNC: 1.7 MG/DL
MICROALBUMIN/CREAT UR: 8 MG/G (ref 0–30)
TSH SERPL DL<=0.005 MIU/L-ACNC: 2.27 UIU/ML (ref 0.38–5.33)
VIT B12 SERPL-MCNC: 275 PG/ML (ref 211–911)

## 2023-05-02 PROCEDURE — 82728 ASSAY OF FERRITIN: CPT | Mod: GA

## 2023-05-02 PROCEDURE — 36415 COLL VENOUS BLD VENIPUNCTURE: CPT

## 2023-05-02 PROCEDURE — 82043 UR ALBUMIN QUANTITATIVE: CPT

## 2023-05-02 PROCEDURE — 99214 OFFICE O/P EST MOD 30 MIN: CPT | Performed by: NURSE PRACTITIONER

## 2023-05-02 PROCEDURE — 82570 ASSAY OF URINE CREATININE: CPT

## 2023-05-02 PROCEDURE — 82607 VITAMIN B-12: CPT

## 2023-05-02 PROCEDURE — 84443 ASSAY THYROID STIM HORMONE: CPT

## 2023-05-02 RX ORDER — METHYLPREDNISOLONE 4 MG/1
TABLET ORAL
Qty: 21 TABLET | Refills: 0 | Status: SHIPPED | OUTPATIENT
Start: 2023-05-02 | End: 2023-08-02

## 2023-05-02 ASSESSMENT — PATIENT HEALTH QUESTIONNAIRE - PHQ9
7. TROUBLE CONCENTRATING ON THINGS, SUCH AS READING THE NEWSPAPER OR WATCHING TELEVISION: NOT AT ALL
6. FEELING BAD ABOUT YOURSELF - OR THAT YOU ARE A FAILURE OR HAVE LET YOURSELF OR YOUR FAMILY DOWN: NOT AL ALL
5. POOR APPETITE OR OVEREATING: NEARLY EVERY DAY
8. MOVING OR SPEAKING SO SLOWLY THAT OTHER PEOPLE COULD HAVE NOTICED. OR THE OPPOSITE, BEING SO FIGETY OR RESTLESS THAT YOU HAVE BEEN MOVING AROUND A LOT MORE THAN USUAL: NEARLY EVERY DAY
3. TROUBLE FALLING OR STAYING ASLEEP OR SLEEPING TOO MUCH: NOT AT ALL
2. FEELING DOWN, DEPRESSED, IRRITABLE, OR HOPELESS: NOT AT ALL
SUM OF ALL RESPONSES TO PHQ9 QUESTIONS 1 AND 2: 0
SUM OF ALL RESPONSES TO PHQ QUESTIONS 1-9: 9
1. LITTLE INTEREST OR PLEASURE IN DOING THINGS: NOT AT ALL
9. THOUGHTS THAT YOU WOULD BE BETTER OFF DEAD, OR OF HURTING YOURSELF: NOT AT ALL
4. FEELING TIRED OR HAVING LITTLE ENERGY: NEARLY EVERY DAY

## 2023-05-02 ASSESSMENT — FIBROSIS 4 INDEX: FIB4 SCORE: 1.19

## 2023-05-02 NOTE — ASSESSMENT & PLAN NOTE
Patient reports 3-week onset of shortness of breath with exertion, lightheadedness, feeling rundown and fatigued.  Has also been struggling with a rash.  Please see additional notes.  Denies fever, chest pain.  History of murmur.  Has never had echocardiogram.

## 2023-05-02 NOTE — PROGRESS NOTES
CC: Lab review, fatigue, shortness of breath, rash    HISTORY OF THE PRESENT ILLNESS: Patient is a 63 y.o. female. This pleasant patient is here today for evaluation and management of the following health problems.        Type 2 diabetes mellitus with diabetic neuropathy, without long-term current use of insulin (HCC)  This is a chronic health problem that is well controlled with current medications and lifestyle measures.  A1c is 6.2%.  Administering Ozempic 0.5 mg weekly.  Did take Rybelsus for approximately 1 month while Ozempic was on backorder.  Trying to work on diabetic diet.  Not exercising routinely.  Up-to-date on retinal scan.  Not on ACE or statin.  Denies polyuria, polydipsia, vision changes.    Short of breath on exertion  Patient reports 3-week onset of shortness of breath with exertion, lightheadedness, feeling rundown and fatigued.  Has also been struggling with a rash.  Please see additional notes.  Denies fever, chest pain.  History of murmur.  Has never had echocardiogram.      RUQ pain  Improved.  CT abdomen unremarkable.  Consulted with surgeon, reportedly no concerns.    Rash  Patient reports 3 to 4 weeks ago, developed scattered rash on chest, arms, back.  Started as tiny vesicles/hives.  Burning sensation.  Now hives have resolved and left with remnant pink itchy areas.  Incidentally started the evening after having CT abdomen with contrast.  Has tried multiple lotions (uses Suzanne), Benadryl cream, hydrocortisone without improvement.    Allergies: Patient has no known allergies.    Current Outpatient Medications Ordered in Epic   Medication Sig Dispense Refill    methylPREDNISolone (MEDROL DOSEPAK) 4 MG Tablet Therapy Pack As directed on the packaging label. 21 Tablet 0    traZODone (DESYREL) 100 MG Tab TAKE 1 TO 3 TABS BY MOUTH AT BEDTIME AS NEEDED FOR SLEEP. 270 Tablet 1    rizatriptan (MAXALT) 10 MG tablet TAKE 1 TAB BY MOUTH ONCE AS NEEDED FOR MIGRAINE FOR UP TO 1 DOSE. MAY REPEAT IN 2  "HOURS IF NEEDED 12 Tablet 2    hydrOXYzine HCl (ATARAX) 25 MG Tab TAKE 1 TABLET BY MOUTH 3 TIMES A DAY AS NEEDED FOR ITCHING 90 Tablet 1    propranolol LA (INDERAL LA) 60 MG CAPSULE SR 24 HR Take 1 Capsule by mouth every day. 30 Capsule 11    butalbital/apap/caffeine (FIORICET) -40 mg Tab Take 1 Tablet by mouth every 6 hours as needed for Headache for up to 91 days. 15 Tablet 2    Semaglutide,0.25 or 0.5MG/DOS, (OZEMPIC, 0.25 OR 0.5 MG/DOSE,) 2 MG/1.5ML Solution Pen-injector INJECT 0.5MG UNDER THE SKIN EVERY 7 DAYS 3 mL 3    diclofenac sodium (VOLTAREN) 1 % Gel APPLY 2 GRAMS TOPICALLY 4 TIMES A DAY AS NEEDED (JOINT PAIN). 100 g 1    amitriptyline (ELAVIL) 10 MG Tab TAKE 1 TO 2 TABS BY MOUTH AT BEDTIME. 180 Tablet 1    omeprazole (PRILOSEC) 20 MG delayed-release capsule TAKE 1 CAPSULE BY MOUTH EVERY DAY 90 Capsule 3    Turmeric (QC TUMERIC COMPLEX PO) Take  by mouth.      Omega-3 Fatty Acids (FISH OIL PO) Take  by mouth.      ferrous fum/vit C/B12 IF FA (TRINSICON) Cap Take 1 capsule by mouth every day.      acetaminophen (TYLENOL) 500 MG Tab Take 1,000 mg by mouth 3 times a day as needed (HA).      fluticasone (FLONASE) 50 MCG/ACT nasal spray ADMINISTER 1 SPRAY INTO AFFECTED NOSTRIL(S) EVERY DAY. 48 mL 3     No current [facility]-ordered facility-administered medications on file.       Past Medical History:   Diagnosis Date    Anesthesia     low bp coming out of anesthesia \"one time\"    Anxiety 11/18/2011    Arthritis     fibromyalgia    Breast mass, left     Breath shortness     occasionally, inhalers a few times a year    Bursitis of knee     left    Cancer (HCC) 2005    squamous cell on nose    Cancer (HCC) 2021    Stage 2 breast cancer    Chronic pain 11/18/2011    Depression 11/18/2011    Diabetes (HCC)     Fibromyalgia 11/18/2011    Gastro-esophageal reflux 3/20/2012    Heart burn     Heart murmur     no cardiolgist    Iron deficiency anemia 3/20/2012    Migraine     Neuropathy 11/18/2011    Pain 6/14/12    " 3/10 stomach    Pneumonia 01/2012    Pulmonary hypertension (HCC) 03/20/2012    Syncope and collapse 3/20/2012       Past Surgical History:   Procedure Laterality Date    FULL THICKNESS SKIN GRAFT N/A 8/4/2021    Procedure: APPLICATION, GRAFT, SKIN, FULL-THICKNESS - TO ABDOMEN;  Surgeon: Thong Mota M.D.;  Location: [facility];  Service: General    DEBRIDEMENT  7/7/2021    Procedure: DEBRIDEMENT - ABDOMINAL WALL WOUND;  Surgeon: Thong Mota M.D.;  Location: [facility];  Service: Gastroenterology    APPLICATION OR REPLACEMENT, WOUND VAC  7/7/2021    Procedure: APPLICATION OR REPLACEMENT,WOUND VAC - FOR APPILCATION.;  Surgeon: Thong Mota M.D.;  Location: [facility];  Service: Gastroenterology    PB SECD CLOS SURG WND EXTEN/COMPLIC  3/31/2021    Procedure: CLOSURE, WOUND, SECONDARY - FOR DELAYED PRIMARY CLOSURE OF ABDOMINAL WALL;  Surgeon: Fede Russ M.D.;  Location: [facility];  Service: General    FLAP GRAFT  3/31/2021    Procedure: FLAP GRAFT - ADVANCEMENT;  Surgeon: Fede Russ M.D.;  Location: [facility];  Service: General    DE EXPLORATORY OF ABDOMEN  1/26/2021    Procedure: LAPAROTOMY, EXPLORATORY - LYSIS OF ADHESIONS;  Surgeon: Fede Russ M.D.;  Location: [facility];  Service: General    IRRIGATION & DEBRIDEMENT GENERAL  1/26/2021    Procedure: IRRIGATION AND DEBRIDEMENT, WOUND - ABDOMINAL WALL WITH REMOVAL OF MESH;  Surgeon: Fede Russ M.D.;  Location: [facility];  Service: General    APPLICATION OR REPLACEMENT, WOUND VAC  1/26/2021    Procedure: APPLICATION OR REPLACEMENT,WOUND VAC;  Surgeon: Fede Russ M.D.;  Location: [facility];  Service: General    IRRIGATION & DEBRIDEMENT GENERAL N/A 9/30/2020    Procedure: IRRIGATION AND DEBRIDEMENT, WOUND-ABD WALL, WOUND VAC PLACEMENT;  Surgeon: Fede Russ M.D.;  Location: [facility];  Service: General    CATH PLACEMENT   2013    Performed by Lizeth Ferreira M.D. at SURGERY SAME DAY [facility]    AXILLARY NODE DISSECTION  2013    Performed by Lizeth Ferreira M.D. at SURGERY SAME DAY [facility]    NODE BIOPSY  2013    Performed by Lizeth Ferreira M.D. at SURGERY SAME DAY [facility]    BREAST BIOPSY  2013    Performed by Lizeth Ferreira M.D. at SURGERY SAME DAY [facility]    LUMPECTOMY Left     9 lymph note removal    BREAST BIOPSY  2012    Performed by Lizeth Ferreira M.D. at SURGERY SAME DAY [facility]    COLONOSCOPY  2012    Performed by PARAM VALDEZ at SURGERY [facility]    GASTROSCOPY  2012    Performed by PARAM VALDEZ at SURGERY [facility]    ABDOMINAL EXPLORATION  10/7/2010    Performed by MARIA G KHAN JR at SURGERY [facility]    IRRIGATION & DEBRIDEMENT GENERAL  2010    Performed by OZZIE WEINSTEIN at SURGERY [facility]    HERNIA REPAIR  3/15/2010    Performed by MARIA G KHAN JR at SURGERY [facility]    FLAP GRAFT  3/15/2010    Performed by MARIA G KHAN JR at SURGERY [facility]    PANNICULECTOMY  3/15/2010    Performed by MARIA G KHAN JR at SURGERY [facility]    ABDOMINAL EXPLORATION  3/11/2010    Performed by MARIA G KHAN JR at SURGERY SAME DAY [facility]    OTHER      pulled mesh out of hernia    OTHER      abscess removed abd.    OTHER      bowel obstruction    OTHER  2009    hernia repair,mesh removal after in aug.09    GASTRIC BYPASS LAPAROSCOPIC      OTHER      bariatric surgery gastric bypass    GYN SURGERY  1984    tubal       Social History     Tobacco Use    Smoking status: Former     Packs/day: 0.10     Years: 8.00     Pack years: 0.80     Types: Cigarettes     Quit date: 2016     Years since quittin.0    Smokeless tobacco: Never    Tobacco comments:     1/2 pk a day on and off 10 yrs, 1 cigarette daily   Vaping Use    Vaping Use: Never used  "  Substance Use Topics    Alcohol use: Not Currently     Comment: maybe once a year    Drug use: No       Family History   Problem Relation Age of Onset    Heart Attack Father     Anxiety disorder Father     Depression Father     Schizophrenia Father     Hypertension Mother     Cancer Maternal Grandfather         leukemia    Cancer Other         leukemia- cousin    Diabetes Maternal Uncle     Anxiety disorder Maternal Uncle     Diabetes Maternal Uncle     Anxiety disorder Maternal Uncle        ROS: As in HPI, otherwise negative for chest pain, abdominal pain, dysuria, blood in stool, fever          Exam: /80 (BP Location: Right arm)   Pulse 80   Temp 36.2 °C (97.2 °F) (Temporal)   Resp 18   Ht 1.651 m (5' 5\")   Wt 117 kg (258 lb 6.4 oz)   SpO2 97%  Body mass index is 43 kg/m².    General: Alert, pleasant, well nourished, well developed female in NAD  HEENT: Normocephalic. Eyes conjunctiva clear lids without ptosis, pupils equal and reactive to light, ears normal shape and contour, canals are clear bilaterally, tympanic membranes are pearly gray with good light reflex, nasal mucosa without erythema and drainage, oropharynx is without erythema, edema or exudates.   Neck: Supple without bruit. Thyroid is not enlarged.  Pulmonary: Clear to ausculation.  Normal effort. No rales, ronchi, or wheezing.  Cardiovascular: Normal rate and rhythm without murmur. Carotid and radial pulses are intact and equal bilaterally.  No lower extremity edema.  Abdomen: Soft, nontender, nondistended.   Neurologic: Grossly nonfocal  Lymph: No cervical or supraclavicular lymph nodes are palpable  Skin: Warm and dry.    Musculoskeletal: Normal gait.   Psych: Normal mood and affect. Alert and oriented. Judgment and insight is normal.    Component      Latest Ref Rn 4/27/2023   WBC      4.8 - 10.8 K/uL 6.7    RBC      4.20 - 5.40 M/uL 4.78    Hemoglobin      12.0 - 16.0 g/dL 15.1    Hematocrit      37.0 - 47.0 % 45.1    MCV      81.4 " - 97.8 fL 94.4    MCH      27.0 - 33.0 pg 31.6    MCHC      33.6 - 35.0 g/dL 33.5 (L)    RDW      35.9 - 50.0 fL 44.5    Platelet Count      164 - 446 K/uL 250    MPV      9.0 - 12.9 fL 9.4    Neutrophils-Polys      44.00 - 72.00 % 58.70    Lymphocytes      22.00 - 41.00 % 30.40    Monocytes      0.00 - 13.40 % 6.70    Eosinophils      0.00 - 6.90 % 2.40    Basophils      0.00 - 1.80 % 1.50    Immature Granulocytes      0.00 - 0.90 % 0.30    Nucleated RBC      /100 WBC 0.00    Neutrophils (Absolute)      2.00 - 7.15 K/uL 3.95    Lymphs (Absolute)      1.00 - 4.80 K/uL 2.04    Monos (Absolute)      0.00 - 0.85 K/uL 0.45    Eos (Absolute)      0.00 - 0.51 K/uL 0.16    Baso (Absolute)      0.00 - 0.12 K/uL 0.10    Immature Granulocytes (abs)      0.00 - 0.11 K/uL 0.02    NRBC (Absolute)      K/uL 0.00    Sodium      135 - 145 mmol/L 138    Potassium      3.6 - 5.5 mmol/L 4.6    Chloride      96 - 112 mmol/L 105    Co2      20 - 33 mmol/L 20    Anion Gap      7.0 - 16.0  13.0    Glucose      65 - 99 mg/dL 138 (H)    Bun      8 - 22 mg/dL 15    Creatinine      0.50 - 1.40 mg/dL 0.77    Calcium      8.5 - 10.5 mg/dL 8.9    AST(SGOT)      12 - 45 U/L 25    ALT(SGPT)      2 - 50 U/L 27    Alkaline Phosphatase      30 - 99 U/L 92    Total Bilirubin      0.1 - 1.5 mg/dL 0.4    Albumin      3.2 - 4.9 g/dL 4.1    Total Protein      6.0 - 8.2 g/dL 6.4    Globulin      1.9 - 3.5 g/dL 2.3    A-G Ratio      g/dL 1.8    Glycohemoglobin      4.0 - 5.6 % 6.2 (H)    Estim. Avg Glu      mg/dL 131    GFR (CKD-EPI)      >60 mL/min/1.73 m 2 87    Fasting Status Fasting    Correct Calcium      8.5 - 10.5 mg/dL 8.8        Please note that this dictation was created using voice recognition software. I have made every reasonable attempt to correct obvious errors, but I expect that there are errors of grammar and possibly content that I did not discover before finalizing the note.      Assessment/Plan  1. Short of breath on exertion  Does have  mild murmur, not new.  Breath sounds clear.  We will get echocardiogram and EKG.  ER precautions reviewed with patient.  - EC-ECHOCARDIOGRAM COMPLETE W/O CONT; Future  - EKG - Cardiology Performed; Future    2. Rash  Likely allergic reaction.  Will treat with Medrol Dosepak.  Instructions side effects reviewed with patient.  We will also refer to dermatology if in case rash does not resolve.  - methylPREDNISolone (MEDROL DOSEPAK) 4 MG Tablet Therapy Pack; As directed on the packaging label.  Dispense: 21 Tablet; Refill: 0  - Referral to Dermatology    3. Fatigue, unspecified type  We will notify patient of results  - TSH WITH REFLEX TO FT4; Future  - FERRITIN; Future  - VITAMIN B12; Future    4. Type 2 diabetes mellitus with diabetic neuropathy, without long-term current use of insulin (HCC)  Well-controlled.  Continue with Ozempic, no changes.  - MICROALBUMIN CREAT RATIO URINE; Future    5. RUQ pain  Improved.  Further work-up and surgical consultation unremarkable.    She will return to clinic in 3 months for lab review and follow-up on diabetes and other health problems or sooner if needed.

## 2023-05-02 NOTE — ASSESSMENT & PLAN NOTE
This is a chronic health problem that is well controlled with current medications and lifestyle measures.  A1c is 6.2%.  Administering Ozempic 0.5 mg weekly.  Did take Rybelsus for approximately 1 month while Ozempic was on backorder.  Trying to work on diabetic diet.  Not exercising routinely.  Up-to-date on retinal scan.  Not on ACE or statin.  Denies polyuria, polydipsia, vision changes.

## 2023-05-02 NOTE — ASSESSMENT & PLAN NOTE
Patient reports 3 to 4 weeks ago, developed scattered rash on chest, arms, back.  Started as tiny vesicles/hives.  Burning sensation.  Now hives have resolved and left with remnant pink itchy areas.  Incidentally started the evening after having CT abdomen with contrast.  Has tried multiple lotions (uses Suzanne), Benadryl cream, hydrocortisone without improvement.

## 2023-05-09 NOTE — TELEPHONE ENCOUNTER
INTERVENTIONAL RADIOLOGY  Preoperative History and Physical      Patient:  Elijah Zepeda  :  1954  Age:  77 y.o. MRN:  [id number]  Today's Date:  2023      CC / HPI   Elijah Zepeda is a 77 y.o. male with a history of right lung mass who presents for CT guided lung biopsy. PAST MEDICAL HISTORY  Past Medical History:   Diagnosis Date    Arrhythmia     patient reported irregular heart beat    Exercise tolerance finding     On 2023 swam x 30 minutes laps without stopping for SOB. HIIT x 30 minutes class without stopping for SOB    Incidental lung nodule 2022    right side per CT    Pulmonary embolism ([address]     Thromboembolus ([facility] [address]        PAST SURGICAL HISTORY  Past Surgical History:   Procedure Laterality Date    BRONCHOSCOPY N/A 3/29/2023    BRONCHOSCOPY ENDOBRONCHIAL ULTRASOUND WITH C-ARM performed by Dedrick Krabbe, MD at [facility] N/A 3/29/2023    BRONCHOSCOPY performed by Dedrick Krabbe, MD at [facility] ENDOSCOPY    COLONOSCOPY  2022    x3 now every 10 years    GANGLION CYST EXCISION Left 2013    HEENT      wisdom teeth x4    PILONIDAL CYST [address] [facility]       SOCIAL HISTORY  Social History     Socioeconomic History    Marital status:      Spouse name: Not on file    Number of children: Not on file    Years of education: Not on file    Highest education level: Not on file   Occupational History    Not on file   Tobacco Use    Smoking status: Never    Smokeless tobacco: Never   Vaping Use    Vaping Use: Never used   Substance and Sexual Activity    Alcohol use:  Yes     Alcohol/week: 7.0 standard drinks     Types: 7 Glasses of wine per week    Drug use: Never    Sexual activity: Not Currently   Other Topics Concern    Not on file   Social History Narrative    Not on file     Social Determinants of Health     Financial Resource Strain: Not on file   Food Insecurity: Not on file   Transportation Needs: Not on file Received request via: Pharmacy    Was the patient seen in the last year in this department? Yes    Does the patient have an active prescription (recently filled or refills available) for medication(s) requested? No    Does the patient have detention Plus and need 100 day supply (blood pressure, diabetes and cholesterol meds only)? Patient does not have SCP    Last OV 7/29/2022  Next OV 1/31/2023

## 2023-06-25 DIAGNOSIS — K29.00 ACUTE GASTRITIS WITHOUT HEMORRHAGE, UNSPECIFIED GASTRITIS TYPE: ICD-10-CM

## 2023-06-26 RX ORDER — OMEPRAZOLE 20 MG/1
20 CAPSULE, DELAYED RELEASE ORAL DAILY
Qty: 90 CAPSULE | Refills: 3 | Status: SHIPPED | OUTPATIENT
Start: 2023-06-26 | End: 2024-03-05

## 2023-06-26 NOTE — TELEPHONE ENCOUNTER
Last ov 5/2/23    Received request via: Pharmacy    Was the patient seen in the last year in this department? Yes    Does the patient have an active prescription (recently filled or refills available) for medication(s) requested? No    Does the patient have snf Plus and need 100 day supply (blood pressure, diabetes and cholesterol meds only)? Patient does not have SCP

## 2023-07-23 DIAGNOSIS — G44.229 CHRONIC TENSION-TYPE HEADACHE, NOT INTRACTABLE: ICD-10-CM

## 2023-07-24 RX ORDER — AMITRIPTYLINE HYDROCHLORIDE 10 MG/1
TABLET, FILM COATED ORAL
Qty: 180 TABLET | Refills: 3 | Status: SHIPPED | OUTPATIENT
Start: 2023-07-24 | End: 2023-11-09

## 2023-07-24 NOTE — TELEPHONE ENCOUNTER
Last ov 5/2/23    Received request via: Pharmacy    Was the patient seen in the last year in this department? Yes    Does the patient have an active prescription (recently filled or refills available) for medication(s) requested? No    Does the patient have jail Plus and need 100 day supply (blood pressure, diabetes and cholesterol meds only)? Patient does not have SCP

## 2023-08-02 ENCOUNTER — OFFICE VISIT (OUTPATIENT)
Dept: MEDICAL GROUP | Facility: PHYSICIAN GROUP | Age: 63
End: 2023-08-02
Payer: COMMERCIAL

## 2023-08-02 VITALS
BODY MASS INDEX: 42.07 KG/M2 | SYSTOLIC BLOOD PRESSURE: 110 MMHG | OXYGEN SATURATION: 96 % | HEART RATE: 75 BPM | DIASTOLIC BLOOD PRESSURE: 70 MMHG | HEIGHT: 65 IN | WEIGHT: 252.5 LBS | TEMPERATURE: 97.9 F | RESPIRATION RATE: 16 BRPM

## 2023-08-02 DIAGNOSIS — E11.40 TYPE 2 DIABETES MELLITUS WITH DIABETIC NEUROPATHY, WITHOUT LONG-TERM CURRENT USE OF INSULIN (HCC): ICD-10-CM

## 2023-08-02 DIAGNOSIS — G89.29 CHRONIC MIDLINE LOW BACK PAIN WITHOUT SCIATICA: ICD-10-CM

## 2023-08-02 DIAGNOSIS — R21 RASH: ICD-10-CM

## 2023-08-02 DIAGNOSIS — E11.9 TYPE 2 DIABETES MELLITUS WITHOUT COMPLICATION, WITHOUT LONG-TERM CURRENT USE OF INSULIN (HCC): ICD-10-CM

## 2023-08-02 DIAGNOSIS — M54.50 CHRONIC MIDLINE LOW BACK PAIN WITHOUT SCIATICA: ICD-10-CM

## 2023-08-02 DIAGNOSIS — L29.9 ITCHING: ICD-10-CM

## 2023-08-02 DIAGNOSIS — R06.02 SHORT OF BREATH ON EXERTION: ICD-10-CM

## 2023-08-02 DIAGNOSIS — K29.00 ACUTE GASTRITIS WITHOUT HEMORRHAGE, UNSPECIFIED GASTRITIS TYPE: ICD-10-CM

## 2023-08-02 DIAGNOSIS — E66.01 MORBID OBESITY WITH BMI OF 40.0-44.9, ADULT (HCC): ICD-10-CM

## 2023-08-02 DIAGNOSIS — G44.229 CHRONIC TENSION-TYPE HEADACHE, NOT INTRACTABLE: ICD-10-CM

## 2023-08-02 DIAGNOSIS — F51.01 PRIMARY INSOMNIA: ICD-10-CM

## 2023-08-02 LAB
HBA1C MFR BLD: 5.4 % (ref ?–5.8)
POCT INT CON NEG: NEGATIVE
POCT INT CON POS: POSITIVE

## 2023-08-02 PROCEDURE — 99214 OFFICE O/P EST MOD 30 MIN: CPT | Performed by: NURSE PRACTITIONER

## 2023-08-02 PROCEDURE — 83036 HEMOGLOBIN GLYCOSYLATED A1C: CPT | Performed by: NURSE PRACTITIONER

## 2023-08-02 PROCEDURE — 3078F DIAST BP <80 MM HG: CPT | Performed by: NURSE PRACTITIONER

## 2023-08-02 PROCEDURE — 3074F SYST BP LT 130 MM HG: CPT | Performed by: NURSE PRACTITIONER

## 2023-08-02 RX ORDER — SUCRALFATE 1 G/1
1 TABLET ORAL
Qty: 120 TABLET | Refills: 0 | Status: SHIPPED | OUTPATIENT
Start: 2023-08-02 | End: 2023-11-09

## 2023-08-02 RX ORDER — ALBUTEROL SULFATE 90 UG/1
2 AEROSOL, METERED RESPIRATORY (INHALATION) EVERY 4 HOURS PRN
Qty: 1 EACH | Refills: 2 | Status: SHIPPED | OUTPATIENT
Start: 2023-08-02 | End: 2024-02-12 | Stop reason: SDUPTHER

## 2023-08-02 RX ORDER — SEMAGLUTIDE 1.34 MG/ML
1 INJECTION, SOLUTION SUBCUTANEOUS
Qty: 12 ML | Refills: 1 | Status: SHIPPED | OUTPATIENT
Start: 2023-08-02 | End: 2024-02-12 | Stop reason: SDUPTHER

## 2023-08-02 RX ORDER — PROPRANOLOL HCL 60 MG
60 CAPSULE, EXTENDED RELEASE 24HR ORAL DAILY
Qty: 90 CAPSULE | Refills: 3 | Status: SHIPPED | OUTPATIENT
Start: 2023-08-02 | End: 2023-11-15

## 2023-08-02 RX ORDER — TRAZODONE HYDROCHLORIDE 100 MG/1
TABLET ORAL
Qty: 270 TABLET | Refills: 1 | Status: SHIPPED | OUTPATIENT
Start: 2023-08-02

## 2023-08-02 RX ORDER — HYDROXYZINE HYDROCHLORIDE 25 MG/1
25 TABLET, FILM COATED ORAL 3 TIMES DAILY PRN
Qty: 90 TABLET | Refills: 1 | Status: SHIPPED | OUTPATIENT
Start: 2023-08-02

## 2023-08-02 RX ORDER — RIZATRIPTAN BENZOATE 10 MG/1
TABLET ORAL
Qty: 12 TABLET | Refills: 2 | Status: SHIPPED | OUTPATIENT
Start: 2023-08-02 | End: 2024-03-05

## 2023-08-02 ASSESSMENT — FIBROSIS 4 INDEX: FIB4 SCORE: 1.19

## 2023-08-02 NOTE — ASSESSMENT & PLAN NOTE
This is a chronic health problem that is well controlled with current medications and lifestyle measures.  Taking Ozempic 0.5 mg once a week.  Has been working on lifestyle measures.  Frustrated that she has not lost more weight.  Not on ACE or statin.  Denies polydipsia, polyuria, vision changes.  Up-to-date on retinal scan.

## 2023-08-02 NOTE — ASSESSMENT & PLAN NOTE
This is a chronic health problem that is well controlled with current medications and lifestyle measures.  Taking trazodone 100 mg at bedtime.  Will sometimes also taken hydroxyzine with good results.  Feels rested the next day.

## 2023-08-02 NOTE — ASSESSMENT & PLAN NOTE
Patient reports illness a couple weeks ago with nausea, vomiting, body aches, fever.  Resolved, but still has burning sensation in her stomach.  Continues with omeprazole 20 mg daily.  Denies blood in stool, dark black stool hematemesis.  Having intermittent diarrhea and constipation.

## 2023-08-02 NOTE — PROGRESS NOTES
CC: Lab review, stomach problems    HISTORY OF THE PRESENT ILLNESS: Patient is a 63 y.o. female. This pleasant patient is here today for evaluation and management of the following health problems.      Type 2 diabetes mellitus with diabetic neuropathy, without long-term current use of insulin (HCC)  This is a chronic health problem that is well controlled with current medications and lifestyle measures.  Taking Ozempic 0.5 mg once a week.  Has been working on lifestyle measures.  Frustrated that she has not lost more weight.  Not on ACE or statin.  Denies polydipsia, polyuria, vision changes.  Up-to-date on retinal scan.    Short of breath on exertion  Reports continued intermittent shortness of breath with exertion.  Had to cancel echocardiogram and EKG because her father had a stroke.  Plans on rescheduling at [facility].  Wonders if albuterol inhaler may help.  Has needed it in the past.  Denies wheezing, chest pain, fever, cough.        Rash  Rash resolved after Medrol Dosepak.    Insomnia  This is a chronic health problem that is well controlled with current medications and lifestyle measures.  Taking trazodone 100 mg at bedtime.  Will sometimes also taken hydroxyzine with good results.  Feels rested the next day.    Acute gastritis without hemorrhage  Patient reports illness a couple weeks ago with nausea, vomiting, body aches, fever.  Resolved, but still has burning sensation in her stomach.  Continues with omeprazole 20 mg daily.  Denies blood in stool, dark black stool hematemesis.  Having intermittent diarrhea and constipation.    Allergies: Patient has no known allergies.    Current Outpatient Medications Ordered in Epic   Medication Sig Dispense Refill    sucralfate (CARAFATE) 1 GM Tab Take 1 Tablet by mouth 4 Times a Day,Before Meals and at Bedtime. 120 Tablet 0    diclofenac sodium (VOLTAREN) 1 % Gel APPLY 2 GRAMS TOPICALLY 4 TIMES A DAY AS NEEDED (JOINT PAIN). 100 g 1    rizatriptan (MAXALT) 10 MG tablet  "TAKE 1 TAB BY MOUTH ONCE AS NEEDED FOR MIGRAINE FOR UP TO 1 DOSE. MAY REPEAT IN 2 HOURS IF NEEDED 12 Tablet 2    propranolol LA (INDERAL LA) 60 MG CAPSULE SR 24 HR Take 1 Capsule by mouth every day. 90 Capsule 3    hydrOXYzine HCl (ATARAX) 25 MG Tab Take 1 Tablet by mouth 3 times a day as needed for Itching. 90 Tablet 1    traZODone (DESYREL) 100 MG Tab TAKE 1 TO 3 TABS BY MOUTH AT BEDTIME AS NEEDED FOR SLEEP. 270 Tablet 1    Semaglutide, 1 MG/DOSE, (OZEMPIC, 1 MG/DOSE,) 4 MG/3ML Solution Pen-injector Inject 1 mg under the skin every 7 days. 12 mL 1    albuterol 108 (90 Base) MCG/ACT Aero Soln inhalation aerosol Inhale 2 Puffs every four hours as needed for Shortness of Breath. 1 Each 2    amitriptyline (ELAVIL) 10 MG Tab TAKE 1 TO 2 TABS BY MOUTH AT BEDTIME. 180 Tablet 3    omeprazole (PRILOSEC) 20 MG delayed-release capsule TAKE 1 CAPSULE BY MOUTH EVERY DAY 90 Capsule 3    Semaglutide,0.25 or 0.5MG/DOS, (OZEMPIC, 0.25 OR 0.5 MG/DOSE,) 2 MG/1.5ML Solution Pen-injector INJECT 0.5MG UNDER THE SKIN EVERY 7 DAYS 3 mL 3    ferrous fum/vit C/B12 IF FA (TRINSICON) Cap Take 1 capsule by mouth every day.      acetaminophen (TYLENOL) 500 MG Tab Take 1,000 mg by mouth 3 times a day as needed (HA).      Turmeric (QC TUMERIC COMPLEX PO) Take  by mouth.       No current Epic-ordered facility-administered medications on file.       Past Medical History:   Diagnosis Date    Anesthesia     low bp coming out of anesthesia \"one time\"    Anxiety 11/18/2011    Arthritis     fibromyalgia    Breast mass, left     Breath shortness     occasionally, inhalers a few times a year    Bursitis of knee     left    Cancer (HCC) 2005    squamous cell on nose    Cancer (HCC) 2021    Stage 2 breast cancer    Chronic pain 11/18/2011    Depression 11/18/2011    Diabetes (HCC)     Fibromyalgia 11/18/2011    Gastro-esophageal reflux 3/20/2012    Heart burn     Heart murmur     no cardiolgist    Iron deficiency anemia 3/20/2012    Migraine     Neuropathy " 11/18/2011    Pain 6/14/12    3/10 stomach    Pneumonia 01/2012    Pulmonary hypertension (HCC) 03/20/2012    Syncope and collapse 3/20/2012       Past Surgical History:   Procedure Laterality Date    FULL THICKNESS SKIN GRAFT N/A 8/4/2021    Procedure: APPLICATION, GRAFT, SKIN, FULL-THICKNESS - TO ABDOMEN;  Surgeon: Thong Mota M.D.;  Location: [facility];  Service: General    DEBRIDEMENT  7/7/2021    Procedure: DEBRIDEMENT - ABDOMINAL WALL WOUND;  Surgeon: Thong Mota M.D.;  Location: [facility];  Service: Gastroenterology    APPLICATION OR REPLACEMENT, WOUND VAC  7/7/2021    Procedure: APPLICATION OR REPLACEMENT,WOUND VAC - FOR APPILCATION.;  Surgeon: Thong Mota M.D.;  Location: [facility];  Service: Gastroenterology    PB SECD CLOS SURG WND EXTEN/COMPLIC  3/31/2021    Procedure: CLOSURE, WOUND, SECONDARY - FOR DELAYED PRIMARY CLOSURE OF ABDOMINAL WALL;  Surgeon: Fede Russ M.D.;  Location: [facility];  Service: General    FLAP GRAFT  3/31/2021    Procedure: FLAP GRAFT - ADVANCEMENT;  Surgeon: Fede Russ M.D.;  Location: [facility];  Service: General    VA EXPLORATORY OF ABDOMEN  1/26/2021    Procedure: LAPAROTOMY, EXPLORATORY - LYSIS OF ADHESIONS;  Surgeon: Fede Russ M.D.;  Location: [facility];  Service: General    IRRIGATION & DEBRIDEMENT GENERAL  1/26/2021    Procedure: IRRIGATION AND DEBRIDEMENT, WOUND - ABDOMINAL WALL WITH REMOVAL OF MESH;  Surgeon: Fede Russ M.D.;  Location: [facility];  Service: General    APPLICATION OR REPLACEMENT, WOUND VAC  1/26/2021    Procedure: APPLICATION OR REPLACEMENT,WOUND VAC;  Surgeon: Fede Russ M.D.;  Location: [facility];  Service: General    IRRIGATION & DEBRIDEMENT GENERAL N/A 9/30/2020    Procedure: IRRIGATION AND DEBRIDEMENT, WOUND-ABD WALL, WOUND VAC PLACEMENT;  Surgeon: Fede Russ M.D.;  Location: [facility];   Service: General    CATH PLACEMENT  2013    Performed by Lizeth Ferreira M.D. at SURGERY SAME DAY [facility]S    AXILLARY NODE DISSECTION  2013    Performed by Lizeth Ferreira M.D. at SURGERY SAME DAY [facility]    NODE BIOPSY  2013    Performed by Lizeth Ferreira M.D. at SURGERY SAME DAY [facility]    BREAST BIOPSY  2013    Performed by Lizeth Ferreira M.D. at SURGERY SAME DAY [facility]    LUMPECTOMY Left     9 lymph note removal    BREAST BIOPSY  2012    Performed by Lizeth Ferreira M.D. at SURGERY SAME DAY [facility]    COLONOSCOPY  2012    Performed by PARAM VALDEZ at SURGERY [facility] ORS    GASTROSCOPY  2012    Performed by PARAM VALDEZ at SURGERY [facility] ORS    ABDOMINAL EXPLORATION  10/7/2010    Performed by MARIA G KHAN JR at SURGERY [facility] ORS    IRRIGATION & DEBRIDEMENT GENERAL  2010    Performed by OZZIE WEINSTEIN at SURGERY [facility] ORS    HERNIA REPAIR  3/15/2010    Performed by MARIA G KHAN JR at SURGERY [facility]    FLAP GRAFT  3/15/2010    Performed by MARIA G KHAN JR at SURGERY [facility]    PANNICULECTOMY  3/15/2010    Performed by MARIA G KHAN JR at SURGERY [facility]S    ABDOMINAL EXPLORATION  3/11/2010    Performed by MARIA G KHAN JR at SURGERY SAME DAY [facility]    OTHER      pulled mesh out of hernia    OTHER      abscess removed abd.    OTHER      bowel obstruction    OTHER  2009    hernia repair,mesh removal after in aug.09    GASTRIC BYPASS LAPAROSCOPIC      OTHER      bariatric surgery gastric bypass    GYN SURGERY  1984    tubal       Social History     Tobacco Use    Smoking status: Former     Packs/day: 0.10     Years: 8.00     Pack years: 0.80     Types: Cigarettes     Quit date: 2016     Years since quittin.2    Smokeless tobacco: Never    Tobacco comments:     1/2 pk a day on and off 10 yrs, 1 cigarette daily   Vaping Use  "   Vaping Use: Never used   Substance Use Topics    Alcohol use: Not Currently     Comment: maybe once a year    Drug use: No       Family History   Problem Relation Age of Onset    Heart Attack Father     Anxiety disorder Father     Depression Father     Schizophrenia Father     Hypertension Mother     Cancer Maternal Grandfather         leukemia    Cancer Other         leukemia- cousin    Diabetes Maternal Uncle     Anxiety disorder Maternal Uncle     Diabetes Maternal Uncle     Anxiety disorder Maternal Uncle        ROS:   As in HPI, otherwise negative for chest pain, dysuria, blood in stool, fever         Exam: /70   Pulse 75   Temp 36.6 °C (97.9 °F) (Temporal)   Resp 16   Ht 1.651 m (5' 5\")   Wt 115 kg (252 lb 8 oz)   SpO2 96%  Body mass index is 42.02 kg/m².    General: Alert, pleasant, well nourished, well developed female in NAD  HEENT: Normocephalic. Eyes conjunctiva clear lids without ptosis, pupils equal and reactive to light, ears normal shape and contour, canals are clear bilaterally, tympanic membranes are pearly gray with good light reflex, nasal mucosa without erythema and drainage, oropharynx is without erythema, edema or exudates.   Neck: Supple without bruit. Thyroid is not enlarged.  Pulmonary: Clear to ausculation.  Normal effort. No rales, ronchi, or wheezing.  Cardiovascular: Normal rate and rhythm without murmur.   Abdomen: Soft, nontender, nondistended. Normal bowel sounds. Liver and spleen are not palpable  Neurologic: Grossly nonfocal  Lymph: No cervical or supraclavicular lymph nodes are palpable  Skin: Warm and dry.    Psych: Normal mood and affect. Alert and oriented. Judgment and insight is normal.    Diabetic Foot Exam: No ulcers or skin lesions present, patient tested with a 10 g force and is sensitive bilaterally throughout the ball of the foot, great toe and heel.  Dorsalis pedis and posterior tibial pulses are present and intact bilaterally      Please note that this " dictation was created using voice recognition software. I have made every reasonable attempt to correct obvious errors, but I expect that there are errors of grammar and possibly content that I did not discover before finalizing the note.      Assessment/Plan  1. Type 2 diabetes mellitus with diabetic neuropathy, without long-term current use of insulin (HCC)  Well-controlled.  Continue with Ozempic.  Will increase dose to 1 mg to help with weight loss.  Continue with lifestyle measures.  Reviewed low-carb diet and plate method in addition to routine aerobic exercise as tolerated.  No abnormality on foot exam today.  - POCT  A1C  - Diabetic Monofilament Lower Extremity Exam  - Comp Metabolic Panel; Future  - ESTIMATED GFR; Future  - HEMOGLOBIN A1C; Future  - Lipid Profile; Future  - MICROALBUMIN CREAT RATIO URINE; Future    2. Acute gastritis without hemorrhage, unspecified gastritis type  We will prescribe sulcal fate to take for 2 to 4 weeks.  Continue with omeprazole.  Instructions side effects reviewed with patient.  - sucralfate (CARAFATE) 1 GM Tab; Take 1 Tablet by mouth 4 Times a Day,Before Meals and at Bedtime.  Dispense: 120 Tablet; Refill: 0    3. Short of breath on exertion  Continues with episodes of shortness of breath, though not as frequent as before.  We will continue with plan and get echocardiogram and EKG.  We will also try albuterol inhaler as needed.    4. Rash  Resolved.    5. Chronic midline low back pain without sciatica  Requesting refill  - diclofenac sodium (VOLTAREN) 1 % Gel; APPLY 2 GRAMS TOPICALLY 4 TIMES A DAY AS NEEDED (JOINT PAIN).  Dispense: 100 g; Refill: 1    6. Chronic tension-type headache, not intractable  Requesting refill  - rizatriptan (MAXALT) 10 MG tablet; TAKE 1 TAB BY MOUTH ONCE AS NEEDED FOR MIGRAINE FOR UP TO 1 DOSE. MAY REPEAT IN 2 HOURS IF NEEDED  Dispense: 12 Tablet; Refill: 2  - propranolol LA (INDERAL LA) 60 MG CAPSULE SR 24 HR; Take 1 Capsule by mouth every day.   Dispense: 90 Capsule; Refill: 3    7. Itching  Requesting refill  - hydrOXYzine HCl (ATARAX) 25 MG Tab; Take 1 Tablet by mouth 3 times a day as needed for Itching.  Dispense: 90 Tablet; Refill: 1    8. Type 2 diabetes mellitus without complication, without long-term current use of insulin ([facility])      9. Morbid obesity with BMI of 40.0-44.9, adult ([facility])  Continue with Ozempic and lifestyle measures.    10. Primary insomnia  Well-controlled.  Continue trazodone.  Reviewed good sleep hygiene.  - traZODone (DESYREL) 100 MG Tab; TAKE 1 TO 3 TABS BY MOUTH AT BEDTIME AS NEEDED FOR SLEEP.  Dispense: 270 Tablet; Refill: 1

## 2023-08-02 NOTE — ASSESSMENT & PLAN NOTE
Reports continued intermittent shortness of breath with exertion.  Had to cancel echocardiogram and EKG because her father had a stroke.  Plans on rescheduling at [facility].  Wonders if albuterol inhaler may help.  Has needed it in the past.  Denies wheezing, chest pain, fever, cough.

## 2023-08-04 NOTE — ASSESSMENT & PLAN NOTE
Dr. Culp,    Called and spoke with Dr. Lopez' office. I asked for him to call back. I gave them your cell phone number and I will follow up with this if I need to call again.    Thank you,    Milady Thompson, RN  Triage [facility]  08/04/23 11:31 EDT     Patient reports 2-month onset of upper body itching and some sudden tingling sensations in bilateral arms.  She did discontinue her Escitalopram and Wellbutrin suddenly a few months ago.  Does have history of neuropathy.

## 2023-08-09 ENCOUNTER — PATIENT MESSAGE (OUTPATIENT)
Dept: MEDICAL GROUP | Facility: PHYSICIAN GROUP | Age: 63
End: 2023-08-09
Payer: COMMERCIAL

## 2023-08-09 DIAGNOSIS — G44.229 CHRONIC TENSION-TYPE HEADACHE, NOT INTRACTABLE: ICD-10-CM

## 2023-08-09 RX ORDER — BUTALBITAL, ACETAMINOPHEN AND CAFFEINE 50; 325; 40 MG/1; MG/1; MG/1
TABLET ORAL
Qty: 30 TABLET | Status: CANCELLED | OUTPATIENT
Start: 2023-08-09

## 2023-08-09 RX ORDER — BUTALBITAL, ACETAMINOPHEN AND CAFFEINE 50; 325; 40 MG/1; MG/1; MG/1
TABLET ORAL
COMMUNITY
Start: 2023-06-15 | End: 2023-08-09

## 2023-08-09 RX ORDER — BUTALBITAL, ACETAMINOPHEN AND CAFFEINE 50; 325; 40 MG/1; MG/1; MG/1
1 TABLET ORAL EVERY 6 HOURS PRN
Qty: 15 TABLET | Refills: 2 | Status: SHIPPED | OUTPATIENT
Start: 2023-08-09 | End: 2023-11-08

## 2023-08-09 NOTE — PATIENT COMMUNICATION
Received request via: Patient    Was the patient seen in the last year in this department? Yes    Does the patient have an active prescription (recently filled or refills available) for medication(s) requested? No    Does the patient have halfway Plus and need 100 day supply (blood pressure, diabetes and cholesterol meds only)? Patient does not have SCP

## 2023-10-23 ENCOUNTER — APPOINTMENT (RX ONLY)
Dept: URBAN - NONMETROPOLITAN AREA CLINIC 15 | Facility: CLINIC | Age: 63
Setting detail: DERMATOLOGY
End: 2023-10-23

## 2023-10-23 DIAGNOSIS — L81.4 OTHER MELANIN HYPERPIGMENTATION: ICD-10-CM

## 2023-10-23 DIAGNOSIS — Z71.89 OTHER SPECIFIED COUNSELING: ICD-10-CM

## 2023-10-23 DIAGNOSIS — D22 MELANOCYTIC NEVI: ICD-10-CM

## 2023-10-23 DIAGNOSIS — L82.1 OTHER SEBORRHEIC KERATOSIS: ICD-10-CM

## 2023-10-23 DIAGNOSIS — D18.0 HEMANGIOMA: ICD-10-CM

## 2023-10-23 PROBLEM — D22.5 MELANOCYTIC NEVI OF TRUNK: Status: ACTIVE | Noted: 2023-10-23

## 2023-10-23 PROBLEM — D18.01 HEMANGIOMA OF SKIN AND SUBCUTANEOUS TISSUE: Status: ACTIVE | Noted: 2023-10-23

## 2023-10-23 PROBLEM — D22.61 MELANOCYTIC NEVI OF RIGHT UPPER LIMB, INCLUDING SHOULDER: Status: ACTIVE | Noted: 2023-10-23

## 2023-10-23 PROBLEM — D22.62 MELANOCYTIC NEVI OF LEFT UPPER LIMB, INCLUDING SHOULDER: Status: ACTIVE | Noted: 2023-10-23

## 2023-10-23 PROCEDURE — ? LIQUID NITROGEN

## 2023-10-23 PROCEDURE — 99203 OFFICE O/P NEW LOW 30 MIN: CPT

## 2023-10-23 PROCEDURE — ? COUNSELING

## 2023-10-23 ASSESSMENT — LOCATION DETAILED DESCRIPTION DERM
LOCATION DETAILED: LEFT ANTECUBITAL SKIN
LOCATION DETAILED: SUBXIPHOID
LOCATION DETAILED: PERIUMBILICAL SKIN
LOCATION DETAILED: RIGHT MEDIAL UPPER BACK
LOCATION DETAILED: LOWER STERNUM
LOCATION DETAILED: RIGHT INFERIOR MEDIAL UPPER BACK
LOCATION DETAILED: LEFT VENTRAL PROXIMAL FOREARM
LOCATION DETAILED: RIGHT ANTERIOR DISTAL UPPER ARM
LOCATION DETAILED: LEFT INFERIOR MEDIAL FOREHEAD
LOCATION DETAILED: RIGHT SUPERIOR UPPER BACK
LOCATION DETAILED: RIGHT SUPERIOR LATERAL MIDBACK
LOCATION DETAILED: SUPERIOR LUMBAR SPINE
LOCATION DETAILED: LEFT SUPERIOR LATERAL MIDBACK
LOCATION DETAILED: RIGHT INFERIOR UPPER BACK
LOCATION DETAILED: RIGHT VENTRAL PROXIMAL FOREARM
LOCATION DETAILED: RIGHT INFERIOR LATERAL MIDBACK
LOCATION DETAILED: LEFT MID-UPPER BACK
LOCATION DETAILED: RIGHT ANTECUBITAL SKIN
LOCATION DETAILED: RIGHT CENTRAL MALAR CHEEK

## 2023-10-23 ASSESSMENT — LOCATION SIMPLE DESCRIPTION DERM
LOCATION SIMPLE: ABDOMEN
LOCATION SIMPLE: LEFT FOREARM
LOCATION SIMPLE: RIGHT FOREARM
LOCATION SIMPLE: LEFT FOREHEAD
LOCATION SIMPLE: RIGHT UPPER ARM
LOCATION SIMPLE: RIGHT UPPER BACK
LOCATION SIMPLE: LEFT UPPER BACK
LOCATION SIMPLE: LOWER BACK
LOCATION SIMPLE: RIGHT CHEEK
LOCATION SIMPLE: RIGHT LOWER BACK
LOCATION SIMPLE: LEFT UPPER ARM
LOCATION SIMPLE: CHEST
LOCATION SIMPLE: LEFT LOWER BACK

## 2023-10-23 ASSESSMENT — LOCATION ZONE DERM
LOCATION ZONE: FACE
LOCATION ZONE: TRUNK
LOCATION ZONE: ARM

## 2023-10-23 NOTE — PROCEDURE: LIQUID NITROGEN
Render Post-Care Instructions In Note?: no
Show Spray Paint Technique Variable?: Yes
Medical Necessity Information: It is in your best interest to select a reason for this procedure from the list below. All of these items fulfill various CMS LCD requirements except the new and changing color options.
Duration Of Freeze Thaw-Cycle (Seconds): 0
Detail Level: Detailed
Consent: The patient's consent was obtained including but not limited to risks of crusting, scabbing, blistering, scarring, darker or lighter pigmentary change, recurrence, incomplete removal and infection.
Spray Paint Text: The liquid nitrogen was applied to the skin utilizing a spray paint frosting technique.
Post-Care Instructions: I reviewed with the patient in detail post-care instructions. Patient is to wear sunprotection, and avoid picking at any of the treated lesions. Pt may apply Vaseline to crusted or scabbing areas.
Medical Necessity Clause: This procedure was medically necessary because the lesions that were treated were:  If lesion does not resolve, bx is needed.

## 2023-10-23 NOTE — PROCEDURE: COUNSELING
Symptoms of retinal detachment and endophthalmitis following intravitreal injection discussed; patient advised to call immediately if symptoms ensue. Detail Level: Zone

## 2023-11-09 ENCOUNTER — OFFICE VISIT (OUTPATIENT)
Dept: MEDICAL GROUP | Facility: PHYSICIAN GROUP | Age: 63
End: 2023-11-09
Payer: COMMERCIAL

## 2023-11-09 VITALS
WEIGHT: 250 LBS | HEIGHT: 65 IN | RESPIRATION RATE: 18 BRPM | TEMPERATURE: 97.6 F | DIASTOLIC BLOOD PRESSURE: 78 MMHG | BODY MASS INDEX: 41.65 KG/M2 | SYSTOLIC BLOOD PRESSURE: 118 MMHG | HEART RATE: 80 BPM | OXYGEN SATURATION: 94 %

## 2023-11-09 DIAGNOSIS — M25.50 PAIN IN JOINT, MULTIPLE SITES: ICD-10-CM

## 2023-11-09 DIAGNOSIS — Z23 NEED FOR VACCINATION: ICD-10-CM

## 2023-11-09 DIAGNOSIS — G44.229 CHRONIC TENSION-TYPE HEADACHE, NOT INTRACTABLE: ICD-10-CM

## 2023-11-09 DIAGNOSIS — Z11.4 SCREENING FOR HIV (HUMAN IMMUNODEFICIENCY VIRUS): ICD-10-CM

## 2023-11-09 DIAGNOSIS — E11.40 TYPE 2 DIABETES MELLITUS WITH DIABETIC NEUROPATHY, WITHOUT LONG-TERM CURRENT USE OF INSULIN (HCC): ICD-10-CM

## 2023-11-09 DIAGNOSIS — Z00.00 PHYSICAL EXAM, ANNUAL: ICD-10-CM

## 2023-11-09 PROCEDURE — 90686 IIV4 VACC NO PRSV 0.5 ML IM: CPT | Performed by: PHYSICIAN ASSISTANT

## 2023-11-09 PROCEDURE — 99214 OFFICE O/P EST MOD 30 MIN: CPT | Mod: 25 | Performed by: PHYSICIAN ASSISTANT

## 2023-11-09 PROCEDURE — 90471 IMMUNIZATION ADMIN: CPT | Performed by: PHYSICIAN ASSISTANT

## 2023-11-09 PROCEDURE — 3074F SYST BP LT 130 MM HG: CPT | Performed by: PHYSICIAN ASSISTANT

## 2023-11-09 PROCEDURE — 3078F DIAST BP <80 MM HG: CPT | Performed by: PHYSICIAN ASSISTANT

## 2023-11-09 RX ORDER — GABAPENTIN 100 MG/1
CAPSULE ORAL
COMMUNITY
End: 2023-11-09

## 2023-11-09 RX ORDER — PROPRANOLOL HYDROCHLORIDE 20 MG/1
1 TABLET ORAL 2 TIMES DAILY
COMMUNITY
End: 2023-11-09

## 2023-11-09 RX ORDER — SUCRALFATE 1 G/1
1 TABLET ORAL
COMMUNITY
End: 2023-11-09

## 2023-11-09 RX ORDER — OMEGA-3 FATTY ACIDS/FISH OIL 300-1000MG
CAPSULE ORAL
COMMUNITY
End: 2023-11-09

## 2023-11-09 RX ORDER — OXYCODONE HYDROCHLORIDE AND ACETAMINOPHEN 5; 325 MG/1; MG/1
1 TABLET ORAL EVERY 6 HOURS PRN
COMMUNITY
Start: 2023-10-13 | End: 2023-11-09

## 2023-11-09 RX ORDER — HYDROCODONE BITARTRATE AND ACETAMINOPHEN 5; 325 MG/1; MG/1
TABLET ORAL
COMMUNITY
End: 2023-11-09

## 2023-11-09 RX ORDER — HYDROXYZINE HYDROCHLORIDE 25 MG/1
1 TABLET, FILM COATED ORAL 3 TIMES DAILY PRN
COMMUNITY
End: 2023-11-09

## 2023-11-09 RX ORDER — TRAMADOL HYDROCHLORIDE 50 MG/1
TABLET ORAL
COMMUNITY
End: 2023-11-09

## 2023-11-09 RX ORDER — SEMAGLUTIDE 1.34 MG/ML
0.5 INJECTION, SOLUTION SUBCUTANEOUS
COMMUNITY
End: 2023-11-09

## 2023-11-09 RX ORDER — MONTELUKAST SODIUM 10 MG/1
TABLET ORAL
COMMUNITY
End: 2023-11-09

## 2023-11-09 RX ORDER — LIDOCAINE HYDROCHLORIDE 20 MG/ML
JELLY TOPICAL
COMMUNITY
End: 2023-11-09

## 2023-11-09 RX ORDER — FLUTICASONE PROPIONATE 50 MCG
SPRAY, SUSPENSION (ML) NASAL
COMMUNITY
End: 2023-11-09

## 2023-11-09 RX ORDER — TOPIRAMATE 25 MG/1
1 TABLET ORAL
COMMUNITY
End: 2023-11-09

## 2023-11-09 RX ORDER — AMITRIPTYLINE HYDROCHLORIDE 50 MG/1
50 TABLET, FILM COATED ORAL NIGHTLY
Qty: 90 TABLET | Refills: 0 | Status: SHIPPED | OUTPATIENT
Start: 2023-11-09 | End: 2023-12-07

## 2023-11-09 RX ORDER — IBUPROFEN 800 MG/1
800 TABLET ORAL EVERY 6 HOURS PRN
COMMUNITY
Start: 2023-10-11 | End: 2023-11-09

## 2023-11-09 RX ORDER — OXYCODONE HYDROCHLORIDE 5 MG/1
TABLET ORAL
COMMUNITY
End: 2023-11-09

## 2023-11-09 RX ORDER — METHOCARBAMOL 500 MG/1
TABLET, FILM COATED ORAL
COMMUNITY
End: 2023-11-09

## 2023-11-09 RX ORDER — ACETAMINOPHEN 325 MG/1
TABLET ORAL
COMMUNITY
End: 2023-11-09

## 2023-11-09 RX ORDER — LIDOCAINE 50 MG/G
OINTMENT TOPICAL
COMMUNITY
End: 2023-11-09

## 2023-11-09 RX ORDER — ONDANSETRON 4 MG/1
TABLET, FILM COATED ORAL
COMMUNITY
End: 2023-11-09

## 2023-11-09 RX ORDER — METFORMIN HYDROCHLORIDE 500 MG/1
1 TABLET, EXTENDED RELEASE ORAL
COMMUNITY
End: 2023-11-09

## 2023-11-09 ASSESSMENT — FIBROSIS 4 INDEX: FIB4 SCORE: 1.19

## 2023-11-09 NOTE — PROGRESS NOTES
CC:    Chief Complaint   Patient presents with    [facility]    Diabetes       HISTORY OF THE PRESENT ILLNESS: Patient is a 63 y.o. female presenting to [facility] primary care     Pt has hx of headaches since teenager. Has been worsening recently and has been on propranolol, maxalt, and excedrin. Questioning her allergies might be triggering since they have been worsening. Has been taking amitriptyline, trazodone and tylenol PM every night.   Pt diagnosed with fibromyalgia in 2003 and having joint pains. Also having SOB for past 3 weeks but has been on and off for years. What she is feeling is acute on chronic fatigue, SOB, and joint pains. Has pain in bilateral knee and lower back and occaisionally her hips. Seen by pain management several years ago but stopped taking opiates. Has had extensive imaging and autoimmune labs done several years ago.     No problem-specific Assessment & Plan notes found for this encounter.    Allergies: Patient has no known allergies.    Current Outpatient Medications Ordered in Epic   Medication Sig Dispense Refill    diclofenac sodium (VOLTAREN) 1 % Gel APPLY 2 GRAMS TOPICALLY 4 TIMES A DAY AS NEEDED (JOINT PAIN). 100 g 1    rizatriptan (MAXALT) 10 MG tablet TAKE 1 TAB BY MOUTH ONCE AS NEEDED FOR MIGRAINE FOR UP TO 1 DOSE. MAY REPEAT IN 2 HOURS IF NEEDED 12 Tablet 2    propranolol LA (INDERAL LA) 60 MG CAPSULE SR 24 HR Take 1 Capsule by mouth every day. 90 Capsule 3    hydrOXYzine HCl (ATARAX) 25 MG Tab Take 1 Tablet by mouth 3 times a day as needed for Itching. 90 Tablet 1    traZODone (DESYREL) 100 MG Tab TAKE 1 TO 3 TABS BY MOUTH AT BEDTIME AS NEEDED FOR SLEEP. 270 Tablet 1    Semaglutide, 1 MG/DOSE, (OZEMPIC, 1 MG/DOSE,) 4 MG/3ML Solution Pen-injector Inject 1 mg under the skin every 7 days. 12 mL 1    albuterol 108 (90 Base) MCG/ACT Aero Soln inhalation aerosol Inhale 2 Puffs every four hours as needed for Shortness of Breath. 1 Each 2    amitriptyline (ELAVIL) 10 MG Tab TAKE  "1 TO 2 TABS BY MOUTH AT BEDTIME. 180 Tablet 3    omeprazole (PRILOSEC) 20 MG delayed-release capsule TAKE 1 CAPSULE BY MOUTH EVERY DAY 90 Capsule 3    Turmeric (QC TUMERIC COMPLEX PO) Take  by mouth.      ferrous fum/vit C/B12 IF FA (TRINSICON) Cap Take 1 capsule by mouth every day.      acetaminophen (TYLENOL) 500 MG Tab Take 1,000 mg by mouth 3 times a day as needed (HA).       No current Epic-ordered facility-administered medications on file.       Past Medical History:   Diagnosis Date    Anesthesia     low bp coming out of anesthesia \"one time\"    Anxiety 11/18/2011    Arthritis     fibromyalgia    Breast mass, left     Breath shortness     occasionally, inhalers a few times a year    Bursitis of knee     left    Cancer (HCC) 2005    squamous cell on nose    Cancer (HCC) 2021    Stage 2 breast cancer    Chronic pain 11/18/2011    Depression 11/18/2011    Diabetes ([facility])     Fibromyalgia 11/18/2011    Gastro-esophageal reflux 3/20/2012    Heart burn     Heart murmur     no cardiolgist    Iron deficiency anemia 3/20/2012    Migraine     Neuropathy 11/18/2011    Pain 6/14/12    3/10 stomach    Pneumonia 01/2012    Pulmonary hypertension (HCC) 03/20/2012    Syncope and collapse 3/20/2012       Past Surgical History:   Procedure Laterality Date    FULL THICKNESS SKIN GRAFT N/A 8/4/2021    Procedure: APPLICATION, GRAFT, SKIN, FULL-THICKNESS - TO ABDOMEN;  Surgeon: Thong Mota M.D.;  Location: SURGERY [facility];  Service: General    DEBRIDEMENT  7/7/2021    Procedure: DEBRIDEMENT - ABDOMINAL WALL WOUND;  Surgeon: Thong Mota M.D.;  Location: SURGERY [facility];  Service: Gastroenterology    APPLICATION OR REPLACEMENT, WOUND VAC  7/7/2021    Procedure: APPLICATION OR REPLACEMENT,WOUND VAC - FOR APPILCATION.;  Surgeon: Thong Mota M.D.;  Location: SURGERY [facility];  Service: Gastroenterology    PB SECD CLOS SURG WND EXTEN/COMPLIC  3/31/2021    Procedure: CLOSURE, WOUND, SECONDARY " - FOR DELAYED PRIMARY CLOSURE OF ABDOMINAL WALL;  Surgeon: Param Russ M.D.;  Location: SURGERY [facility];  Service: General    FLAP GRAFT  3/31/2021    Procedure: FLAP GRAFT - ADVANCEMENT;  Surgeon: Param Russ M.D.;  Location: [facility];  Service: General    [place] EXPLORATORY OF ABDOMEN  1/26/2021    Procedure: LAPAROTOMY, EXPLORATORY - LYSIS OF ADHESIONS;  Surgeon: Param Russ M.D.;  Location: [facility];  Service: General    IRRIGATION & DEBRIDEMENT GENERAL  1/26/2021    Procedure: IRRIGATION AND DEBRIDEMENT, WOUND - ABDOMINAL WALL WITH REMOVAL OF MESH;  Surgeon: Param Russ M.D.;  Location: [facility];  Service: General    APPLICATION OR REPLACEMENT, WOUND VAC  1/26/2021    Procedure: APPLICATION OR REPLACEMENT,WOUND VAC;  Surgeon: Param Russ M.D.;  Location: [facility];  Service: General    IRRIGATION & DEBRIDEMENT GENERAL N/A 9/30/2020    Procedure: IRRIGATION AND DEBRIDEMENT, WOUND-ABD WALL, WOUND VAC PLACEMENT;  Surgeon: Param Russ M.D.;  Location: [facility];  Service: General    CATH PLACEMENT  1/25/2013    Performed by Lizeth Ferreira M.D. at SURGERY SAME DAY [facility]    AXILLARY NODE DISSECTION  1/9/2013    Performed by Lizeth Ferreira M.D. at SURGERY SAME DAY [facility]    NODE BIOPSY  1/9/2013    Performed by Lizeth Ferreira M.D. at SURGERY SAME DAY [facility]    BREAST BIOPSY  1/9/2013    Performed by Lizeth Ferreira M.D. at SURGERY SAME DAY [facility]    LUMPECTOMY Left 2013    9 lymph note removal    BREAST BIOPSY  12/11/2012    Performed by Lizeth Ferreira M.D. at SURGERY SAME DAY [facility]S    COLONOSCOPY  6/21/2012    Performed by PARAM VALDEZ at [facility] ORS    GASTROSCOPY  6/21/2012    Performed by PARAM VALDEZ at [facility] ORS    ABDOMINAL EXPLORATION  10/7/2010    Performed by MARIA G KHAN JR at [facility] ORS    IRRIGATION & DEBRIDEMENT GENERAL  4/12/2010     Performed by OZZIE WEINSTEIN at SURGERY [facility] ORS    HERNIA REPAIR  3/15/2010    Performed by MARIA G KHAN JR at SURGERY [facility] ORS    FLAP GRAFT  3/15/2010    Performed by MARIA G KHAN JR at SURGERY [facility] ORS    PANNICULECTOMY  3/15/2010    Performed by MARIA G KHAN JR at SURGERY [facility] ORS    ABDOMINAL EXPLORATION  3/11/2010    Performed by MARIA G KHAN JR at SURGERY SAME DAY [facility]    OTHER      pulled mesh out of hernia    OTHER      abscess removed abd.    OTHER      bowel obstruction    OTHER  2009    hernia repair,mesh removal after in aug.09    GASTRIC BYPASS LAPAROSCOPIC      OTHER      bariatric surgery gastric bypass    GYN SURGERY      tubal       Social History     Tobacco Use    Smoking status: Former     Current packs/day: 0.00     Average packs/day: 0.1 packs/day for 8.0 years (0.8 ttl pk-yrs)     Types: Cigarettes     Start date: 2008     Quit date: 2016     Years since quittin.5    Smokeless tobacco: Never    Tobacco comments:     1/2 pk a day on and off 10 yrs, 1 cigarette daily   Vaping Use    Vaping Use: Never used   Substance Use Topics    Alcohol use: Not Currently     Comment: maybe once a year    Drug use: No       Social History     Social History Narrative    Patient lives with  in [place] and they have 2 grown children. She does not work. She is unable to drive due to health conditions and her son takes her to all appointments.        Family History   Problem Relation Age of Onset    Heart Attack Father     Anxiety disorder Father     Depression Father     Schizophrenia Father     Hypertension Mother     Cancer Maternal Grandfather         leukemia    Cancer Other         leukemia- cousin    Diabetes Maternal Uncle     Anxiety disorder Maternal Uncle     Diabetes Maternal Uncle     Anxiety disorder Maternal Uncle        ROS:     - Constitutional:Positive for fatigue.  Negative for  "fever, chills, unexpected weight change,     - HEENT: Negative for headaches, vision changes, hearing changes, ear pain, ear discharge, rhinorrhea, sinus congestion, sore throat, and neck pain.      - Respiratory:Positive for SOB. Negative for cough, sputum production, chest congestion, wheezing, and crackles.      - Cardiovascular: Negative for chest pain, palpitations, orthopnea, and bilateral lower extremity edema.     - Gastrointestinal: Negative for heartburn, nausea, vomiting, abdominal pain, hematochezia, melena, diarrhea, constipation, and greasy/foul-smelling stools.     - Genitourinary: Negative for dysuria, polyuria, hematuria, pyuria, urinary urgency, and urinary incontinence.     - Musculoskeletal:Positive for multiple joint pains Negative for myalgias, back pain,     - Skin: Negative for rash, itching, cyanotic skin color change.     - Neurological:Positive for headaches. Negative for dizziness, tingling, tremors, focal sensory deficit, focal weakness     - Endo/Heme/Allergies: Does not bruise/bleed easily.     - Psychiatric/Behavioral: Negative for depression, suicidal/homicidal ideation and memory loss.            .      Exam: /78   Pulse 80   Temp 36.4 °C (97.6 °F) (Temporal)   Resp 18   Ht 1.651 m (5' 5\")   Wt 113 kg (250 lb)   SpO2 94%  Body mass index is 41.6 kg/m².    General: Normal appearing. No acute distress.  Skin: Warm and dry.  No obvious lesions.  HEENT: Normocephalic. Eyes conjunctiva clear lids without ptosis, ears normal shape and contour  Cardiovascular: Regular rate and rhythm without murmur.   Respiratory: Clear to auscultation bilaterally, no rhonchi wheezing or rales.  Neurologic: Grossly nonfocal, A&O x3, gait normal,  Musculoskeletal: No deformity or swelling.   Extremities: No extremity cyanosis, clubbing, or edema.  Psych: Normal mood and affect. Judgment and insight is normal.    Please note that this dictation was created using voice recognition software. I have " made every reasonable attempt to correct obvious errors, but I expect that there are errors of grammar and possibly content that I did not discover before finalizing the note.      Assessment/Plan  1. Screening for HIV (human immunodeficiency virus)    - HIV AG/AB COMBO ASSAY SCREENING; Future    2. Need for vaccination    - INFLUENZA VACCINE QUAD INJ (PF)    3. Chronic tension-type headache, not intractable  Will get neurology consult. Increase amitriptyline to 50mg  - Referral to Neurology  - amitriptyline (ELAVIL) 50 MG Tab; Take 1 Tablet by mouth every evening.  Dispense: 90 Tablet; Refill: 0    4. Physical exam, annual  Labs printed  - CBC WITH DIFFERENTIAL; Future  - Comp Metabolic Panel; Future  - HEMOGLOBIN A1C; Future  - Lipid Profile; Future    5. Type 2 diabetes mellitus with diabetic neuropathy, without long-term current use of insulin (HCC)  Well controlled. Continue ozempic .5mg  - MICROALBUMIN CREAT RATIO URINE; Future    6. Pain in joint, multiple sites  Will get rheumatology consult  - Referral to Rheumatology

## 2023-11-15 ENCOUNTER — TELEPHONE (OUTPATIENT)
Dept: NEUROLOGY | Facility: MEDICAL CENTER | Age: 63
End: 2023-11-15

## 2023-11-15 ENCOUNTER — OFFICE VISIT (OUTPATIENT)
Dept: NEUROLOGY | Facility: MEDICAL CENTER | Age: 63
End: 2023-11-15
Attending: PSYCHIATRY & NEUROLOGY
Payer: COMMERCIAL

## 2023-11-15 ENCOUNTER — PHARMACY VISIT (OUTPATIENT)
Dept: PHARMACY | Facility: MEDICAL CENTER | Age: 63
End: 2023-11-15
Payer: COMMERCIAL

## 2023-11-15 VITALS
HEIGHT: 65 IN | SYSTOLIC BLOOD PRESSURE: 120 MMHG | TEMPERATURE: 98.4 F | OXYGEN SATURATION: 92 % | HEART RATE: 102 BPM | DIASTOLIC BLOOD PRESSURE: 80 MMHG | BODY MASS INDEX: 41.65 KG/M2 | WEIGHT: 250 LBS

## 2023-11-15 DIAGNOSIS — F43.10 POSTTRAUMATIC STRESS DISORDER: ICD-10-CM

## 2023-11-15 DIAGNOSIS — G43.E09 CHRONIC MIGRAINE WITH AURA WITHOUT STATUS MIGRAINOSUS, NOT INTRACTABLE: Primary | ICD-10-CM

## 2023-11-15 PROCEDURE — RXMED WILLOW AMBULATORY MEDICATION CHARGE: Performed by: PSYCHIATRY & NEUROLOGY

## 2023-11-15 PROCEDURE — 99204 OFFICE O/P NEW MOD 45 MIN: CPT | Performed by: PSYCHIATRY & NEUROLOGY

## 2023-11-15 PROCEDURE — 3079F DIAST BP 80-89 MM HG: CPT | Performed by: PSYCHIATRY & NEUROLOGY

## 2023-11-15 PROCEDURE — 3074F SYST BP LT 130 MM HG: CPT | Performed by: PSYCHIATRY & NEUROLOGY

## 2023-11-15 PROCEDURE — 99212 OFFICE O/P EST SF 10 MIN: CPT | Performed by: PSYCHIATRY & NEUROLOGY

## 2023-11-15 RX ORDER — ERENUMAB-AOOE 140 MG/ML
140 INJECTION, SOLUTION SUBCUTANEOUS
Qty: 1.12 ML | Refills: 11 | Status: SHIPPED | OUTPATIENT
Start: 2023-11-15 | End: 2024-04-10

## 2023-11-15 RX ORDER — CHLORHEXIDINE GLUCONATE ORAL RINSE 1.2 MG/ML
SOLUTION DENTAL
COMMUNITY
Start: 2023-10-11 | End: 2024-02-12

## 2023-11-15 RX ORDER — ERENUMAB-AOOE 140 MG/ML
140 INJECTION, SOLUTION SUBCUTANEOUS ONCE
Qty: 1 ML | Refills: 0 | Status: SHIPPED | OUTPATIENT
Start: 2023-11-15 | End: 2023-11-16

## 2023-11-15 RX ORDER — BUTALBITAL, ACETAMINOPHEN AND CAFFEINE 50; 325; 40 MG/1; MG/1; MG/1
TABLET ORAL
COMMUNITY
End: 2023-11-15

## 2023-11-15 ASSESSMENT — FIBROSIS 4 INDEX: FIB4 SCORE: 1.19

## 2023-11-15 NOTE — PROGRESS NOTES
"[facility] NEUROLOGY  GENERAL NEUROLOGY  NEW PATIENT VISIT    Referral source: Jerod Moore PA-C    CC: \"chronic tension-type headache...\"    HISTORY OF ILLNESS:  Mikayla Knight is a 63 y.o. woman with a history most notable for chronic headache, \"ataxia,\" breast cancer, HLD, fibromyalgia, PTSD, MDD, and CODY.  Today, she was unaccompanied, and she provided the following history:    The following is a summary of headache symptoms, presented in my standard format:    Family History: biological father was hospitalized with migraines  Age at onset (years): pre-teenager  Location: supra-orbital, bi-temporal  Radiation: wraps around the back of the head  Frequency: baseline: daily, lately:   Duration: baseline: whole day, lately:   Headache Days/Month: 30/30  Quality: \"feels like ears are going to blow out\"  Intensity: baseline: 7-8/10, lately:   Aura: visual (\"hazy,\" not blurry per say)  Photophobia/Phonophobia/Nausea/Vomiting: a little/yes/yes/not in years  Provoked by Physical Activity?:   Triggers: salt  Associated Symptoms:   Autonomic Signs (such as ptosis, miosis, conjunctival injection, rhinorrhea, increased lacrimation): tearing from the left eye, left eyelid drooping  Head Trauma: she has fallen and struck back of head and neck (after headaches started)  Association with Menses:   ED Visits: yes, x2   Hospitalizations: none  Missed Work Days (retired  for [employer], 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): yes  Sleep (hours/night): 4.5-5 hours/night  Caffeine Intake: 1 Pepsi/day  Hydration: keeps well-hydrated  Nutrition: tries not to skip meals  Exercise:   Analgesic Overuse:     Current Medication Regimen:  - amitriptyline:  mg was prescribed for headache, it is helpful for sleep  - propranolol: 60 mg was prescribed for headaches, unhelpful  - rizatriptan: 10 mg is ineffective    Medications Tried: Response  Preventive:  - topiramate: 100 mg was ineffective  - venlafaxine: 150 mg daily was " "ineffective  - Botox: last administered in ~2013, received a total of ~5 rounds on schedule    Rescue:  - Fioricet:     Medications Not Tried:  -     MEDICAL AND SURGICAL HISTORY:  Past Medical History:   Diagnosis Date    Anesthesia     low bp coming out of anesthesia \"one time\"    Anxiety 11/18/2011    Arthritis     fibromyalgia    Breast mass, left     Breath shortness     occasionally, inhalers a few times a year    Bursitis of knee     left    Cancer ([facility]) 2005    squamous cell on nose    Cancer ([facility]) 2021    Stage 2 breast cancer    Chronic pain 11/18/2011    Depression 11/18/2011    Diabetes ([facility])     Fibromyalgia 11/18/2011    Gastro-esophageal reflux 3/20/2012    Heart burn     Heart murmur     no cardiolgist    Iron deficiency anemia 3/20/2012    Migraine     Neuropathy 11/18/2011    Pain 6/14/12    3/10 stomach    Pneumonia 01/2012    Pulmonary hypertension ([facility]) 03/20/2012    Syncope and collapse 3/20/2012     Past Surgical History:   Procedure Laterality Date    FULL THICKNESS SKIN GRAFT N/A 8/4/2021    Procedure: APPLICATION, GRAFT, SKIN, FULL-THICKNESS - TO ABDOMEN;  Surgeon: Thong Mota M.D.;  Location: [facility];  Service: General    DEBRIDEMENT  7/7/2021    Procedure: DEBRIDEMENT - ABDOMINAL WALL WOUND;  Surgeon: Thong Mota M.D.;  Location: [facility];  Service: Gastroenterology    APPLICATION OR REPLACEMENT, WOUND VAC  7/7/2021    Procedure: APPLICATION OR REPLACEMENT,WOUND VAC - FOR APPILCATION.;  Surgeon: Thong Mota M.D.;  Location: [facility];  Service: Gastroenterology    PB SECD CLOS SURG WND EXTEN/COMPLIC  3/31/2021    Procedure: CLOSURE, WOUND, SECONDARY - FOR DELAYED PRIMARY CLOSURE OF ABDOMINAL WALL;  Surgeon: Fede Russ M.D.;  Location: [facility];  Service: General    FLAP GRAFT  3/31/2021    Procedure: FLAP GRAFT - ADVANCEMENT;  Surgeon: Fede Russ M.D.;  Location: [facility];  Service: " General    [place] EXPLORATORY OF ABDOMEN  1/26/2021    Procedure: LAPAROTOMY, EXPLORATORY - LYSIS OF ADHESIONS;  Surgeon: Param Russ M.D.;  Location: SURGERY [facility];  Service: General    IRRIGATION & DEBRIDEMENT GENERAL  1/26/2021    Procedure: IRRIGATION AND DEBRIDEMENT, WOUND - ABDOMINAL WALL WITH REMOVAL OF MESH;  Surgeon: Param Russ M.D.;  Location: SURGERY [facility];  Service: General    APPLICATION OR REPLACEMENT, WOUND VAC  1/26/2021    Procedure: APPLICATION OR REPLACEMENT,WOUND VAC;  Surgeon: Param Russ M.D.;  Location: SURGERY [facility];  Service: General    IRRIGATION & DEBRIDEMENT GENERAL N/A 9/30/2020    Procedure: IRRIGATION AND DEBRIDEMENT, WOUND-ABD WALL, WOUND VAC PLACEMENT;  Surgeon: Param Russ M.D.;  Location: SURGERY [facility];  Service: General    CATH PLACEMENT  1/25/2013    Performed by Lizeth Ferreira M.D. at SURGERY SAME DAY [facility]S    AXILLARY NODE DISSECTION  1/9/2013    Performed by Lizeth Ferreira M.D. at SURGERY SAME DAY [facility]S    NODE BIOPSY  1/9/2013    Performed by Lizeth Ferreira M.D. at SURGERY SAME DAY [facility]    BREAST BIOPSY  1/9/2013    Performed by Lizeth Ferreira M.D. at SURGERY SAME DAY [facility]    LUMPECTOMY Left 2013    9 lymph note removal    BREAST BIOPSY  12/11/2012    Performed by Lizeth Ferreira M.D. at SURGERY SAME DAY [facility]S    COLONOSCOPY  6/21/2012    Performed by PARAM VALDEZ at SURGERY [facility]S    GASTROSCOPY  6/21/2012    Performed by PARAM VALDEZ at SURGERY [facility]S    ABDOMINAL EXPLORATION  10/7/2010    Performed by MARIA G KHAN JR at SURGERY [facility]S    IRRIGATION & DEBRIDEMENT GENERAL  4/12/2010    Performed by OZZIE WEINSTEIN at SURGERY [facility]S    HERNIA REPAIR  3/15/2010    Performed by MARIA G KHAN JR at SURGERY [facility]S    FLAP GRAFT  3/15/2010    Performed by MARIA G KHAN JR at SURGERY [facility]S    PANNICULECTOMY  3/15/2010     Performed by MARIA G KHAN JR at SURGERY [facility]S    ABDOMINAL EXPLORATION  3/11/2010    Performed by MARIA G KHAN JR at SURGERY SAME DAY [facility]S    OTHER  08/09    pulled mesh out of hernia    OTHER  07/09    abscess removed abd.    OTHER  06/09    bowel obstruction    OTHER  04/2009    hernia repair,mesh removal after in aug.09    GASTRIC BYPASS LAPAROSCOPIC  2005    OTHER  2005    bariatric surgery gastric bypass    GYN SURGERY  1984    tubal     MEDICATIONS:  Current Outpatient Medications   Medication Sig    chlorhexidine (PERIDEX) 0.12 % Solution SWISH AND SPIT WITH 15ML TWICE DAILY    Erenumab-aooe (AIMOVIG) 140 MG/ML Solution Auto-injector Inject 140 mg under the skin every 30 (thirty) days for 30 days.    Erenumab-aooe (AIMOVIG) 140 MG/ML Solution Auto-injector Inject 140 mg under the skin one time for 1 dose.    amitriptyline (ELAVIL) 50 MG Tab Take 1 Tablet by mouth every evening.    diclofenac sodium (VOLTAREN) 1 % Gel APPLY 2 GRAMS TOPICALLY 4 TIMES A DAY AS NEEDED (JOINT PAIN).    rizatriptan (MAXALT) 10 MG tablet TAKE 1 TAB BY MOUTH ONCE AS NEEDED FOR MIGRAINE FOR UP TO 1 DOSE. MAY REPEAT IN 2 HOURS IF NEEDED    hydrOXYzine HCl (ATARAX) 25 MG Tab Take 1 Tablet by mouth 3 times a day as needed for Itching.    traZODone (DESYREL) 100 MG Tab TAKE 1 TO 3 TABS BY MOUTH AT BEDTIME AS NEEDED FOR SLEEP.    Semaglutide, 1 MG/DOSE, (OZEMPIC, 1 MG/DOSE,) 4 MG/3ML Solution Pen-injector Inject 1 mg under the skin every 7 days.    albuterol 108 (90 Base) MCG/ACT Aero Soln inhalation aerosol Inhale 2 Puffs every four hours as needed for Shortness of Breath.    omeprazole (PRILOSEC) 20 MG delayed-release capsule TAKE 1 CAPSULE BY MOUTH EVERY DAY    acetaminophen (TYLENOL) 500 MG Tab Take 1,000 mg by mouth 3 times a day as needed (HA).     SOCIAL HISTORY:  Social History     Tobacco Use    Smoking status: Former     Current packs/day: 0.00     Average packs/day: 0.1 packs/day for 8.0 years  (0.8 ttl pk-yrs)     Types: Cigarettes     Start date: 2008     Quit date: 2016     Years since quittin.5    Smokeless tobacco: Never    Tobacco comments:     1/2 pk a day on and off 10 yrs, 1 cigarette daily   Substance Use Topics    Alcohol use: Not Currently     Comment: maybe once a year     Social History     Social History Narrative    Patient lives with  in [place] and they have 2 grown children. She does not work. She is unable to drive due to health conditions and her son takes her to all appointments.      FAMILY HISTORY:  Family History   Problem Relation Age of Onset    Heart Attack Father     Anxiety disorder Father     Depression Father     Schizophrenia Father     Hypertension Mother     Cancer Maternal Grandfather         leukemia    Cancer Other         leukemia- cousin    Diabetes Maternal Uncle     Anxiety disorder Maternal Uncle     Diabetes Maternal Uncle     Anxiety disorder Maternal Uncle      REVIEW OF SYSTEMS:  A ROS was completed.  Pertinent positives and negatives were included in the HPI, above.  All other systems were reviewed and are negative.    PHYSICAL EXAM:  General/Medical:  - NAD    Neuro:  MENTAL STATUS: awake and alert; no deficits of speech or language; oriented to conversation; affect was appropriate to situation; pleasant, cooperative    CRANIAL NERVES:    II: acuity: NT, fields: NT, pupils: NT, discs: sharp    III/IV/VI: versions: grossly intact    V: facial sensation: NT    VII: facial expression: symmetric    VIII: hearing: intact to voice    IX/X: palate: NT    XI: shoulder shrug: NT    XII: tongue: NT    MOTOR:  - bulk: NT  - tone: NT  Upper Extremity Strength (R/L)    NT   Elbow flexion NT   Elbow extension NT   Shoulder abduction NT     Lower Extremity Strength (R/L)   Hip flexion NT   Knee extension NT   Knee flexion NT   Ankle plantarflexion NT   Ankle dorsiflexion NT     - pronator drift: NT  - abnormal movements: none    SENSATION:  - light  "touch: NT  - vibration (R/L, seconds): NT at the great toes  - pinprick: NT  - proprioception: NT  - Romberg: NT    COORDINATION:  - finger to nose: NT  - finger tapping: NT    REFLEXES:  Reflex Right Left   BR NT NT   Biceps NT NT   Triceps NT NT   Patellae NT NT   Achilles NT NT   Toes NT NT     GAIT:  - NT    REVIEW OF IMAGING STUDIES:  No head imaging available.    REVIEW OF LABORATORY STUDIES:  No recent data available.    ASSESSMENT:  Mikayla Knight is a 63 y.o. woman with chronic migraine with aura and a history otherwise notable for chronic headache, \"ataxia,\" breast cancer, HLD, fibromyalgia, PTSD, MDD, and CODY.  Plans/recommendations as follows:    PLAN:  Chronic Migraine w/ Aura:  Prevention:  - start Aimovig 140 mg/month (sample provided today)  - stop propranolol (ineffective)  - continue amitriptyline 50 mg/night (to promote sleep)  - get 7-9 hours of sleep per night; can try supplementing melatonin 2-10 mg, 2-3 hours before bedtime  - referral to behavioral health to addressed PTSD and promote sleep  - drink plenty of fluids (urine should be nearly clear)  - avoid excessive caffeine intake (no more than 2 servings per day and nothing in the afternoon)  - eat regular meals (don't skip meals)  - get moderate exercise (even just a 20 minute walk daily)    Rescue:  - stop Fioricet  - do not use analgesics (e.g., ibuprofen, acetaminophen) more than 2 days per week in order to avoid analgesic rebound headaches    - keep a headache log    Follow-Up:  - Return in about 4 months (around 3/15/2024).    Signed: Tim Mcmanus M.D.  "

## 2023-11-15 NOTE — TELEPHONE ENCOUNTER
Prior Authorization for ERENUMAB-AOOE (AIMOVIG) 140 MG/ML SOLUTION AUTO-INJECTOR has been approved for a quantity of 1 , day supply 30    Prior Authorization reference number: [id number]  Insurance: [facility]  Effective dates: 11/15/2023 to 05/15/2024  Copay: $10.00     Is patient eligible to fill with [facility]? Yes    Next Steps: The patient's copay exceeds $5.00. Proceed with contacting patient to offer financial assistance.

## 2023-11-16 ENCOUNTER — TELEPHONE (OUTPATIENT)
Dept: NEUROLOGY | Facility: MEDICAL CENTER | Age: 63
End: 2023-11-16
Payer: COMMERCIAL

## 2023-11-16 NOTE — TELEPHONE ENCOUNTER
Attempted to contact patient at [phone] to discuss [facility] and services/benefits offered. No answer, left voicemail.    Lynda Pennington  Rx Coordinator   [phone]

## 2023-12-06 DIAGNOSIS — G44.229 CHRONIC TENSION-TYPE HEADACHE, NOT INTRACTABLE: ICD-10-CM

## 2023-12-07 PROCEDURE — RXMED WILLOW AMBULATORY MEDICATION CHARGE: Performed by: PSYCHIATRY & NEUROLOGY

## 2023-12-07 RX ORDER — AMITRIPTYLINE HYDROCHLORIDE 50 MG/1
50 TABLET, FILM COATED ORAL EVERY EVENING
Qty: 90 TABLET | Refills: 0 | Status: SHIPPED | OUTPATIENT
Start: 2023-12-07

## 2023-12-08 ENCOUNTER — DOCUMENTATION (OUTPATIENT)
Dept: PHARMACY | Facility: MEDICAL CENTER | Age: 63
End: 2023-12-08
Payer: COMMERCIAL

## 2023-12-08 NOTE — PROGRESS NOTES
PHARMACIST PRE SCREEN - ** TRF Onboarding**  Diagnosis: Chronic migraine with aura without status migrainosus, not intractable [G43.109]      Drug & Non-Drug Allergies: EMR Reviewed. No concerning drug or non-drug allergies.                                                                                          Drug Therapy (name/formulation/dose/route of admin/frequency): Aimovig 140mg/mL solution in auto-injector. Inject 1 pen SC Q 30 days       EMR Reviewed   - Dose Appropriateness (Y/N):  Y   - Renal or Hepatic Adjustments (Y/N):  N   - Any comorbidities, PMH, precautions, or contraindications that pose a medication safety concern? (Y/N):  N   - Any relevant lab work needed that affects the initial start date? (Y/N):      n/a, patient provided sample by office during 11/15/23 FUV.    - List Past Treatments: amitriptyline; propranolol; rizatriptan; topiramate; venlafaxine; botox; fioricet    - EMR Med List reviewed.  List DDI’s:              Cat D/C amitriptyline + trazodone + hydroxyzine = additive/increased CNS depression. Continue to monitor and encourage PRN use when possible   - Patient’s ability to self-administer medication:  No issues identified per EMR  List Goals of Therapy:   - To reduce migraine frequency, duration, and severity   - To improve acute medication responsiveness and reduce the need for acute attacks   - To identify and modify migraine triggers      Patient pre-emptively opt out of clinical services for Aimovig as of 12/7/23

## 2023-12-11 ENCOUNTER — PHARMACY VISIT (OUTPATIENT)
Dept: PHARMACY | Facility: MEDICAL CENTER | Age: 63
End: 2023-12-11
Payer: COMMERCIAL

## 2024-01-09 PROCEDURE — RXMED WILLOW AMBULATORY MEDICATION CHARGE: Performed by: PSYCHIATRY & NEUROLOGY

## 2024-01-10 ENCOUNTER — TELEPHONE (OUTPATIENT)
Dept: PHARMACY | Facility: MEDICAL CENTER | Age: 64
End: 2024-01-10
Payer: COMMERCIAL

## 2024-01-10 NOTE — TELEPHONE ENCOUNTER
Contact:             Phone number: [phone]     Name of person spoken with and relationship to patient: TIMMY VIVAS  Medication:   Patient’s Adherence:             How patient is doing on medication: well     How many missed doses and reason: 0     Any new medications: no     Any new conditions: no     Any new allergies: no     Any new side effects: no      Any new diagnoses: no      How many doses remaining:      Did patient want to speak with pharmacist: no   Delivery:             Delivery date and method: FEDEX COLD 01/12     Needs by Date: 01/12     Signature required: no     Any additional details for : GERALDO Jama Appointment Date: GERALDO   Shipping Address: [address] [place] [address] LOOP   Medication(name, strength and dose): AIMOVIG 140MG 1/30DS $5   Copay: $5   Payment Method: NA   Supplies:   Additional Information:  PT STATED MED IS REALLY HELPING LESSEN FREQUENCY OF HEADACHES

## 2024-01-11 ENCOUNTER — PHARMACY VISIT (OUTPATIENT)
Dept: PHARMACY | Facility: MEDICAL CENTER | Age: 64
End: 2024-01-11
Payer: COMMERCIAL

## 2024-01-22 NOTE — TELEPHONE ENCOUNTER
Received request via: Pharmacy    Was the patient seen in the last year in this department? Yes    Does the patient have an active prescription (recently filled or refills available) for medication(s) requested? No  
Obstructive sleep apnea

## 2024-02-01 PROCEDURE — RXMED WILLOW AMBULATORY MEDICATION CHARGE: Performed by: PSYCHIATRY & NEUROLOGY

## 2024-02-02 ENCOUNTER — TELEPHONE (OUTPATIENT)
Dept: PHARMACY | Facility: MEDICAL CENTER | Age: 64
End: 2024-02-02
Payer: COMMERCIAL

## 2024-02-02 NOTE — TELEPHONE ENCOUNTER
Contact:             Phone number: [phone]     Name of person spoken with and relationship to patient: TIMMY VIVAS  Medication:   Patient’s Adherence:             How patient is doing on medication: well     How many missed doses and reason: 0     Any new medications: no     Any new conditions: no     Any new allergies: no     Any new side effects: no      Any new diagnoses: no      How many doses remainin     Did patient want to speak with pharmacist: no   Delivery:             Delivery date and method: FEDEX PD      Needs by Date:  NEXT INJ     Signature required: no     Any additional details for : GERALDO Jama Appointment Date: GERALDO   Shipping Address: [address] [place] [address]   Medication(name, strength and dose): AIMOVIG DS $5   Copay: $5   Payment Method: CCOF 7082   Supplies:    Additional Information:   PT STATED IT HAD MADE A DIFFERENCE IN THE NUMBER OF HEADACHES SHE GETS

## 2024-02-07 ENCOUNTER — PHARMACY VISIT (OUTPATIENT)
Dept: PHARMACY | Facility: MEDICAL CENTER | Age: 64
End: 2024-02-07
Payer: COMMERCIAL

## 2024-02-08 ENCOUNTER — HOSPITAL ENCOUNTER (OUTPATIENT)
Dept: LAB | Facility: MEDICAL CENTER | Age: 64
End: 2024-02-08
Attending: PHYSICIAN ASSISTANT
Payer: COMMERCIAL

## 2024-02-08 DIAGNOSIS — E11.40 TYPE 2 DIABETES MELLITUS WITH DIABETIC NEUROPATHY, WITHOUT LONG-TERM CURRENT USE OF INSULIN (HCC): ICD-10-CM

## 2024-02-08 DIAGNOSIS — Z00.00 PHYSICAL EXAM, ANNUAL: ICD-10-CM

## 2024-02-08 DIAGNOSIS — Z11.4 SCREENING FOR HIV (HUMAN IMMUNODEFICIENCY VIRUS): ICD-10-CM

## 2024-02-08 LAB
ALBUMIN SERPL BCP-MCNC: 3.9 G/DL (ref 3.2–4.9)
ALBUMIN/GLOB SERPL: 1.4 G/DL
ALP SERPL-CCNC: 118 U/L (ref 30–99)
ALT SERPL-CCNC: 36 U/L (ref 2–50)
ANION GAP SERPL CALC-SCNC: 15 MMOL/L (ref 7–16)
AST SERPL-CCNC: 34 U/L (ref 12–45)
BASOPHILS # BLD AUTO: 1.3 % (ref 0–1.8)
BASOPHILS # BLD: 0.06 K/UL (ref 0–0.12)
BILIRUB SERPL-MCNC: 0.3 MG/DL (ref 0.1–1.5)
BUN SERPL-MCNC: 9 MG/DL (ref 8–22)
CALCIUM ALBUM COR SERPL-MCNC: 9.1 MG/DL (ref 8.5–10.5)
CALCIUM SERPL-MCNC: 9 MG/DL (ref 8.5–10.5)
CHLORIDE SERPL-SCNC: 102 MMOL/L (ref 96–112)
CHOLEST SERPL-MCNC: 160 MG/DL (ref 100–199)
CO2 SERPL-SCNC: 19 MMOL/L (ref 20–33)
CREAT SERPL-MCNC: 0.69 MG/DL (ref 0.5–1.4)
CREAT UR-MCNC: 196.42 MG/DL
EOSINOPHIL # BLD AUTO: 0.07 K/UL (ref 0–0.51)
EOSINOPHIL NFR BLD: 1.5 % (ref 0–6.9)
ERYTHROCYTE [DISTWIDTH] IN BLOOD BY AUTOMATED COUNT: 45.6 FL (ref 35.9–50)
EST. AVERAGE GLUCOSE BLD GHB EST-MCNC: 103 MG/DL
FASTING STATUS PATIENT QL REPORTED: NORMAL
GFR SERPLBLD CREATININE-BSD FMLA CKD-EPI: 98 ML/MIN/1.73 M 2
GLOBULIN SER CALC-MCNC: 2.7 G/DL (ref 1.9–3.5)
GLUCOSE SERPL-MCNC: 119 MG/DL (ref 65–99)
HBA1C MFR BLD: 5.2 % (ref 4–5.6)
HCT VFR BLD AUTO: 47 % (ref 37–47)
HDLC SERPL-MCNC: 39 MG/DL
HGB BLD-MCNC: 15.6 G/DL (ref 12–16)
HIV 1+2 AB+HIV1 P24 AG SERPL QL IA: NORMAL
IMM GRANULOCYTES # BLD AUTO: 0.01 K/UL (ref 0–0.11)
IMM GRANULOCYTES NFR BLD AUTO: 0.2 % (ref 0–0.9)
LDLC SERPL CALC-MCNC: 85 MG/DL
LYMPHOCYTES # BLD AUTO: 1.63 K/UL (ref 1–4.8)
LYMPHOCYTES NFR BLD: 34.8 % (ref 22–41)
MCH RBC QN AUTO: 32.5 PG (ref 27–33)
MCHC RBC AUTO-ENTMCNC: 33.2 G/DL (ref 32.2–35.5)
MCV RBC AUTO: 97.9 FL (ref 81.4–97.8)
MICROALBUMIN UR-MCNC: 26.2 MG/DL
MICROALBUMIN/CREAT UR: 133 MG/G (ref 0–30)
MONOCYTES # BLD AUTO: 0.49 K/UL (ref 0–0.85)
MONOCYTES NFR BLD AUTO: 10.4 % (ref 0–13.4)
NEUTROPHILS # BLD AUTO: 2.43 K/UL (ref 1.82–7.42)
NEUTROPHILS NFR BLD: 51.8 % (ref 44–72)
NRBC # BLD AUTO: 0 K/UL
NRBC BLD-RTO: 0 /100 WBC (ref 0–0.2)
PLATELET # BLD AUTO: 233 K/UL (ref 164–446)
PMV BLD AUTO: 9.5 FL (ref 9–12.9)
POTASSIUM SERPL-SCNC: 3.9 MMOL/L (ref 3.6–5.5)
PROT SERPL-MCNC: 6.6 G/DL (ref 6–8.2)
RBC # BLD AUTO: 4.8 M/UL (ref 4.2–5.4)
SODIUM SERPL-SCNC: 136 MMOL/L (ref 135–145)
TRIGL SERPL-MCNC: 179 MG/DL (ref 0–149)
WBC # BLD AUTO: 4.7 K/UL (ref 4.8–10.8)

## 2024-02-08 PROCEDURE — 36415 COLL VENOUS BLD VENIPUNCTURE: CPT | Mod: GY

## 2024-02-08 PROCEDURE — 80053 COMPREHEN METABOLIC PANEL: CPT | Mod: GY

## 2024-02-08 PROCEDURE — 82043 UR ALBUMIN QUANTITATIVE: CPT

## 2024-02-08 PROCEDURE — 80061 LIPID PANEL: CPT | Mod: GY

## 2024-02-08 PROCEDURE — 85025 COMPLETE CBC W/AUTO DIFF WBC: CPT | Mod: GY

## 2024-02-08 PROCEDURE — 83036 HEMOGLOBIN GLYCOSYLATED A1C: CPT | Mod: GY

## 2024-02-08 PROCEDURE — 82570 ASSAY OF URINE CREATININE: CPT

## 2024-02-08 PROCEDURE — 87389 HIV-1 AG W/HIV-1&-2 AB AG IA: CPT | Mod: GY

## 2024-02-12 ENCOUNTER — HOSPITAL ENCOUNTER (OUTPATIENT)
Facility: MEDICAL CENTER | Age: 64
End: 2024-02-12
Attending: PHYSICIAN ASSISTANT
Payer: COMMERCIAL

## 2024-02-12 ENCOUNTER — OFFICE VISIT (OUTPATIENT)
Dept: MEDICAL GROUP | Facility: PHYSICIAN GROUP | Age: 64
End: 2024-02-12
Payer: COMMERCIAL

## 2024-02-12 VITALS
TEMPERATURE: 97.6 F | HEART RATE: 82 BPM | OXYGEN SATURATION: 95 % | RESPIRATION RATE: 14 BRPM | SYSTOLIC BLOOD PRESSURE: 112 MMHG | DIASTOLIC BLOOD PRESSURE: 70 MMHG | BODY MASS INDEX: 41.15 KG/M2 | HEIGHT: 65 IN | WEIGHT: 247 LBS

## 2024-02-12 DIAGNOSIS — R30.0 DYSURIA: ICD-10-CM

## 2024-02-12 DIAGNOSIS — E11.40 TYPE 2 DIABETES MELLITUS WITH DIABETIC NEUROPATHY, WITHOUT LONG-TERM CURRENT USE OF INSULIN (HCC): ICD-10-CM

## 2024-02-12 DIAGNOSIS — E66.01 MORBID OBESITY WITH BMI OF 40.0-44.9, ADULT (HCC): ICD-10-CM

## 2024-02-12 DIAGNOSIS — R06.02 SHORT OF BREATH ON EXERTION: ICD-10-CM

## 2024-02-12 DIAGNOSIS — N30.00 ACUTE CYSTITIS WITHOUT HEMATURIA: ICD-10-CM

## 2024-02-12 DIAGNOSIS — G44.229 CHRONIC TENSION-TYPE HEADACHE, NOT INTRACTABLE: ICD-10-CM

## 2024-02-12 LAB

## 2024-02-12 PROCEDURE — 99214 OFFICE O/P EST MOD 30 MIN: CPT | Performed by: PHYSICIAN ASSISTANT

## 2024-02-12 PROCEDURE — 87186 SC STD MICRODIL/AGAR DIL: CPT

## 2024-02-12 PROCEDURE — 3074F SYST BP LT 130 MM HG: CPT | Performed by: PHYSICIAN ASSISTANT

## 2024-02-12 PROCEDURE — 87086 URINE CULTURE/COLONY COUNT: CPT

## 2024-02-12 PROCEDURE — 3078F DIAST BP <80 MM HG: CPT | Performed by: PHYSICIAN ASSISTANT

## 2024-02-12 PROCEDURE — 81002 URINALYSIS NONAUTO W/O SCOPE: CPT | Performed by: PHYSICIAN ASSISTANT

## 2024-02-12 PROCEDURE — 87077 CULTURE AEROBIC IDENTIFY: CPT

## 2024-02-12 RX ORDER — BUTALBITAL, ACETAMINOPHEN AND CAFFEINE 50; 325; 40 MG/1; MG/1; MG/1
1 TABLET ORAL EVERY 6 HOURS PRN
Qty: 20 TABLET | Refills: 0 | Status: SHIPPED | OUTPATIENT
Start: 2024-02-12 | End: 2024-03-27

## 2024-02-12 RX ORDER — AMOXICILLIN AND CLAVULANATE POTASSIUM 875; 125 MG/1; MG/1
TABLET, FILM COATED ORAL
COMMUNITY
End: 2024-02-15

## 2024-02-12 RX ORDER — SULFAMETHOXAZOLE AND TRIMETHOPRIM 800; 160 MG/1; MG/1
1 TABLET ORAL 2 TIMES DAILY
Qty: 6 TABLET | Refills: 0 | Status: SHIPPED | OUTPATIENT
Start: 2024-02-12 | End: 2024-02-15

## 2024-02-12 RX ORDER — BUTALBITAL, ACETAMINOPHEN AND CAFFEINE 50; 325; 40 MG/1; MG/1; MG/1
TABLET ORAL
COMMUNITY
Start: 2023-12-29 | End: 2024-02-12 | Stop reason: SDUPTHER

## 2024-02-12 RX ORDER — SEMAGLUTIDE 1.34 MG/ML
1 INJECTION, SOLUTION SUBCUTANEOUS
Qty: 12 ML | Refills: 6 | Status: SHIPPED | OUTPATIENT
Start: 2024-02-12

## 2024-02-12 RX ORDER — PROPRANOLOL HCL 60 MG
60 CAPSULE, EXTENDED RELEASE 24HR ORAL
COMMUNITY
Start: 2023-12-08 | End: 2024-03-05

## 2024-02-12 RX ORDER — ALBUTEROL SULFATE 90 UG/1
2 AEROSOL, METERED RESPIRATORY (INHALATION) EVERY 4 HOURS PRN
Qty: 1 EACH | Refills: 2 | Status: SHIPPED | OUTPATIENT
Start: 2024-02-12

## 2024-02-12 ASSESSMENT — FIBROSIS 4 INDEX: FIB4 SCORE: 1.51

## 2024-02-12 ASSESSMENT — PATIENT HEALTH QUESTIONNAIRE - PHQ9: CLINICAL INTERPRETATION OF PHQ2 SCORE: 0

## 2024-02-12 NOTE — PROGRESS NOTES
CC:  Chief Complaint   Patient presents with    Follow-Up    Medication Refill     Semaglutide, 1 MG, albuterol 108, FIORICET       HISTORY OF PRESENT ILLNESS: Patient is a 63 y.o. female established patient presenting with issues below  Pt recently started seeing neurology who started her on Aimovig for her headaches and doing much better.  Requesting refill of her Fioricet which she takes very seldomly now.  Patient's diabetes is very well-controlled on Ozempic 1 mg.  Her hemoglobin A1c is 5.2%.  Her microalbumin did come back elevated but notes today that she thinks she is having a bladder infection.  Patient has been having dysuria, frequency, urgency for the past 3 weeks.    Current Outpatient Medications   Medication Sig Dispense Refill    butalbital/apap/caffeine (FIORICET) -40 mg Tab TAKE 1 TABLET BY MOUTH EVERY 6 HOURS AS NEEDED FOR HEADACHE      amitriptyline (ELAVIL) 50 MG Tab TAKE 1 TABLET BY MOUTH EVERY DAY IN THE EVENING 90 Tablet 0    Erenumab-aooe (AIMOVIG) 140 MG/ML Solution Auto-injector Inject 140 mg under the skin every 30 (thirty) days for 30 days. 1.12 mL 11    diclofenac sodium (VOLTAREN) 1 % Gel APPLY 2 GRAMS TOPICALLY 4 TIMES A DAY AS NEEDED (JOINT PAIN). 100 g 1    hydrOXYzine HCl (ATARAX) 25 MG Tab Take 1 Tablet by mouth 3 times a day as needed for Itching. 90 Tablet 1    traZODone (DESYREL) 100 MG Tab TAKE 1 TO 3 TABS BY MOUTH AT BEDTIME AS NEEDED FOR SLEEP. 270 Tablet 1    Semaglutide, 1 MG/DOSE, (OZEMPIC, 1 MG/DOSE,) 4 MG/3ML Solution Pen-injector Inject 1 mg under the skin every 7 days. 12 mL 1    albuterol 108 (90 Base) MCG/ACT Aero Soln inhalation aerosol Inhale 2 Puffs every four hours as needed for Shortness of Breath. 1 Each 2    omeprazole (PRILOSEC) 20 MG delayed-release capsule TAKE 1 CAPSULE BY MOUTH EVERY DAY 90 Capsule 3    acetaminophen (TYLENOL) 500 MG Tab Take 1,000 mg by mouth 3 times a day as needed (HA).      amoxicillin-clavulanate (AUGMENTIN) 875-125 MG Tab   "(Patient not taking: Reported on 2/12/2024)      propranolol LA (INDERAL LA) 60 MG CAPSULE SR 24 HR Take 60 mg by mouth every day. (Patient not taking: Reported on 2/12/2024)      chlorhexidine (PERIDEX) 0.12 % Solution SWISH AND SPIT WITH 15ML TWICE DAILY      rizatriptan (MAXALT) 10 MG tablet TAKE 1 TAB BY MOUTH ONCE AS NEEDED FOR MIGRAINE FOR UP TO 1 DOSE. MAY REPEAT IN 2 HOURS IF NEEDED (Patient not taking: Reported on 2/12/2024) 12 Tablet 2     No current facility-administered medications for this visit.        ROS:     ROS    Exam:    /70   Pulse 82   Temp 36.4 °C (97.6 °F) (Temporal)   Resp 14   Ht 1.651 m (5' 5\")   Wt 112 kg (247 lb)   SpO2 95%  Body mass index is 41.1 kg/m².    General:  Well nourished, well developed female in NAD  Head is grossly normal.  Neck: Supple.   Pulmonary: Clear to auscultation. No ronchi, wheezing or rales  Cardiac: Regular rate and rhythm. No murmurs.  Skin: Warm and dry. No obvious lesions  Neuro: Normal muscle tone. Gait normal. Alert and oriented.  Psych: Normal mood and affect      Please note that this dictation was created using voice recognition software. I have made every reasonable attempt to correct obvious errors, but I expect that there are errors of grammar and possibly content that I did not discover before finalizing the note.        Assessment/Plan:  1. Type 2 diabetes mellitus with diabetic neuropathy, without long-term current use of insulin (HCC)  Diabetes is very well-controlled.  Continue Ozempic 1 mg  - Semaglutide, 1 MG/DOSE, (OZEMPIC, 1 MG/DOSE,) 4 MG/3ML Solution Pen-injector; Inject 1 mg under the skin every 7 days.  Dispense: 12 mL; Refill: 6  - POCT Retinal Eye Exam    2. Morbid obesity with BMI of 40.0-44.9, adult (HCC)    - Semaglutide, 1 MG/DOSE, (OZEMPIC, 1 MG/DOSE,) 4 MG/3ML Solution Pen-injector; Inject 1 mg under the skin every 7 days.  Dispense: 12 mL; Refill: 6    3. Short of breath on exertion  Refill sent in  - albuterol 108 " (90 Base) MCG/ACT Aero Soln inhalation aerosol; Inhale 2 Puffs every four hours as needed for Shortness of Breath.  Dispense: 1 Each; Refill: 2    4. Chronic tension-type headache, not intractable  PDMP checked.  Refill Fioricet sent and follow-up with neurology  - butalbital/apap/caffeine (FIORICET) -40 mg Tab; Take 1 Tablet by mouth every 6 hours as needed for Headache for up to 10 days.  Dispense: 20 Tablet; Refill: 0    5. Dysuria  Urinalysis is clear  - POCT Urinalysis  - URINE CULTURE(NEW); Future    6. Acute cystitis without hematuria  Urinalysis clear but based on her symptoms we will treat empirically.  - sulfamethoxazole-trimethoprim (BACTRIM DS) 800-160 MG tablet; Take 1 Tablet by mouth 2 times a day.  Dispense: 6 Tablet; Refill: 0

## 2024-02-15 DIAGNOSIS — N30.00 ACUTE CYSTITIS WITHOUT HEMATURIA: ICD-10-CM

## 2024-02-15 LAB
BACTERIA UR CULT: ABNORMAL
BACTERIA UR CULT: ABNORMAL
SIGNIFICANT IND 70042: ABNORMAL
SITE SITE: ABNORMAL
SOURCE SOURCE: ABNORMAL

## 2024-02-15 RX ORDER — CIPROFLOXACIN 500 MG/1
500 TABLET, FILM COATED ORAL 2 TIMES DAILY
Qty: 10 TABLET | Refills: 0 | Status: SHIPPED | OUTPATIENT
Start: 2024-02-15 | End: 2024-03-05

## 2024-03-05 ENCOUNTER — OFFICE VISIT (OUTPATIENT)
Dept: NEUROLOGY | Facility: MEDICAL CENTER | Age: 64
End: 2024-03-05
Attending: PSYCHIATRY & NEUROLOGY
Payer: COMMERCIAL

## 2024-03-05 ENCOUNTER — TELEPHONE (OUTPATIENT)
Dept: PHARMACY | Facility: MEDICAL CENTER | Age: 64
End: 2024-03-05

## 2024-03-05 VITALS
DIASTOLIC BLOOD PRESSURE: 72 MMHG | SYSTOLIC BLOOD PRESSURE: 118 MMHG | HEART RATE: 95 BPM | BODY MASS INDEX: 42.49 KG/M2 | OXYGEN SATURATION: 97 % | WEIGHT: 248.9 LBS | TEMPERATURE: 97.6 F | HEIGHT: 64 IN | RESPIRATION RATE: 20 BRPM

## 2024-03-05 DIAGNOSIS — G43.101 MIGRAINE WITH AURA AND WITH STATUS MIGRAINOSUS, NOT INTRACTABLE: ICD-10-CM

## 2024-03-05 PROCEDURE — 3078F DIAST BP <80 MM HG: CPT | Performed by: PSYCHIATRY & NEUROLOGY

## 2024-03-05 PROCEDURE — 99212 OFFICE O/P EST SF 10 MIN: CPT | Performed by: PSYCHIATRY & NEUROLOGY

## 2024-03-05 PROCEDURE — 3074F SYST BP LT 130 MM HG: CPT | Performed by: PSYCHIATRY & NEUROLOGY

## 2024-03-05 PROCEDURE — 99213 OFFICE O/P EST LOW 20 MIN: CPT | Performed by: PSYCHIATRY & NEUROLOGY

## 2024-03-05 ASSESSMENT — FIBROSIS 4 INDEX: FIB4 SCORE: 1.51

## 2024-03-05 NOTE — PROGRESS NOTES
"[facility] NEUROLOGY  GENERAL NEUROLOGY  FOLLOW-UP VISIT    CC: chronic migraine w/ aura    INTERVAL HISTORY:  Mikayla Knight is a 63 y.o. woman with chronic migraine w/ aura and a history otherwise notable for chronic headache, \"ataxia,\" breast cancer, HLD, fibromyalgia, PTSD, MDD, and CODY.  I last saw her in the clinic on 11/15/2023.  At that time I recommended she start Aimovig, stop propranolol, and continue amitriptyline.  I also recommended she stop Fioricet.  Today, she was unaccompanied, and she provided the following interval history:    The following is a summary of headache symptoms, presented in my standard format:     Family History: biological father was hospitalized with migraines  Age at onset (years): pre-teenager  Location: supra-orbital, bi-temporal  Radiation: wraps around the back of the head  Frequency: baseline: daily, lately:   Duration: baseline: whole day, lately:   Headache Days/Month:   Quality: \"feels like ears are going to blow out\"  Intensity: baseline: 7-8/10, lately:   Aura: visual (\"hazy,\" not blurry per say)  Photophobia/Phonophobia/Nausea/Vomiting: a little/yes/yes/not in years  Provoked by Physical Activity?:   Triggers: salt  Associated Symptoms:   Autonomic Signs (such as ptosis, miosis, conjunctival injection, rhinorrhea, increased lacrimation): tearing from the left eye, left eyelid drooping  Head Trauma: she has fallen and struck back of head and neck (after headaches started)  Association with Menses:   ED Visits: yes, x2   Hospitalizations: none  Missed Work Days (retired  for [employer], 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): yes  Sleep (hours/night): 4.5-5 hours/night  Caffeine Intake: 1 Pepsi/day  Hydration: keeps well-hydrated  Nutrition: tries not to skip meals  Exercise:   Analgesic Overuse: yes     Current Medication Regimen:  - Aimovi mg has been helpful  - amitriptyline:  mg was prescribed for headache, it is helpful for sleep  - Excedrin: takes " this daily     Medications Tried: Response  Preventive:  - propranolol: 60 mg was prescribed for headaches, unhelpful  - topiramate: 100 mg was ineffective  - venlafaxine: 150 mg daily was ineffective  - Botox: last administered in ~2013, received a total of ~5 rounds on schedule    Rescue:  - Fioricet:   - sumatriptan: ineffective  - rizatriptan: 10 mg is ineffective     Medications Not Tried:  -     MEDICATIONS:  Current Outpatient Medications   Medication Sig    Lasmiditan Succinate 100 MG Tab 50 to 100 mg as a single dose; repeat doses have not demonstrated efficacy. May increase to 100 or 200 mg as a single dose with subsequent attacks if needed. Maximum: One dose per 24 hours    Semaglutide, 1 MG/DOSE, (OZEMPIC, 1 MG/DOSE,) 4 MG/3ML Solution Pen-injector Inject 1 mg under the skin every 7 days.    albuterol 108 (90 Base) MCG/ACT Aero Soln inhalation aerosol Inhale 2 Puffs every four hours as needed for Shortness of Breath.    amitriptyline (ELAVIL) 50 MG Tab TAKE 1 TABLET BY MOUTH EVERY DAY IN THE EVENING    Erenumab-aooe (AIMOVIG) 140 MG/ML Solution Auto-injector Inject 140 mg under the skin every 30 (thirty) days for 30 days.    diclofenac sodium (VOLTAREN) 1 % Gel APPLY 2 GRAMS TOPICALLY 4 TIMES A DAY AS NEEDED (JOINT PAIN).    hydrOXYzine HCl (ATARAX) 25 MG Tab Take 1 Tablet by mouth 3 times a day as needed for Itching.    traZODone (DESYREL) 100 MG Tab TAKE 1 TO 3 TABS BY MOUTH AT BEDTIME AS NEEDED FOR SLEEP.    acetaminophen (TYLENOL) 500 MG Tab Take 1,000 mg by mouth 3 times a day as needed (HA).     MEDICAL, SOCIAL, AND FAMILY HISTORY:  There is no change in the patient's ROS or medical, social, or family histories since the previous visit on 11/15/2023.    REVIEW OF SYSTEMS:  A ROS was completed.  Pertinent positives and negatives were included in the HPI, above.  All other systems were reviewed and are negative.    PHYSICAL EXAM:  General/Medical:  - NAD    Neuro:  MENTAL STATUS: awake and alert; no  "deficits of speech or language; oriented to conversation; affect was appropriate to situation; pleasant, cooperative    CRANIAL NERVES:    II: acuity: NT, fields: NT, pupils: NT, discs: NT    III/IV/VI: versions: grossly intact    V: facial sensation: NT    VII: facial expression: symmetric    VIII: hearing: intact to voice    IX/X: palate: NT    XI: shoulder shrug: NT    XII: tongue: NT    MOTOR:  - bulk: NT  - tone: NT  Upper Extremity Strength (R/L)    NT   Elbow flexion NT   Elbow extension NT   Shoulder abduction NT     Lower Extremity Strength  (R/L)   Hip flexion NT   Knee extension NT   Knee flexion NT   Ankle dorsiflexion NT   Ankle plantarflexion NT     - pronator drift: NT  - abnormal movements: none    SENSATION:  - light touch: NT  - vibration (R/L, seconds): NT at the great toes  - pinprick: NT  - proprioception: NT  - Romberg: NT    COORDINATION:  - finger to nose: NT  - finger tapping: NT    REFLEXES:  Reflex Right Left   BR NT NT   Biceps NT NT   Triceps NT NT   Patellae NT NT   Achilles NT NT   Toes NT NT     GAIT:  - NT    REVIEW OF IMAGING STUDIES:  No additional data since the last visit.    REVIEW OF LABORATORY STUDIES:  Reviewed.    ASSESSMENT:  Mikayla Knight is a 63 y.o. womna with chronic migraine w/ aura and a history otherwise notable for chronic headache, \"ataxia,\" breast cancer, HLD, fibromyalgia, PTSD, MDD, and CODY.  While Aimovig has been helpful, Mikayla continues to struggle with occasional breakthrough headaches which have been difficult to treat (haven't responded to triptans).  We reviewed other rescue medication options and agreed to try Lasmiditan.  Plans/recommendations as follows:    PLAN:  Chronic Migraine w/ Aura:  Prevention:  - continue Aimovig 140 mg/month  - try supplementing:  - magnesium: 400-600 mg once or 200-300 mg twice daily  - riboflavin (vitamin B2): 400 mg once daily  - coenzyme Q10: 300 mg daily  - get 7-9 hours of sleep per night; can try supplementing " melatonin 2-10 mg, 2-3 hours before bedtime  - drink plenty of fluids (urine should be nearly clear)  - avoid excessive caffeine intake (no more than 2 servings per day and nothing in the afternoon)  - eat regular meals (don't skip meals)  - get moderate exercise (even just a 20 minute walk daily)    Rescue:  - trial of Lasmiditan: 50 to 100 mg as a single dose; repeat doses have not demonstrated efficacy. May increase to 100 or 200 mg as a single dose with subsequent attacks if needed. Maximum: One dose per 24 hours.  - could try Nurtec, but elected to try Lasmiditan first since she is already on Aimovig (a CGRP-modulating agent)  - do not use analgesics (e.g., ibuprofen, acetaminophen) more than 2 days per week in order to avoid analgesic rebound headaches    - keep a headache log    Follow-Up:  - Return in about 2 months (around 5/5/2024).    Signed: Tim Mcmanus M.D.

## 2024-03-05 NOTE — TELEPHONE ENCOUNTER
Prior Authorization for Lasmiditan Succinate (Reyvow)100 MG Tablet (Quantity: 12, Days: 30) has been submitted via Cover My Meds: Key ([id number] )    Insurance: BCBS    Will follow up in 24-48 business hours.

## 2024-03-06 ENCOUNTER — PATIENT MESSAGE (OUTPATIENT)
Dept: NEUROLOGY | Facility: MEDICAL CENTER | Age: 64
End: 2024-03-06
Payer: COMMERCIAL

## 2024-03-06 DIAGNOSIS — G43.101 MIGRAINE WITH AURA AND WITH STATUS MIGRAINOSUS, NOT INTRACTABLE: Primary | ICD-10-CM

## 2024-03-06 NOTE — TELEPHONE ENCOUNTER
Prior Authorization for LASMIDITAN SUCCINATE (REYVOW)100 MG TABLET has been denied for a quantity of 12 , day supply 30    Prior authorization was denied per the following: not meeting the prior authorization requirement(s). TRIED/FAILED NURTEC AND UBRELVY    Prior Authorization denial reference number: [id number]  Insurance: [facility]      Next Steps:Proceed with appeal process. Generate proposed appeal for provider approval & signature.

## 2024-03-08 ENCOUNTER — TELEPHONE (OUTPATIENT)
Dept: NEUROLOGY | Facility: MEDICAL CENTER | Age: 64
End: 2024-03-08
Payer: COMMERCIAL

## 2024-03-08 ENCOUNTER — TELEPHONE (OUTPATIENT)
Dept: PHARMACY | Facility: MEDICAL CENTER | Age: 64
End: 2024-03-08
Payer: COMMERCIAL

## 2024-03-08 NOTE — TELEPHONE ENCOUNTER
Contact:             Phone number: [phone]     Name of person spoken with and relationship to patient: TIMMY VIVAS  Medication:   Patient’s Adherence:             How patient is doing on medication: well     How many missed doses and reason: 0     Any new medications: no     Any new conditions: no     Any new allergies: no     Any new side effects: no      Any new diagnoses: no      How many doses remainin     Did patient want to speak with pharmacist: no   Delivery:             Delivery date and method: FEDEX 3/11/24     Needs by Date: 03/15 NEXT INJ     Signature required: no     Any additional details for : GERALDO Jama Appointment Date: GERALDO   Shipping Address: [address] [place] [id number]   Medication(name, strength and dose): AIMOVIG DS $5   Copay: $5    Payment Method: CCOF 7082   Supplies:    Additional Information: Verified her Aimovig card was still active until 2026 per Clementine

## 2024-03-08 NOTE — TELEPHONE ENCOUNTER
Patient called and she's confused about where her medication was sent [facility] and both say they don't have it. She also has questions about her medication.

## 2024-03-11 ENCOUNTER — PHARMACY VISIT (OUTPATIENT)
Dept: PHARMACY | Facility: MEDICAL CENTER | Age: 64
End: 2024-03-11
Payer: COMMERCIAL

## 2024-03-11 PROCEDURE — RXMED WILLOW AMBULATORY MEDICATION CHARGE: Performed by: PSYCHIATRY & NEUROLOGY

## 2024-03-12 ENCOUNTER — TELEPHONE (OUTPATIENT)
Dept: NEUROLOGY | Facility: MEDICAL CENTER | Age: 64
End: 2024-03-12
Payer: COMMERCIAL

## 2024-03-12 DIAGNOSIS — G43.101 MIGRAINE WITH AURA AND WITH STATUS MIGRAINOSUS, NOT INTRACTABLE: ICD-10-CM

## 2024-03-12 RX ORDER — RIMEGEPANT SULFATE 75 MG/75MG
75 TABLET, ORALLY DISINTEGRATING ORAL
Qty: 8 TABLET | Refills: 11 | Status: SHIPPED | OUTPATIENT
Start: 2024-03-12 | End: 2024-04-11

## 2024-03-12 NOTE — TELEPHONE ENCOUNTER
Prior Authorization for NURTEC 75 MG TABLET DISPERSIBLE (Quantity: 8, Days: 30) has been submitted via Cover My Meds: Key ([id number])    Insurance: BCBS    Will follow up in 24-48 business hours.

## 2024-03-12 NOTE — TELEPHONE ENCOUNTER
Received request via: Pharmacy    Medication Name/Dosage Lasmiditan Succinate 100 MG Tab     When was medication last prescribed     How many refills were previously provided     How many Refills does he patient have left from last prescription     Was the patient seen in the last year in this department? Yes   Date of last office visit 3/5/24     Per last Neurology Office Visit, when was the date of next follow up visit set for?                            Date of office visit follow up request 5/5/24     Does the patient have an upcoming appointment? Yes   If yes, when 5/7/24             If no, schedule appointment     Does the patient have [facility] and need 100 day supply (blood pressure, diabetes and cholesterol meds only)? Medication is not for cholesterol, blood pressure or diabetes

## 2024-03-13 NOTE — TELEPHONE ENCOUNTER
Prior Authorization for NURTEC 75 MG TABLET DISPERSIBLE has been approved for a quantity of 8 , day supply 30    Prior Authorization reference number:  [id number]  Insurance: [facility]  Effective dates: 03/12/2024 thru 06/12/2024  Copay: $0.00     Is patient eligible to fill with [facility]? Yes    Next Steps: The Patients copay is less than $5.00. Will contact the patient to determine choice of pharmacy, if applicable.

## 2024-03-15 DIAGNOSIS — G44.229 CHRONIC TENSION-TYPE HEADACHE, NOT INTRACTABLE: ICD-10-CM

## 2024-03-20 DIAGNOSIS — E11.40 TYPE 2 DIABETES MELLITUS WITH DIABETIC NEUROPATHY, WITHOUT LONG-TERM CURRENT USE OF INSULIN (HCC): ICD-10-CM

## 2024-03-20 RX ORDER — SEMAGLUTIDE 2.68 MG/ML
2 INJECTION, SOLUTION SUBCUTANEOUS
Qty: 3 ML | Refills: 2 | Status: SHIPPED | OUTPATIENT
Start: 2024-03-20

## 2024-03-27 RX ORDER — BUTALBITAL, ACETAMINOPHEN AND CAFFEINE 50; 325; 40 MG/1; MG/1; MG/1
1 TABLET ORAL EVERY 6 HOURS PRN
Qty: 20 TABLET | Refills: 0 | Status: SHIPPED | OUTPATIENT
Start: 2024-03-27 | End: 2024-04-06

## 2024-04-03 ENCOUNTER — TELEPHONE (OUTPATIENT)
Dept: PHARMACY | Facility: MEDICAL CENTER | Age: 64
End: 2024-04-03
Payer: COMMERCIAL

## 2024-04-03 PROCEDURE — RXMED WILLOW AMBULATORY MEDICATION CHARGE: Performed by: PSYCHIATRY & NEUROLOGY

## 2024-04-03 NOTE — TELEPHONE ENCOUNTER
Contact:             Phone number: [phone]     Name of person spoken with and relationship to patient: TIMMY VIVAS  Medication:   Patient’s Adherence:             How patient is doing on medication: well     How many missed doses and reason: 0     Any new medications: no     Any new conditions: no     Any new allergies: no     Any new side effects: no      Any new diagnoses: no      How many doses remainin     Did patient want to speak with pharmacist: no   Delivery:             Delivery date and method: FEDEX 4/10/24     Needs by Date: 04/15 NEXT INJ     Signature required: no     Any additional details for : GERALDO Jama Appointment Date: GERALDO   Shipping Address: [address] [place] [address]   Medication(name, strength and dose): AIMOVIG DS $5   Copay: $10   Payment Method: CCOF 7082   Supplies:    Additional Information: NO QUESTIONS OR CONCERNS AT THIS TIME

## 2024-04-09 ENCOUNTER — PHARMACY VISIT (OUTPATIENT)
Dept: PHARMACY | Facility: MEDICAL CENTER | Age: 64
End: 2024-04-09
Payer: COMMERCIAL

## 2024-04-10 DIAGNOSIS — G44.229 CHRONIC TENSION-TYPE HEADACHE, NOT INTRACTABLE: ICD-10-CM

## 2024-04-10 RX ORDER — AMITRIPTYLINE HYDROCHLORIDE 50 MG/1
50 TABLET, FILM COATED ORAL EVERY EVENING
Qty: 90 TABLET | Refills: 0 | Status: SHIPPED | OUTPATIENT
Start: 2024-04-10

## 2024-04-10 NOTE — TELEPHONE ENCOUNTER
Received request via: Pharmacy    Was the patient seen in the last year in this department? Yes    Does the patient have an active prescription (recently filled or refills available) for medication(s) requested? No    Pharmacy Name: Pharmacy:   [facility] #[id number] - [place], [place] - [address]     Does the patient have [facility] and need 100 day supply (blood pressure, diabetes and cholesterol meds only)? Patient does not have SCP

## 2024-04-12 ENCOUNTER — PATIENT MESSAGE (OUTPATIENT)
Dept: MEDICAL GROUP | Facility: PHYSICIAN GROUP | Age: 64
End: 2024-04-12
Payer: COMMERCIAL

## 2024-04-12 DIAGNOSIS — F51.01 PRIMARY INSOMNIA: ICD-10-CM

## 2024-04-12 RX ORDER — TRAZODONE HYDROCHLORIDE 100 MG/1
TABLET ORAL
Qty: 270 TABLET | Refills: 1 | Status: SHIPPED | OUTPATIENT
Start: 2024-04-12

## 2024-04-12 NOTE — PATIENT COMMUNICATION
Received request via: Patient    Was the patient seen in the last year in this department? Yes    Does the patient have an active prescription (recently filled or refills available) for medication(s) requested? No    Pharmacy Name: [facility] pharmacy     Does the patient have [facility] and need 100 day supply (blood pressure, diabetes and cholesterol meds only)? Patient does not have SCP

## 2024-04-18 ENCOUNTER — APPOINTMENT (OUTPATIENT)
Dept: NEUROLOGY | Facility: MEDICAL CENTER | Age: 64
End: 2024-04-18
Attending: PSYCHIATRY & NEUROLOGY
Payer: COMMERCIAL

## 2024-04-25 DIAGNOSIS — R06.02 SHORT OF BREATH ON EXERTION: ICD-10-CM

## 2024-04-25 RX ORDER — ALBUTEROL SULFATE 90 UG/1
2 AEROSOL, METERED RESPIRATORY (INHALATION) EVERY 4 HOURS PRN
Qty: 8.5 EACH | Refills: 2 | Status: SHIPPED | OUTPATIENT
Start: 2024-04-25

## 2024-05-03 ENCOUNTER — TELEPHONE (OUTPATIENT)
Dept: PHARMACY | Facility: MEDICAL CENTER | Age: 64
End: 2024-05-03
Payer: COMMERCIAL

## 2024-05-03 NOTE — TELEPHONE ENCOUNTER
Erenumab-aooe (AIMOVIG) 140 MG/ML Solution Auto-injector    PA Renewal    Initiated PA in [facility] Key: [id number]    Prior Authorization for Aimovig has been approved for a quantity of 1 , day supply 30    Prior Authorization reference number: [id number]  Insurance: [employer]  Effective dates: approved through 05/03/2025  Copay: $10     Is patient eligible to fill with [facility]? Yes    Next Steps: The patient's copay exceeds $5.00. Proceed with contacting patient to offer financial assistance.

## 2024-05-06 ENCOUNTER — TELEPHONE (OUTPATIENT)
Dept: PHARMACY | Facility: MEDICAL CENTER | Age: 64
End: 2024-05-06
Payer: COMMERCIAL

## 2024-05-06 PROCEDURE — RXMED WILLOW AMBULATORY MEDICATION CHARGE: Performed by: PSYCHIATRY & NEUROLOGY

## 2024-05-06 NOTE — TELEPHONE ENCOUNTER
Contact:             Phone number: [phone]     Name of person spoken with and relationship to patient: TIMMY VIVAS  Medication:   Patient’s Adherence:             How patient is doing on medication: well     How many missed doses and reason: 0     Any new medications: no     Any new conditions: no     Any new allergies: no     Any new side effects: no      Any new diagnoses: no      How many doses remainin Aimovig     Did patient want to speak with pharmacist: no   Delivery:             Delivery date and method: FEDEX 5/15/24     Needs by Date:      Signature required: no     Any additional details for : May leave at the door   Teach Appointment Date: NA   Shipping Address: [address] [place] [address]   Medication(name, strength and dose): AIMOVIG DS    Copay: $10   Payment Method: CCOF 7082   Supplies:    Additional Information: NO QUESTIONS OR CONCERNS AT THIS TIME    Detail Level: Zone Volume Aspirated In Cc (Optional): 100ml Lesion Type: Seroma Anesthesia Type: 1% lidocaine with epinephrine Consent: Informed consent was obtained and the patient verbalized full understanding. The risks, benefits, expectations and alternatives were reviewed in detail, including, but not limited to, the risks of delayed wound healing, infection, need for multiple incisions and drainages, recurrence, bleeding, injury to underlying structures (including nerves, devices, or organs), need for additional procedures, and pain. Method: sterile needle

## 2024-05-07 ENCOUNTER — OFFICE VISIT (OUTPATIENT)
Dept: NEUROLOGY | Facility: MEDICAL CENTER | Age: 64
End: 2024-05-07
Attending: PSYCHIATRY & NEUROLOGY
Payer: COMMERCIAL

## 2024-05-07 VITALS
SYSTOLIC BLOOD PRESSURE: 126 MMHG | TEMPERATURE: 98 F | HEART RATE: 83 BPM | OXYGEN SATURATION: 97 % | WEIGHT: 244.71 LBS | HEIGHT: 64 IN | RESPIRATION RATE: 16 BRPM | BODY MASS INDEX: 41.78 KG/M2 | DIASTOLIC BLOOD PRESSURE: 82 MMHG

## 2024-05-07 DIAGNOSIS — G35 MULTIPLE SCLEROSIS (HCC): ICD-10-CM

## 2024-05-07 DIAGNOSIS — G43.E09 CHRONIC MIGRAINE WITH AURA WITHOUT STATUS MIGRAINOSUS, NOT INTRACTABLE: Primary | ICD-10-CM

## 2024-05-07 PROCEDURE — 3079F DIAST BP 80-89 MM HG: CPT | Performed by: PSYCHIATRY & NEUROLOGY

## 2024-05-07 PROCEDURE — 99214 OFFICE O/P EST MOD 30 MIN: CPT | Performed by: PSYCHIATRY & NEUROLOGY

## 2024-05-07 PROCEDURE — 3074F SYST BP LT 130 MM HG: CPT | Performed by: PSYCHIATRY & NEUROLOGY

## 2024-05-07 ASSESSMENT — FIBROSIS 4 INDEX: FIB4 SCORE: 1.53

## 2024-05-07 NOTE — PROGRESS NOTES
"[facility] NEUROLOGY  GENERAL NEUROLOGY  FOLLOW-UP VISIT    CC: chronic migraine w/ aura    INTERVAL HISTORY:  Mikayla Knight is a 64 y.o. woman with chronic migraine w/ aura and a history otherwise notable for chronic headache, \"ataxia,\" breast cancer, HLD, fibromyalgia, PTSD, MDD, and CODY.  I last saw her in the clinic on 3/5/2024.  At that time I recommended she continue Aimovig and try Lasmiditan.  Today, she was unaccompanied, and she provided the following interval history:    The following is a summary of headache symptoms, presented in my standard format:     Family History: biological father was hospitalized with migraines  Age at onset (years): pre-teenager  Location: supra-orbital, bi-temporal  Radiation: wraps around the back of the head  Frequency: baseline: daily, lately:   Duration: baseline: whole day, lately:   Headache Days/Month:   Quality: \"feels like ears are going to blow out\"  Intensity: baseline: 7-8/10, lately:   Aura: visual (\"hazy,\" not blurry per say)  Photophobia/Phonophobia/Nausea/Vomiting: a little/yes/yes/not in years  Provoked by Physical Activity?:   Triggers: salt  Associated Symptoms:   Autonomic Signs (such as ptosis, miosis, conjunctival injection, rhinorrhea, increased lacrimation): tearing from the left eye, left eyelid drooping  Head Trauma: she has fallen and struck back of head and neck (after headaches started)  Association with Menses:   ED Visits: yes, x2   Hospitalizations: none  Missed Work Days (retired  for [employer], 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): yes  Sleep (hours/night): 4.5-5 hours/night  Caffeine Intake: 1 Pepsi/day  Hydration: keeps well-hydrated  Nutrition: tries not to skip meals  Exercise:   Analgesic Overuse: no    Current Medication Regimen:  - Aimovi mg has been helpful  - amitriptyline:  mg was prescribed for headache, it is helpful for sleep  - Excedrin: helpful     Medications Tried: Response  Preventive:  - propranolol: 60 mg " "was prescribed for headaches, unhelpful  - topiramate: 100 mg was ineffective  - venlafaxine: 150 mg daily was ineffective  - Botox: last administered in ~2013, received a total of ~5 rounds on schedule    Rescue:  - Fioricet:   - sumatriptan: ineffective  - rizatriptan: 10 mg is ineffective  - Nurtec: worked as well as rizatriptan     Medications Not Tried:  -     Otherwise, Mikayla reports \"saves\" of burning sensory symptoms which wash over her upper- and lower extremities.  The face/head are not involved.  She also reports a constant perception of \"moldy bread\" smell.    MEDICATIONS:  Current Outpatient Medications   Medication Sig    Ubrogepant 100 MG Tab Take 100 mg at the onset of aura/HA; may re-dose x1 after 2 hrs if HA persists; MDD: 200 mg    albuterol 108 (90 Base) MCG/ACT Aero Soln inhalation aerosol INHALE 2 PUFFS EVERY 4 HOURS AS NEEDED FOR SHORTNESS OF BREATH    traZODone (DESYREL) 100 MG Tab TAKE 1 TO 3 TABS BY MOUTH AT BEDTIME AS NEEDED FOR SLEEP.    amitriptyline (ELAVIL) 50 MG Tab TAKE 1 TABLET BY MOUTH EVERY DAY IN THE EVENING    Semaglutide, 2 MG/DOSE, (OZEMPIC, 2 MG/DOSE,) 8 MG/3ML Solution Pen-injector Inject 2 mg under the skin every 7 days.    Erenumab-aooe (AIMOVIG) 140 MG/ML Solution Auto-injector Inject 140 mg under the skin every 30 (thirty) days for 30 days.    diclofenac sodium (VOLTAREN) 1 % Gel APPLY 2 GRAMS TOPICALLY 4 TIMES A DAY AS NEEDED (JOINT PAIN).    hydrOXYzine HCl (ATARAX) 25 MG Tab Take 1 Tablet by mouth 3 times a day as needed for Itching.    acetaminophen (TYLENOL) 500 MG Tab Take 1,000 mg by mouth 3 times a day as needed (HA).    Semaglutide, 1 MG/DOSE, (OZEMPIC, 1 MG/DOSE,) 4 MG/3ML Solution Pen-injector Inject 1 mg under the skin every 7 days.     MEDICAL, SOCIAL, AND FAMILY HISTORY:  There is no change in the patient's ROS or medical, social, or family histories since the previous visit on 3/5/2024.    REVIEW OF SYSTEMS:  A ROS was completed.  Pertinent positives and " "negatives were included in the HPI, above.  All other systems were reviewed and are negative.    PHYSICAL EXAM:  General/Medical:  - NAD    Neuro:  MENTAL STATUS: awake and alert; no deficits of speech or language; oriented to conversation; affect was appropriate to situation; pleasant, cooperative    CRANIAL NERVES:    II: acuity: NT, fields: NT, pupils: NT, discs: NT    III/IV/VI: versions: grossly intact    V: facial sensation: NT    VII: facial expression: symmetric    VIII: hearing: intact to voice    IX/X: palate: NT    XI: shoulder shrug: NT    XII: tongue: NT    MOTOR:  - bulk: NT  - tone: NT  Upper Extremity Strength (R/L)    NT   Elbow flexion NT   Elbow extension NT   Shoulder abduction NT     Lower Extremity Strength  (R/L)   Hip flexion NT   Knee extension NT   Knee flexion NT   Ankle dorsiflexion NT   Ankle plantarflexion NT     - pronator drift: NT  - abnormal movements: none    SENSATION:  - light touch: NT  - vibration (R/L, seconds): NT at the great toes  - pinprick: NT  - proprioception: NT  - Romberg: NT    COORDINATION:  - finger to nose: NT  - finger tapping: NT    REFLEXES:  Reflex Right Left   BR NT NT   Biceps NT NT   Triceps NT NT   Patellae NT NT   Achilles NT NT   Toes NT NT     GAIT:  - NT    REVIEW OF IMAGING STUDIES:  No additional data since the last visit.    REVIEW OF LABORATORY STUDIES:  Reviewed.    ASSESSMENT:  Mikayla Knight is a 64 y.o. womna with chronic migraine w/ aura and a history otherwise notable for chronic headache, \"ataxia,\" breast cancer, HLD, fibromyalgia, PTSD, MDD, and CODY.  Her headache symptoms are worse lately.  Nurtec does not seem to be sufficiently helpful for her.  Plan to stop this and try Ubrelvy.  The sensory symptoms could be consistent with Lhermitte phenomenon, so plan for MRI of the cervical spine.  Plans/recommendations as follows:    PLAN:  Chronic Migraine w/ Aura:  Prevention:  - continue Aimovig 140 mg/month  - continue amitriptyline 50 " mg/night  - get 7-9 hours of sleep per night; can try supplementing melatonin 2-10 mg, 2-3 hours before bedtime  - drink plenty of fluids (urine should be nearly clear)  - avoid excessive caffeine intake (no more than 2 servings per day and nothing in the afternoon)  - eat regular meals (don't skip meals)  - get moderate exercise (even just a 20 minute walk daily)    Rescue:  - stop Nurtec  - trial of Ubrelvy 100 mg  - do not use analgesics (e.g., ibuprofen, acetaminophen) more than 2 days per week in order to avoid analgesic rebound headaches    - keep a headache log    Lhermitte Phenomenon:  - MRI cervical spine    Follow-Up:  - Return in about 3 months (around 8/7/2024).    Signed: Tim Mcmanus M.D.

## 2024-05-08 ENCOUNTER — TELEPHONE (OUTPATIENT)
Dept: PHARMACY | Facility: MEDICAL CENTER | Age: 64
End: 2024-05-08
Payer: COMMERCIAL

## 2024-05-08 NOTE — TELEPHONE ENCOUNTER
Ubrogepant 100 MG Tab    Received New Start PA request via MSOT  for Ubrelvy. (Quantity:8, Day Supply:30)     Insurance: [employer]  Member ID:  [id number]  BIN: [id number]  PCN: [id number]  Group: [id number]     Ran Test claim via [place] & medication Rejects stating prior authorization is required.     Initiated PA in [facility]  (Key: [id number])    Prior Authorization for Ubrelvy has been approved for a quantity of 8 , day supply 30    Prior Authorization reference number: [id number]  Insurance: [employer]  Effective dates: approved through 08/08/2024.  Copay: $0     Is patient eligible to fill with [facility]? Yes    Next Steps: The Patients copay is less than $5.00. Will contact the patient to determine choice of pharmacy, if applicable.

## 2024-05-09 PROCEDURE — RXMED WILLOW AMBULATORY MEDICATION CHARGE: Performed by: PSYCHIATRY & NEUROLOGY

## 2024-05-09 NOTE — TELEPHONE ENCOUNTER
Attempted to contact patient at [phone] to discuss [facility] and services/benefits offered. No answer, left voicemail.      Lynda Pennington  Rx Coordinator   [phone]

## 2024-05-15 ENCOUNTER — PHARMACY VISIT (OUTPATIENT)
Dept: PHARMACY | Facility: MEDICAL CENTER | Age: 64
End: 2024-05-15
Payer: COMMERCIAL

## 2024-06-04 ENCOUNTER — TELEPHONE (OUTPATIENT)
Dept: PHARMACY | Facility: MEDICAL CENTER | Age: 64
End: 2024-06-04
Payer: COMMERCIAL

## 2024-06-04 PROCEDURE — RXMED WILLOW AMBULATORY MEDICATION CHARGE: Performed by: PSYCHIATRY & NEUROLOGY

## 2024-06-05 ENCOUNTER — PHARMACY VISIT (OUTPATIENT)
Dept: PHARMACY | Facility: MEDICAL CENTER | Age: 64
End: 2024-06-05
Payer: COMMERCIAL

## 2024-06-17 DIAGNOSIS — E11.40 TYPE 2 DIABETES MELLITUS WITH DIABETIC NEUROPATHY, WITHOUT LONG-TERM CURRENT USE OF INSULIN (HCC): ICD-10-CM

## 2024-06-18 RX ORDER — SEMAGLUTIDE 2.68 MG/ML
2 INJECTION, SOLUTION SUBCUTANEOUS
Qty: 2 ML | Refills: 2 | Status: SHIPPED | OUTPATIENT
Start: 2024-06-18

## 2024-06-18 NOTE — TELEPHONE ENCOUNTER
Received request via: Pharmacy    Was the patient seen in the last year in this department? Yes    Does the patient have an active prescription (recently filled or refills available) for medication(s) requested? No    Pharmacy Name: Pharmacy:   [id number] - [place], [place] - [address]     Does the patient have [facility] Plus and need 100 day supply (blood pressure, diabetes and cholesterol meds only)? Patient does not have SCP

## 2024-06-24 ENCOUNTER — HOSPITAL ENCOUNTER (OUTPATIENT)
Facility: MEDICAL CENTER | Age: 64
End: 2024-06-24
Attending: PHYSICIAN ASSISTANT
Payer: COMMERCIAL

## 2024-06-24 ENCOUNTER — APPOINTMENT (OUTPATIENT)
Dept: MEDICAL GROUP | Facility: PHYSICIAN GROUP | Age: 64
End: 2024-06-24
Payer: COMMERCIAL

## 2024-06-24 ENCOUNTER — OFFICE VISIT (OUTPATIENT)
Dept: MEDICAL GROUP | Facility: PHYSICIAN GROUP | Age: 64
End: 2024-06-24
Payer: COMMERCIAL

## 2024-06-24 VITALS
HEART RATE: 79 BPM | DIASTOLIC BLOOD PRESSURE: 100 MMHG | RESPIRATION RATE: 12 BRPM | HEIGHT: 64 IN | OXYGEN SATURATION: 98 % | BODY MASS INDEX: 41.48 KG/M2 | SYSTOLIC BLOOD PRESSURE: 130 MMHG | WEIGHT: 243 LBS | TEMPERATURE: 97.2 F

## 2024-06-24 DIAGNOSIS — G47.09 OTHER INSOMNIA: ICD-10-CM

## 2024-06-24 DIAGNOSIS — R39.9 UTI SYMPTOMS: ICD-10-CM

## 2024-06-24 DIAGNOSIS — F43.21 GRIEF: ICD-10-CM

## 2024-06-24 LAB
APPEARANCE UR: NORMAL
BILIRUB UR STRIP-MCNC: NEGATIVE MG/DL
COLOR UR AUTO: NORMAL
GLUCOSE UR STRIP.AUTO-MCNC: NEGATIVE MG/DL
KETONES UR STRIP.AUTO-MCNC: NEGATIVE MG/DL
LEUKOCYTE ESTERASE UR QL STRIP.AUTO: NEGATIVE
NITRITE UR QL STRIP.AUTO: NEGATIVE
PH UR STRIP.AUTO: 5.5 [PH] (ref 5–8)
PROT UR QL STRIP: NEGATIVE MG/DL
RBC UR QL AUTO: NEGATIVE
SP GR UR STRIP.AUTO: >=1.03
UROBILINOGEN UR STRIP-MCNC: NORMAL MG/DL

## 2024-06-24 PROCEDURE — 81002 URINALYSIS NONAUTO W/O SCOPE: CPT | Performed by: PHYSICIAN ASSISTANT

## 2024-06-24 PROCEDURE — 87186 SC STD MICRODIL/AGAR DIL: CPT

## 2024-06-24 PROCEDURE — 99214 OFFICE O/P EST MOD 30 MIN: CPT | Performed by: PHYSICIAN ASSISTANT

## 2024-06-24 PROCEDURE — 87077 CULTURE AEROBIC IDENTIFY: CPT

## 2024-06-24 PROCEDURE — 3075F SYST BP GE 130 - 139MM HG: CPT | Performed by: PHYSICIAN ASSISTANT

## 2024-06-24 PROCEDURE — 87086 URINE CULTURE/COLONY COUNT: CPT

## 2024-06-24 PROCEDURE — 3080F DIAST BP >= 90 MM HG: CPT | Performed by: PHYSICIAN ASSISTANT

## 2024-06-24 RX ORDER — ALPRAZOLAM 0.5 MG/1
0.5 TABLET ORAL 2 TIMES DAILY PRN
Qty: 30 TABLET | Refills: 0 | Status: SHIPPED | OUTPATIENT
Start: 2024-06-24 | End: 2024-07-14

## 2024-06-24 ASSESSMENT — FIBROSIS 4 INDEX: FIB4 SCORE: 1.53

## 2024-06-24 NOTE — PROGRESS NOTES
"CC:  Chief Complaint   Patient presents with    Follow-Up       HISTORY OF PRESENT ILLNESS: Patient is a 64 y.o. female established patient presenting with issues below  Pt's son passed away two weeks ago from complications from a car accident that happened twenty years ago. Pt dealing with intense grief. Has been gardening at home which she has found helpful. Having trouble with insomnia and heightened anxiety.   Pt having dysuria and urinary urgency. Thinks she has another UTI.     Current Outpatient Medications   Medication Sig Dispense Refill    Semaglutide, 2 MG/DOSE, (OZEMPIC, 2 MG/DOSE,) 8 MG/3ML Solution Pen-injector INJECT 2 MG SUBCUTANEOUSLY EVERY 7 DAYS 2 mL 2    albuterol 108 (90 Base) MCG/ACT Aero Soln inhalation aerosol INHALE 2 PUFFS EVERY 4 HOURS AS NEEDED FOR SHORTNESS OF BREATH 8.5 Each 2    traZODone (DESYREL) 100 MG Tab TAKE 1 TO 3 TABS BY MOUTH AT BEDTIME AS NEEDED FOR SLEEP. 270 Tablet 1    amitriptyline (ELAVIL) 50 MG Tab TAKE 1 TABLET BY MOUTH EVERY DAY IN THE EVENING 90 Tablet 0    Erenumab-aooe (AIMOVIG) 140 MG/ML Solution Auto-injector Inject 140 mg under the skin every 30 (thirty) days for 30 days. 1.12 mL 11    diclofenac sodium (VOLTAREN) 1 % Gel APPLY 2 GRAMS TOPICALLY 4 TIMES A DAY AS NEEDED (JOINT PAIN). 100 g 1    hydrOXYzine HCl (ATARAX) 25 MG Tab Take 1 Tablet by mouth 3 times a day as needed for Itching. 90 Tablet 1    acetaminophen (TYLENOL) 500 MG Tab Take 1,000 mg by mouth 3 times a day as needed (HA).      Ubrogepant 100 MG Tab Take 1 tablet (100 mg) at the onset of aura/HA; may re-dose x1 after 2 hrs if HA persists; Max daily dose: 200 mg 8 Tablet 11     No current facility-administered medications for this visit.        ROS:     ROS    Exam:    BP (!) 130/100   Pulse 79   Temp 36.2 °C (97.2 °F) (Temporal)   Resp 12   Ht 1.626 m (5' 4\")   Wt 110 kg (243 lb)   SpO2 98%  Body mass index is 41.71 kg/m².    General:  Well nourished, well developed female in NAD  Head is " grossly normal.  Neck: Supple.   Skin: Warm and dry. No obvious lesions  Neuro: Normal muscle tone. Gait normal. Alert and oriented.  Psych: emotionally sad      Please note that this dictation was created using voice recognition software. I have made every reasonable attempt to correct obvious errors, but I expect that there are errors of grammar and possibly content that I did not discover before finalizing the note.        Assessment/Plan:    1. Grief  PDMP checked. We discussed grief. My recommendation is to seek out a grief counselor. Will have her taken Xanax PRN for anxiety or insomnia.   - ALPRAZolam (XANAX) 0.5 MG Tab; Take 1 Tablet by mouth 2 times a day as needed for Sleep or Anxiety for up to 20 days.  Dispense: 30 Tablet; Refill: 0    2. UTI symptoms  UA normal. Will get urine culture.   - POCT Urinalysis  - URINE CULTURE(NEW); Future    3. Other insomnia    - ALPRAZolam (XANAX) 0.5 MG Tab; Take 1 Tablet by mouth 2 times a day as needed for Sleep or Anxiety for up to 20 days.  Dispense: 30 Tablet; Refill: 0

## 2024-06-27 DIAGNOSIS — R39.9 UTI SYMPTOMS: ICD-10-CM

## 2024-06-27 RX ORDER — SULFAMETHOXAZOLE AND TRIMETHOPRIM 800; 160 MG/1; MG/1
1 TABLET ORAL 2 TIMES DAILY
Qty: 6 TABLET | Refills: 0 | Status: SHIPPED | OUTPATIENT
Start: 2024-06-27

## 2024-06-28 ENCOUNTER — APPOINTMENT (OUTPATIENT)
Dept: RADIOLOGY | Facility: MEDICAL CENTER | Age: 64
End: 2024-06-28
Attending: PSYCHIATRY & NEUROLOGY
Payer: COMMERCIAL

## 2024-07-02 PROCEDURE — RXMED WILLOW AMBULATORY MEDICATION CHARGE: Performed by: PSYCHIATRY & NEUROLOGY

## 2024-07-03 DIAGNOSIS — R06.02 SHORT OF BREATH ON EXERTION: ICD-10-CM

## 2024-07-03 DIAGNOSIS — G44.229 CHRONIC TENSION-TYPE HEADACHE, NOT INTRACTABLE: ICD-10-CM

## 2024-07-03 RX ORDER — AMITRIPTYLINE HYDROCHLORIDE 50 MG/1
50 TABLET, FILM COATED ORAL EVERY EVENING
Qty: 90 TABLET | Refills: 0 | Status: SHIPPED | OUTPATIENT
Start: 2024-07-03

## 2024-07-03 RX ORDER — ALBUTEROL SULFATE 90 UG/1
2 AEROSOL, METERED RESPIRATORY (INHALATION) EVERY 4 HOURS PRN
Qty: 8.5 EACH | Refills: 2 | Status: SHIPPED | OUTPATIENT
Start: 2024-07-03

## 2024-07-09 DIAGNOSIS — N30.00 ACUTE CYSTITIS WITHOUT HEMATURIA: ICD-10-CM

## 2024-07-09 RX ORDER — CIPROFLOXACIN 250 MG/1
250 TABLET, FILM COATED ORAL 2 TIMES DAILY
Qty: 10 TABLET | Refills: 0 | Status: SHIPPED | OUTPATIENT
Start: 2024-07-09

## 2024-07-10 ENCOUNTER — PHARMACY VISIT (OUTPATIENT)
Dept: PHARMACY | Facility: MEDICAL CENTER | Age: 64
End: 2024-07-10
Payer: COMMERCIAL

## 2024-07-24 LAB — RETINAL SCREEN: NEGATIVE

## 2024-08-09 ENCOUNTER — TELEPHONE (OUTPATIENT)
Dept: PHARMACY | Facility: MEDICAL CENTER | Age: 64
End: 2024-08-09
Payer: COMMERCIAL

## 2024-08-09 NOTE — TELEPHONE ENCOUNTER
Prior Authorization for     Ubrogepant 100 MG Tab    (Quantity: 8, Days: 30) has been submitted via Cover My Meds: Key ([id number])    Insurance: [employer]    Will follow up in 24-48 business hours.

## 2024-08-09 NOTE — TELEPHONE ENCOUNTER
Received Renewal PA request via  Refill Queue   for     Ubrogepant 100 MG Tab   . (Quantity:8, Day Supply:30)     Insurance: [facility]  Member ID:  [id number]  BIN: [id number]  PCN: [id number]  Group: [id number]     Ran Test claim via [place] & medication Rejects stating prior authorization is required.

## 2024-08-10 PROCEDURE — RXMED WILLOW AMBULATORY MEDICATION CHARGE: Performed by: PSYCHIATRY & NEUROLOGY

## 2024-08-13 ENCOUNTER — PHARMACY VISIT (OUTPATIENT)
Dept: PHARMACY | Facility: MEDICAL CENTER | Age: 64
End: 2024-08-13
Payer: COMMERCIAL

## 2024-08-13 ENCOUNTER — APPOINTMENT (OUTPATIENT)
Dept: NEUROLOGY | Facility: MEDICAL CENTER | Age: 64
End: 2024-08-13
Attending: PSYCHIATRY & NEUROLOGY
Payer: COMMERCIAL

## 2024-08-14 PROCEDURE — RXMED WILLOW AMBULATORY MEDICATION CHARGE: Performed by: PSYCHIATRY & NEUROLOGY

## 2024-08-14 NOTE — TELEPHONE ENCOUNTER
Prior Authorization for     Ubrogepant 100 MG Tab    has been approved for a quantity of 8 , day supply 30    Prior Authorization reference number: [id number]  Insurance: [facility]  Effective dates: 8/14/2024 to 8/14/2025  Copay: $0     Is patient eligible to fill with [facility]? Yes    Next Steps: The Patients copay is less than $5.00. Will contact the patient to determine choice of pharmacy, if applicable.

## 2024-08-14 NOTE — TELEPHONE ENCOUNTER
Prior Authorization for Ubrogepant 100 MG Tab  has been denied for a quantity of 8 , day supply 30    Prior authorization was denied per the following:     Prior Authorization denial reference number: [id number]  Insurance: [facility]      Next Steps:Proceed with appeal process. Generate proposed appeal for provider approval & signature.

## 2024-08-15 ENCOUNTER — PHARMACY VISIT (OUTPATIENT)
Dept: PHARMACY | Facility: MEDICAL CENTER | Age: 64
End: 2024-08-15
Payer: COMMERCIAL

## 2024-08-15 ENCOUNTER — OFFICE VISIT (OUTPATIENT)
Dept: MEDICAL GROUP | Facility: PHYSICIAN GROUP | Age: 64
End: 2024-08-15
Payer: COMMERCIAL

## 2024-08-15 ENCOUNTER — HOSPITAL ENCOUNTER (OUTPATIENT)
Facility: MEDICAL CENTER | Age: 64
End: 2024-08-15
Attending: PHYSICIAN ASSISTANT
Payer: COMMERCIAL

## 2024-08-15 VITALS
HEART RATE: 73 BPM | HEIGHT: 64 IN | RESPIRATION RATE: 18 BRPM | TEMPERATURE: 96.7 F | WEIGHT: 235 LBS | DIASTOLIC BLOOD PRESSURE: 82 MMHG | SYSTOLIC BLOOD PRESSURE: 128 MMHG | OXYGEN SATURATION: 96 % | BODY MASS INDEX: 40.12 KG/M2

## 2024-08-15 DIAGNOSIS — E11.40 TYPE 2 DIABETES MELLITUS WITH DIABETIC NEUROPATHY, WITHOUT LONG-TERM CURRENT USE OF INSULIN (HCC): ICD-10-CM

## 2024-08-15 DIAGNOSIS — N30.01 ACUTE CYSTITIS WITH HEMATURIA: ICD-10-CM

## 2024-08-15 DIAGNOSIS — R39.9 UTI SYMPTOMS: ICD-10-CM

## 2024-08-15 LAB
APPEARANCE UR: NORMAL
BILIRUB UR STRIP-MCNC: NORMAL MG/DL
COLOR UR AUTO: NORMAL
GLUCOSE UR STRIP.AUTO-MCNC: NEGATIVE MG/DL
HBA1C MFR BLD: 5.5 % (ref ?–5.8)
KETONES UR STRIP.AUTO-MCNC: NORMAL MG/DL
LEUKOCYTE ESTERASE UR QL STRIP.AUTO: NEGATIVE
NITRITE UR QL STRIP.AUTO: POSITIVE
PH UR STRIP.AUTO: 5.5 [PH] (ref 5–8)
POCT INT CON NEG: NEGATIVE
POCT INT CON POS: POSITIVE
PROT UR QL STRIP: 30 MG/DL
RBC UR QL AUTO: NORMAL
SP GR UR STRIP.AUTO: 1.03
UROBILINOGEN UR STRIP-MCNC: 1 MG/DL

## 2024-08-15 PROCEDURE — 87077 CULTURE AEROBIC IDENTIFY: CPT

## 2024-08-15 PROCEDURE — 3079F DIAST BP 80-89 MM HG: CPT | Performed by: PHYSICIAN ASSISTANT

## 2024-08-15 PROCEDURE — 99214 OFFICE O/P EST MOD 30 MIN: CPT | Performed by: PHYSICIAN ASSISTANT

## 2024-08-15 PROCEDURE — 83036 HEMOGLOBIN GLYCOSYLATED A1C: CPT | Performed by: PHYSICIAN ASSISTANT

## 2024-08-15 PROCEDURE — 3074F SYST BP LT 130 MM HG: CPT | Performed by: PHYSICIAN ASSISTANT

## 2024-08-15 PROCEDURE — 87086 URINE CULTURE/COLONY COUNT: CPT

## 2024-08-15 PROCEDURE — 81002 URINALYSIS NONAUTO W/O SCOPE: CPT | Performed by: PHYSICIAN ASSISTANT

## 2024-08-15 PROCEDURE — 87186 SC STD MICRODIL/AGAR DIL: CPT

## 2024-08-15 RX ORDER — CEPHALEXIN 500 MG/1
500 CAPSULE ORAL 4 TIMES DAILY
Qty: 40 CAPSULE | Refills: 0 | Status: SHIPPED | OUTPATIENT
Start: 2024-08-15 | End: 2024-08-25

## 2024-08-15 ASSESSMENT — FIBROSIS 4 INDEX: FIB4 SCORE: 1.53

## 2024-08-15 NOTE — PROGRESS NOTES
"CC:  Chief Complaint   Patient presents with    Follow-Up     UTI, burning, still anxious       HISTORY OF PRESENT ILLNESS: Patient is a 64 y.o. female established patient presenting with issues below  Patient's hemoglobin A1c at 5.5%.  She is currently on Ozempic 2 mg.  Pt states she has been having constant UTI since Feb 2024. Has been treated with multiple antibiotics including Cipro, Bactrim.  Has had 2 urine cultures done with different organisms growing for each.  Denies fever, chills, flank pain.    Current Outpatient Medications   Medication Sig Dispense Refill    ciprofloxacin (CIPRO) 250 MG Tab Take 1 Tablet by mouth 2 times a day. 10 Tablet 0    albuterol 108 (90 Base) MCG/ACT Aero Soln inhalation aerosol INHALE 2 PUFFS EVERY 4 HOURS AS NEEDED FOR SHORTNESS OF BREATH 8.5 Each 2    amitriptyline (ELAVIL) 50 MG Tab TAKE 1 TABLET BY MOUTH EVERY DAY IN THE EVENING 90 Tablet 0    Semaglutide, 2 MG/DOSE, (OZEMPIC, 2 MG/DOSE,) 8 MG/3ML Solution Pen-injector INJECT 2 MG SUBCUTANEOUSLY EVERY 7 DAYS 2 mL 2    Ubrogepant 100 MG Tab Take 1 tablet (100 mg) at the onset of aura/HA; may re-dose x1 after 2 hrs if HA persists; Max daily dose: 200 mg 8 Tablet 11    traZODone (DESYREL) 100 MG Tab TAKE 1 TO 3 TABS BY MOUTH AT BEDTIME AS NEEDED FOR SLEEP. 270 Tablet 1    Erenumab-aooe (AIMOVIG) 140 MG/ML Solution Auto-injector Inject 140 mg under the skin every 30 (thirty) days for 30 days. 1.12 mL 11    diclofenac sodium (VOLTAREN) 1 % Gel APPLY 2 GRAMS TOPICALLY 4 TIMES A DAY AS NEEDED (JOINT PAIN). 100 g 1    hydrOXYzine HCl (ATARAX) 25 MG Tab Take 1 Tablet by mouth 3 times a day as needed for Itching. 90 Tablet 1     No current facility-administered medications for this visit.        ROS:     ROS    Exam:    /82   Pulse 73   Temp 35.9 °C (96.7 °F) (Temporal)   Resp 18   Ht 1.626 m (5' 4\")   Wt 107 kg (235 lb)   SpO2 96%  Body mass index is 40.34 kg/m².    General:  Well nourished, well developed female in " NAD  Head is grossly normal.  Neck: Supple.   Skin: Warm and dry. No obvious lesions  Neuro: Normal muscle tone. Gait normal. Alert and oriented.  Psych: Normal mood and affect    Monofilament testing with a 10 gram force: sensation intact: intact bilaterally  Visual Inspection: Feet without maceration, ulcers, fissures.  Pedal pulses: intact bilaterally    Please note that this dictation was created using voice recognition software. I have made every reasonable attempt to correct obvious errors, but I expect that there are errors of grammar and possibly content that I did not discover before finalizing the note.        Assessment/Plan:    1. Type 2 diabetes mellitus with diabetic neuropathy, without long-term current use of insulin (HCC)  Very well-controlled.  Continue Ozempic 2 mg.  - POCT A1C  - Diabetic Monofilament LE Exam    2. UTI symptoms    - POCT Urinalysis    3. Acute cystitis with hematuria  Will treat with Rocephin and Keflex.  Advised that if she does not improve from this then we will set up with urology consult.  - URINE CULTURE(NEW); Future  - cefTRIAXone (Rocephin) 1 g in lidocaine (Xylocaine) 1 % 4 mL for IM use  - cephALEXin (KEFLEX) 500 MG Cap; Take 1 Capsule by mouth 4 times a day for 10 days.  Dispense: 40 Capsule; Refill: 0

## 2024-08-20 DIAGNOSIS — F43.21 GRIEF: ICD-10-CM

## 2024-08-20 DIAGNOSIS — G47.09 OTHER INSOMNIA: ICD-10-CM

## 2024-08-22 RX ORDER — ALPRAZOLAM 0.5 MG
TABLET ORAL
Qty: 30 TABLET | Refills: 0 | Status: SHIPPED | OUTPATIENT
Start: 2024-08-22 | End: 2024-09-21

## 2024-09-05 PROCEDURE — RXMED WILLOW AMBULATORY MEDICATION CHARGE: Performed by: PSYCHIATRY & NEUROLOGY

## 2024-09-06 ENCOUNTER — PHARMACY VISIT (OUTPATIENT)
Dept: PHARMACY | Facility: MEDICAL CENTER | Age: 64
End: 2024-09-06
Payer: COMMERCIAL

## 2024-09-06 PROCEDURE — RXMED WILLOW AMBULATORY MEDICATION CHARGE: Performed by: PSYCHIATRY & NEUROLOGY

## 2024-09-09 DIAGNOSIS — E11.40 TYPE 2 DIABETES MELLITUS WITH DIABETIC NEUROPATHY, WITHOUT LONG-TERM CURRENT USE OF INSULIN (HCC): ICD-10-CM

## 2024-09-09 RX ORDER — SEMAGLUTIDE 2.68 MG/ML
2 INJECTION, SOLUTION SUBCUTANEOUS
Qty: 3 ML | Refills: 2 | Status: SHIPPED | OUTPATIENT
Start: 2024-09-09

## 2024-09-09 NOTE — TELEPHONE ENCOUNTER
Received request via: Patient    Was the patient seen in the last year in this department? Yes    Does the patient have an active prescription (recently filled or refills available) for medication(s) requested? No    [facility]    Does the patient have [facility] Plus and need 100-day supply? (This applies to ALL medications) Patient does not have SCP

## 2024-09-11 DIAGNOSIS — G44.229 CHRONIC TENSION-TYPE HEADACHE, NOT INTRACTABLE: ICD-10-CM

## 2024-09-11 DIAGNOSIS — R06.02 SHORT OF BREATH ON EXERTION: ICD-10-CM

## 2024-09-11 DIAGNOSIS — F51.01 PRIMARY INSOMNIA: ICD-10-CM

## 2024-09-11 RX ORDER — ALBUTEROL SULFATE 90 UG/1
2 INHALANT RESPIRATORY (INHALATION) EVERY 4 HOURS PRN
Qty: 8.5 EACH | Refills: 2 | Status: SHIPPED | OUTPATIENT
Start: 2024-09-11 | End: 2024-09-24

## 2024-09-11 RX ORDER — AMITRIPTYLINE HYDROCHLORIDE 50 MG/1
50 TABLET ORAL EVERY EVENING
Qty: 90 TABLET | Refills: 0 | Status: SHIPPED | OUTPATIENT
Start: 2024-09-11

## 2024-09-11 RX ORDER — TRAZODONE HYDROCHLORIDE 100 MG/1
TABLET ORAL
Qty: 270 TABLET | Refills: 1 | Status: SHIPPED | OUTPATIENT
Start: 2024-09-11

## 2024-09-11 NOTE — TELEPHONE ENCOUNTER
Received request via: Patient    Was the patient seen in the last year in this department? Yes    Does the patient have an active prescription (recently filled or refills available) for medication(s) requested? No    [facility]     Does the patient have [facility] Plus and need 100-day supply? (This applies to ALL medications) Patient does not have SCP

## 2024-09-11 NOTE — TELEPHONE ENCOUNTER
Received request via: Patient    Was the patient seen in the last year in this department? Yes    Does the patient have an active prescription (recently filled or refills available) for medication(s) requested? No    [facility]    Does the patient have [facility] Plus and need 100-day supply? (This applies to ALL medications) Patient does not have SCP

## 2024-09-24 DIAGNOSIS — R06.02 SHORT OF BREATH ON EXERTION: ICD-10-CM

## 2024-09-24 RX ORDER — ALBUTEROL SULFATE 90 UG/1
2 INHALANT RESPIRATORY (INHALATION) EVERY 4 HOURS PRN
Qty: 8.5 EACH | Refills: 2 | Status: SHIPPED | OUTPATIENT
Start: 2024-09-24 | End: 2024-09-26

## 2024-09-24 NOTE — TELEPHONE ENCOUNTER
Received request via: Patient    Was the patient seen in the last year in this department? Yes    Does the patient have an active prescription (recently filled or refills available) for medication(s) requested? No    [facility]    Does the patient have retirement Plus and need 100-day supply? (This applies to ALL medications) Patient does not have SCP

## 2024-09-26 ENCOUNTER — OFFICE VISIT (OUTPATIENT)
Dept: MEDICAL GROUP | Facility: PHYSICIAN GROUP | Age: 64
End: 2024-09-26
Payer: COMMERCIAL

## 2024-09-26 VITALS
SYSTOLIC BLOOD PRESSURE: 118 MMHG | WEIGHT: 228 LBS | RESPIRATION RATE: 16 BRPM | DIASTOLIC BLOOD PRESSURE: 90 MMHG | TEMPERATURE: 97 F | OXYGEN SATURATION: 98 % | HEART RATE: 123 BPM | HEIGHT: 64 IN | BODY MASS INDEX: 38.93 KG/M2

## 2024-09-26 DIAGNOSIS — Z23 NEED FOR VACCINATION: ICD-10-CM

## 2024-09-26 DIAGNOSIS — F51.01 PRIMARY INSOMNIA: ICD-10-CM

## 2024-09-26 DIAGNOSIS — F41.9 ANXIETY: ICD-10-CM

## 2024-09-26 PROCEDURE — 99214 OFFICE O/P EST MOD 30 MIN: CPT | Mod: 25 | Performed by: PHYSICIAN ASSISTANT

## 2024-09-26 PROCEDURE — 90656 IIV3 VACC NO PRSV 0.5 ML IM: CPT | Performed by: PHYSICIAN ASSISTANT

## 2024-09-26 PROCEDURE — 3080F DIAST BP >= 90 MM HG: CPT | Performed by: PHYSICIAN ASSISTANT

## 2024-09-26 PROCEDURE — 90471 IMMUNIZATION ADMIN: CPT | Performed by: PHYSICIAN ASSISTANT

## 2024-09-26 PROCEDURE — 3074F SYST BP LT 130 MM HG: CPT | Performed by: PHYSICIAN ASSISTANT

## 2024-09-26 RX ORDER — ALPRAZOLAM 0.5 MG
0.5 TABLET ORAL 2 TIMES DAILY PRN
Qty: 30 TABLET | Refills: 0 | Status: SHIPPED | OUTPATIENT
Start: 2024-09-26 | End: 2024-10-26

## 2024-09-26 RX ORDER — PREDNISONE 20 MG/1
TABLET ORAL
COMMUNITY
Start: 2024-09-15 | End: 2024-09-26

## 2024-09-26 RX ORDER — CEFDINIR 300 MG/1
CAPSULE ORAL
COMMUNITY
Start: 2024-09-15 | End: 2024-09-26

## 2024-09-26 ASSESSMENT — FIBROSIS 4 INDEX: FIB4 SCORE: 1.53

## 2024-09-26 NOTE — PROGRESS NOTES
"CC:  Chief Complaint   Patient presents with    Follow-Up     ER     Medication Refill     Ambien        HISTORY OF PRESENT ILLNESS: Patient is a 64 y.o. female established patient presenting with issues below  Pt seen in ER about three weeks ago for COVID and UTI. Treated with cefdinir and prednisone. COVID and UTI sxs resolved now  Requesting refill of xanax for anxiety and insomnia.     Current Outpatient Medications   Medication Sig Dispense Refill    traZODone (DESYREL) 100 MG Tab TAKE 1 TO 3 TABS BY MOUTH AT BEDTIME AS NEEDED FOR SLEEP. 270 Tablet 1    amitriptyline (ELAVIL) 50 MG Tab Take 1 Tablet by mouth every evening. 90 Tablet 0    Semaglutide, 2 MG/DOSE, (OZEMPIC, 2 MG/DOSE,) 8 MG/3ML Solution Pen-injector INJECT 2 MG SUBCUTANEOUSLY EVERY 7 DAYS 3 mL 2    Ubrogepant 100 MG Tab Take 1 tablet (100 mg) at the onset of aura/HA; may re-dose x1 after 2 hrs if HA persists; Max daily dose: 200 mg 8 Tablet 11    Erenumab-aooe (AIMOVIG) 140 MG/ML Solution Auto-injector Inject 140 mg under the skin every 30 (thirty) days for 30 days. 1.12 mL 11    diclofenac sodium (VOLTAREN) 1 % Gel APPLY 2 GRAMS TOPICALLY 4 TIMES A DAY AS NEEDED (JOINT PAIN). 100 g 1     No current facility-administered medications for this visit.        ROS:     ROS    Exam:    BP (!) 118/90   Pulse (!) 123   Temp 36.1 °C (97 °F) (Temporal)   Resp 16   Ht 1.626 m (5' 4\")   Wt 103 kg (228 lb)   SpO2 98%  Body mass index is 39.14 kg/m².    General:  Well nourished, well developed female in NAD  Head is grossly normal.  Neck: Supple.   Pulmonary: Clear to auscultation. No ronchi, wheezing or rales  Cardiac: Regular rate and rhythm. No murmurs.  Skin: Warm and dry. No obvious lesions  Neuro: Normal muscle tone. Gait normal. Alert and oriented.  Psych: Normal mood and affect      Please note that this dictation was created using voice recognition software. I have made every reasonable attempt to correct obvious errors, but I expect that there " are errors of grammar and possibly content that I did not discover before finalizing the note.        Assessment/Plan:  1. Need for vaccination    - INFLUENZA VACCINE QUAD INJ (PF)    2. Primary insomnia  Rx for Xanax 0.5mg sent in  - ALPRAZolam (XANAX) 0.5 MG Tab; Take 1 Tablet by mouth 2 times a day as needed for Sleep or Anxiety for up to 30 days.  Dispense: 30 Tablet; Refill: 0    3. Anxiety    - ALPRAZolam (XANAX) 0.5 MG Tab; Take 1 Tablet by mouth 2 times a day as needed for Sleep or Anxiety for up to 30 days.  Dispense: 30 Tablet; Refill: 0

## 2024-10-03 PROCEDURE — RXMED WILLOW AMBULATORY MEDICATION CHARGE: Performed by: PSYCHIATRY & NEUROLOGY

## 2024-10-07 ENCOUNTER — PHARMACY VISIT (OUTPATIENT)
Dept: PHARMACY | Facility: MEDICAL CENTER | Age: 64
End: 2024-10-07
Payer: COMMERCIAL

## 2024-11-01 PROCEDURE — RXMED WILLOW AMBULATORY MEDICATION CHARGE: Performed by: PSYCHIATRY & NEUROLOGY

## 2024-11-04 ENCOUNTER — PHARMACY VISIT (OUTPATIENT)
Dept: PHARMACY | Facility: MEDICAL CENTER | Age: 64
End: 2024-11-04
Payer: COMMERCIAL

## 2024-11-12 DIAGNOSIS — G44.229 CHRONIC TENSION-TYPE HEADACHE, NOT INTRACTABLE: ICD-10-CM

## 2024-11-12 DIAGNOSIS — F41.9 ANXIETY: ICD-10-CM

## 2024-11-12 DIAGNOSIS — F51.01 PRIMARY INSOMNIA: ICD-10-CM

## 2024-11-12 NOTE — TELEPHONE ENCOUNTER
Received request via: Patient    Was the patient seen in the last year in this department? Yes    Does the patient have an active prescription (recently filled or refills available) for medication(s) requested? No    [facility]    Does the patient have nursing home Plus and need 100-day supply? (This applies to ALL medications) Patient does not have SCP

## 2024-11-13 RX ORDER — ALPRAZOLAM 0.5 MG
0.5 TABLET ORAL 2 TIMES DAILY PRN
Qty: 30 TABLET | Refills: 0 | OUTPATIENT
Start: 2024-11-13 | End: 2024-12-13

## 2024-11-13 RX ORDER — AMITRIPTYLINE HYDROCHLORIDE 50 MG/1
50 TABLET ORAL EVERY EVENING
Qty: 90 TABLET | Refills: 0 | Status: SHIPPED | OUTPATIENT
Start: 2024-11-13

## 2024-11-27 DIAGNOSIS — G43.E09 CHRONIC MIGRAINE WITH AURA WITHOUT STATUS MIGRAINOSUS, NOT INTRACTABLE: ICD-10-CM

## 2024-11-27 NOTE — TELEPHONE ENCOUNTER
Received request via: Pharmacy    Medication Name/Dosage Erenumab-aooe(Aimovig) 140 mg/ml solution auto injector, 140mg under the skin every 30 days for 30 days     When was medication last prescribed 11/15/23    How many refills were previously provided 11    How many Refills does he patient have left from last prescription 0    Was the patient seen in the last year in this department? Yes   Date of last office visit 5/7/24     Per last Neurology Office Visit, when was the date of next follow up visit set for? 3 months                             Date of office visit follow up request 8/7/24     Does the patient have an upcoming appointment? Yes   If yes, when 1/28/25             If no, schedule appointment Scheduled     Does the patient have [facility] Plus and need 100 day supply (blood pressure, diabetes and cholesterol meds only)? Patient does not have SCP

## 2024-11-30 RX ORDER — ERENUMAB-AOOE 140 MG/ML
140 INJECTION, SOLUTION SUBCUTANEOUS
Qty: 1.12 ML | Refills: 11 | Status: SHIPPED | OUTPATIENT
Start: 2024-11-30 | End: 2025-11-30

## 2024-12-02 ENCOUNTER — TELEPHONE (OUTPATIENT)
Dept: PHARMACY | Facility: MEDICAL CENTER | Age: 64
End: 2024-12-02
Payer: COMMERCIAL

## 2024-12-02 NOTE — TELEPHONE ENCOUNTER
Received New Start  request via MSOT  for (AIMOVIG) 140 MG/ML Solution Auto-injector. (Quantity:1, Day Supply:30)     Insurance: [facility]  Member ID:  [id number]  BIN: [id number]  PCN: [id number]  Group: [id number]     Ran Test claim via [place] & medication Pays for a $0 copay. Will outreach to patient to offer specialty pharmacy services and or release to preferred pharmacy    Pt already fills at our pharmacy on [address]. Will release to be put on hold.

## 2024-12-10 PROCEDURE — RXMED WILLOW AMBULATORY MEDICATION CHARGE: Performed by: PSYCHIATRY & NEUROLOGY

## 2024-12-11 ENCOUNTER — PHARMACY VISIT (OUTPATIENT)
Dept: PHARMACY | Facility: MEDICAL CENTER | Age: 64
End: 2024-12-11
Payer: COMMERCIAL

## 2024-12-14 DIAGNOSIS — E11.40 TYPE 2 DIABETES MELLITUS WITH DIABETIC NEUROPATHY, WITHOUT LONG-TERM CURRENT USE OF INSULIN (HCC): ICD-10-CM

## 2024-12-16 RX ORDER — SEMAGLUTIDE 2.68 MG/ML
2 INJECTION, SOLUTION SUBCUTANEOUS
Qty: 3 ML | Refills: 2 | Status: SHIPPED | OUTPATIENT
Start: 2024-12-16

## 2025-01-08 ENCOUNTER — TELEPHONE (OUTPATIENT)
Dept: PHARMACY | Facility: MEDICAL CENTER | Age: 65
End: 2025-01-08
Payer: COMMERCIAL

## 2025-01-08 PROCEDURE — RXMED WILLOW AMBULATORY MEDICATION CHARGE: Performed by: PSYCHIATRY & NEUROLOGY

## 2025-01-08 NOTE — TELEPHONE ENCOUNTER
Medication: Ubrogepant 100 MG Tab  Type of Insurance: Commercial  Type of Financial assistance requested Copay Card  Source: [email or web address]  Source Phone #:  [phone]  Outcome: Approved  Effective dates: 01/08/25 until 12/31/25  Details/Billing Information:   [id number]  PCN: [id number]  GRP: [id number]  ID: [id number]  Max Award Amount: $7000  Final Copay: $0

## 2025-01-09 ENCOUNTER — PHARMACY VISIT (OUTPATIENT)
Dept: PHARMACY | Facility: MEDICAL CENTER | Age: 65
End: 2025-01-09
Payer: COMMERCIAL

## 2025-01-13 DIAGNOSIS — R06.02 SHORT OF BREATH ON EXERTION: ICD-10-CM

## 2025-01-13 RX ORDER — ALBUTEROL SULFATE 90 UG/1
2 INHALANT RESPIRATORY (INHALATION) EVERY 4 HOURS PRN
Qty: 8.5 EACH | Refills: 2 | Status: SHIPPED | OUTPATIENT
Start: 2025-01-13

## 2025-01-28 ENCOUNTER — APPOINTMENT (OUTPATIENT)
Dept: MEDICAL GROUP | Facility: PHYSICIAN GROUP | Age: 65
End: 2025-01-28
Payer: COMMERCIAL

## 2025-01-28 DIAGNOSIS — G44.229 CHRONIC TENSION-TYPE HEADACHE, NOT INTRACTABLE: ICD-10-CM

## 2025-01-28 RX ORDER — AMITRIPTYLINE HYDROCHLORIDE 50 MG/1
50 TABLET ORAL EVERY EVENING
Qty: 90 TABLET | Refills: 0 | Status: SHIPPED | OUTPATIENT
Start: 2025-01-28

## 2025-02-02 PROCEDURE — RXMED WILLOW AMBULATORY MEDICATION CHARGE: Performed by: PSYCHIATRY & NEUROLOGY

## 2025-02-03 ENCOUNTER — APPOINTMENT (OUTPATIENT)
Dept: MEDICAL GROUP | Facility: PHYSICIAN GROUP | Age: 65
End: 2025-02-03
Payer: COMMERCIAL

## 2025-02-03 VITALS
BODY MASS INDEX: 39.44 KG/M2 | WEIGHT: 231 LBS | OXYGEN SATURATION: 94 % | HEART RATE: 93 BPM | DIASTOLIC BLOOD PRESSURE: 88 MMHG | RESPIRATION RATE: 14 BRPM | TEMPERATURE: 98 F | SYSTOLIC BLOOD PRESSURE: 116 MMHG | HEIGHT: 64 IN

## 2025-02-03 DIAGNOSIS — F43.10 POSTTRAUMATIC STRESS DISORDER: ICD-10-CM

## 2025-02-03 DIAGNOSIS — Z00.00 PHYSICAL EXAM, ANNUAL: ICD-10-CM

## 2025-02-03 DIAGNOSIS — E11.40 TYPE 2 DIABETES MELLITUS WITH DIABETIC NEUROPATHY, WITHOUT LONG-TERM CURRENT USE OF INSULIN (HCC): ICD-10-CM

## 2025-02-03 DIAGNOSIS — F41.1 GENERALIZED ANXIETY DISORDER: ICD-10-CM

## 2025-02-03 PROCEDURE — 3079F DIAST BP 80-89 MM HG: CPT | Performed by: PHYSICIAN ASSISTANT

## 2025-02-03 PROCEDURE — 92250 FUNDUS PHOTOGRAPHY W/I&R: CPT | Performed by: PHYSICIAN ASSISTANT

## 2025-02-03 PROCEDURE — 3074F SYST BP LT 130 MM HG: CPT | Performed by: PHYSICIAN ASSISTANT

## 2025-02-03 PROCEDURE — 99214 OFFICE O/P EST MOD 30 MIN: CPT | Performed by: PHYSICIAN ASSISTANT

## 2025-02-03 RX ORDER — ALPRAZOLAM 0.5 MG
0.5 TABLET ORAL
Qty: 35 TABLET | Refills: 0 | Status: SHIPPED | OUTPATIENT
Start: 2025-02-03 | End: 2025-03-10

## 2025-02-03 ASSESSMENT — ANXIETY QUESTIONNAIRES
1. FEELING NERVOUS, ANXIOUS, OR ON EDGE: NEARLY EVERY DAY
2. NOT BEING ABLE TO STOP OR CONTROL WORRYING: NEARLY EVERY DAY
3. WORRYING TOO MUCH ABOUT DIFFERENT THINGS: NEARLY EVERY DAY
6. BECOMING EASILY ANNOYED OR IRRITABLE: NOT AT ALL
4. TROUBLE RELAXING: NEARLY EVERY DAY
5. BEING SO RESTLESS THAT IT IS HARD TO SIT STILL: NOT AT ALL
GAD7 TOTAL SCORE: 15
7. FEELING AFRAID AS IF SOMETHING AWFUL MIGHT HAPPEN: NEARLY EVERY DAY

## 2025-02-03 ASSESSMENT — FIBROSIS 4 INDEX: FIB4 SCORE: 1.53

## 2025-02-03 NOTE — PROGRESS NOTES
"CC:  Chief Complaint   Patient presents with    Medication Refill     Xanax     Follow-Up     Anxiety, sleeping problems        HISTORY OF PRESENT ILLNESS: Patient is a 64 y.o. female established patient presenting with issues below  Pt having a lot of trouble with insomnia due to her increased anxiety. Has been taking trazodone 300mg, amitriptyline, and tylenol PM. Requesting refill of her xanax. Anxiety has been particularly bad lately.   Continues with ozempic for DM. Having trouble losing more weight.     Current Outpatient Medications   Medication Sig Dispense Refill    amitriptyline (ELAVIL) 50 MG Tab TAKE 1 TABLET BY MOUTH EVERY DAY IN THE EVENING 90 Tablet 0    albuterol 108 (90 Base) MCG/ACT Aero Soln inhalation aerosol INHALE 2 PUFFS EVERY 4 HOURS AS NEEDED FOR SHORTNESS OF BREATH 8.5 Each 2    Semaglutide, 2 MG/DOSE, (OZEMPIC, 2 MG/DOSE,) 8 MG/3ML Solution Pen-injector INJECT 2 MG SUBCUTANEOUSLY EVERY 7 DAYS 3 mL 2    Erenumab-aooe (AIMOVIG) 140 MG/ML Solution Auto-injector Inject 140 mg under the skin every 30 (thirty) days. 1.12 mL 11    traZODone (DESYREL) 100 MG Tab TAKE 1 TO 3 TABS BY MOUTH AT BEDTIME AS NEEDED FOR SLEEP. 270 Tablet 1    Ubrogepant 100 MG Tab Take 1 tablet (100 mg) at the onset of aura/HA; may re-dose x1 after 2 hrs if HA persists; Max daily dose: 200 mg 8 Tablet 11    diclofenac sodium (VOLTAREN) 1 % Gel APPLY 2 GRAMS TOPICALLY 4 TIMES A DAY AS NEEDED (JOINT PAIN). 100 g 1     No current facility-administered medications for this visit.        ROS:     ROS    Exam:    /88   Pulse 93   Temp 36.7 °C (98 °F) (Temporal)   Resp 14   Ht 1.626 m (5' 4\")   Wt 105 kg (231 lb)   SpO2 94%  Body mass index is 39.65 kg/m².    General:  Well nourished, well developed female in NAD  Head is grossly normal.  Neck: Supple.   skin: Warm and dry. No obvious lesions  Neuro: Normal muscle tone. Gait normal. Alert and oriented.  Psych: Normal mood and affect      Please note that this " dictation was created using voice recognition software. I have made every reasonable attempt to correct obvious errors, but I expect that there are errors of grammar and possibly content that I did not discover before finalizing the note.        Assessment/Plan:    1. CODY (generalized anxiety disorder)  PDMP checked.  Referral to therapist placed.  - Referral to Behavioral Health  - ALPRAZolam (XANAX) 0.5 MG Tab; Take 1 Tablet by mouth 1 time a day as needed for Sleep or Anxiety for up to 35 days.  Dispense: 35 Tablet; Refill: 0    2. PTSD (post-traumatic stress disorder)    - Referral to Behavioral Health  - ALPRAZolam (XANAX) 0.5 MG Tab; Take 1 Tablet by mouth 1 time a day as needed for Sleep or Anxiety for up to 35 days.  Dispense: 35 Tablet; Refill: 0    3. Physical exam, annual  Labs printed - CBC WITH DIFFERENTIAL; Future  - Comp Metabolic Panel; Future  - HEMOGLOBIN A1C; Future  - Lipid Profile; Future    4. Type 2 diabetes mellitus with diabetic neuropathy, without long-term current use of insulin (HCC)  diabetes has been well-controlled.  Will check on hemoglobin A1c again.  - MICROALBUMIN CREAT RATIO URINE; Future  - POCT Retinal Eye Exam

## 2025-02-06 ENCOUNTER — PHARMACY VISIT (OUTPATIENT)
Dept: PHARMACY | Facility: MEDICAL CENTER | Age: 65
End: 2025-02-06
Payer: COMMERCIAL

## 2025-02-10 LAB — RETINAL SCREEN: NEGATIVE

## 2025-02-27 PROCEDURE — RXMED WILLOW AMBULATORY MEDICATION CHARGE: Performed by: PSYCHIATRY & NEUROLOGY

## 2025-03-03 ENCOUNTER — PHARMACY VISIT (OUTPATIENT)
Dept: PHARMACY | Facility: MEDICAL CENTER | Age: 65
End: 2025-03-03
Payer: COMMERCIAL

## 2025-03-03 ENCOUNTER — HOSPITAL ENCOUNTER (OUTPATIENT)
Dept: LAB | Facility: MEDICAL CENTER | Age: 65
End: 2025-03-03
Attending: PHYSICIAN ASSISTANT
Payer: COMMERCIAL

## 2025-03-03 DIAGNOSIS — Z00.00 PHYSICAL EXAM, ANNUAL: ICD-10-CM

## 2025-03-03 DIAGNOSIS — F51.01 PRIMARY INSOMNIA: ICD-10-CM

## 2025-03-03 DIAGNOSIS — E11.40 TYPE 2 DIABETES MELLITUS WITH DIABETIC NEUROPATHY, WITHOUT LONG-TERM CURRENT USE OF INSULIN (HCC): ICD-10-CM

## 2025-03-03 LAB
ALBUMIN SERPL BCP-MCNC: 3.8 G/DL (ref 3.2–4.9)
ALBUMIN/GLOB SERPL: 1.5 G/DL
ALP SERPL-CCNC: 97 U/L (ref 30–99)
ALT SERPL-CCNC: 27 U/L (ref 2–50)
ANION GAP SERPL CALC-SCNC: 14 MMOL/L (ref 7–16)
AST SERPL-CCNC: 34 U/L (ref 12–45)
BASOPHILS # BLD AUTO: 0.9 % (ref 0–1.8)
BASOPHILS # BLD: 0.06 K/UL (ref 0–0.12)
BILIRUB SERPL-MCNC: 0.3 MG/DL (ref 0.1–1.5)
BUN SERPL-MCNC: 12 MG/DL (ref 8–22)
CALCIUM ALBUM COR SERPL-MCNC: 8.8 MG/DL (ref 8.5–10.5)
CALCIUM SERPL-MCNC: 8.6 MG/DL (ref 8.5–10.5)
CHLORIDE SERPL-SCNC: 105 MMOL/L (ref 96–112)
CHOLEST SERPL-MCNC: 151 MG/DL (ref 100–199)
CO2 SERPL-SCNC: 20 MMOL/L (ref 20–33)
CREAT SERPL-MCNC: 0.85 MG/DL (ref 0.5–1.4)
CREAT UR-MCNC: 276 MG/DL
EOSINOPHIL # BLD AUTO: 0.09 K/UL (ref 0–0.51)
EOSINOPHIL NFR BLD: 1.4 % (ref 0–6.9)
ERYTHROCYTE [DISTWIDTH] IN BLOOD BY AUTOMATED COUNT: 45.9 FL (ref 35.9–50)
EST. AVERAGE GLUCOSE BLD GHB EST-MCNC: 94 MG/DL
FASTING STATUS PATIENT QL REPORTED: NORMAL
GFR SERPLBLD CREATININE-BSD FMLA CKD-EPI: 77 ML/MIN/1.73 M 2
GLOBULIN SER CALC-MCNC: 2.6 G/DL (ref 1.9–3.5)
GLUCOSE SERPL-MCNC: 103 MG/DL (ref 65–99)
HBA1C MFR BLD: 4.9 % (ref 4–5.6)
HCT VFR BLD AUTO: 44.8 % (ref 37–47)
HDLC SERPL-MCNC: 41 MG/DL
HGB BLD-MCNC: 14.7 G/DL (ref 12–16)
IMM GRANULOCYTES # BLD AUTO: 0.04 K/UL (ref 0–0.11)
IMM GRANULOCYTES NFR BLD AUTO: 0.6 % (ref 0–0.9)
LDLC SERPL CALC-MCNC: 79 MG/DL
LYMPHOCYTES # BLD AUTO: 2.14 K/UL (ref 1–4.8)
LYMPHOCYTES NFR BLD: 33.4 % (ref 22–41)
MCH RBC QN AUTO: 33 PG (ref 27–33)
MCHC RBC AUTO-ENTMCNC: 32.8 G/DL (ref 32.2–35.5)
MCV RBC AUTO: 100.4 FL (ref 81.4–97.8)
MICROALBUMIN UR-MCNC: 3.2 MG/DL
MICROALBUMIN/CREAT UR: 12 MG/G (ref 0–30)
MONOCYTES # BLD AUTO: 0.46 K/UL (ref 0–0.85)
MONOCYTES NFR BLD AUTO: 7.2 % (ref 0–13.4)
NEUTROPHILS # BLD AUTO: 3.61 K/UL (ref 1.82–7.42)
NEUTROPHILS NFR BLD: 56.5 % (ref 44–72)
NRBC # BLD AUTO: 0 K/UL
NRBC BLD-RTO: 0 /100 WBC (ref 0–0.2)
PLATELET # BLD AUTO: 253 K/UL (ref 164–446)
PMV BLD AUTO: 9.1 FL (ref 9–12.9)
POTASSIUM SERPL-SCNC: 4.1 MMOL/L (ref 3.6–5.5)
PROT SERPL-MCNC: 6.4 G/DL (ref 6–8.2)
RBC # BLD AUTO: 4.46 M/UL (ref 4.2–5.4)
SODIUM SERPL-SCNC: 139 MMOL/L (ref 135–145)
TRIGL SERPL-MCNC: 153 MG/DL (ref 0–149)
WBC # BLD AUTO: 6.4 K/UL (ref 4.8–10.8)

## 2025-03-03 PROCEDURE — 83036 HEMOGLOBIN GLYCOSYLATED A1C: CPT | Mod: GY

## 2025-03-03 PROCEDURE — 80053 COMPREHEN METABOLIC PANEL: CPT | Mod: GY

## 2025-03-03 PROCEDURE — 82043 UR ALBUMIN QUANTITATIVE: CPT

## 2025-03-03 PROCEDURE — 85025 COMPLETE CBC W/AUTO DIFF WBC: CPT | Mod: GY

## 2025-03-03 PROCEDURE — 36415 COLL VENOUS BLD VENIPUNCTURE: CPT | Mod: GY

## 2025-03-03 PROCEDURE — 82570 ASSAY OF URINE CREATININE: CPT

## 2025-03-03 PROCEDURE — 80061 LIPID PANEL: CPT | Mod: GY

## 2025-03-03 RX ORDER — TRAZODONE HYDROCHLORIDE 100 MG/1
TABLET ORAL
Qty: 270 TABLET | Refills: 1 | Status: SHIPPED | OUTPATIENT
Start: 2025-03-03

## 2025-03-03 NOTE — TELEPHONE ENCOUNTER
Received request via: Patient    Was the patient seen in the last year in this department? Yes    Does the patient have an active prescription (recently filled or refills available) for medication(s) requested? No    [facility]    Does the patient have [facility] Plus and need 100-day supply? (This applies to ALL medications) Patient does not have SCP

## 2025-03-06 ENCOUNTER — RESULTS FOLLOW-UP (OUTPATIENT)
Dept: MEDICAL GROUP | Facility: PHYSICIAN GROUP | Age: 65
End: 2025-03-06
Payer: COMMERCIAL

## 2025-03-07 DIAGNOSIS — F43.10 POSTTRAUMATIC STRESS DISORDER: ICD-10-CM

## 2025-03-07 DIAGNOSIS — F41.1 GENERALIZED ANXIETY DISORDER: ICD-10-CM

## 2025-03-10 NOTE — TELEPHONE ENCOUNTER
Received request via: Patient    Was the patient seen in the last year in this department? Yes    Does the patient have an active prescription (recently filled or refills available) for medication(s) requested? No    [facility]    Does the patient have [facility] Plus and need 100-day supply? (This applies to ALL medications) Patient does not have SCP

## 2025-03-14 RX ORDER — ALPRAZOLAM 0.5 MG
0.5 TABLET ORAL
Qty: 35 TABLET | Refills: 0 | Status: SHIPPED | OUTPATIENT
Start: 2025-03-14 | End: 2025-04-18

## 2025-03-20 DIAGNOSIS — E11.40 TYPE 2 DIABETES MELLITUS WITH DIABETIC NEUROPATHY, WITHOUT LONG-TERM CURRENT USE OF INSULIN (HCC): ICD-10-CM

## 2025-03-20 RX ORDER — SEMAGLUTIDE 2.68 MG/ML
2 INJECTION, SOLUTION SUBCUTANEOUS
Qty: 3 ML | Refills: 2 | Status: SHIPPED | OUTPATIENT
Start: 2025-03-20

## 2025-03-26 PROCEDURE — RXMED WILLOW AMBULATORY MEDICATION CHARGE: Performed by: PSYCHIATRY & NEUROLOGY

## 2025-03-27 ENCOUNTER — PHARMACY VISIT (OUTPATIENT)
Dept: PHARMACY | Facility: MEDICAL CENTER | Age: 65
End: 2025-03-27
Payer: COMMERCIAL

## 2025-04-14 DIAGNOSIS — G44.229 CHRONIC TENSION-TYPE HEADACHE, NOT INTRACTABLE: ICD-10-CM

## 2025-04-14 DIAGNOSIS — F41.1 GENERALIZED ANXIETY DISORDER: ICD-10-CM

## 2025-04-14 DIAGNOSIS — F43.10 POSTTRAUMATIC STRESS DISORDER: ICD-10-CM

## 2025-04-14 NOTE — TELEPHONE ENCOUNTER
Received request via: Patient    Was the patient seen in the last year in this department? Yes    Does the patient have an active prescription (recently filled or refills available) for medication(s) requested? No    [facility]    Does the patient have snf Plus and need 100-day supply? (This applies to ALL medications) Patient does not have SCP

## 2025-04-14 NOTE — TELEPHONE ENCOUNTER
Received request via: Patient    Was the patient seen in the last year in this department? Yes    Does the patient have an active prescription (recently filled or refills available) for medication(s) requested? No    [facility]    Does the patient have [facility] Plus and need 100-day supply? (This applies to ALL medications) Patient does not have SCP   Erivedge Counseling- I discussed with the patient the risks of Erivedge including but not limited to nausea, vomiting, diarrhea, constipation, weight loss, changes in the sense of taste, decreased appetite, muscle spasms, and hair loss.  The patient verbalized understanding of the proper use and possible adverse effects of Erivedge.  All of the patient's questions and concerns were addressed.

## 2025-04-15 RX ORDER — AMITRIPTYLINE HYDROCHLORIDE 50 MG/1
50 TABLET ORAL EVERY EVENING
Qty: 90 TABLET | Refills: 0 | Status: SHIPPED | OUTPATIENT
Start: 2025-04-15

## 2025-04-20 DIAGNOSIS — R06.02 SHORT OF BREATH ON EXERTION: ICD-10-CM

## 2025-04-21 DIAGNOSIS — G43.E09 CHRONIC MIGRAINE WITH AURA WITHOUT STATUS MIGRAINOSUS, NOT INTRACTABLE: ICD-10-CM

## 2025-04-21 RX ORDER — ALBUTEROL SULFATE 90 UG/1
2 INHALANT RESPIRATORY (INHALATION) EVERY 4 HOURS PRN
Qty: 8.5 EACH | Refills: 2 | Status: SHIPPED | OUTPATIENT
Start: 2025-04-21

## 2025-04-21 NOTE — TELEPHONE ENCOUNTER
Received request via: Pharmacy    Medication Name/Dosage Ubrelvy 100mg    When was medication last prescribed 5/7/24    How many refills were previously provided 11    How many Refills does he patient have left from last prescription 0    Was the patient seen in the last year in this department? Yes   Date of last office visit 5/7/24     Per last Neurology Office Visit, when was the date of next follow up visit set for?                            Date of office visit follow up request 8/7/25    Does the patient have an upcoming appointment? No   If yes, when -             If no, schedule appointment sent message to schedule    Does the patient have long-term Plus and need 100 day supply (blood pressure, diabetes and cholesterol meds only)? Patient does not have SCP

## 2025-04-22 RX ORDER — UBROGEPANT 100 MG/1
TABLET ORAL
Qty: 8 TABLET | Refills: 11 | Status: SHIPPED | OUTPATIENT
Start: 2025-04-22 | End: 2026-04-22

## 2025-04-23 DIAGNOSIS — F41.1 GENERALIZED ANXIETY DISORDER: ICD-10-CM

## 2025-04-23 DIAGNOSIS — F43.10 POSTTRAUMATIC STRESS DISORDER: ICD-10-CM

## 2025-04-24 PROCEDURE — RXMED WILLOW AMBULATORY MEDICATION CHARGE: Performed by: PSYCHIATRY & NEUROLOGY

## 2025-04-24 RX ORDER — ALPRAZOLAM 0.5 MG
0.5 TABLET ORAL
Qty: 35 TABLET | Refills: 0 | Status: SHIPPED | OUTPATIENT
Start: 2025-04-24 | End: 2025-05-29

## 2025-04-28 ENCOUNTER — PHARMACY VISIT (OUTPATIENT)
Dept: PHARMACY | Facility: MEDICAL CENTER | Age: 65
End: 2025-04-28
Payer: COMMERCIAL

## 2025-05-19 ENCOUNTER — TELEPHONE (OUTPATIENT)
Dept: PHARMACY | Facility: MEDICAL CENTER | Age: 65
End: 2025-05-19
Payer: COMMERCIAL

## 2025-05-19 NOTE — TELEPHONE ENCOUNTER
Received Renewal PA request via [employer]  for Erenumab-aooe (AIMOVIG) 140 MG/ML Solution Auto-injector . (Quantity:1, Day Supply:30)     Insurance: [employer]  Member ID:  [id number]  BIN: [id number]  PCN: [id number]  Group: [id number]     Ran Test claim via [place] & medication Rejects stating prior authorization is required.

## 2025-05-19 NOTE — TELEPHONE ENCOUNTER
Prior Authorization for     Erenumab-aooe (AIMOVIG) 140 MG/ML Solution Auto-injector     has been approved for a quantity of 1 , day supply 30    Prior Authorization reference number: [id number]  Insurance: [facility] Personal  Effective dates: 5/19/25 to 5/19/27  Copay: $0     Is patient eligible to fill with [facility]? Yes    Next Steps: The Patients copay is less than $5.00. Will contact the patient to determine choice of pharmacy, if applicable.

## 2025-05-19 NOTE — TELEPHONE ENCOUNTER
Prior Authorization for     Erenumab-aooe (AIMOVIG) 140 MG/ML Solution Auto-injector     (Quantity: 1, Days: 30) has been submitted via Cover My Meds: Key ([id number])    Insurance: [facility] Personal    Will follow up in 24-48 business hours.

## 2025-05-20 PROCEDURE — RXMED WILLOW AMBULATORY MEDICATION CHARGE: Performed by: PSYCHIATRY & NEUROLOGY

## 2025-05-21 ENCOUNTER — PHARMACY VISIT (OUTPATIENT)
Dept: PHARMACY | Facility: MEDICAL CENTER | Age: 65
End: 2025-05-21
Payer: COMMERCIAL

## 2025-05-27 RX ORDER — ALPRAZOLAM 0.5 MG
0.5 TABLET ORAL
Qty: 35 TABLET | Refills: 0 | Status: SHIPPED | OUTPATIENT
Start: 2025-05-27 | End: 2025-07-01

## 2025-06-03 DIAGNOSIS — G44.229 CHRONIC TENSION-TYPE HEADACHE, NOT INTRACTABLE: ICD-10-CM

## 2025-06-04 RX ORDER — AMITRIPTYLINE HYDROCHLORIDE 50 MG/1
50 TABLET ORAL EVERY EVENING
Qty: 90 TABLET | Refills: 0 | Status: SHIPPED | OUTPATIENT
Start: 2025-06-04

## 2025-06-17 PROCEDURE — RXMED WILLOW AMBULATORY MEDICATION CHARGE: Performed by: PSYCHIATRY & NEUROLOGY

## 2025-06-18 ENCOUNTER — PHARMACY VISIT (OUTPATIENT)
Dept: PHARMACY | Facility: MEDICAL CENTER | Age: 65
End: 2025-06-18
Payer: COMMERCIAL

## 2025-06-22 DIAGNOSIS — E11.40 TYPE 2 DIABETES MELLITUS WITH DIABETIC NEUROPATHY, WITHOUT LONG-TERM CURRENT USE OF INSULIN (HCC): ICD-10-CM

## 2025-06-23 RX ORDER — SEMAGLUTIDE 2.68 MG/ML
2 INJECTION, SOLUTION SUBCUTANEOUS
Qty: 3 ML | Refills: 2 | Status: SHIPPED | OUTPATIENT
Start: 2025-06-23

## 2025-06-23 NOTE — TELEPHONE ENCOUNTER
Received request via: Patient    Was the patient seen in the last year in this department? Yes    Does the patient have an active prescription (recently filled or refills available) for medication(s) requested? No    [facility]    Does the patient have [facility] Plus and need 100-day supply? (This applies to ALL medications) Patient does not have SCP

## 2025-07-07 DIAGNOSIS — F43.10 POSTTRAUMATIC STRESS DISORDER: ICD-10-CM

## 2025-07-07 DIAGNOSIS — F41.1 GENERALIZED ANXIETY DISORDER: ICD-10-CM

## 2025-07-07 DIAGNOSIS — R06.02 SHORT OF BREATH ON EXERTION: ICD-10-CM

## 2025-07-07 RX ORDER — ALBUTEROL SULFATE 90 UG/1
2 INHALANT RESPIRATORY (INHALATION) EVERY 4 HOURS PRN
Qty: 8.5 EACH | Refills: 2 | Status: SHIPPED | OUTPATIENT
Start: 2025-07-07

## 2025-07-07 RX ORDER — ALPRAZOLAM 0.5 MG
0.5 TABLET ORAL
Qty: 35 TABLET | Refills: 0 | Status: SHIPPED | OUTPATIENT
Start: 2025-07-07 | End: 2025-08-11

## 2025-07-11 PROCEDURE — RXMED WILLOW AMBULATORY MEDICATION CHARGE: Performed by: PSYCHIATRY & NEUROLOGY

## 2025-07-14 ENCOUNTER — PHARMACY VISIT (OUTPATIENT)
Dept: PHARMACY | Facility: MEDICAL CENTER | Age: 65
End: 2025-07-14
Payer: COMMERCIAL

## 2025-08-04 ENCOUNTER — OFFICE VISIT (OUTPATIENT)
Dept: MEDICAL GROUP | Facility: PHYSICIAN GROUP | Age: 65
End: 2025-08-04
Payer: COMMERCIAL

## 2025-08-04 VITALS
SYSTOLIC BLOOD PRESSURE: 120 MMHG | HEART RATE: 86 BPM | RESPIRATION RATE: 12 BRPM | WEIGHT: 225 LBS | OXYGEN SATURATION: 94 % | DIASTOLIC BLOOD PRESSURE: 68 MMHG | TEMPERATURE: 97.7 F | BODY MASS INDEX: 38.41 KG/M2 | HEIGHT: 64 IN

## 2025-08-04 DIAGNOSIS — F33.42 RECURRENT MAJOR DEPRESSIVE DISORDER, IN FULL REMISSION (HCC): ICD-10-CM

## 2025-08-04 DIAGNOSIS — F43.81 PROLONGED GRIEF DISORDER: ICD-10-CM

## 2025-08-04 DIAGNOSIS — F43.10 POSTTRAUMATIC STRESS DISORDER: ICD-10-CM

## 2025-08-04 DIAGNOSIS — F51.01 PRIMARY INSOMNIA: ICD-10-CM

## 2025-08-04 DIAGNOSIS — Z00.00 MEDICARE ANNUAL WELLNESS VISIT, SUBSEQUENT: ICD-10-CM

## 2025-08-04 DIAGNOSIS — E11.40 TYPE 2 DIABETES MELLITUS WITH DIABETIC NEUROPATHY, WITHOUT LONG-TERM CURRENT USE OF INSULIN (HCC): Primary | ICD-10-CM

## 2025-08-04 LAB
HBA1C MFR BLD: 4.7 % (ref ?–5.8)
POCT INT CON NEG: NEGATIVE
POCT INT CON POS: POSITIVE

## 2025-08-04 PROCEDURE — G0439 PPPS, SUBSEQ VISIT: HCPCS | Performed by: PHYSICIAN ASSISTANT

## 2025-08-04 PROCEDURE — 3078F DIAST BP <80 MM HG: CPT | Performed by: PHYSICIAN ASSISTANT

## 2025-08-04 PROCEDURE — 3074F SYST BP LT 130 MM HG: CPT | Performed by: PHYSICIAN ASSISTANT

## 2025-08-04 PROCEDURE — 83036 HEMOGLOBIN GLYCOSYLATED A1C: CPT | Performed by: PHYSICIAN ASSISTANT

## 2025-08-04 RX ORDER — TRAZODONE HYDROCHLORIDE 100 MG/1
TABLET ORAL
Qty: 270 TABLET | Refills: 1 | Status: SHIPPED | OUTPATIENT
Start: 2025-08-04

## 2025-08-04 ASSESSMENT — FIBROSIS 4 INDEX: FIB4 SCORE: 1.66

## 2025-08-04 ASSESSMENT — ACTIVITIES OF DAILY LIVING (ADL): BATHING_REQUIRES_ASSISTANCE: 0

## 2025-08-04 ASSESSMENT — PATIENT HEALTH QUESTIONNAIRE - PHQ9
CLINICAL INTERPRETATION OF PHQ2 SCORE: 6
5. POOR APPETITE OR OVEREATING: 3 - NEARLY EVERY DAY
SUM OF ALL RESPONSES TO PHQ QUESTIONS 1-9: 24

## 2025-08-04 ASSESSMENT — ENCOUNTER SYMPTOMS: GENERAL WELL-BEING: FAIR

## 2025-08-08 ENCOUNTER — SPECIALTY PHARMACY (OUTPATIENT)
Dept: PHARMACY | Facility: MEDICAL CENTER | Age: 65
End: 2025-08-08
Payer: COMMERCIAL

## 2025-08-08 PROCEDURE — RXMED WILLOW AMBULATORY MEDICATION CHARGE: Performed by: PSYCHIATRY & NEUROLOGY

## 2025-08-11 ENCOUNTER — PHARMACY VISIT (OUTPATIENT)
Dept: PHARMACY | Facility: MEDICAL CENTER | Age: 65
End: 2025-08-11
Payer: COMMERCIAL

## 2025-08-15 DIAGNOSIS — G44.229 CHRONIC TENSION-TYPE HEADACHE, NOT INTRACTABLE: ICD-10-CM

## 2025-08-15 DIAGNOSIS — F43.10 POSTTRAUMATIC STRESS DISORDER: ICD-10-CM

## 2025-08-15 DIAGNOSIS — F41.1 GENERALIZED ANXIETY DISORDER: ICD-10-CM

## 2025-08-18 RX ORDER — ALPRAZOLAM 0.5 MG
0.5 TABLET ORAL
Qty: 35 TABLET | Refills: 0 | Status: SHIPPED | OUTPATIENT
Start: 2025-08-18 | End: 2025-09-22

## 2025-08-18 RX ORDER — AMITRIPTYLINE HYDROCHLORIDE 50 MG/1
50 TABLET ORAL EVERY EVENING
Qty: 90 TABLET | Refills: 0 | Status: SHIPPED | OUTPATIENT
Start: 2025-08-18

## (undated) DEVICE — BLADE DERMATOME NEW AIR (10/BX)

## (undated) DEVICE — GLOVE BIOGEL SZ 8 SURGICAL PF LTX - (50PR/BX 4BX/CA)

## (undated) DEVICE — SLEEVE, VASO, THIGH, MED

## (undated) DEVICE — TOWEL STOP TIMEOUT SAFETY FLAG (40EA/CA)

## (undated) DEVICE — DRAPE LAPAROTOMY T SHEET - (12EA/CA)

## (undated) DEVICE — BAG SPONGE COUNT 10.25 X 32 - BLUE (250/CA)

## (undated) DEVICE — GLOVE SZ 7.5 BIOGEL PI MICRO - PF LF (50PR/BX)

## (undated) DEVICE — NEPTUNE 4 PORT MANIFOLD - (20/PK)

## (undated) DEVICE — GLOVE BIOGEL INDICATOR SZ 7SURGICAL PF LTX - (50/BX 4BX/CA)

## (undated) DEVICE — TRAY SRGPRP PVP IOD WT PRP - (20/CA)

## (undated) DEVICE — TUBING CLEARLINK DUO-VENT - C-FLO (48EA/CA)

## (undated) DEVICE — SODIUM CHL IRRIGATION 0.9% 1000ML (12EA/CA)

## (undated) DEVICE — SUTURE 2-0 ETHILON FS - (36/BX) 18 INCH

## (undated) DEVICE — CHLORAPREP 26 ML APPLICATOR - ORANGE TINT(25/CA)

## (undated) DEVICE — DEPRESSOR TONGUE ADLT NS 6 IN - NON STERILE (500/BX)

## (undated) DEVICE — SUTURE 1 VICRYL PLUS CTX - 8 X 18 INCH (12/BX)

## (undated) DEVICE — GOWN WARMING STANDARD FLEX - (30/CA)

## (undated) DEVICE — SUTURE 2-0 VICRYL PLUS CTX - 36 INCH (36/BX)

## (undated) DEVICE — ELECTRODE 850 FOAM ADHESIVE - HYDROGEL RADIOTRNSPRNT (50/PK)

## (undated) DEVICE — HANDPIECE 10FT INTPLS SCT PLS IRRIGATION HAND CONTROL SET (6/PK)

## (undated) DEVICE — ELECTRODE DUAL RETURN W/ CORD - (50/PK)

## (undated) DEVICE — MASK ANESTHESIA ADULT  - (100/CA)

## (undated) DEVICE — KIT ANESTHESIA W/CIRCUIT & 3/LT BAG W/FILTER (20EA/CA)

## (undated) DEVICE — SUCTION INSTRUMENT YANKAUER BULBOUS TIP W/O VENT (50EA/CA)

## (undated) DEVICE — SPONGE DRAIN 4 X 4IN 6-PLY - (2/PK25PK/BX12BX/CS)

## (undated) DEVICE — TUBE CONNECTING SUCTION - CLEAR PLASTIC STERILE 72 IN (50EA/CA)

## (undated) DEVICE — HEAD HOLDER JUNIOR/ADULT

## (undated) DEVICE — LACTATED RINGERS INJ 1000 ML - (14EA/CA 60CA/PF)

## (undated) DEVICE — SET LEADWIRE 5 LEAD BEDSIDE DISPOSABLE ECG (1SET OF 5/EA)

## (undated) DEVICE — SENSOR SPO2 NEO LNCS ADHESIVE (20/BX) SEE USER NOTES

## (undated) DEVICE — DRESSING BORDERED GAUZE 4X10 - (15EA/BX 10BX/CA 150EA/CA)

## (undated) DEVICE — PROTECTOR ULNA NERVE - (36PR/CA)

## (undated) DEVICE — TOWELS CLOTH SURGICAL - (4/PK 20PK/CA)

## (undated) DEVICE — WATER IRRIGATION STERILE 1000ML (12EA/CA)

## (undated) DEVICE — CANISTER SUCTION 3000ML MECHANICAL FILTER AUTO SHUTOFF MEDI-VAC NONSTERILE LF DISP  (40EA/CA)

## (undated) DEVICE — SODIUM CHL. IRRIGATION 0.9% 3000ML (4EA/CA 65CA/PF)

## (undated) DEVICE — SUTURE 3-0 ETHILON FS-1 - (36/BX) 30 INCH

## (undated) DEVICE — GLOVE BIOGEL PI ULTRATOUCH SZ 8.0 SURGICAL PF LF - (50/BX 4BX/CA)

## (undated) DEVICE — GLOVE SZ 7 BIOGEL PI MICRO - PF LF (50PR/BX 4BX/CA)

## (undated) DEVICE — GLOVE SZ 7.5 LF PROTEXIS (50PR/BX)

## (undated) DEVICE — GLOVE, LITE (PAIR)

## (undated) DEVICE — SET EXTENSION WITH 2 PORTS (48EA/CA) ***PART #2C8610 IS A SUBSTITUTE*****

## (undated) DEVICE — SYRINGE 10 ML CONTROL LL (25EA/BX 4BX/CA)

## (undated) DEVICE — BLADE SURGICAL #15 - (50/BX 3BX/CA)

## (undated) DEVICE — DRAIN J-VAC 7MM FLAT - (10EA/CA)

## (undated) DEVICE — CANISTER SUCTION RIGID RED 1500CC (40EA/CA)

## (undated) DEVICE — GLOVE BIOGEL PI INDICATOR SZ 7.5 SURGICAL PF LF -(50/BX 4BX/CA)

## (undated) DEVICE — GLOVE BIOGEL INDICATOR SZ 6.5 SURGICAL PF LTX - (50PR/BX 4BX/CA)

## (undated) DEVICE — PAD LAP STERILE 18 X 18 - (5/PK 40PK/CA)

## (undated) DEVICE — DRAPE IOBAN II INCISE 23X17 - (10EA/BX 4BX/CA)

## (undated) DEVICE — SPONGE GAUZESTER 4 X 4 4PLY - (128PK/CA)

## (undated) DEVICE — TRAY SKIN SCRUB PVP WET (20EA/CA) PART #DYND70356 DISCONTINUED

## (undated) DEVICE — SUTURE 2-0 VICRYL PLUS CT-1 - 8 X 18 INCH(12/BX)

## (undated) DEVICE — SUTURE 3-0 VICRYL PLUS SH - 8X 18 INCH (12/BX)

## (undated) DEVICE — DERMACARRIER 8 (NEW MESHGFT) - (10/BX)

## (undated) DEVICE — SUTURE GENERAL

## (undated) DEVICE — GLOVE BIOGEL SZ 7 SURGICAL PF LTX - (50PR/BX 4BX/CA)

## (undated) DEVICE — GAUZE PETROLATUM 3 X 9 - VASELINE (50EA/BX 4BX/CA)

## (undated) DEVICE — TIP INTPLS HFLO ML ORFC BTRY - (12/CS)  FOR SURGILAV

## (undated) DEVICE — STAPLER SKIN DISP - (6/BX 10BX/CA) VISISTAT

## (undated) DEVICE — DERMABOND ADVANCED - (12EA/BX)

## (undated) DEVICE — RESERVOIR SUCTION 100 CC - SILICONE (20EA/CA)

## (undated) DEVICE — SLEEVE VASO CALF MED - (10PR/CA)

## (undated) DEVICE — DRAPE LARGE 3 QUARTER - (20/CA)

## (undated) DEVICE — BINDER ABDOMINAL 62-74 INCH - 12 IN (1/EA)

## (undated) DEVICE — DRESSING TRANSPARENT FILM TEGADERM 4 X 4.75" (50EA/BX)"

## (undated) DEVICE — TUBE E-T HI-LO CUFF 7.0MM (10EA/PK)

## (undated) DEVICE — CLOSURE WOUND 1/4 X 4 (STERI - STRIP) (50/BX 4BX/CA)

## (undated) DEVICE — GLOVE BIOGEL SZ 6.5 SURGICAL PF LTX (50PR/BX 4BX/CA)

## (undated) DEVICE — GOWN SURGEONS LARGE - (32/CA)

## (undated) DEVICE — DRESSING TEGADERM 8 X 12 - (10/BX 8BX/CA)

## (undated) DEVICE — PACK MINOR BASIN - (2EA/CA)

## (undated) DEVICE — NEEDLE NON SAFETY HYPO 22 GA X 1 1/2 IN (100/BX)

## (undated) DEVICE — DRESSING PETROLEUM GAUZE 5 X 9" (50EA/BX 4BX/CA)"

## (undated) DEVICE — HUMID-VENT HEAT AND MOISTURE EXCHANGE- (50/BX)

## (undated) DEVICE — PACK MAJOR BASIN - (2EA/CA)

## (undated) DEVICE — DRESSING, WOUND VAC MED.